# Patient Record
Sex: MALE | Race: WHITE | NOT HISPANIC OR LATINO | Employment: OTHER | URBAN - METROPOLITAN AREA
[De-identification: names, ages, dates, MRNs, and addresses within clinical notes are randomized per-mention and may not be internally consistent; named-entity substitution may affect disease eponyms.]

---

## 2017-01-09 ENCOUNTER — ALLSCRIPTS OFFICE VISIT (OUTPATIENT)
Dept: OTHER | Facility: OTHER | Age: 52
End: 2017-01-09

## 2017-02-13 ENCOUNTER — HOSPITAL ENCOUNTER (OUTPATIENT)
Dept: CT IMAGING | Facility: CLINIC | Age: 52
Discharge: HOME | End: 2017-02-13
Payer: MEDICARE

## 2017-02-13 ENCOUNTER — ALLSCRIPTS OFFICE VISIT (OUTPATIENT)
Dept: OTHER | Facility: OTHER | Age: 52
End: 2017-02-13

## 2017-02-13 DIAGNOSIS — F17.200 NICOTINE DEPENDENCE, UNCOMPLICATED: ICD-10-CM

## 2017-02-13 DIAGNOSIS — J45.909 UNCOMPLICATED ASTHMA: ICD-10-CM

## 2017-02-13 DIAGNOSIS — J44.9 CHRONIC OBSTRUCTIVE PULMONARY DISEASE (HCC): ICD-10-CM

## 2017-02-13 PROCEDURE — 71250 CT THORAX DX C-: CPT

## 2017-09-06 ENCOUNTER — GENERIC CONVERSION - ENCOUNTER (OUTPATIENT)
Dept: OTHER | Facility: OTHER | Age: 52
End: 2017-09-06

## 2017-10-11 ENCOUNTER — ALLSCRIPTS OFFICE VISIT (OUTPATIENT)
Dept: OTHER | Facility: OTHER | Age: 52
End: 2017-10-11

## 2017-10-13 NOTE — PROGRESS NOTES
Assessment  1  Acute otitis externa of left ear (380 10) (H60 502)   2  Acute asthmatic bronchitis (493 90) (J45 909)   3  History of hematemesis (V12 79) (Z87 19)   4  GERD (gastroesophageal reflux disease) (530 81) (K21 9)   5  Polysubstance dependence (304 80) (F19 20)   6  Nicotine dependence (305 1) (F17 200)    Plan  Acute asthmatic bronchitis    · Cefuroxime Axetil 500 MG Oral Tablet; Take 1 tablet twice daily for 10 days   · ProAir  (90 Base) MCG/ACT Inhalation Aerosol Solution; INHALE 2 PUFFS  EVERY 4-6 HOURS AS NEEDED  Acute otitis externa of left ear    · Neomycin-Polymyxin-HC 3 5-57409-0 Otic Suspension; 2 drops to the affected ear  QID for 7 days  GERD (gastroesophageal reflux disease), Hematemesis/vomiting blood    · Omeprazole 40 MG Oral Capsule Delayed Release; take 1 capsule daily   · 2 - Harry Gray MD, Lore[facility] Chatman  (Gastroenterology) Co-Management  *  Status: Active  Requested  for: 36SOP4688  Care Summary provided  : Yes  Polysubstance dependence    · 3 - [facility] (Psychiatry) Co-Management  *  Status: Hold For - Scheduling   Requested for: 19QHM9901  are Referring to a non- Preferred Provider : Quality  Care Summary provided  : Yes    Discussion/Summary    1  Acute otitis externa of the left ear-the patient will use the eardrops as ordered  He will try to avoid water in the ear and will keep it dry  He will call if the symptoms do not improve within a week  Acute asthmatic bronchitis-we will treat the patient with antibiotic as ordered and also gave him a albuterol inhaler to use as needed for shortness of breath  Status post episode of hematemesis and history of GERD-we will start the patient on the omeprazole as ordered  I have referred him to GI for further evaluation and probable endoscopy  He was advised to follow up in the emergency room with any recurrent symptoms of him out and assess  Polysubstance abuse-I referred him to an outpatient drug and alcohol treatment program as ordered to help with his recovery  We will see him back in the office as scheduled  Chief Complaint  Cough with mucous, and left ear pain, swollen, and clogged  Vomiting blood and blood in stool  History of Present Illness  HPI: Mireya Major is a 68-year-old male who presents today with a cough productive mucus as left ear is painful and swollen  He is having a cough and chest congestion  He is smoking 2 ppd  His left ear is painful and swollen for 5 months  It is worse recently  There is some drainage form the ear  There is no bleeding  There is pressure in the ear  There are no fevers or chills  He had an episode of a large amount of hematemesis on 10/05/2017- he recently stopped taking his opioids that he was getting off the street  He had been having withdrawal symptoms-last week and he was having diarrhea and sweats  He felt better by 10/04/2017  He was taking a lot of Advil and developed burning in his esophagus and stomach and he had 2 severe episodes of hematemesis  He has not had any further episodes since then  He was having black tarry stools-and that has resolved- he only had one episode  He is not having pain  He is drinking alcohol  He did not go to the emergency room with his symptoms  Currently has had no further episodes of hematemesis but still has some abdominal burning and   Cold Symptoms: [address] presents with complaints of cold symptoms  Associated symptoms include sneezing,-nasal congestion,-post nasal drainage,-productive cough,-plugged ear(s)-and-ear pain, but-no runny nose,-no scratchy throat,-no sore throat,-no hoarseness,-no dry cough,-no facial pressure,-no facial pain,-no headache,-no swollen lymph nodes,-no wheezing,-no shortness of breath,-no fatigue,-no weakness,-no nausea,-no vomiting,-no diarrhea,-no fever-and-no chills  Hematemesis: [address] presents with complaints of hematemesis     Associated symptoms include vomiting blood-and-epigastric pain, but-no bloating,-no cramping,-no nausea,-no prolonged emesis,-no prolonged retching,-no diarrhea,-no hematochezia,-no melena,-no anorexia,-no easy bruising,-no epistaxis-and-no hemoptysis  Review of Systems    Constitutional: as noted in HPI    ENT: as noted in HPI  Cardiovascular: as noted in HPI  Respiratory: as noted in HPI  Gastrointestinal: as noted in HPI  Genitourinary: as noted in HPI  Musculoskeletal: as noted in HPI  Integumentary: as noted in HPI  Active Problems  1  Bipolar II disorder (296 89) (F31 81)   2  Chronic asthmatic bronchitis (493 20) (J44 9)   3  Chronic depression (311) (F32 9)   4  Nicotine dependence (305 1) (F17 200)   5  Polysubstance dependence (304 80) (F19 20)   6  Suicidal ideation (V62 84) (R45 851)    Past Medical History  1  History of Acute asthmatic bronchitis (493 90) (J45 909)   2  History of Acute maxillary sinusitis (461 0) (J01 00)   3  History of Acute recurrent pansinusitis (461 8) (J01 41)   4  History of Bilateral impacted cerumen (380 4) (H61 23)   5  History of Bronchitis, asthmatic (493 90) (J45 909)   6  History of Contusion Of The Right Knee With Intact Skin Surface (924 11)   7  History of Depression (311) (F32 9)   8  History of Encounter for screening for malignant neoplasm of rectum (V76 41) (Z12 12)   9  History of acute bronchitis (V12 69) (Z87 09)   10  History of hematemesis (V12 79) (Z87 19)   11  History of pneumonia (V12 61) (Z87 01)   12  History of sebaceous cyst (V13 3) (Z87 2)   13  History of tinea corporis (V12 09) (Z86 19)   14  History of Impacted cerumen, unspecified laterality (380 4) (H61 20)   15  History of Joint pain, knee (719 46) (M25 569)   16  History of Pneumonia of left lower lobe due to Haemophilus influenzae (482 2) (J14)  Active Problems And Past Medical History Reviewed: The active problems and past medical history were reviewed and updated today  Family History  Mother    1  Family history of Colon Cancer (V16 0)   2  Family history of Diabetes Mellitus (V18 0)  Family History Reviewed: The family history was reviewed and updated today  Social History   · Alcohol Use (History)   · consumes 5-6 beers daily   · Current Every Day Smoker (305 1)   · 1 1/2 PPD   · Daily Coffee Consumption (3  Cups/Day)  The social history was reviewed and updated today  The social history was reviewed and is unchanged  Current Meds   1  Cefuroxime Axetil 500 MG Oral Tablet; Take 1 tablet twice daily; Therapy: 68OAM7034 to (Last Rx:13Nae3069)  Requested for: 12Edj5473 Ordered    The medication list was reviewed and updated today  Allergies  1  Levaquin    Vitals   Recorded: 19NDG1016 03:04PM   Temperature 98 4 F, Tympanic   Heart Rate 88   Systolic 993, RUE, Sitting   Diastolic 70, RUE, Sitting   Height 5 ft 7 in   Weight 118 lb 8 0 oz   BMI Calculated 18 56   BSA Calculated 1 62     Physical Exam    Constitutional   General appearance: No acute distress, well appearing and well nourished  Ears, Nose, Mouth, and Throat   External inspection of ears and nose: Normal     Otoscopic examination: Abnormal   The right tympanic membrane was normal  The right external canal was normal  The left external canal was tender,-was swollen-and-was erythematous  Nasal mucosa, septum, and turbinates: Normal without edema or erythema  Oropharynx: Abnormal   The posterior pharynx was erythematous, but-did not have an exudate  Pulmonary   Respiratory effort: No increased work of breathing or signs of respiratory distress  Auscultation of lungs: Abnormal   Auscultation of the lungs revealed expiratory wheezing  rales/crackles over both bases  rhonchi over both bases  no friction rub  diffuse wheezing bilaterally  diminished breath sounds over both bases  bronchial breath sounds over the bases bilaterally  Cardiovascular   Auscultation of heart: Normal rate and rhythm, normal S1 and S2, without murmurs      Examination of extremities for edema and/or varicosities: Normal     Carotid pulses: Normal     Abdomen   Abdomen: Non-tender, no masses  Liver and spleen: No hepatomegaly or splenomegaly  Lymphatic   Palpation of lymph nodes in neck: No lymphadenopathy           Signatures   Electronically signed by : Destin Ramires DO; Oct 12 2017  7:24AM EST                       (Author)

## 2017-11-08 ENCOUNTER — GENERIC CONVERSION - ENCOUNTER (OUTPATIENT)
Dept: OTHER | Facility: OTHER | Age: 52
End: 2017-11-08

## 2017-11-16 ENCOUNTER — GENERIC CONVERSION - ENCOUNTER (OUTPATIENT)
Dept: OTHER | Facility: OTHER | Age: 52
End: 2017-11-16

## 2017-11-16 DIAGNOSIS — F32.9 MAJOR DEPRESSIVE DISORDER, SINGLE EPISODE: ICD-10-CM

## 2017-11-16 DIAGNOSIS — F17.200 NICOTINE DEPENDENCE, UNCOMPLICATED: ICD-10-CM

## 2017-11-16 DIAGNOSIS — F31.81 BIPOLAR II DISORDER (HCC): ICD-10-CM

## 2017-11-30 ENCOUNTER — GENERIC CONVERSION - ENCOUNTER (OUTPATIENT)
Dept: FAMILY MEDICINE CLINIC | Facility: CLINIC | Age: 52
End: 2017-11-30

## 2018-01-03 ENCOUNTER — ALLSCRIPTS OFFICE VISIT (OUTPATIENT)
Dept: OTHER | Facility: OTHER | Age: 53
End: 2018-01-03

## 2018-01-05 NOTE — PROGRESS NOTES
Assessment   1  Acute maxillary sinusitis (461 0) (J01 00)   2  Acute bronchitis, unspecified organism (466 0) (J20 9)   3  Nicotine dependence (305 1) (F17 200)    Plan   Acute bronchitis, unspecified organism, Acute maxillary sinusitis    · Clarithromycin 500 MG Oral Tablet; TAKE 1 TABLET EVERY 12 HOURS DAILY   · PredniSONE 20 MG Oral Tablet; Take 3 tablets daily for 2 days, then take 2 tablets    daily for 3 days, then take 1 tablet daily for 3 days then stop  Acute maxillary sinusitis    · Apply warm moist compresses to the affected area 3 times a day for 5 minutes ;    Status:Complete;   Done: 19PNY1690   · Drink at least 6 glasses of water or juice a day ; Status:Complete;   Done: 95HXA8297   · How to use a nasal spray ; Status:Complete;   Done: 45UPJ9129   · Irrigate your nose twice a day ; Status:Complete;   Done: 53GBN3949   · Taking a hot steamy shower may help your condition ; Status:Complete;   Done:    59BYA2838   · Follow Up if Not Better Evaluation and Treatment  Follow-up  Status: Complete  Done:    88QBV3606   · Call [phone] if: The fever comes back after being normal for 2 days ;    Status:Complete;   Done: 00YFP6972   · Call [phone] if: The sinus pain is not better in 1 week ; Status:Complete;   Done:    22GFM8702   · Call [phone] if: The symptoms are not better in 7 days ; Status:Complete;   Done:    63RFG8083   · Call [phone] if: The symptoms come back after the medications are finished ;    Status:Complete;   Done: 49ETX1302   · Call [phone] if: You start vomiting ; Status:Complete;   Done: 66CUC3884   · Call [phone] if: Your sinus pain is worse ; Status:Complete;   Done: 50ZDE4071   · Call [phone] if:  Your temperature is higher than 101F ; Status:Complete;   Done:    54GIW3038  GERD (gastroesophageal reflux disease)    · Follow-up visit in 2 weeks Evaluation and Treatment  Follow-up  Status: Hold For -    Scheduling  Requested for: 13RXT9870    Discussion/Summary      The patient will take the antibiotic as prescribed  He can continue with his over-the-counter decongestant as needed for symptom relief  He will increase his fluids and rest  He'll call if there's no improvement within one week or if the condition worsens  Possible side effects of new medications were reviewed with the patient/guardian today  The treatment plan was reviewed with the patient/guardian  The patient/guardian understands and agrees with the treatment plan      Chief Complaint   1  Cough  Congested cough      History of Present Illness   HPI: Jimmy Luque is a 51-year-old male who presents today with a congestive cough  He had a cold that started on 12/22/2017 which seemed to get better by 12/25/2017  He then started with more bacterial symptoms a day or 2 later  There is a cough and chest congestion  He is bringing up mucous and his ears are popping  There are low grade fevers  There is nasal congestion and pressure  There is wheezing and shortness of breath  There is some chest discomfort  There is tightness in his chest     Cough: [address] presents with complaints of cough  Associated symptoms include chills,-- fever,-- runny nose,-- stuffy nose,-- sore throat,-- postnasal drainage-- and-- painful swallowing, but-- no dyspnea,-- no wheezing,-- no myalgias,-- no pleuritic chest pain,-- no chest pain,-- no vomiting,-- no heartburn,-- no mouth breathing,-- no noisy breathing,-- no rapid breathing,-- no hoarseness,-- no eye itching,-- no nose itching,-- no hemoptysis-- and-- no night sweats  Review of Systems        Constitutional: as noted in HPI       ENT: as noted in HPI  Cardiovascular: as noted in HPI  Respiratory: as noted in HPI  Gastrointestinal: as noted in HPI  Genitourinary: as noted in HPI  Musculoskeletal: as noted in HPI  Integumentary: as noted in HPI  Active Problems   1   Bipolar II disorder (296 89) (F31 81)   2  Chronic depression (311) (F32 9)   3  Chronic sinusitis (473 9) (J32 9)   4  Cough due to bronchospasm (519 11) (J98 01)   5  GERD (gastroesophageal reflux disease) (530 81) (K21 9)   6  Nicotine dependence (305 1) (F17 200)   7  Polysubstance dependence (304 80) (F19 20)    Past Medical History   1  History of Acute asthmatic bronchitis (493 90) (J45 909)   2  History of Acute maxillary sinusitis (461 0) (J01 00)   3  History of Acute recurrent pansinusitis (461 8) (J01 41)   4  History of Bilateral impacted cerumen (380 4) (H61 23)   5  History of Bronchitis, asthmatic (493 90) (J45 909)   6  History of Contusion Of The Right Knee With Intact Skin Surface (924 11)   7  History of Depression (311) (F32 9)   8  History of Encounter for screening for malignant neoplasm of rectum (V76 41) (Z12 12)   9  History of acute bronchitis (V12 69) (Z87 09)   10  History of hematemesis (V12 79) (Z87 19)   11  History of pneumonia (V12 61) (Z87 01)   12  History of sebaceous cyst (V13 3) (Z87 2)   13  History of suicidal ideation (V11 8) (Z86 59)   14  History of tinea corporis (V12 09) (Z86 19)   15  History of Impacted cerumen, unspecified laterality (380 4) (H61 20)   16  History of Joint pain, knee (719 46) (M25 569)   17  History of Pneumonia of left lower lobe due to Haemophilus influenzae (482 2) (J14)  Active Problems And Past Medical History Reviewed: The active problems and past medical history were reviewed and updated today  Family History   Mother    1  Family history of Colon Cancer (V16 0)   2  Family history of Diabetes Mellitus (V18 0)  Family History Reviewed: The family history was reviewed and updated today  Social History    · Alcohol Use (History)   · consumes 5-6 beers daily   · Current Every Day Smoker (305 1)   · 1 1/2 PPD   · Daily Coffee Consumption (3  Cups/Day)  The social history was reviewed and updated today  The social history was reviewed and is unchanged  Current Meds    1  No Reported Medications Recorded     The medication list was reviewed and updated today  Allergies   1  Levaquin    Vitals    Recorded: T6806374 12:50PM Recorded: 03XLU1240 12:18PM   Temperature  98 3 F, Tympanic   Heart Rate  78, L Radial   Pulse Quality  Regular, L Radial   Systolic  971, LUE, Sitting   Diastolic  80, LUE, Sitting   Height  5 ft 7 in   Weight  116 lb    BMI Calculated  18 17   BSA Calculated  1 6   O2 Saturation 96, RA      Physical Exam        Constitutional      General appearance: No acute distress, well appearing and well nourished  Ears, Nose, Mouth, and Throat      External inspection of ears and nose: Normal        Otoscopic examination: Abnormal   The right tympanic membrane was normal  The right external canal was normal  The left external canal was tender,-- was swollen-- and-- was erythematous  Nasal mucosa, septum, and turbinates: Normal without edema or erythema  Oropharynx: Abnormal   The posterior pharynx was erythematous, but-- did not have an exudate  Pulmonary      Respiratory effort: No increased work of breathing or signs of respiratory distress  Auscultation of lungs: Abnormal   Auscultation of the lungs revealed expiratory wheezing  rales/crackles over both bases  rhonchi over both bases  no friction rub  diffuse wheezing bilaterally  diminished breath sounds over both bases  bronchial breath sounds over the bases bilaterally  Cardiovascular      Auscultation of heart: Normal rate and rhythm, normal S1 and S2, without murmurs  Examination of extremities for edema and/or varicosities: Normal        Carotid pulses: Normal        Abdomen      Abdomen: Non-tender, no masses  Liver and spleen: No hepatomegaly or splenomegaly  Lymphatic      Palpation of lymph nodes in neck: No lymphadenopathy            Signatures    Electronically signed by : Ebony Mtz DO; Jan 4 2018  5:37AM EST (Author)

## 2018-01-09 NOTE — MISCELLANEOUS
Message   Recorded as Task   Date: 09/06/2017 08:06 AM, Created By: Rajesh Charles   Task Name: Call Back   Assigned To: Liliya,April   Regarding Patient: Huel Cranker, Status: In Progress   CommentCasandnetta Milling - 06 Sep 2017 8:06 AM     TASK CREATED  Caller: Self; Other; [phone] (Home)  PT STARTED WITHDRAW FOR TWO DAYS AND THEN LAST NIGHT TOOK A 50 MG OF NALTROXENE AND NOW HE IS GOING THROUGH WITHDRAW WORSE HAS SWEATS AND DIARHEAA VERY BAD HE WANTS TO KNOW IF THERE IS ANYTHING HE CAN DO TO GET THE NALTROXENE OUT OF HIS SYSTEM HE RESEARCHED IT AND IT SAID TO TAKE AFTER YOU WERE TOTALLY WITHDRAWN AND HE TOOK IT TO SOON HE THINKS CONTACT HIM AT [facility] - 06 Sep 2017 8:30 AM     TASK EDITED  Tell him I cannot help him here with this   he needs to go to the emergency room  Do not off from an appointment for this   Liliya,April - 06 Sep 2017 9:43 AM     TASK EDITED  phone line [place] [phone] - 06 Sep 2017 9:43 AM     TASK IN [address] [place] [address] Street - 06 Sep 2017 9:47 AM     TASK EDITED  patient was repeatedly advised to go to emergency room, Johnny stated he did not want to go to the ER and that he would just do things "his way"  patient was advised to go to the local emergency room if he changes his mind and it was advisable to go to the emergency room by Dr Drew Davis and that Dr Drew Davis was unable to help him here with this  Active Problems   1  Acute asthmatic bronchitis (493 90) (J45 909)  2  Acute maxillary sinusitis (461 0) (J01 00)  3  Bipolar II disorder (296 89) (F31 81)  4  Chronic asthmatic bronchitis (493 20) (J44 9)  5  Chronic depression (311) (F32 9)  6  Nicotine dependence (305 1) (F17 200)  7  Pneumonia (486) (J18 9)  8  Polysubstance dependence (304 80) (F19 20)  9  Suicidal ideation (V62 84) (R49 851)    Current Meds  1  Cefuroxime Axetil 500 MG Oral Tablet; Take 1 tablet twice daily;    Therapy: 16FYU9127 to (Last Rx:63Ila3353)  Requested for: 14ZMJ3574 Ordered    Allergies   1   Levaquin    Signatures   Electronically signed by : CASSIUS Kidd ; Sep  6 2017 10:00AM EST                       (Author)

## 2018-01-13 VITALS
BODY MASS INDEX: 18.21 KG/M2 | HEIGHT: 67 IN | SYSTOLIC BLOOD PRESSURE: 122 MMHG | DIASTOLIC BLOOD PRESSURE: 80 MMHG | WEIGHT: 116 LBS | HEART RATE: 70 BPM | TEMPERATURE: 98.4 F

## 2018-01-13 VITALS
BODY MASS INDEX: 18.6 KG/M2 | DIASTOLIC BLOOD PRESSURE: 70 MMHG | SYSTOLIC BLOOD PRESSURE: 120 MMHG | WEIGHT: 118.5 LBS | TEMPERATURE: 98.4 F | HEIGHT: 67 IN | HEART RATE: 88 BPM

## 2018-01-14 VITALS
DIASTOLIC BLOOD PRESSURE: 72 MMHG | HEART RATE: 70 BPM | TEMPERATURE: 98.4 F | SYSTOLIC BLOOD PRESSURE: 122 MMHG | BODY MASS INDEX: 18.83 KG/M2 | HEIGHT: 67 IN | WEIGHT: 120 LBS

## 2018-01-16 NOTE — MISCELLANEOUS
Message   Recorded as Task   Date: 11/07/2017 02:25 PM, Created By: Fredda Phoenix   Task Name: Medical Complaint Callback   Assigned To: Darlene Gonzalez   Regarding Patient: Juan Jose Hammond, Status: In Progress   Comment: Fredda Phoenix - 07 Nov 2017 2:25 PM     TASK CREATED  Caller: Self; Medical Complaint; [phone] (Home)  PT IS NOT BETTER WITH HIS EAR IT IS A LITTLE WORSE HE CANT GET THE DROPS IN HIS EAR HE WILL LIKE THE NAME OF AN ENT HE CAN GO TO PT # [phone]   Fabi Loza - 07 Nov 2017 2:37 PM     TASK REASSIGNED: Previously Assigned To Fabi Loza  We can refer him to Dr Iliana Bashir in [place]  Darlene Gonzalez - 07 Nov 2017 5:14 PM     TASK EDITED  spoke to patient, he is driving and I will call him in the morning with the number   Darlene Gonzalez - 07 Nov 2017 5:14 PM     TASK IN PROGRESS   Darlene Gonzalez - 08 Nov 2017 9:22 AM     TASK EDITED  left detailed message for amna  Active Problems    1  Acute asthmatic bronchitis (493 90) (J45 909)   2  Acute otitis externa of left ear (380 10) (H60 502)   3  Bipolar II disorder (296 89) (F31 81)   4  Chronic asthmatic bronchitis (493 20) (J44 9)   5  Chronic depression (311) (F32 9)   6  GERD (gastroesophageal reflux disease) (530 81) (K21 9)   7  Nicotine dependence (305 1) (F17 200)   8  Polysubstance dependence (304 80) (F19 20)   9  Suicidal ideation (V62 84) (R45 851)    Current Meds   1  Doxycycline Monohydrate 100 MG Oral Tablet; TAKE 1 TABLET TWICE DAILY; Therapy: 56OPW2047 to (Evaluate:10Nov2017)  Requested for: 31Oct2017; Last   Rx:31Oct2017 Ordered   2  Neomycin-Polymyxin-HC 3 5-52730-0 Otic Suspension; 2 drops to the affected ear QID   for 7 days; Therapy: 89GGP8257 to (Evaluate:18Oct2017)  Requested for: 72XTT7504; Last   Rx:11Oct2017 Ordered   3  Omeprazole 40 MG Oral Capsule Delayed Release; take 1 capsule daily; Therapy: 58JGL4808 to (341 754 221)  Requested for: 13YTU0292; Last   Rx:11Oct2017 Ordered   4   ProAir  (90 Base) MCG/ACT Inhalation Aerosol Solution; INHALE 2 PUFFS   EVERY 4-6 HOURS AS NEEDED; Therapy: 25YCZ6127 to (Last Rx:11Oct2017)  Requested for: 11Oct2017 Ordered    Allergies    1   Levaquin    Signatures   Electronically signed by : Savi Kuo, ; Nov 8 2017  9:23AM EST                       (Author)

## 2018-01-16 NOTE — MISCELLANEOUS
Message  Patient called the office this morning  He was seen at [facility] ER on 9/24/16 and was interviewed by a crisis   He was apparently referred for outpatient treatment to the [facility] for detox  He is trying to be admitted but wants us to contact them and help him facilitate therapy  Spoke to "[facility] [employer]" at [facility] and she will contact patient this morning  Requested a call back from the facility to followup but she did not return another call to our office  [facility] - phone # [phone] ELOISA      Active Problems    1  Bipolar II disorder (296 89) (F31 81)   2  Chronic asthmatic bronchitis (493 20) (J44 9,J45 909)   3  Chronic depression (311) (F32 9)   4  Nicotine dependence (305 1) (F17 200)   5  Polysubstance dependence (304 80) (F19 20)    Current Meds   1  No Reported Medications Recorded    Allergies    1   Levaquin    Signatures   Electronically signed by : Maynard Najjar, ; Sep 26 2016  4:32PM EST                       (Author)

## 2018-01-17 NOTE — PROGRESS NOTES
Assessment    1  Bronchitis, asthmatic (164 90) (J45 909)    Plan  Bronchitis, asthmatic    · DrRx Zithromax Z-Elpidio 250 MG #6 pill pack; Day one two tablet day 2 through 5   1  tab   · PredniSONE 10 MG Oral Tablet; Day 1  50 mg after breakfast  Day 2  40 mg after breakfast  Day 3  30 mg after breakfast  Day 4  20 mg after breakfast  Day 5  10 mg   · Follow Up if Not Better Evaluation and Treatment  Follow-up  Status: Complete  Done:  [id number]   · Avoid exposure to cigarette smoke ; Status:Complete;   Done: 76YUN4350   · Do not resume your normal level of activity until you have been rechecked ;  Status:Complete;   Done: 43MHO4616   · Drink at least 6 glasses of clear liquids a day ; Status:Complete;   Done: 37HIH4345   · Call [phone] if: You have difficulty breathing, or you are short of breath more  often ; Status:Complete;   Done: 37YIN7274    Discussion/Summary    47-year-old male with asthmatic bronchitis  This is undoubtedly a followup to probably a viral infection  I'm giving him an antibiotic and prednisone taper  I've encouraged him to try hard to quit smoking  He will call me if he is not getting better  Chief Complaint    1  Cold Symptoms   2  Cough  Sinus congestion, coughing which was productive @ first but not now body aches      History of Present Illness  HPI: 47-year-old male with recurring bronchitis/sinusitis  Was treated a month ago and doing well until a few days ago when he began to become congested and started to cough up discolored productive phlegm      Review of Systems    Constitutional: feeling poorly  ENT: sore throat and nasal discharge  Cardiovascular: no complaints of slow or fast heart rate, no chest pain, no palpitations, no leg claudication or lower extremity edema  Respiratory: cough  Gastrointestinal: no complaints of abdominal pain, no constipation, no nausea or vomiting, no diarrhea or bloody stools     Genitourinary: no complaints of dysuria or incontinence, no hesitancy, no nocturia, no genital lesion, no inadequacy of penile erection  Musculoskeletal: no complaints of arthralgia, no myalgia, no joint swelling or stiffness, no limb pain or swelling  Integumentary: no complaints of skin rash or lesion, no itching or dry skin, no skin wounds  Neurological: no complaints of headache, no confusion, no numbness or tingling, no dizziness or fainting  Active Problems    1  Acute recurrent pansinusitis (461 8) (J01 41)   2  Bronchitis, asthmatic (493 90) (J45 909)   3  Nicotine dependence (305 1) (F17 200)   4  Tinea corporis (110 5) (B35 4)    Past Medical History    1  History of Contusion Of The Right Knee With Intact Skin Surface (924 11)   2  History of Depression (311) (F32 9)   3  History of Encounter for screening for malignant neoplasm of rectum (V76 41) (Z12 12)   4  History of acute bronchitis (V12 69) (Z87 09)   5  History of sebaceous cyst (V13 3) (Z87 2)   6  History of Impacted cerumen, unspecified laterality (380 4) (H61 20)   7  History of Joint pain, knee (719 46) (M25 569)   8  History of Pneumonia of left lower lobe due to Haemophilus influenzae (482 2) (J14)  Active Problems And Past Medical History Reviewed: The active problems and past medical history were reviewed and updated today  Family History    1  Family history of Colon Cancer (V16 0)   2  Family history of Diabetes Mellitus (V18 0)  Family History Reviewed: The family history was reviewed and updated today  Social History    · Alcohol Use (History)   · consumes 5-6 beers daily   · Current Every Day Smoker (305 1)   · 1 1/2 PPD   · Daily Coffee Consumption (3  Cups/Day)  The social history was reviewed and updated today  The social history was reviewed and is unchanged  Surgical History  Surgical History Reviewed: The surgical history was reviewed and updated today  Current Meds   1   Ketoconazole 2 % External Cream; APPLY A THIN LAYER TO AFFECTED AREA(S)   TWICE DAILY; Therapy: 50SBQ2855 to (Complete:06Bug4812)  Requested for: 12Gcu8613; Last   Rx:49Mwn8745 Ordered    Allergies    1  Levaquin    Vitals   Recorded: 36OMR4025 02:57PM   Temperature 98 7 F, Tympanic   Heart Rate 78, L Radial   Pulse Quality Regular   Systolic 062, LUE, Sitting   Diastolic 70, LUE, Sitting   Height 5 ft 7 in   Weight 120 lb    BMI Calculated 18 79   BSA Calculated 1 63     Physical Exam    Constitutional   General appearance: Abnormal   chronically ill and appearance reflects stated age  Ears, Nose, Mouth, and Throat   External inspection of ears and nose: Normal     Otoscopic examination: Tympanic membrance translucent with normal light reflex  Canals patent without erythema  Nasal mucosa, septum, and turbinates: Normal without edema or erythema  Oropharynx: Abnormal   The posterior pharynx was erythematous  Pulmonary   Auscultation of lungs: Abnormal   Auscultation of the lungs revealed decreased breath sounds diffusely  Cardiovascular   Auscultation of heart: Normal rate and rhythm, normal S1 and S2, without murmurs      Examination of extremities for edema and/or varicosities: Normal     Carotid pulses: Normal          Signatures   Electronically signed by : CASSIUS Obrien ; Jan 25 2016  3:35PM EST                       (Author)

## 2018-01-18 NOTE — MISCELLANEOUS
Message  I spoke with Ed today at 3:30 PM  I told him the labs that were done recently in the emergency room showed positivity for opiates and alcohol  His kidney and liver functions and CBC and urinalysis were normal  I told him that I was started this point and but I was not about to give him any controlled substances and that I felt that his attempts to detoxify himself as an outpatient would probably not be altogether successful and suggested again to get back in touch with [facility] we intervened and spoke to them and asked them to please consider admitting him for detoxification  I think he is not seriously interested in doing this  I told him that I would help in any way I could but that if he was going to withdrawal from opiates he had probably best present himself to the hospital again pmc      Signatures   Electronically signed by : CASSIUS Mendez ; Sep 27 2016  3:49PM EST                       (Author)

## 2018-01-22 VITALS
DIASTOLIC BLOOD PRESSURE: 70 MMHG | TEMPERATURE: 97 F | HEART RATE: 78 BPM | SYSTOLIC BLOOD PRESSURE: 128 MMHG | RESPIRATION RATE: 16 BRPM | HEIGHT: 67 IN | BODY MASS INDEX: 18.52 KG/M2 | WEIGHT: 118 LBS

## 2018-01-23 VITALS
HEART RATE: 78 BPM | SYSTOLIC BLOOD PRESSURE: 128 MMHG | DIASTOLIC BLOOD PRESSURE: 80 MMHG | TEMPERATURE: 98.3 F | WEIGHT: 116 LBS | HEIGHT: 67 IN | BODY MASS INDEX: 18.21 KG/M2 | OXYGEN SATURATION: 96 %

## 2018-04-04 ENCOUNTER — APPOINTMENT (OUTPATIENT)
Dept: RADIOLOGY | Facility: CLINIC | Age: 53
End: 2018-04-04
Payer: MEDICARE

## 2018-04-04 ENCOUNTER — OFFICE VISIT (OUTPATIENT)
Dept: FAMILY MEDICINE CLINIC | Facility: CLINIC | Age: 53
End: 2018-04-04
Payer: MEDICARE

## 2018-04-04 VITALS
TEMPERATURE: 98.2 F | DIASTOLIC BLOOD PRESSURE: 74 MMHG | SYSTOLIC BLOOD PRESSURE: 110 MMHG | WEIGHT: 119 LBS | HEIGHT: 67 IN | HEART RATE: 74 BPM | BODY MASS INDEX: 18.68 KG/M2

## 2018-04-04 DIAGNOSIS — M54.2 NECK PAIN, ACUTE: ICD-10-CM

## 2018-04-04 DIAGNOSIS — M25.511 ACUTE PAIN OF RIGHT SHOULDER DUE TO TRAUMA: ICD-10-CM

## 2018-04-04 DIAGNOSIS — J01.01 ACUTE RECURRENT MAXILLARY SINUSITIS: Primary | ICD-10-CM

## 2018-04-04 DIAGNOSIS — J32.9 CHRONIC SINUSITIS, UNSPECIFIED LOCATION: ICD-10-CM

## 2018-04-04 DIAGNOSIS — G89.29 CHRONIC BILATERAL LOW BACK PAIN WITHOUT SCIATICA: ICD-10-CM

## 2018-04-04 DIAGNOSIS — J01.01 ACUTE RECURRENT MAXILLARY SINUSITIS: ICD-10-CM

## 2018-04-04 DIAGNOSIS — M54.50 CHRONIC BILATERAL LOW BACK PAIN WITHOUT SCIATICA: ICD-10-CM

## 2018-04-04 DIAGNOSIS — M67.40 GANGLION CYST: ICD-10-CM

## 2018-04-04 DIAGNOSIS — G89.11 ACUTE PAIN OF RIGHT SHOULDER DUE TO TRAUMA: ICD-10-CM

## 2018-04-04 DIAGNOSIS — J44.9 CHRONIC OBSTRUCTIVE PULMONARY DISEASE, UNSPECIFIED COPD TYPE (HCC): ICD-10-CM

## 2018-04-04 DIAGNOSIS — J20.0 ACUTE BRONCHITIS DUE TO MYCOPLASMA PNEUMONIAE: ICD-10-CM

## 2018-04-04 PROBLEM — K21.9 GERD (GASTROESOPHAGEAL REFLUX DISEASE): Status: ACTIVE | Noted: 2017-10-11

## 2018-04-04 PROCEDURE — 72100 X-RAY EXAM L-S SPINE 2/3 VWS: CPT

## 2018-04-04 PROCEDURE — 73030 X-RAY EXAM OF SHOULDER: CPT

## 2018-04-04 PROCEDURE — 72040 X-RAY EXAM NECK SPINE 2-3 VW: CPT

## 2018-04-04 PROCEDURE — 71046 X-RAY EXAM CHEST 2 VIEWS: CPT

## 2018-04-04 PROCEDURE — 99214 OFFICE O/P EST MOD 30 MIN: CPT | Performed by: FAMILY MEDICINE

## 2018-04-04 RX ORDER — CLONIDINE HYDROCHLORIDE 0.1 MG/1
1 TABLET ORAL
COMMUNITY
Start: 2017-11-16 | End: 2018-10-15

## 2018-04-04 RX ORDER — AZITHROMYCIN 250 MG/1
TABLET, FILM COATED ORAL
Qty: 6 TABLET | Refills: 0 | Status: SHIPPED | OUTPATIENT
Start: 2018-04-04 | End: 2018-04-10

## 2018-04-04 RX ORDER — MOMETASONE FUROATE 50 UG/1
2 SPRAY, METERED NASAL DAILY
Qty: 17 G | Refills: 0 | Status: SHIPPED | OUTPATIENT
Start: 2018-04-04 | End: 2020-01-31 | Stop reason: ALTCHOICE

## 2018-04-04 RX ORDER — BUDESONIDE AND FORMOTEROL FUMARATE DIHYDRATE 160; 4.5 UG/1; UG/1
2 AEROSOL RESPIRATORY (INHALATION) 2 TIMES DAILY
COMMUNITY
Start: 2017-11-16 | End: 2020-01-31 | Stop reason: ALTCHOICE

## 2018-04-04 RX ORDER — PREDNISONE 20 MG/1
TABLET ORAL
Qty: 15 TABLET | Refills: 0 | Status: SHIPPED | OUTPATIENT
Start: 2018-04-04 | End: 2020-01-31 | Stop reason: ALTCHOICE

## 2018-04-04 NOTE — PROGRESS NOTES
Assessment/Plan:  1  Acute maxillary sinusitis and acute bronchitis-the patient will take antibiotic as ordered  He will take the prednisone taper as ordered  He can use his rescue inhaler every 4 hours as needed for shortness of breath  2   COPD-the patient will continue his inhalers as ordered  3   Chronic sinusitis-the patient will start the nasal spray as ordered  He will go for the follow-up CT scan as ordered  4   The patient will go for the x-rays of his shoulder, neck, and lumbar spine as ordered to evaluate his chronic pain  5  Ganglion cyst left hand- I am referring the patient to a hand specialist as ordered  We will follow up with the results of the testing and will see him back as scheduled  Diagnoses and all orders for this visit:    Acute recurrent maxillary sinusitis  -     azithromycin (ZITHROMAX) 250 mg tablet; Take 2 tablets today then 1 tablet daily x 4 days  -     predniSONE 20 mg tablet; Take 3 tablets for 2 days then 2 tablets for 3 days then 1 tablet for 3 days then stop  -     XR chest pa & lateral; Future    Acute bronchitis due to Mycoplasma pneumoniae  -     azithromycin (ZITHROMAX) 250 mg tablet; Take 2 tablets today then 1 tablet daily x 4 days  -     predniSONE 20 mg tablet; Take 3 tablets for 2 days then 2 tablets for 3 days then 1 tablet for 3 days then stop  -     XR chest pa & lateral; Future    Chronic obstructive pulmonary disease, unspecified COPD type ([facility] [address] )    Acute pain of right shoulder due to trauma  -     XR shoulder 2+ vw right; Future    Neck pain, acute  -     Cancel: XR spine cervical complete 4 or 5 vw non injury; Future    Chronic bilateral low back pain without sciatica  -     Cancel: XR spine lumbar minimum 4 views non injury; Future    Ganglion cyst  Comments:  left hand  Orders:  -     Ambulatory referral to Orthopedic Surgery;  Future    Chronic sinusitis, unspecified location  -     mometasone (NASONEX) 50 mcg/act nasal spray; 2 sprays into each nostril daily  -     CT sinus wo contrast; Future    Other orders  -     cloNIDine (CATAPRES) 0 1 mg tablet; Take 1 tablet by mouth daily at bedtime  -     albuterol (PROAIR HFA) 90 mcg/act inhaler; Inhale 2 puffs every 4 (four) hours as needed for shortness of breath  -     budesonide-formoterol (SYMBICORT) 160-4 5 mcg/act inhaler; Inhale 2 puffs 2 (two) times a day       No Follow-up on file  Subjective:   Chief Complaint   Patient presents with    Cold Like Symptoms     Nasal congested with yellow mucous, left ear pressure, and chest congested comes and goes for 4 months  Lump painful in left hand  Patient ID: Tin Arellano is a 50 y o  male presents today for nasal congestion, left ear pressure, and chest congestion  Page Meter is a 61-year-old male presents today with nasal congestion, left ear pressure, and chest congestion  He has had lingering congestion since December 2017  He was seen here is January 2018 and was placed on Clarithromycin and Prednisone and he did improve but his symptoms came back  He has had worsening symptoms over the last 3 weeks  He continues to smoke heavily  There is no fever  There is thick nasal drainage   There is a productive cough  He has no energy  He is very tired  There is mild wheezing  He has a painful lump on the palmar aspect of his left hand at the base of the 3rd finger for a month  The pain is getting worse and there is pain in his hand  There is swelling  He fell about 1 1/2 months ago on the ice and he landed on the right side  There was a contusion to the right shoulder  He did not seek medical care  There is no numbness in the shoulder  There is tingling in the right hand          URI        The following portions of the patient's history were reviewed and updated as appropriate: allergies, current medications, past family history, past medical history, past social history, past surgical history and problem list   Patient Active Problem List   Diagnosis    Bipolar II disorder (HCC)    Chronic depression    Chronic sinusitis    GERD (gastroesophageal reflux disease)    Nicotine dependence    Polysubstance dependence ([facility] )     Past Medical History:   Diagnosis Date    Bronchitis, asthmatic     last assessed: 3/21/2016    Depression     Pneumonia     last assessed: 12/27/2016    Pneumonia of left lower lobe due to Haemophilus influenzae ([facility] )     last assessed: 11/27/2015    Sebaceous cyst     last assessed: 12/26/2013    Substance abuse     Suicidal ideation     last assessed: 12/27/2016     No past surgical history on file  Allergies   Allergen Reactions    Levofloxacin     Quinolones      Family History   Problem Relation Age of Onset    Colon cancer Mother     Diabetes Mother      mellitus     Social History     Social History    Marital status: Single     Spouse name: N/A    Number of children: N/A    Years of education: N/A     Occupational History    Not on file  Social History Main Topics    Smoking status: Current Every Day Smoker     Packs/day: 1 50    Smokeless tobacco: Never Used    Alcohol use Yes      Comment: consumes 5-6 beers daily    Drug use: Yes     Types: Prescription      Comment: opiates    Sexual activity: Not on file     Other Topics Concern    Not on file     Social History Narrative    Coffee consumption (3 cups/day)     No current outpatient prescriptions on file prior to visit  No current facility-administered medications on file prior to visit  Review of Systems    Objective:  Vitals:    04/04/18 1414   BP: 110/74   BP Location: Left arm   Patient Position: Sitting   Cuff Size: Standard   Pulse: 74   Temp: 98 2 °F (36 8 °C)   TempSrc: Tympanic   Weight: 54 kg (119 lb)   Height: 5' 7" (1 702 m)     Body mass index is 18 64 kg/m²  Physical Exam   Constitutional: He appears well-developed and well-nourished     HENT:   Nose: Mucosal edema, rhinorrhea and sinus tenderness present  Right sinus exhibits maxillary sinus tenderness  Left sinus exhibits maxillary sinus tenderness  Eyes: Pupils are equal, round, and reactive to light  Neck: Normal range of motion  Neck supple  Cardiovascular: Normal rate, regular rhythm and intact distal pulses  Exam reveals no gallop and no friction rub  No murmur heard  Pulmonary/Chest: Effort normal  No respiratory distress  He has wheezes in the right middle field, the right lower field, the left middle field and the left lower field  He has rhonchi in the right middle field, the right lower field, the left middle field and the left lower field  He has rales  He exhibits no tenderness  Abdominal: Soft  Bowel sounds are normal  He exhibits no distension and no mass  There is no tenderness  There is no rebound and no guarding  Musculoskeletal: He exhibits tenderness  Right shoulder: He exhibits decreased range of motion, tenderness, bony tenderness and pain  He exhibits no swelling, no effusion, no deformity, no laceration and no spasm  Cervical back: He exhibits decreased range of motion, tenderness, bony tenderness and pain  Lumbar back: He exhibits decreased range of motion, tenderness and swelling  Left hand: He exhibits tenderness  There is a 1 cm palpable subcutaneous lesion in the palmar aspect of the left hand at the base of the 3rd finger  It is tender to touch and freely moveable  Lymphadenopathy:     He has cervical adenopathy

## 2018-04-06 ENCOUNTER — TELEPHONE (OUTPATIENT)
Dept: FAMILY MEDICINE CLINIC | Facility: CLINIC | Age: 53
End: 2018-04-06

## 2018-04-06 DIAGNOSIS — M50.30 DEGENERATIVE DISC DISEASE, CERVICAL: ICD-10-CM

## 2018-04-06 DIAGNOSIS — M47.22 CERVICAL RADICULOPATHY DUE TO DEGENERATIVE JOINT DISEASE OF SPINE: Primary | ICD-10-CM

## 2018-04-06 DIAGNOSIS — M43.10 ANTEROLISTHESIS: ICD-10-CM

## 2018-04-09 PROBLEM — M47.22 CERVICAL RADICULOPATHY DUE TO DEGENERATIVE JOINT DISEASE OF SPINE: Status: ACTIVE | Noted: 2018-04-09

## 2018-04-09 PROBLEM — M43.10 ANTEROLISTHESIS: Status: ACTIVE | Noted: 2018-04-09

## 2018-04-09 PROBLEM — M50.30 DEGENERATIVE DISC DISEASE, CERVICAL: Status: ACTIVE | Noted: 2018-04-09

## 2018-04-09 NOTE — TELEPHONE ENCOUNTER
I spoke with the patient on 4/7/2018 at 1:00 p m     I reviewed the results of his x-rays  His chest x-ray was unremarkable  The x-ray of his lumbar spine demonstrated some degenerative disc disease changes  The x-ray of the cervical spine demonstrated degenerative disc disease and demonstrated anterolisthesis of C3 and C4  This is on a degenerative basis  The patient has ongoing neck pain  He has been having weakness in both arms, numbness down both arms, and pain in the neck  We will order a MRI of the cervical spine for further evaluation

## 2018-04-11 ENCOUNTER — TELEPHONE (OUTPATIENT)
Dept: FAMILY MEDICINE CLINIC | Facility: CLINIC | Age: 53
End: 2018-04-11

## 2018-04-11 DIAGNOSIS — K21.9 GASTROESOPHAGEAL REFLUX DISEASE WITHOUT ESOPHAGITIS: Primary | ICD-10-CM

## 2018-04-11 DIAGNOSIS — F19.20 POLYSUBSTANCE DEPENDENCE (HCC): ICD-10-CM

## 2018-04-11 DIAGNOSIS — Z12.5 SCREENING FOR PROSTATE CANCER: ICD-10-CM

## 2018-04-11 DIAGNOSIS — F32.A CHRONIC DEPRESSION: ICD-10-CM

## 2018-04-11 DIAGNOSIS — F31.81 BIPOLAR II DISORDER (HCC): ICD-10-CM

## 2018-04-16 ENCOUNTER — TELEPHONE (OUTPATIENT)
Dept: FAMILY MEDICINE CLINIC | Facility: CLINIC | Age: 53
End: 2018-04-16

## 2018-04-16 DIAGNOSIS — J20.9 ACUTE BRONCHITIS, UNSPECIFIED ORGANISM: Primary | ICD-10-CM

## 2018-04-16 RX ORDER — DOXYCYCLINE HYCLATE 100 MG/1
100 CAPSULE ORAL EVERY 12 HOURS SCHEDULED
Qty: 14 CAPSULE | Refills: 0 | Status: SHIPPED | OUTPATIENT
Start: 2018-04-16 | End: 2018-04-23

## 2018-04-18 ENCOUNTER — APPOINTMENT (OUTPATIENT)
Dept: LAB | Facility: CLINIC | Age: 53
End: 2018-04-18
Payer: MEDICARE

## 2018-04-18 DIAGNOSIS — K21.9 GASTROESOPHAGEAL REFLUX DISEASE WITHOUT ESOPHAGITIS: ICD-10-CM

## 2018-04-18 DIAGNOSIS — F19.20 POLYSUBSTANCE DEPENDENCE (HCC): ICD-10-CM

## 2018-04-18 DIAGNOSIS — F31.81 BIPOLAR II DISORDER (HCC): ICD-10-CM

## 2018-04-18 DIAGNOSIS — Z12.5 SCREENING FOR PROSTATE CANCER: ICD-10-CM

## 2018-04-18 DIAGNOSIS — F32.A CHRONIC DEPRESSION: ICD-10-CM

## 2018-04-18 LAB
ALBUMIN SERPL BCP-MCNC: 3.8 G/DL (ref 3.5–5)
ALP SERPL-CCNC: 45 U/L (ref 46–116)
ALT SERPL W P-5'-P-CCNC: 30 U/L (ref 12–78)
AMYLASE SERPL-CCNC: 47 IU/L (ref 25–115)
ANION GAP SERPL CALCULATED.3IONS-SCNC: 5 MMOL/L (ref 4–13)
AST SERPL W P-5'-P-CCNC: 20 U/L (ref 5–45)
BASOPHILS # BLD AUTO: 0.02 THOUSANDS/ΜL (ref 0–0.1)
BASOPHILS NFR BLD AUTO: 0 % (ref 0–1)
BILIRUB SERPL-MCNC: 0.63 MG/DL (ref 0.2–1)
BUN SERPL-MCNC: 5 MG/DL (ref 5–25)
CALCIUM SERPL-MCNC: 8.6 MG/DL (ref 8.3–10.1)
CHLORIDE SERPL-SCNC: 108 MMOL/L (ref 100–108)
CHOLEST SERPL-MCNC: 139 MG/DL (ref 50–200)
CO2 SERPL-SCNC: 28 MMOL/L (ref 21–32)
CREAT SERPL-MCNC: 0.62 MG/DL (ref 0.6–1.3)
EOSINOPHIL # BLD AUTO: 0.06 THOUSAND/ΜL (ref 0–0.61)
EOSINOPHIL NFR BLD AUTO: 1 % (ref 0–6)
ERYTHROCYTE [DISTWIDTH] IN BLOOD BY AUTOMATED COUNT: 11.8 % (ref 11.6–15.1)
GFR SERPL CREATININE-BSD FRML MDRD: 116 ML/MIN/1.73SQ M
GGT SERPL-CCNC: 85 U/L (ref 5–85)
GLUCOSE P FAST SERPL-MCNC: 79 MG/DL (ref 65–99)
HCT VFR BLD AUTO: 40.2 % (ref 36.5–49.3)
HDLC SERPL-MCNC: 81 MG/DL (ref 40–60)
HGB BLD-MCNC: 13.5 G/DL (ref 12–17)
LDLC SERPL CALC-MCNC: 40 MG/DL (ref 0–100)
LDLC SERPL DIRECT ASSAY-MCNC: 52 MG/DL (ref 0–100)
LIPASE SERPL-CCNC: 212 U/L (ref 73–393)
LYMPHOCYTES # BLD AUTO: 2.24 THOUSANDS/ΜL (ref 0.6–4.47)
LYMPHOCYTES NFR BLD AUTO: 38 % (ref 14–44)
MCH RBC QN AUTO: 34.4 PG (ref 26.8–34.3)
MCHC RBC AUTO-ENTMCNC: 33.6 G/DL (ref 31.4–37.4)
MCV RBC AUTO: 102 FL (ref 82–98)
MONOCYTES # BLD AUTO: 0.81 THOUSAND/ΜL (ref 0.17–1.22)
MONOCYTES NFR BLD AUTO: 14 % (ref 4–12)
NEUTROPHILS # BLD AUTO: 2.68 THOUSANDS/ΜL (ref 1.85–7.62)
NEUTS SEG NFR BLD AUTO: 47 % (ref 43–75)
NONHDLC SERPL-MCNC: 58 MG/DL
NRBC BLD AUTO-RTO: 0 /100 WBCS
PLATELET # BLD AUTO: 311 THOUSANDS/UL (ref 149–390)
PMV BLD AUTO: 8.8 FL (ref 8.9–12.7)
POTASSIUM SERPL-SCNC: 4.3 MMOL/L (ref 3.5–5.3)
PROT SERPL-MCNC: 7.1 G/DL (ref 6.4–8.2)
RBC # BLD AUTO: 3.93 MILLION/UL (ref 3.88–5.62)
SODIUM SERPL-SCNC: 141 MMOL/L (ref 136–145)
TRIGL SERPL-MCNC: 90 MG/DL
TSH SERPL DL<=0.05 MIU/L-ACNC: 1.62 UIU/ML (ref 0.36–3.74)
WBC # BLD AUTO: 5.83 THOUSAND/UL (ref 4.31–10.16)

## 2018-04-18 PROCEDURE — 82977 ASSAY OF GGT: CPT

## 2018-04-18 PROCEDURE — 80053 COMPREHEN METABOLIC PANEL: CPT

## 2018-04-18 PROCEDURE — 84443 ASSAY THYROID STIM HORMONE: CPT

## 2018-04-18 PROCEDURE — G0103 PSA SCREENING: HCPCS

## 2018-04-18 PROCEDURE — 83721 ASSAY OF BLOOD LIPOPROTEIN: CPT

## 2018-04-18 PROCEDURE — 36415 COLL VENOUS BLD VENIPUNCTURE: CPT

## 2018-04-18 PROCEDURE — 83690 ASSAY OF LIPASE: CPT

## 2018-04-18 PROCEDURE — 82150 ASSAY OF AMYLASE: CPT

## 2018-04-18 PROCEDURE — 85025 COMPLETE CBC W/AUTO DIFF WBC: CPT

## 2018-04-18 PROCEDURE — 80061 LIPID PANEL: CPT

## 2018-04-20 LAB — PSA SERPL DL<=0.01 NG/ML-MCNC: 0.66 NG/ML (ref 0–4)

## 2018-05-07 ENCOUNTER — OFFICE VISIT (OUTPATIENT)
Dept: OBGYN CLINIC | Facility: CLINIC | Age: 53
End: 2018-05-07
Payer: MEDICARE

## 2018-05-07 VITALS
WEIGHT: 112.6 LBS | DIASTOLIC BLOOD PRESSURE: 84 MMHG | HEART RATE: 97 BPM | SYSTOLIC BLOOD PRESSURE: 133 MMHG | HEIGHT: 67 IN | BODY MASS INDEX: 17.67 KG/M2

## 2018-05-07 DIAGNOSIS — M67.40 GANGLION CYST: ICD-10-CM

## 2018-05-07 DIAGNOSIS — R22.32 MASS OF LEFT HAND: Primary | ICD-10-CM

## 2018-05-07 PROCEDURE — 99203 OFFICE O/P NEW LOW 30 MIN: CPT | Performed by: ORTHOPAEDIC SURGERY

## 2018-05-07 NOTE — PROGRESS NOTES
ASSESSMENT/PLAN:    Diagnoses and all orders for this visit:    Mass of left hand    Ganglion cyst  Comments:  left hand  Orders:  -     Ambulatory referral to Orthopedic Surgery        Assessment:   Left hand ganglion cyst    Plan:   Resume activities as tolerated    Follow Up:  PRN    To Do Next Visit:  none    General Discussions:     Ganglion Cysts: The anatomy and physiology of the ganglion was discussed with the patient today in the office  Fluid leaking out of the joint surface typically creates a small sac, which can enlarge and cause pain or limitation of motion  Treatment options include observation, aspiration, or surgical incision were discussed with the patient today  Observation typically lead to resolution and approximately 10% of patients, aspiration least resolution approximately 50% of patients, and surgical excision lead to resolution in approximately 97% of patients  After discussion with the patient today, the patient voiced understanding of all treatment options  Operative Discussions:      _____________________________________________________  CHIEF COMPLAINT:  Chief Complaint   Patient presents with    Left Hand - Cyst         SUBJECTIVE:  Abraham Keene is a 50y o  year old male who presents with one-month history of left hand palmar mass overlying the long finger metacarpal head  3 days ago, he slammed his palm and immediately noticed a decrease in the size of the mass  Pain that radiated proximally to the volar wrist region  Denies any numbness or tingling       PAST MEDICAL HISTORY:  Past Medical History:   Diagnosis Date    Bronchitis, asthmatic     last assessed: 3/21/2016    Depression     Pneumonia     last assessed: 12/27/2016    Pneumonia of left lower lobe due to Haemophilus influenzae ([facility] [address] )     last assessed: 11/27/2015    Sebaceous cyst     last assessed: 12/26/2013    Substance abuse     Suicidal ideation     last assessed: 12/27/2016       PAST SURGICAL HISTORY:  History reviewed  No pertinent surgical history  FAMILY HISTORY:  Family History   Problem Relation Age of Onset    Colon cancer Mother     Diabetes Mother      mellitus       SOCIAL HISTORY:  Social History   Substance Use Topics    Smoking status: Current Every Day Smoker     Packs/day: 1 50    Smokeless tobacco: Never Used    Alcohol use Yes      Comment: consumes 5-6 beers daily       MEDICATIONS:    Current Outpatient Prescriptions:     albuterol (PROAIR HFA) 90 mcg/act inhaler, Inhale 2 puffs every 4 (four) hours as needed for shortness of breath, Disp: , Rfl:     budesonide-formoterol (SYMBICORT) 160-4 5 mcg/act inhaler, Inhale 2 puffs 2 (two) times a day, Disp: , Rfl:     cloNIDine (CATAPRES) 0 1 mg tablet, Take 1 tablet by mouth daily at bedtime, Disp: , Rfl:     mometasone (NASONEX) 50 mcg/act nasal spray, 2 sprays into each nostril daily, Disp: 17 g, Rfl: 0    predniSONE 20 mg tablet, Take 3 tablets for 2 days then 2 tablets for 3 days then 1 tablet for 3 days then stop, Disp: 15 tablet, Rfl: 0    ALLERGIES:  Allergies   Allergen Reactions    Levofloxacin     Quinolones        REVIEW OF SYSTEMS:  Pertinent items are noted in HPI  A comprehensive review of systems was negative      LABS:  HgA1c: No results found for: HGBA1C  BMP:   Lab Results   Component Value Date    GLUCOSE 86 09/24/2016    CALCIUM 8 6 04/18/2018     04/18/2018    K 4 3 04/18/2018    CO2 28 04/18/2018     04/18/2018    BUN 5 04/18/2018    CREATININE 0 62 04/18/2018         _____________________________________________________  PHYSICAL EXAMINATION:  General: well developed and well nourished, alert, oriented times 3 and appears comfortable  Psychiatric: Normal  HEENT: Trachea Midline, No torticollis  Cardiovascular: No discernable arrhythmia  Pulmonary: No wheezing or stridor  Skin:  Let hand 1 cm x 1 cm mobile mass over the palmar aspect of the long finger metacarpal head  Neurovascular: Sensation Intact to the Median, Ulnar, Radial Nerve, Motor Intact to the Median, Ulnar, Radial Nerve and Pulses Intact    MUSCULOSKELETAL EXAMINATION:  Left side:  Full range of motion and 5/5 strength in all distributions      _____________________________________________________  STUDIES REVIEWED:  No Studies to review    PROCEDURES PERFORMED:  Procedures  No Procedures performed today

## 2018-10-05 ENCOUNTER — OFFICE VISIT (OUTPATIENT)
Dept: URGENT CARE | Facility: CLINIC | Age: 53
End: 2018-10-05
Payer: MEDICARE

## 2018-10-05 ENCOUNTER — APPOINTMENT (OUTPATIENT)
Dept: RADIOLOGY | Facility: CLINIC | Age: 53
End: 2018-10-05
Payer: MEDICARE

## 2018-10-05 VITALS
SYSTOLIC BLOOD PRESSURE: 122 MMHG | BODY MASS INDEX: 19.15 KG/M2 | RESPIRATION RATE: 16 BRPM | HEIGHT: 67 IN | TEMPERATURE: 98.5 F | DIASTOLIC BLOOD PRESSURE: 78 MMHG | HEART RATE: 130 BPM | WEIGHT: 122 LBS | OXYGEN SATURATION: 97 %

## 2018-10-05 DIAGNOSIS — J20.9 ACUTE BRONCHITIS, UNSPECIFIED ORGANISM: Primary | ICD-10-CM

## 2018-10-05 DIAGNOSIS — J20.9 ACUTE BRONCHITIS, UNSPECIFIED ORGANISM: ICD-10-CM

## 2018-10-05 PROCEDURE — 99213 OFFICE O/P EST LOW 20 MIN: CPT | Performed by: PHYSICIAN ASSISTANT

## 2018-10-05 PROCEDURE — 71046 X-RAY EXAM CHEST 2 VIEWS: CPT

## 2018-10-05 PROCEDURE — G0463 HOSPITAL OUTPT CLINIC VISIT: HCPCS | Performed by: PHYSICIAN ASSISTANT

## 2018-10-05 RX ORDER — AZITHROMYCIN 250 MG/1
TABLET, FILM COATED ORAL
Qty: 6 TABLET | Refills: 0 | Status: SHIPPED | OUTPATIENT
Start: 2018-10-05 | End: 2018-10-09

## 2018-10-05 RX ORDER — PREDNISONE 10 MG/1
TABLET ORAL
Qty: 21 TABLET | Refills: 0 | Status: SHIPPED | OUTPATIENT
Start: 2018-10-05 | End: 2020-01-31 | Stop reason: ALTCHOICE

## 2018-10-05 NOTE — PROGRESS NOTES
[facility]        NAME: Joyce Reynolds is a 48 y o  male  : 1965    MRN: [id number]  DATE: 2018  TIME: 3:16 PM    Assessment and Plan   Acute bronchitis, unspecified organism [[id number]]  1  Acute bronchitis, unspecified organism  XR chest pa & lateral    Ambulatory referral to Allergy    azithromycin (ZITHROMAX) 250 mg tablet    predniSONE 10 mg tablet         Patient Instructions     No acute cardiopulmonary disease noted on xray, will call if radiology report differs  Take azithromycin as directed  Take prednisone taper as directed  Follow up with PCP in 3-5 days  Proceed to  ER if symptoms worsen  Chief Complaint     Chief Complaint   Patient presents with    Fatigue     For about three months the pt reports being exposed to mold  He reports worsening fatigue, confusion, b/l leg pain, cough, diarrhea and loss of appetite  History of Present Illness       This is a 48year old male PMH opioid abuse, COPD, and bipolar disorder presenting with multiple complaints  He states that he has mold in his apartment that his landlord is not doing anything about  He states that he has had cough and congestion x 6 months  Over the last 1-2 months he has had worsening sweats, chills, sleeping problems, ear pain, diarrhea, shortness of breath, bilateral leg pain, and mental fog  He also reports increased left ear pain over the last few weeks due to "clogged eustachian tube " He has a history of opioid substance abuse and states that he had to take a percocet today to be able to come here  He talks at length about the mold in his home and his landlord  He reports that he has done extensive research on mold poisoning and is requesting a urine test for mold- I suggested that he see an allergist for evaluation of these complaints and he is resistant to the idea, stating that he has done the research himself  He has not tried anything for his symptoms          Review of Systems   Review of Systems   Constitutional: Positive for chills and fatigue  HENT: Positive for congestion, ear pain, postnasal drip, rhinorrhea, sinus pain, sinus pressure and sore throat  Negative for ear discharge  Respiratory: Positive for cough, chest tightness, shortness of breath and wheezing  Gastrointestinal: Positive for diarrhea and nausea  Negative for abdominal pain and vomiting  Musculoskeletal: Positive for arthralgias, back pain and myalgias  Negative for joint swelling  Skin: Negative for rash  Neurological: Positive for dizziness and headaches  Negative for syncope  Psychiatric/Behavioral: Positive for decreased concentration and sleep disturbance           Current Medications       Current Outpatient Prescriptions:     albuterol (PROAIR HFA) 90 mcg/act inhaler, Inhale 2 puffs every 4 (four) hours as needed for shortness of breath, Disp: , Rfl:     azithromycin (ZITHROMAX) 250 mg tablet, Take 2 tablets today then 1 tablet daily x 4 days, Disp: 6 tablet, Rfl: 0    budesonide-formoterol (SYMBICORT) 160-4 5 mcg/act inhaler, Inhale 2 puffs 2 (two) times a day, Disp: , Rfl:     cloNIDine (CATAPRES) 0 1 mg tablet, Take 1 tablet by mouth daily at bedtime, Disp: , Rfl:     mometasone (NASONEX) 50 mcg/act nasal spray, 2 sprays into each nostril daily (Patient not taking: Reported on 10/5/2018 ), Disp: 17 g, Rfl: 0    predniSONE 10 mg tablet, 60 mg x 1 day, 50 mg x 1 day, 40 mg x 1 day, 30 mg x 1 day, 20 mg x 1 day, 10 mg x 1 day, Disp: 21 tablet, Rfl: 0    predniSONE 20 mg tablet, Take 3 tablets for 2 days then 2 tablets for 3 days then 1 tablet for 3 days then stop (Patient not taking: Reported on 10/5/2018 ), Disp: 15 tablet, Rfl: 0    Current Allergies     Allergies as of 10/05/2018 - Reviewed 10/05/2018   Allergen Reaction Noted    Levofloxacin  11/27/2015    Quinolones  09/24/2016            The following portions of the patient's history were reviewed and updated as appropriate: allergies, current medications, past family history, past medical history, past social history, past surgical history and problem list      Past Medical History:   Diagnosis Date    Bronchitis, asthmatic     last assessed: 3/21/2016    Depression     Pneumonia     last assessed: 12/27/2016    Pneumonia of left lower lobe due to Haemophilus influenzae ([address] )     last assessed: 11/27/2015    Sebaceous cyst     last assessed: 12/26/2013    Substance abuse     Suicidal ideation     last assessed: 12/27/2016       No past surgical history on file  Family History   Problem Relation Age of Onset    Colon cancer Mother     Diabetes Mother         mellitus         Medications have been verified  Objective   /78   Pulse (!) 130   Temp 98 5 °F (36 9 °C)   Resp 16   Ht 5' 7" (1 702 m)   Wt 55 3 kg (122 lb)   SpO2 97%   BMI 19 11 kg/m²        Physical Exam     Physical Exam   Constitutional: He is oriented to person, place, and time  He appears distressed (mild)  HENT:   Head: Normocephalic  Right Ear: Tympanic membrane, external ear and ear canal normal    Left Ear: Tympanic membrane, external ear and ear canal normal    Nose: Mucosal edema and rhinorrhea present  Mouth/Throat: Uvula is midline and mucous membranes are normal  Posterior oropharyngeal erythema present  No posterior oropharyngeal edema  Eyes: Pupils are equal, round, and reactive to light  Conjunctivae and EOM are normal    Neck: Normal range of motion  Neck supple  Cardiovascular: Normal rate, regular rhythm, normal heart sounds and intact distal pulses  Pulmonary/Chest: Effort normal and breath sounds normal  No respiratory distress  He has no decreased breath sounds  He has no wheezes  He has no rhonchi  He has no rales  Coarse lung sounds   Abdominal: Soft  Bowel sounds are normal  He exhibits no distension  There is no tenderness  Musculoskeletal: Normal range of motion     No antalgic gait   Lymphadenopathy:     He has no cervical adenopathy  Neurological: He is alert and oriented to person, place, and time  Skin: Skin is warm and dry  He is not diaphoretic  Nursing note and vitals reviewed

## 2018-10-05 NOTE — PATIENT INSTRUCTIONS
No acute cardiopulmonary disease noted on xray, will call if radiology report differs  Take azithromycin as directed  Take prednisone taper as directed  Follow up with allergist and your PCP for persistent symptoms  Go to the ER for worsening symptoms

## 2018-10-15 ENCOUNTER — HOSPITAL ENCOUNTER (EMERGENCY)
Facility: HOSPITAL | Age: 53
Discharge: HOME | End: 2018-10-15
Attending: EMERGENCY MEDICINE | Admitting: EMERGENCY MEDICINE
Payer: MEDICARE

## 2018-10-15 ENCOUNTER — APPOINTMENT (EMERGENCY)
Dept: RADIOLOGY | Facility: HOSPITAL | Age: 53
End: 2018-10-15
Payer: MEDICARE

## 2018-10-15 VITALS
BODY MASS INDEX: 17.42 KG/M2 | HEART RATE: 118 BPM | TEMPERATURE: 97.7 F | OXYGEN SATURATION: 98 % | RESPIRATION RATE: 18 BRPM | DIASTOLIC BLOOD PRESSURE: 79 MMHG | SYSTOLIC BLOOD PRESSURE: 127 MMHG | HEIGHT: 67 IN | WEIGHT: 111 LBS

## 2018-10-15 DIAGNOSIS — J44.1 COPD WITH ACUTE EXACERBATION (HCC): Primary | ICD-10-CM

## 2018-10-15 LAB
ALBUMIN SERPL BCP-MCNC: 4.4 G/DL (ref 3.5–5)
ALP SERPL-CCNC: 92 U/L (ref 46–116)
ALT SERPL W P-5'-P-CCNC: 123 U/L (ref 12–78)
AMORPH PHOS CRY URNS QL MICRO: ABNORMAL /HPF
AMPHETAMINES SERPL QL SCN: NEGATIVE
ANION GAP SERPL CALCULATED.3IONS-SCNC: 11 MMOL/L (ref 4–13)
APTT PPP: 30 SECONDS (ref 24–36)
AST SERPL W P-5'-P-CCNC: 86 U/L (ref 5–45)
BACTERIA UR QL AUTO: ABNORMAL /HPF
BARBITURATES UR QL: NEGATIVE
BASOPHILS # BLD AUTO: 0.03 THOUSANDS/ΜL (ref 0–0.1)
BASOPHILS NFR BLD AUTO: 0 % (ref 0–1)
BENZODIAZ UR QL: NEGATIVE
BILIRUB SERPL-MCNC: 1.6 MG/DL (ref 0.2–1)
BILIRUB UR QL STRIP: ABNORMAL
BUN SERPL-MCNC: 8 MG/DL (ref 5–25)
CALCIUM SERPL-MCNC: 9.8 MG/DL (ref 8.3–10.1)
CHLORIDE SERPL-SCNC: 99 MMOL/L (ref 100–108)
CLARITY UR: CLEAR
CO2 SERPL-SCNC: 29 MMOL/L (ref 21–32)
COCAINE UR QL: NEGATIVE
COLOR UR: ABNORMAL
CREAT SERPL-MCNC: 0.6 MG/DL (ref 0.6–1.3)
EOSINOPHIL # BLD AUTO: 0.01 THOUSAND/ΜL (ref 0–0.61)
EOSINOPHIL NFR BLD AUTO: 0 % (ref 0–6)
ERYTHROCYTE [DISTWIDTH] IN BLOOD BY AUTOMATED COUNT: 11.5 % (ref 11.6–15.1)
ETHANOL SERPL-MCNC: <3 MG/DL (ref 0–3)
GFR SERPL CREATININE-BSD FRML MDRD: 116 ML/MIN/1.73SQ M
GLUCOSE SERPL-MCNC: 106 MG/DL (ref 65–140)
GLUCOSE UR STRIP-MCNC: NEGATIVE MG/DL
HCT VFR BLD AUTO: 46.1 % (ref 36.5–49.3)
HGB BLD-MCNC: 16.2 G/DL (ref 12–17)
HGB UR QL STRIP.AUTO: NEGATIVE
IMM GRANULOCYTES # BLD AUTO: 0.02 THOUSAND/UL (ref 0–0.2)
IMM GRANULOCYTES NFR BLD AUTO: 0 % (ref 0–2)
INR PPP: 0.98 (ref 0.86–1.17)
KETONES UR STRIP-MCNC: ABNORMAL MG/DL
LACTATE SERPL-SCNC: 1.2 MMOL/L (ref 0.5–2)
LEUKOCYTE ESTERASE UR QL STRIP: NEGATIVE
LYMPHOCYTES # BLD AUTO: 0.9 THOUSANDS/ΜL (ref 0.6–4.47)
LYMPHOCYTES NFR BLD AUTO: 12 % (ref 14–44)
MAGNESIUM SERPL-MCNC: 2.1 MG/DL (ref 1.6–2.6)
MCH RBC QN AUTO: 34.8 PG (ref 26.8–34.3)
MCHC RBC AUTO-ENTMCNC: 35.1 G/DL (ref 31.4–37.4)
MCV RBC AUTO: 99 FL (ref 82–98)
METHADONE UR QL: NEGATIVE
MONOCYTES # BLD AUTO: 0.83 THOUSAND/ΜL (ref 0.17–1.22)
MONOCYTES NFR BLD AUTO: 11 % (ref 4–12)
NEUTROPHILS # BLD AUTO: 5.93 THOUSANDS/ΜL (ref 1.85–7.62)
NEUTS SEG NFR BLD AUTO: 77 % (ref 43–75)
NITRITE UR QL STRIP: NEGATIVE
NON-SQ EPI CELLS URNS QL MICRO: ABNORMAL /HPF
NRBC BLD AUTO-RTO: 0 /100 WBCS
OPIATES UR QL SCN: NEGATIVE
PCP UR QL: NEGATIVE
PH UR STRIP.AUTO: 8.5 [PH] (ref 4.5–8)
PLATELET # BLD AUTO: 296 THOUSANDS/UL (ref 149–390)
PMV BLD AUTO: 9 FL (ref 8.9–12.7)
POTASSIUM SERPL-SCNC: 3.9 MMOL/L (ref 3.5–5.3)
PROCALCITONIN SERPL-MCNC: <0.05 NG/ML
PROT SERPL-MCNC: 8 G/DL (ref 6.4–8.2)
PROT UR STRIP-MCNC: ABNORMAL MG/DL
PROTHROMBIN TIME: 12.4 SECONDS (ref 11.8–14.2)
RBC # BLD AUTO: 4.66 MILLION/UL (ref 3.88–5.62)
RBC #/AREA URNS AUTO: ABNORMAL /HPF
SODIUM SERPL-SCNC: 139 MMOL/L (ref 136–145)
SP GR UR STRIP.AUTO: 1.02 (ref 1–1.03)
THC UR QL: POSITIVE
TROPONIN I SERPL-MCNC: <0.02 NG/ML
UROBILINOGEN UR QL STRIP.AUTO: 0.2 E.U./DL
WBC # BLD AUTO: 7.72 THOUSAND/UL (ref 4.31–10.16)
WBC #/AREA URNS AUTO: ABNORMAL /HPF

## 2018-10-15 PROCEDURE — 80053 COMPREHEN METABOLIC PANEL: CPT | Performed by: EMERGENCY MEDICINE

## 2018-10-15 PROCEDURE — 84145 PROCALCITONIN (PCT): CPT | Performed by: EMERGENCY MEDICINE

## 2018-10-15 PROCEDURE — 36415 COLL VENOUS BLD VENIPUNCTURE: CPT | Performed by: EMERGENCY MEDICINE

## 2018-10-15 PROCEDURE — 80320 DRUG SCREEN QUANTALCOHOLS: CPT | Performed by: EMERGENCY MEDICINE

## 2018-10-15 PROCEDURE — 71046 X-RAY EXAM CHEST 2 VIEWS: CPT

## 2018-10-15 PROCEDURE — 84484 ASSAY OF TROPONIN QUANT: CPT | Performed by: EMERGENCY MEDICINE

## 2018-10-15 PROCEDURE — 96360 HYDRATION IV INFUSION INIT: CPT

## 2018-10-15 PROCEDURE — 85730 THROMBOPLASTIN TIME PARTIAL: CPT | Performed by: EMERGENCY MEDICINE

## 2018-10-15 PROCEDURE — 99285 EMERGENCY DEPT VISIT HI MDM: CPT

## 2018-10-15 PROCEDURE — 94640 AIRWAY INHALATION TREATMENT: CPT

## 2018-10-15 PROCEDURE — 83605 ASSAY OF LACTIC ACID: CPT | Performed by: EMERGENCY MEDICINE

## 2018-10-15 PROCEDURE — 81001 URINALYSIS AUTO W/SCOPE: CPT | Performed by: EMERGENCY MEDICINE

## 2018-10-15 PROCEDURE — 85610 PROTHROMBIN TIME: CPT | Performed by: EMERGENCY MEDICINE

## 2018-10-15 PROCEDURE — 87040 BLOOD CULTURE FOR BACTERIA: CPT | Performed by: EMERGENCY MEDICINE

## 2018-10-15 PROCEDURE — 93005 ELECTROCARDIOGRAM TRACING: CPT

## 2018-10-15 PROCEDURE — 83735 ASSAY OF MAGNESIUM: CPT | Performed by: EMERGENCY MEDICINE

## 2018-10-15 PROCEDURE — 80307 DRUG TEST PRSMV CHEM ANLYZR: CPT | Performed by: EMERGENCY MEDICINE

## 2018-10-15 PROCEDURE — 85025 COMPLETE CBC W/AUTO DIFF WBC: CPT | Performed by: EMERGENCY MEDICINE

## 2018-10-15 RX ORDER — PREDNISONE 20 MG/1
40 TABLET ORAL DAILY
Qty: 8 TABLET | Refills: 0 | Status: SHIPPED | OUTPATIENT
Start: 2018-10-16 | End: 2018-10-20

## 2018-10-15 RX ORDER — ALBUTEROL SULFATE 90 UG/1
2 AEROSOL, METERED RESPIRATORY (INHALATION) EVERY 4 HOURS PRN
Qty: 1 INHALER | Refills: 0 | Status: SHIPPED | OUTPATIENT
Start: 2018-10-15 | End: 2020-01-31 | Stop reason: ALTCHOICE

## 2018-10-15 RX ORDER — PREDNISONE 20 MG/1
40 TABLET ORAL DAILY
Status: DISCONTINUED | OUTPATIENT
Start: 2018-10-15 | End: 2018-10-15 | Stop reason: HOSPADM

## 2018-10-15 RX ORDER — ALBUTEROL SULFATE 2.5 MG/3ML
5 SOLUTION RESPIRATORY (INHALATION) ONCE
Status: COMPLETED | OUTPATIENT
Start: 2018-10-15 | End: 2018-10-15

## 2018-10-15 RX ORDER — AZITHROMYCIN 250 MG/1
TABLET, FILM COATED ORAL
Qty: 6 TABLET | Refills: 0 | Status: SHIPPED | OUTPATIENT
Start: 2018-10-15 | End: 2018-10-19

## 2018-10-15 RX ADMIN — IPRATROPIUM BROMIDE 0.5 MG: 0.5 SOLUTION RESPIRATORY (INHALATION) at 15:26

## 2018-10-15 RX ADMIN — IPRATROPIUM BROMIDE 0.5 MG: 0.5 SOLUTION RESPIRATORY (INHALATION) at 14:13

## 2018-10-15 RX ADMIN — ALBUTEROL SULFATE 5 MG: 2.5 SOLUTION RESPIRATORY (INHALATION) at 15:26

## 2018-10-15 RX ADMIN — PREDNISONE 40 MG: 20 TABLET ORAL at 16:10

## 2018-10-15 RX ADMIN — ALBUTEROL SULFATE 5 MG: 2.5 SOLUTION RESPIRATORY (INHALATION) at 14:12

## 2018-10-15 RX ADMIN — SODIUM CHLORIDE 1000 ML: 0.9 INJECTION, SOLUTION INTRAVENOUS at 13:59

## 2018-10-15 NOTE — DISCHARGE INSTRUCTIONS
COPD (Chronic Obstructive Pulmonary Disease)   WHAT YOU NEED TO KNOW:   Chronic obstructive pulmonary disease (COPD) is a lung disease that makes it hard for you to breathe  It is usually a result of lung damage caused by years of irritation and inflammation in your lungs  DISCHARGE INSTRUCTIONS:   Call 911 if:   · You feel lightheaded, short of breath, and have chest pain  Return to the emergency department if:   · You are confused, dizzy, or feel faint  · Your arm or leg feels warm, tender, and painful  It may look swollen and red  · You cough up blood  Contact your healthcare provider if:   · You have more shortness of breath than usual      · You need more medicine than usual to control your symptoms  · You are coughing or wheezing more than usual      · You are coughing up more mucus, or it is a different color or has a different odor  · You gain more than 3 pounds in a week  · You have a fever, a runny or stuffy nose, and a sore throat, or other cold or flu symptoms  · Your skin, lips, or nails start to turn blue  · You have swelling in your legs or ankles  · You are very tired or weak for more than a day  · You notice changes in your mood, or changes in your ability to think or concentrate  · You have questions or concerns about your condition or care  Medicines:   · Medicines  may be used to open your airways, decrease swelling and inflammation in your lungs, or treat an infection  You may need 2 or more medicines  A short-acting medicine relieves symptoms quickly  Long-acting medicines will control or prevent symptoms  Ask for more information about the medicines you are given and how to use them safely  · Take your medicine as directed  Contact your healthcare provider if you think your medicine is not helping or if you have side effects  Tell him or her if you are allergic to any medicine  Keep a list of the medicines, vitamins, and herbs you take  Include the amounts, and when and why you take them  Bring the list or the pill bottles to follow-up visits  Carry your medicine list with you in case of an emergency  Help make breathing easier:   · Use pursed-lip breathing any time you feel short of breath  Take a deep breath in through your nose  Slowly breathe out through your mouth with your lips pursed for twice as long as you inhaled  You can also practice this breathing pattern while you bend, lift, climb stairs, or exercise  It slows down your breathing and helps move more air in and out of your lungs  · Do not smoke, and avoid others who smoke  Nicotine and other substances can cause lung irritation or damage and make it harder for you to breathe  Do not use e-cigarettes or smokeless tobacco  They still contain nicotine  Ask your healthcare provider for information if you currently smoke and need help to quit  For support and more information:  ¨ [employer]  Phone: 5- 491 - 058-9256  Web Address: [place]      · Be aware of and avoid anything that makes your symptoms worse  Stay out of high altitudes and places with high humidity  Stay inside, or cover your mouth and nose with a scarf when you are outside during cold weather  Stay inside on days when air pollution or pollen counts are high  Do not use aerosol sprays such as deodorant, bug spray, and hair spray  Manage COPD and help prevent exacerbations:  COPD is a serious condition that gets worse over time  A COPD exacerbation means your symptoms suddenly get worse  It is important to prevent exacerbations  An exacerbation can cause more lung damage  COPD cannot be cured, but you can take action to feel better and prevent COPD exacerbations:  · Protect yourself from germs  Germs can get into your lungs and cause an infection  An infection in your lungs can create more mucus and make it harder to breathe   An infection can also create swelling in your airways and prevent air from getting in  You can decrease your risk for infection by doing the following:     [facility] your hands often with soap and water  Carry germ-killing gel with you  You can use the gel to clean your hands when soap and water are not available  ¨ Do not touch your eyes, nose, or mouth unless you have washed your hands first      ¨ Always cover your mouth when you cough  Cough into a tissue or your shirtsleeve so you do not spread germs from your hands  ¨ Try to avoid people who have a cold or the flu  If you are sick, stay away from others as much as possible  · Drink more liquids  This will help to keep your air passages moist and help you cough up mucus  Ask how much liquid to drink each day and which liquids are best for you  · Exercise daily  Exercise for at least 20 minutes each day to help increase your energy and decrease shortness of breath  Walking or riding a bike are good ways to exercise  Talk to your healthcare provider about the best exercise plan for you  · Ask about vaccines  Your healthcare provider may recommend that you get regular flu and pneumonia vaccines  Pneumonia can become life-threatening for a person who has COPD  Ask about other vaccines you may need  Ask your healthcare provider about the flu and pneumonia vaccines  All adults should get the flu (influenza) vaccine every year as soon as it becomes available  The pneumonia vaccine is given to adults aged 72 or older to prevent pneumococcal disease, such as pneumonia  Adults aged 23 to 59 years who are at high risk for pneumococcal disease also should get the pneumococcal vaccine  It may need to be repeated 1 or 5 years later  Pulmonary rehabilitation:  Your healthcare provider may recommend a program to help you manage your symptoms and improve your quality of life  It may include nutritional counseling and exercise to strengthen your lungs     Make decisions about your choices for future treatment:  Ask for information about advanced medical directives and living nunez  These documents help you decide and write down your choices for treatment and end-of-life care  It is best to complete them when you feel well and can think clearly about your wishes  The information can then be kept for future use if you are in the hospital or become very ill  Follow up with your healthcare provider as directed: You may need more tests  Your healthcare provider may refer you to a pulmonary (lung) specialist  Write down your questions so you remember to ask them during your visits  © 2017 [facility] [place] Information is for End User's use only and may not be sold, redistributed or otherwise used for commercial purposes  All illustrations and images included in CareNotes® are the copyrighted property of [employer]  or [employer]  The above information is an  only  It is not intended as medical advice for individual conditions or treatments  Talk to your doctor, nurse or pharmacist before following any medical regimen to see if it is safe and effective for you

## 2018-10-15 NOTE — ED PROVIDER NOTES
History  Chief Complaint   Patient presents with    Shortness of Breath     Patient reports shortness of breath x2 months  reports mold in the home, that the landlord will not fix  Admits to pain in legs, n, d, sore throat  States that he has been coughing up "nasty junk " Admits to cold sweats but never took temp      Here with multiple complaints  Notes 2 months of periodic sweats, cough, dyspnea, intermittent leg pain, intermittent nausea, poor appetite  When asked specifically why he came to the ED today, he notes worsening of his baseline dyspnea, cough produtive of 'nasty' looking sputum  Is very concerned b/c he states his roof leaks and there is mold his landlord refuses to clean up  Is concerned about the 'mycotoxins '    Denies fever/chills, no sig congestion, occas sore throat  Denies cp/pressure, no palpitations  Notes intermittent abd pain, anorexia and nausea along w/ episodic diarrhea  Denies melena/hematochezia  No changes in urination  Pt drinks alcohol daily and admits to occasionally binging  Initially denied drug use, but notes that he was previously in rehab for opiate abuse and will occasionally 'pop a perc' to see if that helps with any of his symptoms  History provided by:  Patient  History limited by:  Psychiatric disorder (poor historian)   used: No    Shortness of Breath   Severity:  Moderate  Onset quality:  Gradual  Timing:  Constant  Progression:  Worsening  Chronicity:  Chronic  Context: not pollens and not URI    Relieved by:  None tried  Worsened by:  Exertion, movement and coughing  Ineffective treatments:  None tried  Associated symptoms: cough, diaphoresis, sore throat, sputum production and wheezing    Associated symptoms: no abdominal pain, no chest pain, no fever, no hemoptysis, no PND and no vomiting        Prior to Admission Medications   Prescriptions Last Dose Informant Patient Reported? Taking?    albuterol (PROAIR HFA) 90 mcg/act inhaler Not Taking at Unknown time Self Yes No   Sig: Inhale 2 puffs every 4 (four) hours as needed for shortness of breath   budesonide-formoterol (SYMBICORT) 160-4 5 mcg/act inhaler Not Taking at Unknown time Self Yes No   Sig: Inhale 2 puffs 2 (two) times a day   mometasone (NASONEX) 50 mcg/act nasal spray Not Taking at Unknown time  No No   Si sprays into each nostril daily   Patient not taking: Reported on 10/5/2018    predniSONE 10 mg tablet Not Taking at Unknown time  No No   Si mg x 1 day, 50 mg x 1 day, 40 mg x 1 day, 30 mg x 1 day, 20 mg x 1 day, 10 mg x 1 day   Patient not taking: Reported on 10/15/2018    predniSONE 20 mg tablet Not Taking at Unknown time  No No   Sig: Take 3 tablets for 2 days then 2 tablets for 3 days then 1 tablet for 3 days then stop   Patient not taking: Reported on 10/5/2018       Facility-Administered Medications: None       Past Medical History:   Diagnosis Date    Bronchitis, asthmatic     last assessed: 3/21/2016    Depression     Pneumonia     last assessed: 2016    Pneumonia of left lower lobe due to Haemophilus influenzae ([facility] )     last assessed: 2015    Sebaceous cyst     last assessed: 2013    Substance abuse ([facility] )     Suicidal ideation     last assessed: 2016       History reviewed  No pertinent surgical history  Family History   Problem Relation Age of Onset    Colon cancer Mother     Diabetes Mother         mellitus     I have reviewed and agree with the history as documented  Social History   Substance Use Topics    Smoking status: Current Every Day Smoker     Packs/day: 1 50    Smokeless tobacco: Never Used    Alcohol use Yes      Comment: consumes 5-6 beers daily        Review of Systems   Constitutional: Positive for diaphoresis  Negative for fever  HENT: Positive for sore throat  Respiratory: Positive for cough, sputum production, shortness of breath and wheezing  Negative for hemoptysis      Cardiovascular: Negative for chest pain and PND  Gastrointestinal: Negative for abdominal pain and vomiting  All other systems reviewed and are negative  Physical Exam  Physical Exam   Constitutional: He appears well-developed and well-nourished  Appears older than stated age   HENT:   Nose: Nose normal    Mouth/Throat: Oropharynx is clear and moist    Eyes: Conjunctivae are normal    Neck: Neck supple  Cardiovascular: Regular rhythm  Tachycardia present  Pulmonary/Chest: No accessory muscle usage  Tachypnea noted  No respiratory distress  He has decreased breath sounds  He has wheezes  He has rhonchi  Abdominal: Soft  There is no tenderness  Musculoskeletal: He exhibits no deformity  Neurological: He is alert  Fine tremor noted   Skin: Skin is warm  Psychiatric: His affect is labile  His speech is rapid and/or pressured  He is hyperactive  Nursing note and vitals reviewed        Vital Signs  ED Triage Vitals [10/15/18 1347]   Temperature Pulse Respirations Blood Pressure SpO2   97 7 °F (36 5 °C) (!) 117 19 (!) 156/103 99 %      Temp Source Heart Rate Source Patient Position - Orthostatic VS BP Location FiO2 (%)   Tympanic Monitor Sitting Right arm --      Pain Score       6           Vitals:    10/15/18 1430 10/15/18 1445 10/15/18 1500 10/15/18 1615   BP: 128/70 141/78 127/76 127/79   Pulse: 85 (!) 106 (!) 111 (!) 118   Patient Position - Orthostatic VS: Sitting Sitting Sitting Sitting       Visual Acuity      ED Medications  Medications   sodium chloride 0 9 % bolus 1,000 mL (0 mL Intravenous Stopped 10/15/18 1521)   albuterol inhalation solution 5 mg (5 mg Nebulization Given 10/15/18 1412)   ipratropium (ATROVENT) 0 02 % inhalation solution 0 5 mg (0 5 mg Nebulization Given 10/15/18 1413)   albuterol inhalation solution 5 mg (5 mg Nebulization Given 10/15/18 1526)   ipratropium (ATROVENT) 0 02 % inhalation solution 0 5 mg (0 5 mg Nebulization Given 10/15/18 1526)       Diagnostic Studies  Results Reviewed     Procedure Component Value Units Date/Time    Blood culture #1 [[id number]] Collected:  10/15/18 1400    Lab Status:  Preliminary result Specimen:  Blood from Arm, Left Updated:  10/17/18 2201     Blood Culture No Growth at 48 hrs  Blood culture #2 [[id number]] Collected:  10/15/18 1350    Lab Status:  Preliminary result Specimen:  Blood from Arm, Right Updated:  10/17/18 2201     Blood Culture No Growth at 48 hrs  Procalcitonin [[id number]]  (Normal) Collected:  10/15/18 1350    Lab Status:  Final result Specimen:  Blood from Arm, Right Updated:  10/15/18 2143     Procalcitonin <0 05 ng/ml     Urine Microscopic [[id number]]  (Abnormal) Collected:  10/15/18 1525    Lab Status:  Final result Specimen:  Urine Updated:  10/15/18 1604     RBC, UA 2-4 (A) /hpf      WBC, UA 2-4 (A) /hpf      Epithelial Cells Occasional /hpf      Bacteria, UA Occasional /hpf      AMORPH PHOSPATES Occasional /hpf     Rapid drug screen, urine [[id number]]  (Abnormal) Collected:  10/15/18 1525    Lab Status:  Final result Specimen:  Urine from Urine, Other Updated:  10/15/18 1557     Amph/Meth UR Negative     Barbiturate Ur Negative     Benzodiazepine Urine Negative     Cocaine Urine Negative     Methadone Urine Negative     Opiate Urine Negative     PCP Ur Negative     THC Urine Positive (A)    Narrative:         Presumptive report  If requested, specimen will be sent to reference lab for confirmation  FOR MEDICAL PURPOSES ONLY  IF CONFIRMATION NEEDED PLEASE CONTACT THE LAB WITHIN 5 DAYS      Drug Screen Cutoff Levels:  AMPHETAMINE/METHAMPHETAMINES  1000 ng/mL  BARBITURATES     200 ng/mL  BENZODIAZEPINES     200 ng/mL  COCAINE      300 ng/mL  METHADONE      300 ng/mL  OPIATES      300 ng/mL  PHENCYCLIDINE     25 ng/mL  THC       50 ng/mL    UA w Reflex to Microscopic w Reflex to Culture [[id number]]  (Abnormal) Collected:  10/15/18 1525    Lab Status:  Final result Specimen:  Urine Updated:  10/15/18 1544     Color, UA [place] Clarity, UA Clear     Specific [place], UA 1 020     pH, UA 8 5 (H)     Leukocytes, UA Negative     Nitrite, UA Negative     Protein, UA Trace (A) mg/dl      Glucose, UA Negative mg/dl      Ketones, UA 40 (2+) (A) mg/dl      Urobilinogen, UA 0 2 E U /dl      Bilirubin, UA Interference- unable to analyze (A)     Blood, UA Negative    Lactic acid x2 [[id number]]  (Normal) Collected:  10/15/18 1350    Lab Status:  Final result Specimen:  Blood from Arm, Right Updated:  10/15/18 1441     LACTIC ACID 1 2 mmol/L     Narrative:         Result may be elevated if tourniquet was used during collection  Ethanol [[id number]]  (Normal) Collected:  10/15/18 1354    Lab Status:  Final result Specimen:  Blood from Arm, Right Updated:  10/15/18 1436     Ethanol Lvl <3 mg/dL     Comprehensive metabolic panel [[id number]]  (Abnormal) Collected:  10/15/18 1350    Lab Status:  Final result Specimen:  Blood from Arm, Right Updated:  10/15/18 1431     Sodium 139 mmol/L      Potassium 3 9 mmol/L      Chloride 99 (L) mmol/L      CO2 29 mmol/L      ANION GAP 11 mmol/L      BUN 8 mg/dL      Creatinine 0 60 mg/dL      Glucose 106 mg/dL      Calcium 9 8 mg/dL      AST 86 (H) U/L       (H) U/L      Alkaline Phosphatase 92 U/L      Total Protein 8 0 g/dL      Albumin 4 4 g/dL      Total Bilirubin 1 60 (H) mg/dL      eGFR 116 ml/min/1 73sq m     Narrative:         National Kidney Disease Education Program recommendations are as follows:  GFR calculation is accurate only with a steady state creatinine  Chronic Kidney disease less than 60 ml/min/1 73 sq  meters  Kidney failure less than 15 ml/min/1 73 sq  meters      Troponin I [[id number]]  (Normal) Collected:  10/15/18 1353    Lab Status:  Final result Specimen:  Blood from Arm, Right Updated:  10/15/18 1430     Troponin I <0 02 ng/mL     Magnesium [[id number]]  (Normal) Collected:  10/15/18 1353    Lab Status:  Final result Specimen:  Blood from Arm, Right Updated:  10/15/18 1422     Magnesium 2 1 mg/dL     Protime-INR [[id number]]  (Normal) Collected:  10/15/18 1350    Lab Status:  Final result Specimen:  Blood from Arm, Right Updated:  10/15/18 1420     Protime 12 4 seconds      INR 0 98    APTT [[id number]]  (Normal) Collected:  10/15/18 1350    Lab Status:  Final result Specimen:  Blood from Arm, Right Updated:  10/15/18 1420     PTT 30 seconds     CBC and differential [[id number]]  (Abnormal) Collected:  10/15/18 1353    Lab Status:  Final result Specimen:  Blood from Arm, Right Updated:  10/15/18 1408     WBC 7 72 Thousand/uL      RBC 4 66 Million/uL      Hemoglobin 16 2 g/dL      Hematocrit 46 1 %      MCV 99 (H) fL      MCH 34 8 (H) pg      MCHC 35 1 g/dL      RDW 11 5 (L) %      MPV 9 0 fL      Platelets 039 Thousands/uL      nRBC 0 /100 WBCs      Neutrophils Relative 77 (H) %      Immat GRANS % 0 %      Lymphocytes Relative 12 (L) %      Monocytes Relative 11 %      Eosinophils Relative 0 %      Basophils Relative 0 %      Neutrophils Absolute 5 93 Thousands/µL      Immature Grans Absolute 0 02 Thousand/uL      Lymphocytes Absolute 0 90 Thousands/µL      Monocytes Absolute 0 83 Thousand/µL      Eosinophils Absolute 0 01 Thousand/µL      Basophils Absolute 0 03 Thousands/µL                  XR chest 2 views   ED Interpretation by Em Marie DO (10/15 1418)   neg      Final Result by Cesar Maher MD (10/15 1412)      No acute cardiopulmonary disease              Workstation performed: [id number]                    Procedures  ECG 12 Lead Documentation  Date/Time: 10/15/2018 1:42 PM  Performed by: Saintclair Parson  Authorized by: Saintclair Parson     ECG reviewed by me, the ED Provider: yes    Patient location:  ED  Previous ECG:     Previous ECG:  Unavailable  Interpretation:     Interpretation: non-specific    Rate:     ECG rate:  102    ECG rate assessment: tachycardic    Rhythm:     Rhythm: sinus tachycardia    Ectopy:     Ectopy: none    Conduction:     Conduction: abnormal      Abnormal conduction: non-specific intraventricular conduction delay    ST segments:     ST segments:  Normal  T waves:     T waves: normal             Phone Contacts  ED Phone Contact    ED Course                               MDM  Number of Diagnoses or Management Options  COPD with acute exacerbation [facility]): new and requires workup     Amount and/or Complexity of Data Reviewed  Clinical lab tests: ordered and reviewed  Tests in the radiology section of CPT®: ordered and reviewed  Tests in the medicine section of CPT®: ordered and reviewed  Independent visualization of images, tracings, or specimens: yes      CritCare Time    Disposition  Final diagnoses:   COPD with acute exacerbation ([address] )     Time reflects when diagnosis was documented in both MDM as applicable and the Disposition within this note     Time User Action Codes Description Comment    10/15/2018  3:47 PM Sherita Jones Add [J44 1] COPD with acute exacerbation [facility])       ED Disposition     ED Disposition Condition Comment    Discharge  Johnny Santana discharge to home/self care  Condition at discharge: Good        Follow-up Information     Follow up With Specialties Details Why Contact Info    Viet Nicholson, DO Family Medicine In 3 days If symptoms worsen [facility]  [phone]            Discharge Medication List as of 10/15/2018  3:58 PM      START taking these medications    Details   !! albuterol (PROVENTIL HFA,VENTOLIN HFA) 90 mcg/act inhaler Inhale 2 puffs every 4 (four) hours as needed for wheezing, Starting Mon 10/15/2018, Normal      azithromycin (ZITHROMAX) 250 mg tablet Take 2 tablets today then 1 tablet daily x 4 days, Normal      !! predniSONE 20 mg tablet Take 2 tablets (40 mg total) by mouth daily for 4 days, Starting Tue 10/16/2018, Until Sat 10/20/2018, Normal       !! - Potential duplicate medications found  Please discuss with provider        CONTINUE these medications which have NOT CHANGED Details   !! albuterol (PROAIR HFA) 90 mcg/act inhaler Inhale 2 puffs every 4 (four) hours as needed for shortness of breath, Starting Wed 10/11/2017, Historical Med      budesonide-formoterol (SYMBICORT) 160-4 5 mcg/act inhaler Inhale 2 puffs 2 (two) times a day, Starting Thu 11/16/2017, Historical Med      mometasone (NASONEX) 50 mcg/act nasal spray 2 sprays into each nostril daily, Starting Wed 4/4/2018, Normal      !! predniSONE 10 mg tablet 60 mg x 1 day, 50 mg x 1 day, 40 mg x 1 day, 30 mg x 1 day, 20 mg x 1 day, 10 mg x 1 day, Normal      !! predniSONE 20 mg tablet Take 3 tablets for 2 days then 2 tablets for 3 days then 1 tablet for 3 days then stop, Normal       !! - Potential duplicate medications found  Please discuss with provider  No discharge procedures on file      ED Provider  Electronically Signed by           Gallo Murphy DO  10/18/18 9457

## 2018-10-16 LAB
ATRIAL RATE: 102 BPM
P AXIS: 72 DEGREES
PR INTERVAL: 122 MS
QRS AXIS: 0 DEGREES
QRSD INTERVAL: 90 MS
QT INTERVAL: 334 MS
QTC INTERVAL: 435 MS
T WAVE AXIS: 71 DEGREES
VENTRICULAR RATE: 102 BPM

## 2018-10-16 PROCEDURE — 93010 ELECTROCARDIOGRAM REPORT: CPT | Performed by: INTERNAL MEDICINE

## 2018-10-20 LAB
BACTERIA BLD CULT: NORMAL
BACTERIA BLD CULT: NORMAL

## 2019-03-13 ENCOUNTER — OFFICE VISIT (OUTPATIENT)
Dept: URGENT CARE | Facility: CLINIC | Age: 54
End: 2019-03-13
Payer: MEDICARE

## 2019-03-13 VITALS
WEIGHT: 124 LBS | RESPIRATION RATE: 16 BRPM | BODY MASS INDEX: 18.79 KG/M2 | TEMPERATURE: 97 F | DIASTOLIC BLOOD PRESSURE: 80 MMHG | HEIGHT: 68 IN | OXYGEN SATURATION: 97 % | SYSTOLIC BLOOD PRESSURE: 152 MMHG | HEART RATE: 110 BPM

## 2019-03-13 DIAGNOSIS — J20.8 ACUTE BRONCHITIS DUE TO OTHER SPECIFIED ORGANISMS: Primary | ICD-10-CM

## 2019-03-13 PROCEDURE — 99213 OFFICE O/P EST LOW 20 MIN: CPT | Performed by: EMERGENCY MEDICINE

## 2019-03-13 PROCEDURE — G0463 HOSPITAL OUTPT CLINIC VISIT: HCPCS | Performed by: EMERGENCY MEDICINE

## 2019-03-13 RX ORDER — AMOXICILLIN AND CLAVULANATE POTASSIUM 875; 125 MG/1; MG/1
1 TABLET, FILM COATED ORAL 2 TIMES DAILY
Qty: 20 TABLET | Refills: 0 | Status: SHIPPED | OUTPATIENT
Start: 2019-03-13 | End: 2019-03-23

## 2019-03-13 NOTE — PATIENT INSTRUCTIONS
Augmentin twice a day, lots of fluids, smoke as little as possible until better, recheck with PCP next week if symptoms persist

## 2019-03-13 NOTE — PROGRESS NOTES
Assessment/Plan:    No problem-specific Assessment & Plan notes found for this encounter  Diagnoses and all orders for this visit:    Acute bronchitis due to other specified organisms  -     amoxicillin-clavulanate (AUGMENTIN) 875-125 mg per tablet; Take 1 tablet by mouth 2 (two) times a day for 10 days          Subjective:      Patient ID: Melchor Harris is a 48 y o  male  Pt with productive cough for past week; thought he would get better but has not; no fever but smokes 1 1/2 PPD    Cough   This is a chronic problem  The current episode started in the past 7 days  The problem has been waxing and waning  The problem occurs every few minutes  The cough is productive of sputum  Associated symptoms include ear congestion  The symptoms are aggravated by exercise  He has tried oral steroids for the symptoms  The treatment provided no relief  His past medical history is significant for COPD  The following portions of the patient's history were reviewed and updated as appropriate: allergies, current medications, past family history, past medical history, past social history, past surgical history and problem list     Review of Systems   Respiratory: Positive for cough  All other systems reviewed and are negative  Objective:      /80   Pulse (!) 110   Temp (!) 97 °F (36 1 °C)   Resp 16   Ht 5' 8" (1 727 m)   Wt 56 2 kg (124 lb)   SpO2 97%   BMI 18 85 kg/m²          Physical Exam   Constitutional: He is oriented to person, place, and time  He appears well-developed and well-nourished  HENT:   Right Ear: External ear normal    Left Ear: External ear normal    Nose: Nose normal    Mouth/Throat: Oropharynx is clear and moist    Eyes: Pupils are equal, round, and reactive to light  Neck: Normal range of motion  Cardiovascular: Normal rate  Pulmonary/Chest: Effort normal and breath sounds normal    Abdominal: Soft  Neurological: He is alert and oriented to person, place, and time  Skin: Skin is warm and dry  Psychiatric: He has a normal mood and affect  His behavior is normal  Judgment and thought content normal    Nursing note and vitals reviewed

## 2019-03-26 ENCOUNTER — OFFICE VISIT (OUTPATIENT)
Dept: URGENT CARE | Facility: CLINIC | Age: 54
End: 2019-03-26
Payer: MEDICARE

## 2019-03-26 VITALS
BODY MASS INDEX: 18.91 KG/M2 | SYSTOLIC BLOOD PRESSURE: 126 MMHG | OXYGEN SATURATION: 98 % | WEIGHT: 124.4 LBS | TEMPERATURE: 97.2 F | HEART RATE: 102 BPM | DIASTOLIC BLOOD PRESSURE: 92 MMHG | RESPIRATION RATE: 20 BRPM

## 2019-03-26 DIAGNOSIS — D23.30 CYST, DERMOID, FACE: Primary | ICD-10-CM

## 2019-03-26 PROCEDURE — G0463 HOSPITAL OUTPT CLINIC VISIT: HCPCS | Performed by: PREVENTIVE MEDICINE

## 2019-03-26 PROCEDURE — 10160 PNXR ASPIR ABSC HMTMA BULLA: CPT | Performed by: PREVENTIVE MEDICINE

## 2019-03-26 PROCEDURE — 99213 OFFICE O/P EST LOW 20 MIN: CPT | Performed by: PREVENTIVE MEDICINE

## 2019-03-26 NOTE — PROGRESS NOTES
[facility]        NAME: Joana Fraga is a 48 y o  male  : 1965    MRN: [id number]  DATE: 2019  TIME: 6:14 PM    Assessment and Plan   Cyst, dermoid, face [D23 30]  1  Cyst, dermoid, face  LET gel 1 application         Patient Instructions       Follow up with PCP in 3-5 days  Proceed to  ER if symptoms worsen  Chief Complaint     Chief Complaint   Patient presents with    Facial Pain     patient reports he has had a cyst right cheek x 3 months,  no drainage noted  History of Present Illness       Cyst on the right side of the cheek x3 months  It is not painful  It is not warm  It is mildly increased over the last month  Review of Systems   Review of Systems   Skin:        Cyst right cheek         Current Medications       Current Outpatient Medications:     albuterol (PROAIR HFA) 90 mcg/act inhaler, Inhale 2 puffs every 4 (four) hours as needed for shortness of breath, Disp: , Rfl:     albuterol (PROVENTIL HFA,VENTOLIN HFA) 90 mcg/act inhaler, Inhale 2 puffs every 4 (four) hours as needed for wheezing (Patient not taking: Reported on 3/26/2019), Disp: 1 Inhaler, Rfl: 0    budesonide-formoterol (SYMBICORT) 160-4 5 mcg/act inhaler, Inhale 2 puffs 2 (two) times a day, Disp: , Rfl:     mometasone (NASONEX) 50 mcg/act nasal spray, 2 sprays into each nostril daily (Patient not taking: Reported on 10/5/2018 ), Disp: 17 g, Rfl: 0    predniSONE 10 mg tablet, 60 mg x 1 day, 50 mg x 1 day, 40 mg x 1 day, 30 mg x 1 day, 20 mg x 1 day, 10 mg x 1 day (Patient not taking: Reported on 10/15/2018 ), Disp: 21 tablet, Rfl: 0    predniSONE 20 mg tablet, Take 3 tablets for 2 days then 2 tablets for 3 days then 1 tablet for 3 days then stop (Patient not taking: Reported on 10/5/2018 ), Disp: 15 tablet, Rfl: 0  No current facility-administered medications for this visit       Current Allergies     Allergies as of 2019 - Reviewed 2019   Allergen Reaction Noted    Levofloxacin  11/27/2015    Quinolones  09/24/2016            The following portions of the patient's history were reviewed and updated as appropriate: allergies, current medications, past family history, past medical history, past social history, past surgical history and problem list      Past Medical History:   Diagnosis Date    Bronchitis, asthmatic     last assessed: 3/21/2016    Depression     Pneumonia     last assessed: 12/27/2016    Pneumonia of left lower lobe due to Haemophilus influenzae ([facility] )     last assessed: 11/27/2015    Sebaceous cyst     last assessed: 12/26/2013    Substance abuse ([facility] )     Suicidal ideation     last assessed: 12/27/2016       History reviewed  No pertinent surgical history  Family History   Problem Relation Age of Onset    Colon cancer Mother     Diabetes Mother         mellitus         Medications have been verified  Objective   /92   Pulse 102   Temp (!) 97 2 °F (36 2 °C)   Resp 20   Wt 56 4 kg (124 lb 6 4 oz)   SpO2 98%   BMI 18 91 kg/m²          Physical Exam     Physical Exam   Skin:   The right cheek there is a colorless cool cyst approximately 3 8 inch in diameter  It is firm but very mildly fluctuant at the top  Incision and drain  Date/Time: 3/26/2019 6:16 PM  Performed by: Meseret Mccracken MD  Authorized by: Meseret Mccracken MD     Location:     Type:  Cyst    Size:  3/8"    Location:  Head/neck    Head/neck location:  Face  Anesthesia (see MAR for exact dosages): Anesthesia method:  Topical application    Topical anesthetic:  LET  Procedure details:     Complexity:  Simple    Needle aspiration: yes      Needle size:  18 G    Incision types:  Single straight    Incision depth:  Subcutaneous    Drainage:  Bloody    Drainage amount:  Scant    Wound treatment:  Wound left open    Packing materials:  None  Post-procedure details:     Patient tolerance of procedure:   Tolerated well, no immediate complications

## 2019-03-26 NOTE — PATIENT INSTRUCTIONS
I was unable to get any significant drainage of the cyst   I believe you need to see a surgeon have the cyst removed

## 2019-07-23 ENCOUNTER — OFFICE VISIT (OUTPATIENT)
Dept: URGENT CARE | Facility: CLINIC | Age: 54
End: 2019-07-23
Payer: MEDICARE

## 2019-07-23 VITALS
WEIGHT: 118 LBS | RESPIRATION RATE: 20 BRPM | BODY MASS INDEX: 18.52 KG/M2 | DIASTOLIC BLOOD PRESSURE: 92 MMHG | HEIGHT: 67 IN | OXYGEN SATURATION: 100 % | TEMPERATURE: 98 F | SYSTOLIC BLOOD PRESSURE: 138 MMHG | HEART RATE: 84 BPM

## 2019-07-23 DIAGNOSIS — H61.22 IMPACTED CERUMEN OF LEFT EAR: Primary | ICD-10-CM

## 2019-07-23 PROCEDURE — 99213 OFFICE O/P EST LOW 20 MIN: CPT | Performed by: FAMILY MEDICINE

## 2019-07-23 PROCEDURE — G0463 HOSPITAL OUTPT CLINIC VISIT: HCPCS | Performed by: FAMILY MEDICINE

## 2019-07-23 NOTE — PROGRESS NOTES
Assessment/Plan:      Diagnoses and all orders for this visit:    Impacted cerumen of left ear  -     carbamide peroxide (DEBROX) 6 5 % otic solution; Administer 5 drops into the left ear 2 (two) times a day          Subjective:     Patient ID: Jose Diaz is a 48 y o  male  Patient presents with complaints of impacted cerumen in his left ear canal   Apparently he used drops for ear wax this morning for the 1st time, and the they were ineffective  Review of Systems   Constitutional: Negative  HENT:        See HPI  Eyes: Negative  Respiratory: Negative  Objective:     Physical Exam   Constitutional: He appears well-developed and well-nourished  HENT:   Head: Normocephalic and atraumatic     There is dry impacted cerumen in the left ear canal    Pulmonary/Chest: Effort normal

## 2019-08-19 ENCOUNTER — TELEPHONE (OUTPATIENT)
Dept: FAMILY MEDICINE CLINIC | Facility: CLINIC | Age: 54
End: 2019-08-19

## 2019-08-19 NOTE — LETTER
[facility]  [address] [address]  Phone#  [phone]  Fax#  [phone]    2019      Re:  Rober Davalos            1965      To Whom It May Concern:     Rober Davalos is a well-established patient in our practice  He receives treatment for multiple medical conditions including COPD and chronic sinusitis  He was seen in our office on 1/3/2018 and 2018 for treatment of acute bronchitis, exacerbation of COPD, and acute sinusitis on both of these dates  Thank you for your assistance in this matter  Please contact our office with any additional questions that you may have  Sincerely,         Haim Gutierrez

## 2019-08-19 NOTE — TELEPHONE ENCOUNTER
Patient called, he said that he needs a letter, stating he had of respiratory issues  The time frame is from 1/2018-10/2018  He has a court case over a mold issue coming up  I told him I would pass the message on and we would call him back  Thank you

## 2019-08-19 NOTE — TELEPHONE ENCOUNTER
Patient said that in 2018 he was seen in the office several times with Bronchitis or pneumonia, and he asked if we could type a letter stating the dates he was seen and for what  Thank you

## 2020-01-31 ENCOUNTER — APPOINTMENT (OUTPATIENT)
Dept: RADIOLOGY | Facility: CLINIC | Age: 55
End: 2020-01-31
Payer: MEDICARE

## 2020-01-31 ENCOUNTER — OFFICE VISIT (OUTPATIENT)
Dept: FAMILY MEDICINE CLINIC | Facility: CLINIC | Age: 55
End: 2020-01-31
Payer: MEDICARE

## 2020-01-31 VITALS
BODY MASS INDEX: 18.68 KG/M2 | SYSTOLIC BLOOD PRESSURE: 154 MMHG | WEIGHT: 119 LBS | HEART RATE: 90 BPM | DIASTOLIC BLOOD PRESSURE: 94 MMHG | TEMPERATURE: 97.6 F | HEIGHT: 67 IN | RESPIRATION RATE: 18 BRPM

## 2020-01-31 DIAGNOSIS — R05.9 COUGH: ICD-10-CM

## 2020-01-31 DIAGNOSIS — F17.219 CIGARETTE NICOTINE DEPENDENCE WITH NICOTINE-INDUCED DISORDER: ICD-10-CM

## 2020-01-31 DIAGNOSIS — J44.9 CHRONIC OBSTRUCTIVE PULMONARY DISEASE, UNSPECIFIED COPD TYPE (HCC): Primary | ICD-10-CM

## 2020-01-31 DIAGNOSIS — J44.9 CHRONIC OBSTRUCTIVE PULMONARY DISEASE, UNSPECIFIED COPD TYPE (HCC): ICD-10-CM

## 2020-01-31 PROCEDURE — 99214 OFFICE O/P EST MOD 30 MIN: CPT | Performed by: FAMILY MEDICINE

## 2020-01-31 PROCEDURE — 71046 X-RAY EXAM CHEST 2 VIEWS: CPT

## 2020-01-31 RX ORDER — DOXYCYCLINE HYCLATE 100 MG/1
100 CAPSULE ORAL EVERY 12 HOURS SCHEDULED
Qty: 14 CAPSULE | Refills: 0 | Status: SHIPPED | OUTPATIENT
Start: 2020-01-31 | End: 2020-02-07

## 2020-01-31 RX ORDER — PREDNISONE 10 MG/1
TABLET ORAL
Qty: 21 TABLET | Refills: 0 | Status: SHIPPED | OUTPATIENT
Start: 2020-01-31 | End: 2020-08-25 | Stop reason: ALTCHOICE

## 2020-01-31 NOTE — PROGRESS NOTES
Cheyenne Gresham 1965 male MRN: [id number]    Family Medicine Acute Visit    ASSESSMENT/PLAN  Problem List Items Addressed This Visit        Respiratory    COPD (chronic obstructive pulmonary disease) ([facility] ) - Primary    Relevant Medications    predniSONE 10 mg tablet    doxycycline hyclate (VIBRAMYCIN) 100 mg capsule    Other Relevant Orders    XR chest pa & lateral       Other    Nicotine dependence    Relevant Orders    XR chest pa & lateral    Cough    Relevant Medications    predniSONE 10 mg tablet    doxycycline hyclate (VIBRAMYCIN) 100 mg capsule    Other Relevant Orders    XR chest pa & lateral                No future appointments  SUBJECTIVE  CC: Sinus Problem (congestion x 1 month ) and Cough (congestion x 1 month)      HPI:  Cheyenne Gresham is a 48 y o  male who presents for sick visit,  Cough, congestion, sinus pressure x 1 5 months  Smoking 1 5 PPD x 35 years      Review of Systems   Constitutional: Negative for chills and fever  HENT: Positive for congestion, postnasal drip and sinus pain  Negative for rhinorrhea  Eyes: Negative for photophobia and visual disturbance  Respiratory: Positive for cough and wheezing  Negative for shortness of breath  Cardiovascular: Negative for chest pain, palpitations and leg swelling  Gastrointestinal: Negative for abdominal pain, constipation, diarrhea, nausea and vomiting  Genitourinary: Negative for difficulty urinating and dysuria  Musculoskeletal: Negative for arthralgias and myalgias  Skin: Negative for rash  Neurological: Negative for dizziness and syncope         Historical Information   The patient history was reviewed as follows:  Past Medical History:   Diagnosis Date    Bronchitis, asthmatic     last assessed: 3/21/2016    Depression     Pneumonia     last assessed: 12/27/2016    Pneumonia of left lower lobe due to Haemophilus influenzae ([facility] )     last assessed: 11/27/2015    Sebaceous cyst     last assessed: 12/26/2013   Jude Maldonado Substance abuse ([facility] )     Suicidal ideation     last assessed: 12/27/2016         History reviewed  No pertinent surgical history  Family History   Problem Relation Age of Onset    Colon cancer Mother     Diabetes Mother         mellitus      Social History   Social History     Substance and Sexual Activity   Alcohol Use Yes    Comment: consumes 5-6 beers daily     Social History     Substance and Sexual Activity   Drug Use Not Currently    Types: Prescription    Comment: opiates     Social History     Tobacco Use   Smoking Status Current Every Day Smoker    Packs/day: 1 50   Smokeless Tobacco Never Used       Medications:     Current Outpatient Medications:     doxycycline hyclate (VIBRAMYCIN) 100 mg capsule, Take 1 capsule (100 mg total) by mouth every 12 (twelve) hours for 7 days, Disp: 14 capsule, Rfl: 0    predniSONE 10 mg tablet, Take 60 mg x 1 day, then 50 mg x1 day, then 40 mg x1 day, then 30 mg x1 day, then 20 mg x 1 day, then  10 mg x1 day, Disp: 21 tablet, Rfl: 0    Allergies   Allergen Reactions    Levofloxacin     Quinolones        OBJECTIVE  Vitals:   Vitals:    01/31/20 1142   BP: 154/94   BP Location: Right arm   Patient Position: Sitting   Cuff Size: Standard   Pulse: 90   Resp: 18   Temp: 97 6 °F (36 4 °C)   TempSrc: Tympanic   Weight: 54 kg (119 lb)   Height: 5' 7" (1 702 m)         Physical Exam   Constitutional: He is oriented to person, place, and time  He appears well-developed and well-nourished  HENT:   Head: Normocephalic and atraumatic  Right Ear: External ear normal    Left Ear: External ear normal    Mouth/Throat: Oropharynx is clear and moist    Eyes: Pupils are equal, round, and reactive to light  Conjunctivae and EOM are normal    Neck: Normal range of motion  Neck supple  Cardiovascular: Normal rate, regular rhythm and normal heart sounds  No murmur heard  Pulmonary/Chest: Effort normal  No respiratory distress  He has no wheezes  He has rhonchi  He has rales  Abdominal: Soft  Bowel sounds are normal  He exhibits no distension  Musculoskeletal: Normal range of motion  He exhibits no edema  Neurological: He is alert and oriented to person, place, and time  Skin: Skin is warm and dry  Psychiatric: He has a normal mood and affect   His behavior is normal                   [facility], DO    1/31/2020

## 2020-02-14 ENCOUNTER — OFFICE VISIT (OUTPATIENT)
Dept: FAMILY MEDICINE CLINIC | Facility: CLINIC | Age: 55
End: 2020-02-14
Payer: MEDICARE

## 2020-02-14 VITALS
OXYGEN SATURATION: 97 % | TEMPERATURE: 96.7 F | SYSTOLIC BLOOD PRESSURE: 138 MMHG | HEART RATE: 110 BPM | DIASTOLIC BLOOD PRESSURE: 90 MMHG | WEIGHT: 133 LBS | HEIGHT: 67 IN | RESPIRATION RATE: 16 BRPM | BODY MASS INDEX: 20.88 KG/M2

## 2020-02-14 DIAGNOSIS — J44.9 CHRONIC OBSTRUCTIVE PULMONARY DISEASE, UNSPECIFIED COPD TYPE (HCC): Primary | ICD-10-CM

## 2020-02-14 DIAGNOSIS — F17.219 CIGARETTE NICOTINE DEPENDENCE WITH NICOTINE-INDUCED DISORDER: ICD-10-CM

## 2020-02-14 DIAGNOSIS — J32.9 CHRONIC SINUSITIS, UNSPECIFIED LOCATION: ICD-10-CM

## 2020-02-14 PROCEDURE — 3008F BODY MASS INDEX DOCD: CPT | Performed by: FAMILY MEDICINE

## 2020-02-14 PROCEDURE — 99213 OFFICE O/P EST LOW 20 MIN: CPT | Performed by: FAMILY MEDICINE

## 2020-02-14 RX ORDER — AZITHROMYCIN 250 MG/1
TABLET, FILM COATED ORAL
Qty: 6 TABLET | Refills: 0 | Status: SHIPPED | OUTPATIENT
Start: 2020-02-14 | End: 2020-02-18

## 2020-02-14 NOTE — PROGRESS NOTES
Sabas Murrell 1965 male MRN: [id number]    Family Medicine Acute Visit    ASSESSMENT/PLAN  Problem List Items Addressed This Visit        Respiratory    Chronic sinusitis    Relevant Medications    azithromycin (ZITHROMAX) 250 mg tablet    COPD (chronic obstructive pulmonary disease) ([facility] ) - Primary       Other    Nicotine dependence                No future appointments  SUBJECTIVE  CC: Nausea and Nasal Congestion      HPI:  Sabas Murrell is a 48 y o  male who presents for acute visit  Was seen 2 weeks ago for bronchitis, was on doxy, felt better initially but now is back again  States as soon as he stopped the medication it came back  Sinus pain, congestion  Review of Systems   Constitutional: Negative for chills and fever  HENT: Positive for congestion, postnasal drip and rhinorrhea  Negative for sinus pain  Eyes: Negative for photophobia and visual disturbance  Respiratory: Positive for cough and shortness of breath  Cardiovascular: Negative for chest pain, palpitations and leg swelling  Gastrointestinal: Negative for abdominal pain, constipation, diarrhea, nausea and vomiting  Genitourinary: Negative for difficulty urinating and dysuria  Musculoskeletal: Negative for arthralgias and myalgias  Skin: Negative for rash  Neurological: Negative for dizziness and syncope  Historical Information   The patient history was reviewed as follows:  Past Medical History:   Diagnosis Date    Bronchitis, asthmatic     last assessed: 3/21/2016    Depression     Pneumonia     last assessed: 12/27/2016    [facility]neum[facility] lower lobe [facility]ophil[facility] influenzae [facility]     last assessed: 11/27/2015    Sebaceous cyst     last assessed: 12/26/2013    Substance abuse ([facility] )     Suicidal ideation     last assessed: 12/27/2016         History reviewed  No pertinent surgical history    Family History   Problem Relation Age of Onset    Colon cancer Mother     Diabetes Mother mellitus      Social History   Social History     Substance and Sexual Activity   Alcohol Use Yes    Comment: consumes 5-6 beers daily     Social History     Substance and Sexual Activity   Drug Use Not Currently    Types: Prescription    Comment: opiates     Social History     Tobacco Use   Smoking Status Current Every Day Smoker    Packs/day: 1 50   Smokeless Tobacco Never Used       Medications:     Current Outpatient Medications:     azithromycin (ZITHROMAX) 250 mg tablet, Take 2 tablets today then 1 tablet daily x 4 days, Disp: 6 tablet, Rfl: 0    predniSONE 10 mg tablet, Take 60 mg x 1 day, then 50 mg x1 day, then 40 mg x1 day, then 30 mg x1 day, then 20 mg x 1 day, then  10 mg x1 day (Patient not taking: Reported on 2/14/2020), Disp: 21 tablet, Rfl: 0    Allergies   Allergen Reactions    Levofloxacin     Quinolones        OBJECTIVE  Vitals:   Vitals:    02/14/20 1435   BP: 138/90   BP Location: Left arm   Patient Position: Sitting   Cuff Size: Large   Pulse: (!) 110   Resp: 16   Temp: (!) 96 7 °F (35 9 °C)   TempSrc: Tympanic   SpO2: 97%   Weight: 60 3 kg (133 lb)   Height: 5' 7" (1 702 m)         Physical Exam               Anne Marie 59, DO    2/14/2020

## 2020-03-10 ENCOUNTER — OFFICE VISIT (OUTPATIENT)
Dept: FAMILY MEDICINE CLINIC | Facility: CLINIC | Age: 55
End: 2020-03-10
Payer: MEDICARE

## 2020-03-10 ENCOUNTER — APPOINTMENT (OUTPATIENT)
Dept: RADIOLOGY | Facility: CLINIC | Age: 55
End: 2020-03-10
Payer: MEDICARE

## 2020-03-10 VITALS
BODY MASS INDEX: 21.06 KG/M2 | TEMPERATURE: 97.4 F | SYSTOLIC BLOOD PRESSURE: 170 MMHG | RESPIRATION RATE: 20 BRPM | HEART RATE: 97 BPM | DIASTOLIC BLOOD PRESSURE: 90 MMHG | HEIGHT: 67 IN | OXYGEN SATURATION: 98 % | WEIGHT: 134.2 LBS

## 2020-03-10 DIAGNOSIS — L72.3 SEBACEOUS CYST: ICD-10-CM

## 2020-03-10 DIAGNOSIS — S69.91XA FINGER INJURY, RIGHT, INITIAL ENCOUNTER: Primary | ICD-10-CM

## 2020-03-10 DIAGNOSIS — S69.91XA FINGER INJURY, RIGHT, INITIAL ENCOUNTER: ICD-10-CM

## 2020-03-10 PROCEDURE — 73130 X-RAY EXAM OF HAND: CPT

## 2020-03-10 PROCEDURE — 99213 OFFICE O/P EST LOW 20 MIN: CPT | Performed by: FAMILY MEDICINE

## 2020-03-10 PROCEDURE — 3008F BODY MASS INDEX DOCD: CPT | Performed by: FAMILY MEDICINE

## 2020-03-10 NOTE — PROGRESS NOTES
Brenton Cortez 1965 male MRN: [id number]    Family Medicine Acute Visit    ASSESSMENT/PLAN  Problem List Items Addressed This Visit        Musculoskeletal and Integument    Sebaceous cyst    Relevant Orders    Ambulatory referral to Plastic Surgery       Other    Finger injury, right, initial encounter - Primary    Relevant Orders    XR hand 3+ vw right                No future appointments  SUBJECTIVE  CC: Finger Injury (right ring finger 14 days ago after getting finger trapped in door  )      HPI:  Brenton Cortez is a 48 y o  male who presents for acute visit  Finger got caught in the door, thinks he broke his finger, very swollen, bruising noted  This happened 2 weeks ago  Right hand 4th digit  Also has cyst on his face- tried to open it himself at home-states some drained out but he needs someone else to look at it    Review of Systems   Constitutional: Negative for chills and fever  HENT: Negative for congestion, postnasal drip, rhinorrhea and sinus pain  Eyes: Negative for photophobia and visual disturbance  Respiratory: Negative for cough and shortness of breath  Cardiovascular: Negative for chest pain, palpitations and leg swelling  Gastrointestinal: Negative for abdominal pain, constipation, diarrhea, nausea and vomiting  Genitourinary: Negative for difficulty urinating and dysuria  Musculoskeletal: Negative for arthralgias and myalgias  Skin: Negative for rash  Neurological: Negative for dizziness and syncope         Historical Information   The patient history was reviewed as follows:  Past Medical History:   Diagnosis Date    Bronchitis, asthmatic     last assessed: 3/21/2016    Depression     Pneumonia     last assessed: 12/27/2016    Pneumonia of left lower lobe [facility] to [facility]ophilus influenzae [facility] )     last assessed: 11/27/2015    Sebaceous cyst     last assessed: 12/26/2013    Substance abuse ([facility] )     Suicidal ideation     last assessed: 12/27/2016 History reviewed  No pertinent surgical history  Family History   Problem Relation Age of Onset    Colon cancer Mother     Diabetes Mother         mellitus      Social History   Social History     Substance and Sexual Activity   Alcohol Use Yes    Comment: consumes 5-6 beers daily     Social History     Substance and Sexual Activity   Drug Use Not Currently    Types: Prescription    Comment: opiates     Social History     Tobacco Use   Smoking Status Current Every Day Smoker    Packs/day: 1 50   Smokeless Tobacco Never Used       Medications:     Current Outpatient Medications:     predniSONE 10 mg tablet, Take 60 mg x 1 day, then 50 mg x1 day, then 40 mg x1 day, then 30 mg x1 day, then 20 mg x 1 day, then  10 mg x1 day, Disp: 21 tablet, Rfl: 0    Allergies   Allergen Reactions    Levofloxacin     Quinolones        OBJECTIVE  Vitals:   Vitals:    03/10/20 1419   BP: 170/90   BP Location: Left arm   Patient Position: Sitting   Cuff Size: Standard   Pulse: 97   Resp: 20   Temp: (!) 97 4 °F (36 3 °C)   TempSrc: Tympanic   SpO2: 98%   Weight: 60 9 kg (134 lb 3 2 oz)   Height: 5' 7" (1 702 m)         Physical Exam   Constitutional: He is oriented to person, place, and time  He appears well-developed and well-nourished  HENT:   Head: Normocephalic and atraumatic  Right Ear: External ear normal    Left Ear: External ear normal    Mouth/Throat: Oropharynx is clear and moist    Eyes: Pupils are equal, round, and reactive to light  Conjunctivae and EOM are normal    Neck: Normal range of motion  Neck supple  Cardiovascular: Normal rate, regular rhythm and normal heart sounds  No murmur heard  Pulmonary/Chest: Effort normal and breath sounds normal  No respiratory distress  He has no wheezes  Abdominal: Soft  Bowel sounds are normal  He exhibits no distension  Musculoskeletal: Normal range of motion  He exhibits no edema  Right hand: He exhibits tenderness and swelling   Decreased strength noted  Neurological: He is alert and oriented to person, place, and time  Skin: Skin is warm and dry  Psychiatric: He has a normal mood and affect   His behavior is normal                   Dante Fortune DO    3/10/2020

## 2020-08-24 ENCOUNTER — NURSE TRIAGE (OUTPATIENT)
Dept: OTHER | Facility: OTHER | Age: 55
End: 2020-08-24

## 2020-08-24 NOTE — TELEPHONE ENCOUNTER
Regarding: arm pain  ----- Message from Thierry Shelton sent at 8/24/2020  5:21 PM EDT -----  " I have so much arm pain I can not move it "

## 2020-08-24 NOTE — TELEPHONE ENCOUNTER
Reason for Disposition   Numbness (i e , loss of sensation) in hand or fingers    Answer Assessment - Initial Assessment Questions  1  ONSET: "When did the pain start?"      2 days ago  2  LOCATION: "Where is the pain located?"      Right arm   3  PAIN: "How bad is the pain?" (Scale 1-10; or mild, moderate, severe)    - MILD (1-3): doesn't interfere with normal activities    - MODERATE (4-7): interferes with normal activities (e g , work or school) or awakens from sleep    - SEVERE (8-10): excruciating pain, unable to do any normal activities, unable to hold a cup of water     10  4  WORK OR EXERCISE: "Has there been any recent work or exercise that involved this part of the body?"      Playing the piano  5  CAUSE: "What do you think is causing the arm pain?"    Unsure  Cannot think of any cause other than playing the piano ( intensely )        6  OTHER SYMPTOMS: "Do you have any other symptoms?" (e g , neck pain, swelling, rash, fever, numbness, weakness)      Numbness in the arm      Protocols used: ARM PAIN-ADULT-

## 2020-08-24 NOTE — TELEPHONE ENCOUNTER
Extremity is warm to touch  States he has "normal blood flow to the arm" Tried ice and heat without relief  Suggested that patient go to a [facility] but refuses

## 2020-08-25 ENCOUNTER — TELEMEDICINE (OUTPATIENT)
Dept: FAMILY MEDICINE CLINIC | Facility: CLINIC | Age: 55
End: 2020-08-25
Payer: MEDICARE

## 2020-08-25 VITALS — HEIGHT: 67 IN | WEIGHT: 115 LBS | BODY MASS INDEX: 18.05 KG/M2

## 2020-08-25 DIAGNOSIS — J01.01 ACUTE RECURRENT MAXILLARY SINUSITIS: ICD-10-CM

## 2020-08-25 DIAGNOSIS — M25.511 ACUTE PAIN OF RIGHT SHOULDER: Primary | ICD-10-CM

## 2020-08-25 PROCEDURE — 99214 OFFICE O/P EST MOD 30 MIN: CPT | Performed by: NURSE PRACTITIONER

## 2020-08-25 PROCEDURE — 3008F BODY MASS INDEX DOCD: CPT | Performed by: NURSE PRACTITIONER

## 2020-08-25 RX ORDER — PREDNISONE 10 MG/1
10 TABLET ORAL DAILY
Qty: 21 TABLET | Refills: 0 | Status: SHIPPED | OUTPATIENT
Start: 2020-08-25 | End: 2021-01-29 | Stop reason: ALTCHOICE

## 2020-08-25 RX ORDER — CEFUROXIME AXETIL 500 MG/1
500 TABLET ORAL EVERY 12 HOURS SCHEDULED
Qty: 14 TABLET | Refills: 0 | Status: SHIPPED | OUTPATIENT
Start: 2020-08-25 | End: 2020-09-01

## 2020-08-25 NOTE — PROGRESS NOTES
Virtual Regular Visit      Assessment/Plan:    Problem List Items Addressed This Visit     None               Reason for visit is   Chief Complaint   Patient presents with    Shoulder Pain     right -- tingling in hand -- no known injury    Sinus Problem    Virtual Regular Visit        Encounter provider MACHO Gomes    Provider located at [address] [place] [id number] [phone]      Recent Visits  No visits were found meeting these conditions  Showing recent visits within past 7 days and meeting all other requirements     Today's Visits  Date Type Provider Dept   08/25/20 Telemedicine MACHO Gomes [facility]   Showing today's visits and meeting all other requirements     Future Appointments  No visits were found meeting these conditions  Showing future appointments within next 150 days and meeting all other requirements        The patient was identified by name and date of birth  Alexis Rice was informed that this is a telemedicine visit and that the visit is being conducted through [facility] and patient was informed that this is a secure, HIPAA-compliant platform  He agrees to proceed     My office door was closed  No one else was in the room  He acknowledged consent and understanding of privacy and security of the video platform  The patient has agreed to participate and understands they can discontinue the visit at any time  Patient is aware this is a billable service  Subjective  Alexis Rice is a 48 y o  male       Shoulder Pain    The pain is present in the right shoulder  This is a new problem  The current episode started 1 to 4 weeks ago  There has been no history of extremity trauma  The problem occurs constantly  The problem has been unchanged  The quality of the pain is described as sharp and aching  The pain is at a severity of 9/10  The pain is severe   Associated symptoms include a limited range of motion  Pertinent negatives include no fever, inability to bear weight, itching, joint locking, joint swelling, numbness, stiffness or tingling  The symptoms are aggravated by activity  He has tried nothing for the symptoms  The treatment provided no relief  Family history does not include gout or rheumatoid arthritis  There is no history of diabetes, gout, osteoarthritis or rheumatoid arthritis  Sinus Problem   This is a chronic (patient reports that he has a mold issue, shows viesw of areasin his apartment, reports about to be evicted due to lack of paying rent due to mold) problem  The current episode started 1 to 4 weeks ago  The problem has been waxing and waning since onset  There has been no fever  His pain is at a severity of 5/10  The pain is moderate  Associated symptoms include congestion and sinus pressure  Pertinent negatives include no chills, coughing, diaphoresis, ear pain, headaches, hoarse voice, neck pain, shortness of breath, sneezing, sore throat or swollen glands  Past treatments include nothing  The treatment provided no relief  Past Medical History:   Diagnosis Date    Bronchitis, asthmatic     last assessed: 3/21/2016    Depression     Pneumonia     last assessed: 12/27/2016    Pneumonia of left lower lobe due to Haemophilus influenzae ([facility] )     last assessed: 11/27/2015    Sebaceous cyst     last assessed: 12/26/2013    Substance abuse ([facility] )     Suicidal ideation     last assessed: 12/27/2016       No past surgical history on file  No current outpatient medications on file  No current facility-administered medications for this visit  Allergies   Allergen Reactions    Levofloxacin     Quinolones        Review of Systems   Constitutional: Negative  Negative for chills, diaphoresis and fever  HENT: Positive for congestion and sinus pressure  Negative for ear pain, hoarse voice, sneezing and sore throat  Eyes: Negative      Respiratory: Negative  Negative for cough and shortness of breath  Cardiovascular: Negative  Gastrointestinal: Negative  Endocrine: Negative  Genitourinary: Negative  Musculoskeletal: Negative for gout, neck pain and stiffness  Skin: Negative  Negative for itching  Allergic/Immunologic: Negative  Neurological: Negative  Negative for tingling, numbness and headaches  Hematological: Negative  Psychiatric/Behavioral: Negative  Video Exam    Vitals:    08/25/20 1437   Weight: 52 2 kg (115 lb)   Height: 5' 7" (1 702 m)       Physical Exam  Constitutional:       General: He is not in acute distress  Appearance: He is not ill-appearing  Comments: Appears disarranged   HENT:      Head: Normocephalic and atraumatic  Eyes:      General:         Right eye: No discharge  Left eye: No discharge  Extraocular Movements: Extraocular movements intact  Conjunctiva/sclera: Conjunctivae normal    Neck:      Musculoskeletal: Normal range of motion  Pulmonary:      Effort: Pulmonary effort is normal       Breath sounds: Normal breath sounds  Musculoskeletal:         General: Tenderness present  No deformity  Right lower leg: No edema  Left lower leg: No edema  Comments: Limited ROM of right shoulder   Skin:     General: Skin is dry  Capillary Refill: Capillary refill takes less than 2 seconds  Neurological:      Mental Status: He is alert and oriented to person, place, and time  Cranial Nerves: No cranial nerve deficit  Sensory: No sensory deficit  Motor: No weakness  Coordination: Coordination normal       Gait: Gait normal       Deep Tendon Reflexes: Reflexes normal    Psychiatric:         Mood and Affect: Mood normal          Behavior: Behavior normal          Thought Content:  Thought content normal          Judgment: Judgment normal           I spent 20 minutes directly with the patient during this visit      VIRTUAL VISIT DISCLAIMER    Johnny Santana acknowledges that he has consented to an online visit or consultation  He understands that the online visit is based solely on information provided by him, and that, in the absence of a face-to-face physical evaluation by the physician, the diagnosis he receives is both limited and provisional in terms of accuracy and completeness  This is not intended to replace a full medical face-to-face evaluation by the physician  Johnny Santana understands and accepts these terms

## 2020-08-25 NOTE — PATIENT INSTRUCTIONS
Shoulder Pain   WHAT YOU NEED TO KNOW:   Shoulder pain is a common problem and can affect your daily activities  Pain can be caused by a problem within your shoulder  Shoulder pain may also be caused by pain that spreads to your shoulder from another part of your body  DISCHARGE INSTRUCTIONS:   Return to the emergency department if:   · You have severe pain  · You cannot move your arm or shoulder  · You have numbness or tingling in your shoulder or arm  Contact your healthcare provider if:   · Your pain gets worse or does not go away with treatment  · You have trouble moving your arm or shoulder  · You have questions or concerns about your condition or care  Medicines: You may need any of the following:  · Acetaminophen  decreases pain and fever  It is available without a doctor's order  Ask how much to take and how often to take it  Follow directions  Acetaminophen can cause liver damage if not taken correctly  · NSAIDs , such as ibuprofen, help decrease swelling, pain, and fever  This medicine is available with or without a doctor's order  NSAIDs can cause stomach bleeding or kidney problems in certain people  If you take blood thinner medicine, always ask your healthcare provider if NSAIDs are safe for you  Always read the medicine label and follow directions  · Take your medicine as directed  Contact your healthcare provider if you think your medicine is not helping or if you have side effects  Tell him of her if you are allergic to any medicine  Keep a list of the medicines, vitamins, and herbs you take  Include the amounts, and when and why you take them  Bring the list or the pill bottles to follow-up visits  Carry your medicine list with you in case of an emergency  Follow up with your healthcare provider or orthopedist as directed:  Write down your questions so you remember to ask them during your visits     Manage your symptoms:   · Apply ice  on your shoulder for 20 to 30 minutes every 2 hours or as directed  Use an ice pack, or put crushed ice in a plastic bag  Cover it with a towel  Ice helps prevent tissue damage and decreases swelling and pain  · Apply heat if ice does not help your symptoms  Apply heat on your shoulder for 20 to 30 minutes every 2 hours for as many days as directed  Heat helps decrease pain and muscle spasms  · Go to physical or occupational therapy as directed  A physical therapist teaches you exercises to help improve movement and strength, and to decrease pain  An occupational therapist teaches you skills to help with your daily activities  Prevent shoulder pain:   · Stretch and strengthen your shoulder  Use proper technique during exercises and sports  · Limit activities as directed  Try to avoid repeated overhead movements  © 2017 [facility] Information is for End User's use only and may not be sold, redistributed or otherwise used for commercial purposes  All illustrations and images included in CareNotes® are the copyrighted property of A D [employer] M [employer] Inc  or [employer]  The above information is an  only  It is not intended as medical advice for individual conditions or treatments  Talk to your doctor, nurse or pharmacist before following any medical regimen to see if it is safe and effective for you

## 2020-12-08 ENCOUNTER — TELEMEDICINE (OUTPATIENT)
Dept: FAMILY MEDICINE CLINIC | Facility: CLINIC | Age: 55
End: 2020-12-08
Payer: MEDICARE

## 2020-12-08 DIAGNOSIS — R05.9 COUGH: ICD-10-CM

## 2020-12-08 DIAGNOSIS — Z03.818 ENCOUNTER FOR OBSERVATION FOR SUSPECTED EXPOSURE TO OTHER BIOLOGICAL AGENTS RULED OUT: ICD-10-CM

## 2020-12-08 DIAGNOSIS — J44.9 CHRONIC OBSTRUCTIVE PULMONARY DISEASE, UNSPECIFIED COPD TYPE (HCC): ICD-10-CM

## 2020-12-08 DIAGNOSIS — J32.9 CHRONIC SINUSITIS, UNSPECIFIED LOCATION: ICD-10-CM

## 2020-12-08 DIAGNOSIS — Z03.818 ENCOUNTER FOR OBSERVATION FOR SUSPECTED EXPOSURE TO OTHER BIOLOGICAL AGENTS RULED OUT: Primary | ICD-10-CM

## 2020-12-08 PROCEDURE — 99442 PR PHYS/QHP TELEPHONE EVALUATION 11-20 MIN: CPT | Performed by: FAMILY MEDICINE

## 2020-12-08 PROCEDURE — U0003 INFECTIOUS AGENT DETECTION BY NUCLEIC ACID (DNA OR RNA); SEVERE ACUTE RESPIRATORY SYNDROME CORONAVIRUS 2 (SARS-COV-2) (CORONAVIRUS DISEASE [COVID-19]), AMPLIFIED PROBE TECHNIQUE, MAKING USE OF HIGH THROUGHPUT TECHNOLOGIES AS DESCRIBED BY CMS-2020-01-R: HCPCS | Performed by: FAMILY MEDICINE

## 2020-12-08 RX ORDER — AZITHROMYCIN 250 MG/1
TABLET, FILM COATED ORAL
Qty: 6 TABLET | Refills: 0 | Status: SHIPPED | OUTPATIENT
Start: 2020-12-08 | End: 2020-12-12

## 2020-12-08 RX ORDER — PREDNISONE 10 MG/1
TABLET ORAL
Qty: 21 TABLET | Refills: 0 | Status: SHIPPED | OUTPATIENT
Start: 2020-12-08 | End: 2021-01-29 | Stop reason: ALTCHOICE

## 2020-12-09 LAB — SARS-COV-2 RNA SPEC QL NAA+PROBE: NOT DETECTED

## 2021-01-29 ENCOUNTER — TELEMEDICINE (OUTPATIENT)
Dept: FAMILY MEDICINE CLINIC | Facility: CLINIC | Age: 56
End: 2021-01-29
Payer: MEDICARE

## 2021-01-29 VITALS — WEIGHT: 115 LBS | BODY MASS INDEX: 18.05 KG/M2 | HEIGHT: 67 IN

## 2021-01-29 DIAGNOSIS — Z20.822 EXPOSURE TO COVID-19 VIRUS: Primary | ICD-10-CM

## 2021-01-29 PROCEDURE — 99441 PR PHYS/QHP TELEPHONE EVALUATION 5-10 MIN: CPT | Performed by: FAMILY MEDICINE

## 2021-01-29 NOTE — PROGRESS NOTES
Virtual Brief Visit    Assessment/Plan:    Problem List Items Addressed This Visit     None                Reason for visit is   Chief Complaint   Patient presents with    Chills    Headache     for 3 days    Fatigue    Virtual Brief Visit        Encounter provider Triny Crhistensen DO    Provider located at [address] [address]  [phone]    Recent Visits  No visits were found meeting these conditions  Showing recent visits within past 7 days and meeting all other requirements     Today's Visits  Date Type Provider Dept   01/29/21 Telemedicine Eddie Alas DO [facility]   Showing today's visits and meeting all other requirements     Future Appointments  No visits were found meeting these conditions  Showing future appointments within next 150 days and meeting all other requirements        After connecting through {AMB VIRTUAL VISIT URSXNO:14070}, the patient was identified by name and date of birth  Aakash Godoy was informed that this is a telemedicine visit and that the visit is being conducted through {AMB CORONAVIRUS VISIT VFRUTA:43421}  {Telemedicine confidentiality :25992} {Telemedicine participants:62058}  He acknowledged consent and understanding of privacy and security of the platform  The patient has agreed to participate and understands he can discontinue the visit at any time  Patient is aware this is a billable service  Subjective    Aakash Godoy is a 50 y o  male ***  HPI     Past Medical History:   Diagnosis Date    Bronchitis, asthmatic     last assessed: 3/21/2016    Depression     Pneumonia     last assessed: 12/27/2016    Pneumonia of left lower lobe due to Haemophilus influenzae ([facility] )     last assessed: 11/27/2015    Sebaceous cyst     last assessed: 12/26/2013    Substance abuse ([facility] )     Suicidal ideation     last assessed: 12/27/2016       No past surgical history on file      No current outpatient medications on file  No current facility-administered medications for this visit  Allergies   Allergen Reactions    Levofloxacin     Quinolones        Review of Systems    Vitals:    01/29/21 1248   Weight: 52 2 kg (115 lb)   Height: 5' 7" (1 702 m)         {covid time spent:11344}    VIRTUAL VISIT DISCLAIMER    Johnny Santana acknowledges that he has consented to an online visit or consultation  He understands that the online visit is based solely on information provided by him, and that, in the absence of a face-to-face physical evaluation by the physician, the diagnosis he receives is both limited and provisional in terms of accuracy and completeness  This is not intended to replace a full medical face-to-face evaluation by the physician  Johnny Santana understands and accepts these terms

## 2021-01-29 NOTE — PROGRESS NOTES
COVID-19 Virtual Visit     Assessment/Plan:    Problem List Items Addressed This Visit     None      Visit Diagnoses     Exposure to COVID-19 virus    -  Primary    Relevant Orders    Novel Coronavirus (Covid-19),PCR [facility] - Collected at Mobile Vans or [facility]          will test patient for COVID   Follow-up if positive        Disposition:     I have spent 10 minutes directly with the patient  Greater than 50% of this time was spent in counseling/coordination of care regarding: instructions for management  Encounter provider Chester Melo DO    Provider located at [address] [place] [id number] [phone]    Recent Visits  No visits were found meeting these conditions  Showing recent visits within past 7 days and meeting all other requirements     Today's Visits  Date Type Provider Dept   01/29/21 Telemedicine DO Arvin Montero   Showing today's visits and meeting all other requirements     Future Appointments  No visits were found meeting these conditions  Showing future appointments within next 150 days and meeting all other requirements        Patient agrees to participate in a virtual check in via telephone or video visit instead of presenting to the office to address urgent/immediate medical needs  Patient is aware this is a billable service  After connecting through Telephone, the patient was identified by name and date of birth  Inez Jorge was informed that this was a telemedicine visit and that the exam was being conducted confidentially over secure lines  Johnny Santana acknowledged consent and understanding of privacy and security of the telemedicine visit  I informed the patient that I have reviewed his record in Epic and presented the opportunity for him to ask any questions regarding the visit today  The patient agreed to participate  Subjective:    Inez Jorge is a 50 y o  male who is concerned about COVID-19  Patient is currently asymptomatic  Patient denies fever, chills, fatigue, malaise, congestion, rhinorrhea, sore throat, anosmia, loss of taste, cough, shortness of breath, chest tightness, abdominal pain, nausea, vomiting, diarrhea, myalgias and headaches  Patient's symptoms are unclear  Patient is a poor historian has significant flight of ideas    Lab Results   Component Value Date    SARSCOV2 Not Detected 12/08/2020     Past Medical History:   Diagnosis Date    Bronchitis, asthmatic     last assessed: 3/21/2016    Depression     Pneumonia     last assessed: 12/27/2016    Pneumonia of left lower lobe due to Haemophilus influenzae ([facility] )     last assessed: 11/27/2015    Sebaceous cyst     last assessed: 12/26/2013    Substance abuse ([facility] )     Suicidal ideation     last assessed: 12/27/2016     No past surgical history on file  No current outpatient medications on file  No current facility-administered medications for this visit  Allergies   Allergen Reactions    Levofloxacin     Quinolones        Review of Systems   Constitutional: Negative  Negative for chills, fatigue and fever  HENT: Negative  Negative for congestion, rhinorrhea and sore throat  Eyes: Negative  Respiratory: Negative  Negative for cough, chest tightness and shortness of breath  Cardiovascular: Negative  Gastrointestinal: Negative  Negative for abdominal pain, diarrhea, nausea and vomiting  Endocrine: Negative  Genitourinary: Negative  Musculoskeletal: Negative  Negative for myalgias  Skin: Negative  Allergic/Immunologic: Negative  Neurological: Negative  Negative for headaches  Hematological: Negative  Psychiatric/Behavioral: Negative  All other systems reviewed and are negative      Objective:    Vitals:    01/29/21 1248   Weight: 52 2 kg (115 lb)   Height: 5' 7" (1 702 m)       VIRTUAL VISIT DISCLAIMER    Johnny Santana acknowledges that he has consented to an online visit or consultation  He understands that the online visit is based solely on information provided by him, and that, in the absence of a face-to-face physical evaluation by the physician, the diagnosis he receives is both limited and provisional in terms of accuracy and completeness  This is not intended to replace a full medical face-to-face evaluation by the physician  Johnny Santana understands and accepts these terms

## 2022-01-03 ENCOUNTER — TELEMEDICINE (OUTPATIENT)
Dept: FAMILY MEDICINE CLINIC | Facility: CLINIC | Age: 57
End: 2022-01-03
Payer: MEDICARE

## 2022-01-03 VITALS — BODY MASS INDEX: 18.01 KG/M2 | HEIGHT: 67 IN

## 2022-01-03 DIAGNOSIS — J20.9 ACUTE BRONCHITIS, UNSPECIFIED ORGANISM: Primary | ICD-10-CM

## 2022-01-03 PROCEDURE — 99213 OFFICE O/P EST LOW 20 MIN: CPT | Performed by: FAMILY MEDICINE

## 2022-01-03 RX ORDER — DOXYCYCLINE HYCLATE 100 MG/1
100 CAPSULE ORAL EVERY 12 HOURS SCHEDULED
Qty: 20 CAPSULE | Refills: 0 | Status: SHIPPED | OUTPATIENT
Start: 2022-01-03 | End: 2022-01-13

## 2022-01-03 NOTE — PROGRESS NOTES
Virtual Regular Visit    Verification of patient location:    Patient is located in the following state in which I hold an active license [place]      Assessment/Plan:    Problem List Items Addressed This Visit     None      Visit Diagnoses     Acute bronchitis, unspecified organism    -  Primary    Relevant Medications    doxycycline hyclate (VIBRAMYCIN) 100 mg capsule      The patient has acute bronchitis  The patient will take the doxycycline as indicated  He will call if there is no improvement or if the condition worsens  I strongly advised him to schedule follow-up in the office to address some of his preventative concerns  We will see him back as scheduled  Reason for visit is   Chief Complaint   Patient presents with    Dental Pain     Tooth pain    Eye Problem     Left eye numbness    Cough     Cough with green mucous    Virtual Regular Visit        Encounter provider Ollie Aleman DO    Provider located at [address] [place] [id number] [phone]      Recent Visits  Date Type Provider Dept   01/03/22 Telemedicine Ollie Aleman DO [facility]   Showing recent visits within past 7 days and meeting all other requirements  Future Appointments  No visits were found meeting these conditions  Showing future appointments within next 150 days and meeting all other requirements       The patient was identified by name and date of birth  Neto Wilcox was informed that this is a telemedicine visit and that the visit is being conducted through [address] [place] [address] Now and patient was informed that this is a secure, HIPAA-compliant platform  He agrees to proceed     My office door was closed  No one else was in the room  He acknowledged consent and understanding of privacy and security of the video platform  The patient has agreed to participate and understands they can discontinue the visit at any time      Patient is aware this is a billable service  Subjective  Dashawn Bustamante is a 49 y o  male presents with cold an chest congestion for several days  Derrick Jose is a 49 y o  male who presents with a cough and chest congestion for 2-3 weeks that is getting worse  He does have a thick productive cough of mucus  He continues to smoke  He is not vaccinated against COVID-19  He also complains of a swollen gum and bad teeth the refuses to go to the dentist   He does have fevers on and off  He complains of some mild shortness of breath  He denies any chest pain  There is no nausea, vomiting, or diarrhea  He also complains of discoloration in his stool but refuses to follow-up with GI as well  Cough  This is a chronic problem  The current episode started 1 to 4 weeks ago  The problem has been rapidly worsening  The problem occurs constantly  The cough is productive of purulent sputum  Associated symptoms include nasal congestion, postnasal drip and shortness of breath  Pertinent negatives include no chest pain, chills, ear congestion, ear pain, fever, headaches, heartburn, hemoptysis, myalgias, rash, rhinorrhea, sore throat, sweats, weight loss or wheezing  Past Medical History:   Diagnosis Date    Bronchitis, asthmatic     last assessed: 3/21/2016    Depression     Pneumonia     last assessed: 12/27/2016    Pneumonia of left lower lobe due to Haemophilus influenzae ([facility] )     last assessed: 11/27/2015    Sebaceous cyst     last assessed: 12/26/2013    Substance abuse ([facility] )     Suicidal ideation     last assessed: 12/27/2016       No past surgical history on file  Current Outpatient Medications   Medication Sig Dispense Refill    doxycycline hyclate (VIBRAMYCIN) 100 mg capsule Take 1 capsule (100 mg total) by mouth every 12 (twelve) hours for 10 days 20 capsule 0     No current facility-administered medications for this visit          Allergies   Allergen Reactions    Levofloxacin     Quinolones        Review of Systems   Constitutional: Negative  Negative for chills, fever and weight loss  HENT: Positive for postnasal drip  Negative for ear pain, rhinorrhea and sore throat  Eyes: Negative  Respiratory: Positive for cough and shortness of breath  Negative for hemoptysis and wheezing  Cardiovascular: Negative  Negative for chest pain  Gastrointestinal: Negative  Negative for heartburn  Endocrine: Negative  Genitourinary: Negative  Musculoskeletal: Negative  Negative for myalgias  Skin: Negative  Negative for rash  Allergic/Immunologic: Negative  Neurological: Negative  Negative for headaches  Hematological: Negative  Psychiatric/Behavioral: Negative  Video Exam    Vitals:    01/03/22 1342   Height: 5' 7" (1 702 m)   The above vitals were obtained by the patient at home and reported to our office since this is a virtual visit  Physical Exam  Constitutional:       General: He is not in acute distress  Appearance: He is well-developed  He is not diaphoretic  HENT:      Head: Normocephalic and atraumatic  Eyes:      Pupils: Pupils are equal, round, and reactive to light  Pulmonary:      Effort: Pulmonary effort is normal       Breath sounds: Normal breath sounds  Musculoskeletal:         General: Normal range of motion  Cervical back: Normal range of motion and neck supple  Neurological:      Mental Status: He is alert  Psychiatric:         Behavior: Behavior normal          Thought Content: Thought content normal          Judgment: Judgment normal           I spent 15 minutes directly with the patient during this visit    VIRTUAL VISIT DISCLAIMER      [address] verbally agrees to participate in [employer]   Pt is aware that [employer] could be limited without vital signs or the ability to perform a full hands-on physical exam  Johnny Santana understands he or the provider may request at any time to terminate the video visit and request the patient to seek care or treatment in person

## 2022-01-17 ENCOUNTER — OFFICE VISIT (OUTPATIENT)
Dept: FAMILY MEDICINE CLINIC | Facility: CLINIC | Age: 57
End: 2022-01-17
Payer: MEDICARE

## 2022-01-17 VITALS
SYSTOLIC BLOOD PRESSURE: 144 MMHG | DIASTOLIC BLOOD PRESSURE: 82 MMHG | HEIGHT: 67 IN | OXYGEN SATURATION: 97 % | WEIGHT: 141.2 LBS | RESPIRATION RATE: 20 BRPM | BODY MASS INDEX: 22.16 KG/M2 | TEMPERATURE: 97.7 F | HEART RATE: 118 BPM

## 2022-01-17 DIAGNOSIS — Z12.11 SCREENING FOR COLON CANCER: ICD-10-CM

## 2022-01-17 DIAGNOSIS — K12.0 APHTHOUS ULCER OF TONGUE: Primary | ICD-10-CM

## 2022-01-17 DIAGNOSIS — R03.0 ELEVATED BLOOD-PRESSURE READING WITHOUT DIAGNOSIS OF HYPERTENSION: ICD-10-CM

## 2022-01-17 DIAGNOSIS — D17.24 LIPOMA OF LEFT LOWER EXTREMITY: ICD-10-CM

## 2022-01-17 DIAGNOSIS — Z11.59 ENCOUNTER FOR HEPATITIS C SCREENING TEST FOR LOW RISK PATIENT: ICD-10-CM

## 2022-01-17 DIAGNOSIS — E78.2 MIXED HYPERLIPIDEMIA: ICD-10-CM

## 2022-01-17 DIAGNOSIS — R05.3 CHRONIC COUGH: ICD-10-CM

## 2022-01-17 DIAGNOSIS — F31.81 BIPOLAR II DISORDER (HCC): ICD-10-CM

## 2022-01-17 DIAGNOSIS — Z11.4 SCREENING FOR HIV (HUMAN IMMUNODEFICIENCY VIRUS): ICD-10-CM

## 2022-01-17 PROCEDURE — 99214 OFFICE O/P EST MOD 30 MIN: CPT | Performed by: FAMILY MEDICINE

## 2022-01-17 NOTE — PROGRESS NOTES
Assessment/Plan:  Problem List Items Addressed This Visit        Digestive    Aphthous ulcer of tongue - Primary     The patient has resolving aphthous ulcers of his tongue  We have given them the magic mouthwash to use as needed  He will avoid spicy and acidic foods  Will monitor closely  They are starting to resolve  He will follow-up if there is no improvement within a week or if the condition worsens  Relevant Medications    al mag oxide-diphenhydramine-lidocaine viscous (MAGIC MOUTHWASH) 1:1:1 suspension       Other    Bipolar II disorder (HCC)    Relevant Orders    CBC and differential    Comprehensive metabolic panel    LDL cholesterol, direct    Lipid panel    TSH, 3rd generation with Free T4 reflex    UA (URINE) with reflex to Scope    Lipoma of left lower extremity     The palpable lesion on the patient's left lower leg is consistent with a lipoma  It is very small  We will monitor it for now  There is no intervention necessary  Other Visit Diagnoses     Elevated blood-pressure reading without diagnosis of hypertension        Relevant Orders    CBC and differential    Comprehensive metabolic panel    LDL cholesterol, direct    Lipid panel    TSH, 3rd generation with Free T4 reflex    UA (URINE) with reflex to Scope    Screening for colon cancer        Relevant Orders    Ambulatory referral for colonoscopy    Encounter for hepatitis C screening test for low risk patient        Relevant Orders    Hepatitis C antibody    Screening for HIV (human immunodeficiency virus)        Relevant Orders    HIV 1/2 Antigen/Antibody (4th Generation) w Reflex [facility]    Chronic cough        Relevant Orders    XR chest pa & lateral    Mixed hyperlipidemia        Relevant Orders    LDL cholesterol, direct    Lipid panel      The patient will go for the testing as indicated  We will see him back as scheduled  Return for Annual physical- schedule the patient for a medicare wellness visit        I spent 20 minutes during the visit reviewing the history from the patient, performing the examination, discussing the findings with the patient, providing counseling and education, and making a plan  I spent 15 minutes ordering referrals and testing and documenting  Subjective:   Chief Complaint   Patient presents with    Lesion(s) on tongue     Patient reports that they showed up 5-6 days ago, he thinks there's about 4 of them   Bump on left shin     Patient reports that he first noticed it about a year ago  No pain from it, but it alarms hism        Patient ID: Gui Barksdale is a 49 y o  male presents today for evaluation of lesions on his tongue  Gui Barksdale is a 49 y o  male who presents today for evaluation of lesions on his tongue that have been present for 5-6 days  He also complains of a bump on his left shin that he has had for about a year  The lesion has not changed in size and there is no pain with this  He has lesions on his tongue for 5-6 days and they are painful- they have gotten a little better with medicinal tea  There is increased heartburn- that subsided  He had a stomach virus prior to this and his discoloration in his tool  There is a sore throat  There are are slight fevers  There is no cough  He took the doxycycline and did well  He is still smoking          The following portions of the patient's history were reviewed and updated as appropriate: allergies, current medications, past family history, past medical history, past social history, past surgical history and problem list   Patient Active Problem List   Diagnosis    B[facility] II disorder ([facility]    Chronic depression    Chronic sinusitis    GERD (gastroesophageal reflux disease)    Nicotine dependence    Polysubstance dependence ([facility]    COPD (chronic obstructive pulmonary disease) ([facility]    Cervical radiculopathy due to degenerative joint disease of spine    Degenerative disc disease, cervical    Anterolisthesis    Mass of left hand    Cough    Finger injury, right, initial encounter    Sebaceous cyst    Lipoma of left lower extremity    Aphthous ulcer of tongue     Past Medical History:   Diagnosis Date    Bronchitis, asthmatic     last assessed: 3/21/2016    Depression     Pneumonia     last assessed: 12/27/2016    Pneumonia of left lower lobe due to Haemophilus influenzae ([facility] )     last assessed: 11/27/2015    Sebaceous cyst     last assessed: 12/26/2013    Substance abuse ([facility] )     Suicidal ideation     last assessed: 12/27/2016     History reviewed  No pertinent surgical history    Allergies   Allergen Reactions    Levofloxacin     Quinolones      Family History   Problem Relation Age of Onset    Colon cancer Mother     Diabetes Mother         mellitus     Social History     Socioeconomic History    Marital status: Single     Spouse name: Not on file    Number of children: Not on file    Years of education: Not on file    Highest education level: Not on file   Occupational History    Not on file   Tobacco Use    Smoking status: Current Every Day Smoker     Packs/day: 1 50    Smokeless tobacco: Never Used   Vaping Use    Vaping Use: Never used   Substance and Sexual Activity    Alcohol use: Yes     Comment: consumes 5-6 beers daily    Drug use: Not Currently     Types: Prescription, Methamphetamines     Comment: opiates    Sexual activity: Not on file   Other Topics Concern    Not on file   Social History Narrative    Coffee consumption (3 cups/day)     Social Determinants of Health     Financial Resource Strain: Not on file   Food Insecurity: Not on file   Transportation Needs: Not on file   Physical Activity: Not on file   Stress: Not on file   Social Connections: Not on file   Intimate Partner Violence: Not At Risk    Fear of Current or Ex-Partner: No    Emotionally Abused: No    Physically Abused: No    Sexually Abused: No   Housing Stability: Not on file     No current outpatient medications on file prior to visit  No current facility-administered medications on file prior to visit  Review of Systems   Constitutional: Negative  HENT: Positive for mouth sores  Eyes: Negative  Respiratory: Negative  Cardiovascular: Negative  Gastrointestinal: Negative  Endocrine: Negative  Genitourinary: Negative  Musculoskeletal: Negative  Skin: Negative  Allergic/Immunologic: Negative  Neurological: Negative  Hematological: Negative  Psychiatric/Behavioral: Negative  Objective:  Vitals:    01/17/22 1107   BP: 144/82   BP Location: Left arm   Patient Position: Sitting   Cuff Size: Standard   Pulse: (!) 118   Resp: 20   Temp: 97 7 °F (36 5 °C)   TempSrc: Tympanic   SpO2: 97%   Weight: 64 kg (141 lb 3 2 oz)   Height: 5' 7" (1 702 m)     Body mass index is 22 12 kg/m²  Physical Exam  Vitals and nursing note reviewed  Constitutional:       General: He is not in acute distress  Appearance: He is well-developed  He is not diaphoretic  HENT:      Mouth/Throat:      Mouth: Mucous membranes are moist       Pharynx: Oropharynx is clear  No oropharyngeal exudate or posterior oropharyngeal erythema  Comments: The patient has small superficial ulcerated areas and either side of his tongue that her white in color  There is no increased redness or swelling  Eyes:      Pupils: Pupils are equal, round, and reactive to light  Neck:      Thyroid: No thyromegaly  Vascular: No JVD  Trachea: No tracheal deviation  Cardiovascular:      Rate and Rhythm: Normal rate and regular rhythm  Heart sounds: Normal heart sounds  No murmur heard  No friction rub  No gallop  Pulmonary:      Effort: Pulmonary effort is normal  No respiratory distress  Breath sounds: Normal breath sounds  No stridor  No wheezing or rales  Chest:      Chest wall: No tenderness     Abdominal:      General: Bowel sounds are normal  There is no distension  Palpations: Abdomen is soft  There is no mass  Tenderness: There is no abdominal tenderness  There is no guarding or rebound  Musculoskeletal:         General: Normal range of motion  Cervical back: Normal range of motion and neck supple  Lymphadenopathy:      Cervical: No cervical adenopathy  Skin:     General: Skin is warm and dry  Coloration: Skin is not pale  Findings: No erythema or rash  Neurological:      Mental Status: He is alert and oriented to person, place, and time  Cranial Nerves: No cranial nerve deficit  Motor: No abnormal muscle tone  Coordination: Coordination normal       Deep Tendon Reflexes: Reflexes are normal and symmetric   Reflexes normal

## 2022-01-19 PROBLEM — D17.24 LIPOMA OF LEFT LOWER EXTREMITY: Status: ACTIVE | Noted: 2022-01-19

## 2022-01-19 PROBLEM — K12.0 APHTHOUS ULCER OF TONGUE: Status: ACTIVE | Noted: 2022-01-19

## 2022-01-19 NOTE — ASSESSMENT & PLAN NOTE
The palpable lesion on the patient's left lower leg is consistent with a lipoma  It is very small  We will monitor it for now  There is no intervention necessary

## 2022-01-19 NOTE — ASSESSMENT & PLAN NOTE
The patient has resolving aphthous ulcers of his tongue  We have given them the magic mouthwash to use as needed  He will avoid spicy and acidic foods  Will monitor closely  They are starting to resolve  He will follow-up if there is no improvement within a week or if the condition worsens

## 2022-02-03 ENCOUNTER — APPOINTMENT (OUTPATIENT)
Dept: LAB | Facility: CLINIC | Age: 57
End: 2022-02-03
Payer: MEDICARE

## 2022-02-03 ENCOUNTER — APPOINTMENT (OUTPATIENT)
Dept: RADIOLOGY | Facility: CLINIC | Age: 57
End: 2022-02-03
Payer: MEDICARE

## 2022-02-03 DIAGNOSIS — R05.3 CHRONIC COUGH: ICD-10-CM

## 2022-02-03 DIAGNOSIS — R03.0 ELEVATED BLOOD-PRESSURE READING WITHOUT DIAGNOSIS OF HYPERTENSION: ICD-10-CM

## 2022-02-03 DIAGNOSIS — E78.2 MIXED HYPERLIPIDEMIA: ICD-10-CM

## 2022-02-03 DIAGNOSIS — Z11.4 SCREENING FOR HIV (HUMAN IMMUNODEFICIENCY VIRUS): ICD-10-CM

## 2022-02-03 DIAGNOSIS — F31.81 BIPOLAR II DISORDER (HCC): ICD-10-CM

## 2022-02-03 DIAGNOSIS — Z11.59 ENCOUNTER FOR HEPATITIS C SCREENING TEST FOR LOW RISK PATIENT: ICD-10-CM

## 2022-02-03 LAB
ALBUMIN SERPL BCP-MCNC: 4.1 G/DL (ref 3.5–5)
ALP SERPL-CCNC: 104 U/L (ref 46–116)
ALT SERPL W P-5'-P-CCNC: 114 U/L (ref 12–78)
ANION GAP SERPL CALCULATED.3IONS-SCNC: 5 MMOL/L (ref 4–13)
AST SERPL W P-5'-P-CCNC: 153 U/L (ref 5–45)
BACTERIA UR QL AUTO: ABNORMAL /HPF
BASOPHILS # BLD AUTO: 0.02 THOUSANDS/ΜL (ref 0–0.1)
BASOPHILS NFR BLD AUTO: 0 % (ref 0–1)
BILIRUB SERPL-MCNC: 0.87 MG/DL (ref 0.2–1)
BILIRUB UR QL STRIP: NEGATIVE
BUN SERPL-MCNC: 9 MG/DL (ref 5–25)
CALCIUM SERPL-MCNC: 9.3 MG/DL (ref 8.3–10.1)
CAOX CRY URNS QL MICRO: ABNORMAL /HPF
CHLORIDE SERPL-SCNC: 101 MMOL/L (ref 100–108)
CHOLEST SERPL-MCNC: 177 MG/DL
CLARITY UR: CLEAR
CO2 SERPL-SCNC: 30 MMOL/L (ref 21–32)
COLOR UR: ABNORMAL
CREAT SERPL-MCNC: 0.75 MG/DL (ref 0.6–1.3)
EOSINOPHIL # BLD AUTO: 0.06 THOUSAND/ΜL (ref 0–0.61)
EOSINOPHIL NFR BLD AUTO: 1 % (ref 0–6)
ERYTHROCYTE [DISTWIDTH] IN BLOOD BY AUTOMATED COUNT: 12.9 % (ref 11.6–15.1)
GFR SERPL CREATININE-BSD FRML MDRD: 103 ML/MIN/1.73SQ M
GLUCOSE P FAST SERPL-MCNC: 94 MG/DL (ref 65–99)
GLUCOSE UR STRIP-MCNC: NEGATIVE MG/DL
HCT VFR BLD AUTO: 45.6 % (ref 36.5–49.3)
HDLC SERPL-MCNC: 85 MG/DL
HGB BLD-MCNC: 15.7 G/DL (ref 12–17)
HGB UR QL STRIP.AUTO: NEGATIVE
HYALINE CASTS #/AREA URNS LPF: ABNORMAL /LPF
IMM GRANULOCYTES # BLD AUTO: 0.03 THOUSAND/UL (ref 0–0.2)
IMM GRANULOCYTES NFR BLD AUTO: 1 % (ref 0–2)
KETONES UR STRIP-MCNC: ABNORMAL MG/DL
LDLC SERPL CALC-MCNC: 79 MG/DL (ref 0–100)
LDLC SERPL DIRECT ASSAY-MCNC: 66 MG/DL (ref 0–100)
LEUKOCYTE ESTERASE UR QL STRIP: NEGATIVE
LYMPHOCYTES # BLD AUTO: 2.03 THOUSANDS/ΜL (ref 0.6–4.47)
LYMPHOCYTES NFR BLD AUTO: 35 % (ref 14–44)
MCH RBC QN AUTO: 34.3 PG (ref 26.8–34.3)
MCHC RBC AUTO-ENTMCNC: 34.4 G/DL (ref 31.4–37.4)
MCV RBC AUTO: 100 FL (ref 82–98)
MONOCYTES # BLD AUTO: 0.64 THOUSAND/ΜL (ref 0.17–1.22)
MONOCYTES NFR BLD AUTO: 11 % (ref 4–12)
MUCOUS THREADS UR QL AUTO: ABNORMAL
NEUTROPHILS # BLD AUTO: 2.95 THOUSANDS/ΜL (ref 1.85–7.62)
NEUTS SEG NFR BLD AUTO: 52 % (ref 43–75)
NITRITE UR QL STRIP: NEGATIVE
NON-SQ EPI CELLS URNS QL MICRO: ABNORMAL /HPF
NONHDLC SERPL-MCNC: 92 MG/DL
NRBC BLD AUTO-RTO: 0 /100 WBCS
PH UR STRIP.AUTO: 6 [PH]
PLATELET # BLD AUTO: 193 THOUSANDS/UL (ref 149–390)
PMV BLD AUTO: 9.9 FL (ref 8.9–12.7)
POTASSIUM SERPL-SCNC: 3.3 MMOL/L (ref 3.5–5.3)
PROT SERPL-MCNC: 8.3 G/DL (ref 6.4–8.2)
PROT UR STRIP-MCNC: ABNORMAL MG/DL
RBC # BLD AUTO: 4.58 MILLION/UL (ref 3.88–5.62)
RBC #/AREA URNS AUTO: ABNORMAL /HPF
SODIUM SERPL-SCNC: 136 MMOL/L (ref 136–145)
SP GR UR STRIP.AUTO: 1.02 (ref 1–1.03)
TRIGL SERPL-MCNC: 63 MG/DL
TSH SERPL DL<=0.05 MIU/L-ACNC: 2.46 UIU/ML (ref 0.36–3.74)
UROBILINOGEN UR QL STRIP.AUTO: 1 E.U./DL
WBC # BLD AUTO: 5.73 THOUSAND/UL (ref 4.31–10.16)
WBC #/AREA URNS AUTO: ABNORMAL /HPF

## 2022-02-03 PROCEDURE — 80053 COMPREHEN METABOLIC PANEL: CPT

## 2022-02-03 PROCEDURE — 71046 X-RAY EXAM CHEST 2 VIEWS: CPT

## 2022-02-03 PROCEDURE — 87389 HIV-1 AG W/HIV-1&-2 AB AG IA: CPT

## 2022-02-03 PROCEDURE — 85025 COMPLETE CBC W/AUTO DIFF WBC: CPT

## 2022-02-03 PROCEDURE — 86803 HEPATITIS C AB TEST: CPT

## 2022-02-03 PROCEDURE — 83721 ASSAY OF BLOOD LIPOPROTEIN: CPT

## 2022-02-03 PROCEDURE — 84443 ASSAY THYROID STIM HORMONE: CPT

## 2022-02-03 PROCEDURE — 81001 URINALYSIS AUTO W/SCOPE: CPT

## 2022-02-03 PROCEDURE — 36415 COLL VENOUS BLD VENIPUNCTURE: CPT

## 2022-02-03 PROCEDURE — 80061 LIPID PANEL: CPT

## 2022-02-04 LAB
HCV AB SER QL: NORMAL
HIV 1+2 AB+HIV1 P24 AG SERPL QL IA: NORMAL

## 2022-02-08 ENCOUNTER — TELEPHONE (OUTPATIENT)
Dept: FAMILY MEDICINE CLINIC | Facility: CLINIC | Age: 57
End: 2022-02-08

## 2022-02-08 NOTE — TELEPHONE ENCOUNTER
Patient called  He was wondering what the blood work results were and the x-ray  I told him that the results were not back on the x-ray, but the blood work was  He asked if someone would be able to call him with the results of the blood work  [phone]  Thank you

## 2022-02-10 ENCOUNTER — TELEPHONE (OUTPATIENT)
Dept: FAMILY MEDICINE CLINIC | Facility: CLINIC | Age: 57
End: 2022-02-10

## 2022-02-10 DIAGNOSIS — F10.10 ALCOHOL ABUSE: ICD-10-CM

## 2022-02-10 DIAGNOSIS — R94.5 ABNORMAL RESULTS OF LIVER FUNCTION STUDIES: ICD-10-CM

## 2022-02-10 DIAGNOSIS — R19.5 STOOL COLOR ABNORMAL: ICD-10-CM

## 2022-02-10 DIAGNOSIS — R74.8 ELEVATED LIVER ENZYMES: Primary | ICD-10-CM

## 2022-02-10 DIAGNOSIS — R19.5 CHANGE IN STOOL: ICD-10-CM

## 2022-02-10 NOTE — TELEPHONE ENCOUNTER
I spoke with the patient on 2/09/2022 and reviewed his testing  The patient's chest x-ray came back normal   His labs were mostly unremarkable except his liver enzymes were elevated  He does admit to heavy alcohol use  He also has been experiencing very dark colored stool  His bowel movements are formed but extremely dark  He has never had a colonoscopy  I advised the patient we need to refer him to GI for further evaluation and in addition we will send her for additional testing to follow-up on the elevated liver enzymes  He needs to cut back on his alcohol intake and I discussed with him the possibility of placing him in a detox center to help him stop alcohol use  He is going to consider this  We will see him back as scheduled

## 2022-09-28 ENCOUNTER — OFFICE VISIT (OUTPATIENT)
Dept: FAMILY MEDICINE CLINIC | Facility: CLINIC | Age: 57
End: 2022-09-28
Payer: MEDICARE

## 2022-09-28 ENCOUNTER — LAB (OUTPATIENT)
Dept: LAB | Facility: CLINIC | Age: 57
End: 2022-09-28
Payer: MEDICARE

## 2022-09-28 ENCOUNTER — APPOINTMENT (OUTPATIENT)
Dept: LAB | Facility: CLINIC | Age: 57
End: 2022-09-28
Payer: MEDICARE

## 2022-09-28 VITALS
HEART RATE: 100 BPM | WEIGHT: 134.6 LBS | HEIGHT: 67 IN | BODY MASS INDEX: 21.12 KG/M2 | OXYGEN SATURATION: 99 % | TEMPERATURE: 98.1 F | SYSTOLIC BLOOD PRESSURE: 120 MMHG | RESPIRATION RATE: 16 BRPM | DIASTOLIC BLOOD PRESSURE: 88 MMHG

## 2022-09-28 DIAGNOSIS — E46 PROTEIN-CALORIE MALNUTRITION, UNSPECIFIED SEVERITY (HCC): ICD-10-CM

## 2022-09-28 DIAGNOSIS — G62.9 NEUROPATHY: ICD-10-CM

## 2022-09-28 DIAGNOSIS — R74.8 ABNORMAL LEVELS OF OTHER SERUM ENZYMES: ICD-10-CM

## 2022-09-28 DIAGNOSIS — R00.0 TACHYCARDIA: ICD-10-CM

## 2022-09-28 DIAGNOSIS — R14.0 ABDOMINAL DISTENSION: ICD-10-CM

## 2022-09-28 DIAGNOSIS — R79.9 ABNORMAL FINDING OF BLOOD CHEMISTRY, UNSPECIFIED: ICD-10-CM

## 2022-09-28 DIAGNOSIS — Z12.5 SCREENING FOR PROSTATE CANCER: ICD-10-CM

## 2022-09-28 DIAGNOSIS — F19.20 POLYSUBSTANCE DEPENDENCE (HCC): ICD-10-CM

## 2022-09-28 DIAGNOSIS — R53.83 FATIGUE, UNSPECIFIED TYPE: ICD-10-CM

## 2022-09-28 DIAGNOSIS — R16.0 HEPATOMEGALY: ICD-10-CM

## 2022-09-28 DIAGNOSIS — R19.5 DARK STOOLS: Primary | ICD-10-CM

## 2022-09-28 DIAGNOSIS — R10.84 GENERALIZED ABDOMINAL PAIN: ICD-10-CM

## 2022-09-28 DIAGNOSIS — J44.9 CHRONIC OBSTRUCTIVE PULMONARY DISEASE, UNSPECIFIED COPD TYPE (HCC): ICD-10-CM

## 2022-09-28 DIAGNOSIS — R63.4 WEIGHT LOSS: ICD-10-CM

## 2022-09-28 DIAGNOSIS — K21.9 GASTROESOPHAGEAL REFLUX DISEASE WITHOUT ESOPHAGITIS: ICD-10-CM

## 2022-09-28 DIAGNOSIS — R19.5 DARK STOOLS: ICD-10-CM

## 2022-09-28 DIAGNOSIS — F17.219 CIGARETTE NICOTINE DEPENDENCE WITH NICOTINE-INDUCED DISORDER: ICD-10-CM

## 2022-09-28 PROBLEM — K12.0 APHTHOUS ULCER OF TONGUE: Status: RESOLVED | Noted: 2022-01-19 | Resolved: 2022-09-28

## 2022-09-28 LAB
ALBUMIN SERPL BCP-MCNC: 3.1 G/DL (ref 3.5–5)
ALP SERPL-CCNC: 310 U/L (ref 46–116)
ALT SERPL W P-5'-P-CCNC: 78 U/L (ref 12–78)
AMYLASE SERPL-CCNC: 21 IU/L (ref 25–115)
ANION GAP SERPL CALCULATED.3IONS-SCNC: 9 MMOL/L (ref 4–13)
AST SERPL W P-5'-P-CCNC: 174 U/L (ref 5–45)
BASOPHILS # BLD AUTO: 0.11 THOUSANDS/ΜL (ref 0–0.1)
BASOPHILS NFR BLD AUTO: 1 % (ref 0–1)
BILIRUB SERPL-MCNC: 4.23 MG/DL (ref 0.2–1)
BUN SERPL-MCNC: 12 MG/DL (ref 5–25)
CALCIUM ALBUM COR SERPL-MCNC: 10.3 MG/DL (ref 8.3–10.1)
CALCIUM SERPL-MCNC: 9.6 MG/DL (ref 8.3–10.1)
CHLORIDE SERPL-SCNC: 92 MMOL/L (ref 96–108)
CO2 SERPL-SCNC: 32 MMOL/L (ref 21–32)
CREAT SERPL-MCNC: 0.59 MG/DL (ref 0.6–1.3)
EOSINOPHIL # BLD AUTO: 0.23 THOUSAND/ΜL (ref 0–0.61)
EOSINOPHIL NFR BLD AUTO: 1 % (ref 0–6)
ERYTHROCYTE [DISTWIDTH] IN BLOOD BY AUTOMATED COUNT: 15.9 % (ref 11.6–15.1)
EST. AVERAGE GLUCOSE BLD GHB EST-MCNC: 100 MG/DL
FOLATE SERPL-MCNC: 2 NG/ML (ref 3.1–17.5)
GFR SERPL CREATININE-BSD FRML MDRD: 113 ML/MIN/1.73SQ M
GGT SERPL-CCNC: 1144 U/L (ref 5–85)
GLUCOSE P FAST SERPL-MCNC: 119 MG/DL (ref 65–99)
HBA1C MFR BLD: 5.1 %
HCT VFR BLD AUTO: 37.3 % (ref 36.5–49.3)
HGB BLD-MCNC: 13.1 G/DL (ref 12–17)
IMM GRANULOCYTES # BLD AUTO: 0.18 THOUSAND/UL (ref 0–0.2)
IMM GRANULOCYTES NFR BLD AUTO: 1 % (ref 0–2)
LIPASE SERPL-CCNC: 204 U/L (ref 73–393)
LYMPHOCYTES # BLD AUTO: 1.19 THOUSANDS/ΜL (ref 0.6–4.47)
LYMPHOCYTES NFR BLD AUTO: 6 % (ref 14–44)
MCH RBC QN AUTO: 37.5 PG (ref 26.8–34.3)
MCHC RBC AUTO-ENTMCNC: 35.1 G/DL (ref 31.4–37.4)
MCV RBC AUTO: 107 FL (ref 82–98)
MONOCYTES # BLD AUTO: 1.44 THOUSAND/ΜL (ref 0.17–1.22)
MONOCYTES NFR BLD AUTO: 7 % (ref 4–12)
NEUTROPHILS # BLD AUTO: 17.41 THOUSANDS/ΜL (ref 1.85–7.62)
NEUTS SEG NFR BLD AUTO: 84 % (ref 43–75)
NRBC BLD AUTO-RTO: 0 /100 WBCS
PLATELET # BLD AUTO: 323 THOUSANDS/UL (ref 149–390)
PMV BLD AUTO: 9.8 FL (ref 8.9–12.7)
POTASSIUM SERPL-SCNC: 2.9 MMOL/L (ref 3.5–5.3)
PROT SERPL-MCNC: 7.6 G/DL (ref 6.4–8.4)
PSA SERPL-MCNC: 1.2 NG/ML (ref 0–4)
RBC # BLD AUTO: 3.49 MILLION/UL (ref 3.88–5.62)
SODIUM SERPL-SCNC: 133 MMOL/L (ref 135–147)
TSH SERPL DL<=0.05 MIU/L-ACNC: 4.13 UIU/ML (ref 0.45–4.5)
VIT B12 SERPL-MCNC: 2340 PG/ML (ref 100–900)
WBC # BLD AUTO: 20.56 THOUSAND/UL (ref 4.31–10.16)

## 2022-09-28 PROCEDURE — 82150 ASSAY OF AMYLASE: CPT

## 2022-09-28 PROCEDURE — 99214 OFFICE O/P EST MOD 30 MIN: CPT | Performed by: FAMILY MEDICINE

## 2022-09-28 PROCEDURE — 83690 ASSAY OF LIPASE: CPT

## 2022-09-28 PROCEDURE — 85025 COMPLETE CBC W/AUTO DIFF WBC: CPT

## 2022-09-28 PROCEDURE — 36415 COLL VENOUS BLD VENIPUNCTURE: CPT

## 2022-09-28 PROCEDURE — 83036 HEMOGLOBIN GLYCOSYLATED A1C: CPT

## 2022-09-28 PROCEDURE — 84443 ASSAY THYROID STIM HORMONE: CPT

## 2022-09-28 PROCEDURE — 82746 ASSAY OF FOLIC ACID SERUM: CPT

## 2022-09-28 PROCEDURE — G0103 PSA SCREENING: HCPCS

## 2022-09-28 PROCEDURE — 82977 ASSAY OF GGT: CPT

## 2022-09-28 PROCEDURE — 80053 COMPREHEN METABOLIC PANEL: CPT

## 2022-09-28 PROCEDURE — 82607 VITAMIN B-12: CPT

## 2022-09-28 RX ORDER — OMEPRAZOLE 20 MG/1
20 CAPSULE, DELAYED RELEASE ORAL DAILY
Qty: 30 CAPSULE | Refills: 2 | Status: SHIPPED | OUTPATIENT
Start: 2022-09-28

## 2022-09-28 NOTE — ASSESSMENT & PLAN NOTE
I strongly advised the patient that he needs to call and schedule a follow-up with GI as previously advised  He ultimately needs an upper and lower endoscopy to rule out a bleeding source  I advised he should follow up in the ER with any worsening symptoms  He will call to schedule appoint with GI as soon as possible  He will go for the testing as indicated

## 2022-09-28 NOTE — ASSESSMENT & PLAN NOTE
The patient is having worsening GERD symptoms so we will start him on the omeprazole as ordered and monitor him closely  He will go for the testing as indicated

## 2022-09-28 NOTE — ASSESSMENT & PLAN NOTE
The patient has a strong history of substance abuse and alcohol use disorder  He does have an enlarged liver on exam   He will go for the testing as indicated and will follow up very closely with the results  I did strongly advise him to follow-up with GI as previously recommended  He was strongly encouraged to quit alcohol intake and I did recommend to consider inpatient detox but the patient refuses at this time

## 2022-09-28 NOTE — RESULT ENCOUNTER NOTE
Left vm for him to callback and confirm he received vm and let us know if he had any other questions

## 2022-09-28 NOTE — PROGRESS NOTES
Assessment/Plan:  Problem List Items Addressed This Visit        Digestive    GERD (gastroesophageal reflux disease)     The patient is having worsening GERD symptoms so we will start him on the omeprazole as ordered and monitor him closely  He will go for the testing as indicated  Relevant Medications    omeprazole (PriLOSEC) 20 mg delayed release capsule    Other Relevant Orders    CBC and differential (Completed)    Comprehensive metabolic panel    Amylase    Lipase    Ambulatory Referral to Gastroenterology    Hepatomegaly     The patient has a strong history of substance abuse and alcohol use disorder  He does have an enlarged liver on exam   He will go for the testing as indicated and will follow up very closely with the results  I did strongly advise him to follow-up with GI as previously recommended  He was strongly encouraged to quit alcohol intake and I did recommend to consider inpatient detox but the patient refuses at this time  Relevant Orders    CBC and differential (Completed)    Comprehensive metabolic panel    Amylase    Lipase    US abdomen complete    Ambulatory Referral to Gastroenterology       Respiratory    COPD (chronic obstructive pulmonary disease) ([facility] [address] )     The patient is strongly encouraged to quit smoking and we will monitor him closely  Other    Dark stools - Primary     I strongly advised the patient that he needs to call and schedule a follow-up with GI as previously advised  He ultimately needs an upper and lower endoscopy to rule out a bleeding source  I advised he should follow up in the ER with any worsening symptoms  He will call to schedule appoint with GI as soon as possible  He will go for the testing as indicated           Relevant Medications    omeprazole (PriLOSEC) 20 mg delayed release capsule    Other Relevant Orders    CBC and differential (Completed)    Comprehensive metabolic panel    Amylase    Lipase    Gamma GT    US abdomen complete    Ambulatory Referral to Gastroenterology    Nicotine dependence     Tobacco Cessation Counseling: Tobacco cessation counseling and education was provided  The patient is sincerely urged to quit consumption of tobacco  He is not ready to quit tobacco  The numerous health risks of tobacco consumption were discussed  If he decides to quit, there are a number of helpful adjunctive aids, and he can see me to discuss nicotine replacement therapy, chantix, or bupropion anytime in the future             Polysubstance dependence ([facility] [address] )    Relevant Orders    CBC and differential (Completed)    Comprehensive metabolic panel    Vitamin B12    Folate      Other Visit Diagnoses     Generalized abdominal pain        Relevant Orders    CBC and differential (Completed)    Comprehensive metabolic panel    Amylase    Lipase    Gamma GT    TSH, 3rd generation    PSA, Total Screen    Vitamin B12    Folate    US abdomen complete    Ambulatory Referral to Gastroenterology    ECG 12 lead    Hemoglobin A1C    Neuropathy        Relevant Orders    CBC and differential (Completed)    Comprehensive metabolic panel    Amylase    Lipase    Gamma GT    TSH, 3rd generation    PSA, Total Screen    Vitamin B12    Folate    Hemoglobin A1C    Screening for prostate cancer        Relevant Orders    PSA, Total Screen    Tachycardia        Relevant Orders    ECG 12 lead    Abdominal distension        Relevant Orders    CBC and differential (Completed)    Comprehensive metabolic panel    Amylase    Lipase    Gamma GT    TSH, 3rd generation    PSA, Total Screen    Vitamin B12    Folate    US abdomen complete    Ambulatory Referral to Gastroenterology    Fatigue, unspecified type        Relevant Orders    CBC and differential (Completed)    Comprehensive metabolic panel    Amylase    Lipase    Gamma GT    TSH, 3rd generation    PSA, Total Screen    Vitamin B12    Folate    Weight loss        Relevant Orders    CBC and differential (Completed) Comprehensive metabolic panel    Amylase    Lipase    Gamma GT    TSH, 3rd generation    PSA, Total Screen    Vitamin B12    Folate    Abnormal levels of other serum enzymes         Relevant Orders    Gamma GT    Protein-calorie malnutrition, unspecified severity (HCC)         Relevant Orders    Vitamin B12    Folate    Hemoglobin A1C    Abnormal finding of blood chemistry, unspecified         Relevant Orders    Hemoglobin A1C      The patient will go for the testing as ordered and was strongly advised to follow-up with GI for further evaluation of his dark stools an enlarged liver  He will schedule this in the near future  We will follow up closely with his results  The patient did refuse the COVID-19 vaccination and the flu shot today  Return in about 1 month (around 10/28/2022) for Recheck  I spent 20 minutes during the visit reviewing the history from the patient, performing the examination, discussing the findings with the patient, providing counseling and education, and making a plan  I spent 10 minutes ordering referrals and testing and documenting  Subjective:   Chief Complaint   Patient presents with    Follow-up        Patient ID: Idania Tatum is a 56 y o  male presents today for a routine checkup  Idania Tatum is a 56 y o  male with a strong history of polysubstance abuse and bipolar disorder who presents today for routine checkup  The patient has multiple problems  He is under a lot of stress and is not sleeping  He has been having dark colored stools and black tarry stools for months  He never went to the GI specialists  He has never had a colonoscopy  He will have episodes of nausea and vomitting every 10 days for the past 8 months  He is having some increasing GERD symptoms as well  There is no dysphagia  There is no early satiety your who is overdue for lab work  He does feels heart racing at times  He is still smoking    The patient denies any chest pain, shortness of breath, or palpitations  There is no edema  There are no headaches or visual changes  There is no lightheadedness, dizziness, or fainting spells  The patient has been having dark stools as well for a months  There are no bloody stools      The following portions of the patient's history were reviewed and updated as appropriate: allergies, current medications, past family history, past medical history, past social history, past surgical history and problem list   Patient Active Problem List   Diagnosis    Bipolar II disorder ([place] [address] )    Chronic depression    Chronic sinusitis    GERD (gastroesophageal reflux disease)    Nicotine dependence    Polysubstance dependence ([place] [address] )    COPD (chronic obstructive pulmonary disease) ([place] [address] )    Cervical radiculopathy due to degenerative joint disease of spine    Degenerative disc disease, cervical    Anterolisthesis    Mass of left hand    Cough    Finger injury, right, initial encounter    Sebaceous cyst    Lipoma of left lower extremity    Dark stools    Hepatomegaly     Past Medical History:   Diagnosis Date    Bronchitis, asthmatic     last assessed: 3/21/2016    Depression     Pneumonia     last assessed: 12/27/2016    Pneumonia of left lower lobe due to Haemophilus influenzae ([place] [address] )     last assessed: 11/27/2015    Sebaceous cyst     last assessed: 12/26/2013    Substance abuse ([place] [address] )     Suicidal ideation     last assessed: 12/27/2016     History reviewed  No pertinent surgical history    Allergies   Allergen Reactions    Levofloxacin     Quinolones      Family History   Problem Relation Age of Onset    Colon cancer Mother     Diabetes Mother         mellitus     Social History     Socioeconomic History    Marital status: Single     Spouse name: Not on file    Number of children: Not on file    Years of education: Not on file    Highest education level: Not on file   Occupational History    Not on file   Tobacco Use    Smoking status: Current Every Day Smoker     Packs/day: 1 00    Smokeless tobacco: Never Used   Vaping Use    Vaping Use: Never used   Substance and Sexual Activity    Alcohol use: Yes     Comment: 10 shots a day of vodka mixed with juice of some sort    Drug use: Not Currently     Types: Prescription, Methamphetamines     Comment: opiates    Sexual activity: Not on file   Other Topics Concern    Not on file   Social History Narrative    Coffee consumption (3 cups/day)     Social Determinants of Health     Financial Resource Strain: Not on file   Food Insecurity: Not on file   Transportation Needs: Not on file   Physical Activity: Not on file   Stress: Not on file   Social Connections: Not on file   Intimate Partner Violence: Not At Risk    Fear of Current or Ex-Partner: No    Emotionally Abused: No    Physically Abused: No    Sexually Abused: No   Housing Stability: Not on file     Current Outpatient Medications on File Prior to Visit   Medication Sig Dispense Refill    [DISCONTINUED] al mag oxide-diphenhydramine-lidocaine viscous (MAGIC MOUTHWASH) 1:1:1 suspension Swish and spit 10 mL every 4 (four) hours as needed for mouth pain or discomfort 90 mL 0     No current facility-administered medications on file prior to visit  Review of Systems   Constitutional: Positive for fatigue  HENT: Negative  Eyes: Negative  Respiratory: Negative  Cardiovascular: Negative  Gastrointestinal: Positive for abdominal distention, abdominal pain, nausea and vomiting  Endocrine: Negative  Genitourinary: Negative  Musculoskeletal: Positive for arthralgias  Skin: Negative  Allergic/Immunologic: Negative  Neurological: Negative  Hematological: Negative  Psychiatric/Behavioral: Negative          Objective:  Vitals:    09/28/22 0830   BP: 120/88   BP Location: Left arm   Patient Position: Sitting   Cuff Size: Standard   Pulse: 100   Resp: 16   Temp: 98 1 °F (36 7 °C)   SpO2: 99%   Weight: 61 1 kg (134 lb 9 6 oz) Height: 5' 7" (1 702 m)     Body mass index is 21 08 kg/m²  Physical Exam  Vitals and nursing note reviewed  Constitutional:       General: He is not in acute distress  Appearance: He is well-developed  He is not diaphoretic  Eyes:      Pupils: Pupils are equal, round, and reactive to light  Neck:      Thyroid: No thyromegaly  Vascular: No JVD  Trachea: No tracheal deviation  Cardiovascular:      Rate and Rhythm: Normal rate and regular rhythm  Heart sounds: Normal heart sounds  No murmur heard  No friction rub  No gallop  Pulmonary:      Effort: Pulmonary effort is normal  No respiratory distress  Breath sounds: Normal breath sounds  No stridor  No wheezing or rales  Chest:      Chest wall: No tenderness  Abdominal:      General: Abdomen is flat  Bowel sounds are normal  There is distension  Palpations: Abdomen is soft  There is hepatomegaly and splenomegaly  There is no mass  Tenderness: There is abdominal tenderness in the epigastric area and left upper quadrant  There is no guarding or rebound  Hernia: No hernia is present  Musculoskeletal:         General: Normal range of motion  Cervical back: Normal range of motion and neck supple  Lymphadenopathy:      Cervical: No cervical adenopathy  Skin:     General: Skin is warm and dry  Coloration: Skin is not pale  Findings: No erythema or rash  Neurological:      Mental Status: He is alert and oriented to person, place, and time  Cranial Nerves: No cranial nerve deficit  Motor: No abnormal muscle tone  Coordination: Coordination normal       Deep Tendon Reflexes: Reflexes are normal and symmetric   Reflexes normal

## 2022-09-30 ENCOUNTER — TELEPHONE (OUTPATIENT)
Dept: FAMILY MEDICINE CLINIC | Facility: CLINIC | Age: 57
End: 2022-09-30

## 2022-09-30 DIAGNOSIS — R19.5 DARK STOOLS: ICD-10-CM

## 2022-09-30 DIAGNOSIS — R14.0 ABDOMINAL DISTENSION: ICD-10-CM

## 2022-09-30 DIAGNOSIS — E87.6 HYPOKALEMIA: Primary | ICD-10-CM

## 2022-09-30 DIAGNOSIS — R74.8 ELEVATED LIVER ENZYMES: ICD-10-CM

## 2022-09-30 DIAGNOSIS — R16.0 HEPATOMEGALY: ICD-10-CM

## 2022-09-30 DIAGNOSIS — K04.7 DENTAL INFECTION: ICD-10-CM

## 2022-09-30 RX ORDER — AMOXICILLIN AND CLAVULANATE POTASSIUM 875; 125 MG/1; MG/1
1 TABLET, FILM COATED ORAL EVERY 12 HOURS SCHEDULED
Qty: 14 TABLET | Refills: 0 | Status: SHIPPED | OUTPATIENT
Start: 2022-09-30 | End: 2022-10-07

## 2022-09-30 RX ORDER — POTASSIUM CHLORIDE 20 MEQ/1
TABLET, EXTENDED RELEASE ORAL
Qty: 60 TABLET | Refills: 0 | Status: SHIPPED | OUTPATIENT
Start: 2022-09-30 | End: 2022-10-28

## 2022-10-03 ENCOUNTER — APPOINTMENT (OUTPATIENT)
Dept: LAB | Facility: HOSPITAL | Age: 57
End: 2022-10-03
Payer: MEDICARE

## 2022-10-03 DIAGNOSIS — R19.5 CHANGE IN STOOL: ICD-10-CM

## 2022-10-03 DIAGNOSIS — R94.5 ABNORMAL RESULTS OF LIVER FUNCTION STUDIES: ICD-10-CM

## 2022-10-03 DIAGNOSIS — R74.8 ELEVATED LIVER ENZYMES: ICD-10-CM

## 2022-10-03 DIAGNOSIS — F10.10 ALCOHOL ABUSE: ICD-10-CM

## 2022-10-03 DIAGNOSIS — R19.5 STOOL COLOR ABNORMAL: ICD-10-CM

## 2022-10-03 LAB
ALBUMIN SERPL BCP-MCNC: 2.8 G/DL (ref 3.5–5)
ALP SERPL-CCNC: 321 U/L (ref 46–116)
ALT SERPL W P-5'-P-CCNC: 80 U/L (ref 12–78)
AST SERPL W P-5'-P-CCNC: 149 U/L (ref 5–45)
BILIRUB DIRECT SERPL-MCNC: 2.25 MG/DL (ref 0–0.2)
BILIRUB SERPL-MCNC: 2.9 MG/DL (ref 0.2–1)
FERRITIN SERPL-MCNC: 428 NG/ML (ref 8–388)
HAV IGM SER QL: NORMAL
HBV CORE IGM SER QL: NORMAL
HBV SURFACE AG SER QL: NORMAL
HCV AB SER QL: NORMAL
IRON SATN MFR SERPL: 43 % (ref 20–50)
IRON SERPL-MCNC: 65 UG/DL (ref 65–175)
PROT SERPL-MCNC: 7.3 G/DL (ref 6.4–8.4)
TIBC SERPL-MCNC: 152 UG/DL (ref 250–450)

## 2022-10-03 PROCEDURE — 86381 MITOCHONDRIAL ANTIBODY EACH: CPT

## 2022-10-03 PROCEDURE — 83550 IRON BINDING TEST: CPT

## 2022-10-03 PROCEDURE — 80074 ACUTE HEPATITIS PANEL: CPT

## 2022-10-03 PROCEDURE — 82784 ASSAY IGA/IGD/IGG/IGM EACH: CPT

## 2022-10-03 PROCEDURE — 86364 TISS TRNSGLTMNASE EA IG CLAS: CPT

## 2022-10-03 PROCEDURE — 80076 HEPATIC FUNCTION PANEL: CPT

## 2022-10-03 PROCEDURE — 82728 ASSAY OF FERRITIN: CPT

## 2022-10-03 PROCEDURE — 86258 DGP ANTIBODY EACH IG CLASS: CPT

## 2022-10-03 PROCEDURE — 86038 ANTINUCLEAR ANTIBODIES: CPT

## 2022-10-03 PROCEDURE — 86231 EMA EACH IG CLASS: CPT

## 2022-10-03 PROCEDURE — 36415 COLL VENOUS BLD VENIPUNCTURE: CPT

## 2022-10-03 PROCEDURE — 86015 ACTIN ANTIBODY EACH: CPT

## 2022-10-03 PROCEDURE — 83540 ASSAY OF IRON: CPT

## 2022-10-04 LAB
ACTIN IGG SERPL-ACNC: 19 UNITS (ref 0–19)
MITOCHONDRIA M2 IGG SER-ACNC: <20 UNITS (ref 0–20)
RYE IGE QN: NEGATIVE

## 2022-10-05 LAB
ENDOMYSIUM IGA SER QL: NEGATIVE
GLIADIN PEPTIDE IGA SER-ACNC: 5 UNITS (ref 0–19)
GLIADIN PEPTIDE IGG SER-ACNC: 3 UNITS (ref 0–19)
IGA SERPL-MCNC: 381 MG/DL (ref 90–386)
TTG IGA SER-ACNC: <2 U/ML (ref 0–3)
TTG IGG SER-ACNC: <2 U/ML (ref 0–5)

## 2022-10-14 ENCOUNTER — TELEPHONE (OUTPATIENT)
Dept: FAMILY MEDICINE CLINIC | Facility: CLINIC | Age: 57
End: 2022-10-14

## 2022-10-14 ENCOUNTER — APPOINTMENT (EMERGENCY)
Dept: CT IMAGING | Facility: HOSPITAL | Age: 57
DRG: 871 | End: 2022-10-14
Payer: MEDICARE

## 2022-10-14 ENCOUNTER — HOSPITAL ENCOUNTER (INPATIENT)
Facility: HOSPITAL | Age: 57
LOS: 14 days | Discharge: HOME | DRG: 871 | End: 2022-10-28
Attending: EMERGENCY MEDICINE | Admitting: INTERNAL MEDICINE
Payer: MEDICARE

## 2022-10-14 DIAGNOSIS — K74.60 CIRRHOSIS (HCC): ICD-10-CM

## 2022-10-14 DIAGNOSIS — Z78.9 ALCOHOL USE: ICD-10-CM

## 2022-10-14 DIAGNOSIS — A41.9 SEPSIS WITH ACUTE ORGAN DYSFUNCTION WITHOUT SEPTIC SHOCK, DUE TO UNSPECIFIED ORGANISM, UNSPECIFIED TYPE (HCC): ICD-10-CM

## 2022-10-14 DIAGNOSIS — D72.829 LEUKOCYTOSIS, UNSPECIFIED TYPE: ICD-10-CM

## 2022-10-14 DIAGNOSIS — R18.8 ASCITES: Primary | ICD-10-CM

## 2022-10-14 DIAGNOSIS — R10.9 ABDOMINAL PAIN: ICD-10-CM

## 2022-10-14 DIAGNOSIS — R65.20 SEPSIS WITH ACUTE ORGAN DYSFUNCTION WITHOUT SEPTIC SHOCK, DUE TO UNSPECIFIED ORGANISM, UNSPECIFIED TYPE (HCC): ICD-10-CM

## 2022-10-14 PROBLEM — R65.10 SIRS (SYSTEMIC INFLAMMATORY RESPONSE SYNDROME) (HCC): Status: ACTIVE | Noted: 2022-10-14

## 2022-10-14 PROBLEM — F10.90 ALCOHOL USE: Status: ACTIVE | Noted: 2022-10-14

## 2022-10-14 PROBLEM — G93.40 ENCEPHALOPATHY: Status: ACTIVE | Noted: 2022-10-14

## 2022-10-14 LAB
ALBUMIN SERPL BCP-MCNC: 2.4 G/DL (ref 3.5–5)
ALP SERPL-CCNC: 411 U/L (ref 46–116)
ALT SERPL W P-5'-P-CCNC: 72 U/L (ref 12–78)
AMMONIA PLAS-SCNC: 38 UMOL/L (ref 11–35)
ANION GAP SERPL CALCULATED.3IONS-SCNC: 15 MMOL/L (ref 4–13)
APTT PPP: 35 SECONDS (ref 23–37)
AST SERPL W P-5'-P-CCNC: 123 U/L (ref 5–45)
ATRIAL RATE: 129 BPM
BACTERIA UR QL AUTO: ABNORMAL /HPF
BASOPHILS # BLD MANUAL: 0 THOUSAND/UL (ref 0–0.1)
BASOPHILS NFR MAR MANUAL: 0 % (ref 0–1)
BILIRUB DIRECT SERPL-MCNC: 1.41 MG/DL (ref 0–0.2)
BILIRUB SERPL-MCNC: 1.9 MG/DL (ref 0.2–1)
BILIRUB UR QL STRIP: NEGATIVE
BUN SERPL-MCNC: 6 MG/DL (ref 5–25)
CALCIUM ALBUM COR SERPL-MCNC: 9.9 MG/DL (ref 8.3–10.1)
CALCIUM SERPL-MCNC: 8.6 MG/DL (ref 8.3–10.1)
CHLORIDE SERPL-SCNC: 97 MMOL/L (ref 96–108)
CLARITY UR: CLEAR
CO2 SERPL-SCNC: 21 MMOL/L (ref 21–32)
COLOR UR: YELLOW
CREAT SERPL-MCNC: 0.67 MG/DL (ref 0.6–1.3)
EOSINOPHIL # BLD MANUAL: 0 THOUSAND/UL (ref 0–0.4)
EOSINOPHIL NFR BLD MANUAL: 0 % (ref 0–6)
ERYTHROCYTE [DISTWIDTH] IN BLOOD BY AUTOMATED COUNT: 16.7 % (ref 11.6–15.1)
ETHANOL SERPL-MCNC: 19 MG/DL (ref 0–3)
GFR SERPL CREATININE-BSD FRML MDRD: 108 ML/MIN/1.73SQ M
GLUCOSE SERPL-MCNC: 182 MG/DL (ref 65–140)
GLUCOSE UR STRIP-MCNC: NEGATIVE MG/DL
HCT VFR BLD AUTO: 34 % (ref 36.5–49.3)
HGB BLD-MCNC: 12.4 G/DL (ref 12–17)
HGB UR QL STRIP.AUTO: NEGATIVE
INR PPP: 1.07 (ref 0.84–1.19)
KETONES UR STRIP-MCNC: NEGATIVE MG/DL
LACTATE SERPL-SCNC: 1.9 MMOL/L (ref 0.5–2)
LEUKOCYTE ESTERASE UR QL STRIP: NEGATIVE
LIPASE SERPL-CCNC: 308 U/L (ref 73–393)
LYMPHOCYTES # BLD AUTO: 1.29 THOUSAND/UL (ref 0.6–4.47)
LYMPHOCYTES # BLD AUTO: 4 % (ref 14–44)
MCH RBC QN AUTO: 36.6 PG (ref 26.8–34.3)
MCHC RBC AUTO-ENTMCNC: 36.5 G/DL (ref 31.4–37.4)
MCV RBC AUTO: 100 FL (ref 82–98)
METAMYELOCYTES NFR BLD MANUAL: 1 % (ref 0–1)
MONOCYTES # BLD AUTO: 0.65 THOUSAND/UL (ref 0–1.22)
MONOCYTES NFR BLD: 2 % (ref 4–12)
MUCOUS THREADS UR QL AUTO: ABNORMAL
NEUTROPHILS # BLD MANUAL: 30.09 THOUSAND/UL (ref 1.85–7.62)
NEUTS BAND NFR BLD MANUAL: 4 % (ref 0–8)
NEUTS SEG NFR BLD AUTO: 89 % (ref 43–75)
NITRITE UR QL STRIP: NEGATIVE
NON-SQ EPI CELLS URNS QL MICRO: ABNORMAL /HPF
P AXIS: 49 DEGREES
PH UR STRIP.AUTO: 7.5 [PH]
PLATELET # BLD AUTO: 405 THOUSANDS/UL (ref 149–390)
PLATELET BLD QL SMEAR: ABNORMAL
PMV BLD AUTO: 9.6 FL (ref 8.9–12.7)
POTASSIUM SERPL-SCNC: 4 MMOL/L (ref 3.5–5.3)
PR INTERVAL: 138 MS
PROT SERPL-MCNC: 7.1 G/DL (ref 6.4–8.4)
PROT UR STRIP-MCNC: ABNORMAL MG/DL
PROTHROMBIN TIME: 14.7 SECONDS (ref 11.6–14.5)
QRS AXIS: -45 DEGREES
QRSD INTERVAL: 88 MS
QT INTERVAL: 298 MS
QTC INTERVAL: 436 MS
RBC # BLD AUTO: 3.39 MILLION/UL (ref 3.88–5.62)
RBC #/AREA URNS AUTO: ABNORMAL /HPF
RBC MORPH BLD: NORMAL
SODIUM SERPL-SCNC: 133 MMOL/L (ref 135–147)
SP GR UR STRIP.AUTO: <1.005 (ref 1–1.03)
T WAVE AXIS: 43 DEGREES
UROBILINOGEN UR STRIP-ACNC: <2 MG/DL
VENTRICULAR RATE: 129 BPM
WBC # BLD AUTO: 32.35 THOUSAND/UL (ref 4.31–10.16)
WBC #/AREA URNS AUTO: ABNORMAL /HPF

## 2022-10-14 PROCEDURE — 85610 PROTHROMBIN TIME: CPT | Performed by: EMERGENCY MEDICINE

## 2022-10-14 PROCEDURE — 85730 THROMBOPLASTIN TIME PARTIAL: CPT | Performed by: EMERGENCY MEDICINE

## 2022-10-14 PROCEDURE — 82248 BILIRUBIN DIRECT: CPT | Performed by: EMERGENCY MEDICINE

## 2022-10-14 PROCEDURE — 99285 EMERGENCY DEPT VISIT HI MDM: CPT

## 2022-10-14 PROCEDURE — 85007 BL SMEAR W/DIFF WBC COUNT: CPT | Performed by: EMERGENCY MEDICINE

## 2022-10-14 PROCEDURE — 96365 THER/PROPH/DIAG IV INF INIT: CPT

## 2022-10-14 PROCEDURE — 0W9G3ZZ DRAINAGE OF PERITONEAL CAVITY, PERCUTANEOUS APPROACH: ICD-10-PCS | Performed by: INTERNAL MEDICINE

## 2022-10-14 PROCEDURE — 99285 EMERGENCY DEPT VISIT HI MDM: CPT | Performed by: EMERGENCY MEDICINE

## 2022-10-14 PROCEDURE — 93005 ELECTROCARDIOGRAM TRACING: CPT

## 2022-10-14 PROCEDURE — 74177 CT ABD & PELVIS W/CONTRAST: CPT

## 2022-10-14 PROCEDURE — 85027 COMPLETE CBC AUTOMATED: CPT | Performed by: EMERGENCY MEDICINE

## 2022-10-14 PROCEDURE — 99223 1ST HOSP IP/OBS HIGH 75: CPT | Performed by: PHYSICIAN ASSISTANT

## 2022-10-14 PROCEDURE — 80053 COMPREHEN METABOLIC PANEL: CPT | Performed by: EMERGENCY MEDICINE

## 2022-10-14 PROCEDURE — 36415 COLL VENOUS BLD VENIPUNCTURE: CPT

## 2022-10-14 PROCEDURE — 83690 ASSAY OF LIPASE: CPT | Performed by: EMERGENCY MEDICINE

## 2022-10-14 PROCEDURE — 93010 ELECTROCARDIOGRAM REPORT: CPT | Performed by: INTERNAL MEDICINE

## 2022-10-14 PROCEDURE — 82140 ASSAY OF AMMONIA: CPT | Performed by: PHYSICIAN ASSISTANT

## 2022-10-14 PROCEDURE — 87040 BLOOD CULTURE FOR BACTERIA: CPT | Performed by: EMERGENCY MEDICINE

## 2022-10-14 PROCEDURE — 96366 THER/PROPH/DIAG IV INF ADDON: CPT

## 2022-10-14 PROCEDURE — G1004 CDSM NDSC: HCPCS

## 2022-10-14 PROCEDURE — 83605 ASSAY OF LACTIC ACID: CPT | Performed by: EMERGENCY MEDICINE

## 2022-10-14 PROCEDURE — 82077 ASSAY SPEC XCP UR&BREATH IA: CPT | Performed by: EMERGENCY MEDICINE

## 2022-10-14 PROCEDURE — 81001 URINALYSIS AUTO W/SCOPE: CPT | Performed by: EMERGENCY MEDICINE

## 2022-10-14 RX ORDER — LORAZEPAM 2 MG/ML
2 INJECTION INTRAMUSCULAR ONCE
Status: DISCONTINUED | OUTPATIENT
Start: 2022-10-14 | End: 2022-10-27

## 2022-10-14 RX ORDER — ALBUTEROL SULFATE 90 UG/1
2 AEROSOL, METERED RESPIRATORY (INHALATION) EVERY 4 HOURS PRN
Status: DISCONTINUED | OUTPATIENT
Start: 2022-10-14 | End: 2022-10-28 | Stop reason: HOSPADM

## 2022-10-14 RX ORDER — LANOLIN ALCOHOL/MO/W.PET/CERES
100 CREAM (GRAM) TOPICAL DAILY
Status: DISCONTINUED | OUTPATIENT
Start: 2022-10-15 | End: 2022-10-28 | Stop reason: HOSPADM

## 2022-10-14 RX ORDER — FOLIC ACID 1 MG/1
1 TABLET ORAL DAILY
Status: DISCONTINUED | OUTPATIENT
Start: 2022-10-15 | End: 2022-10-28 | Stop reason: HOSPADM

## 2022-10-14 RX ORDER — OXYCODONE HYDROCHLORIDE 5 MG/1
2.5 TABLET ORAL EVERY 6 HOURS PRN
Status: DISCONTINUED | OUTPATIENT
Start: 2022-10-14 | End: 2022-10-15

## 2022-10-14 RX ORDER — DIAZEPAM 5 MG/ML
10 INJECTION, SOLUTION INTRAMUSCULAR; INTRAVENOUS ONCE
Status: COMPLETED | OUTPATIENT
Start: 2022-10-14 | End: 2022-10-14

## 2022-10-14 RX ORDER — SODIUM CHLORIDE 9 MG/ML
75 INJECTION, SOLUTION INTRAVENOUS CONTINUOUS
Status: DISCONTINUED | OUTPATIENT
Start: 2022-10-14 | End: 2022-10-15

## 2022-10-14 RX ORDER — NICOTINE 21 MG/24HR
21 PATCH, TRANSDERMAL 24 HOURS TRANSDERMAL ONCE
Status: COMPLETED | OUTPATIENT
Start: 2022-10-14 | End: 2022-10-15

## 2022-10-14 RX ORDER — ONDANSETRON 2 MG/ML
4 INJECTION INTRAMUSCULAR; INTRAVENOUS EVERY 4 HOURS PRN
Status: DISCONTINUED | OUTPATIENT
Start: 2022-10-14 | End: 2022-10-28 | Stop reason: HOSPADM

## 2022-10-14 RX ADMIN — Medication 3.38 G: at 22:46

## 2022-10-14 RX ADMIN — DIAZEPAM 10 MG: 10 INJECTION, SOLUTION INTRAMUSCULAR; INTRAVENOUS at 22:46

## 2022-10-14 RX ADMIN — NICOTINE 21 MG: 21 PATCH, EXTENDED RELEASE TRANSDERMAL at 20:46

## 2022-10-14 RX ADMIN — IOHEXOL 100 ML: 350 INJECTION, SOLUTION INTRAVENOUS at 16:50

## 2022-10-14 RX ADMIN — SODIUM CHLORIDE 75 ML/HR: 0.9 INJECTION, SOLUTION INTRAVENOUS at 20:47

## 2022-10-14 RX ADMIN — OXYCODONE HYDROCHLORIDE 2.5 MG: 5 TABLET ORAL at 21:18

## 2022-10-14 RX ADMIN — PIPERACILLIN AND TAZOBACTAM 3.38 G: 3; .375 INJECTION, POWDER, LYOPHILIZED, FOR SOLUTION INTRAVENOUS at 16:30

## 2022-10-14 RX ADMIN — ONDANSETRON 4 MG: 2 INJECTION INTRAMUSCULAR; INTRAVENOUS at 21:18

## 2022-10-14 NOTE — TELEPHONE ENCOUNTER
Patient had called and wanted to inform you that he is having Bloating, not passing stool, having sharp pain and having lower colon stabbing pain and no appetite  Please advise I told him to go to the hospital but he had wanted you to be aware of his condition  Patient can be reached at [phone]

## 2022-10-14 NOTE — ED PROVIDER NOTES
History  Chief Complaint   Patient presents with   • Abdominal Pain     Pt c/o of rioght sided abdomianla pain x 1 days  Patient in the emergency department with complaint of worsening abdominal pain, distension and jaundice  He denies fevers or chills  He admits to daily alcohol use, most recently this morning  He states that he was evaluated by his primary care physician recently, due to his complaint of dark, tarry stools  Primary care physician recommended outpatient abdominal ultrasound and CT scan  Patient states that abdominal pain has since worsened  He denies nausea, vomiting, diarrhea  History provided by:  Patient   used: No    Abdominal Pain  Pain location:  Suprapubic and RUQ  Pain quality: aching    Pain radiates to:  Does not radiate  Pain severity:  Moderate  Onset quality:  Gradual  Timing:  Constant  Progression:  Worsening  Chronicity:  New  Relieved by:  Nothing  Worsened by:  Nothing  Ineffective treatments:  None tried  Associated symptoms: no chest pain, no chills, no constipation, no cough, no diarrhea, no dysuria, no fever, no hematuria, no nausea, no shortness of breath and no vomiting        Prior to Admission Medications   Prescriptions Last Dose Informant Patient Reported? Taking?   omeprazole (PriLOSEC) 20 mg delayed release capsule 10/14/2022 at Unknown time  No Yes   Sig: Take 1 capsule (20 mg total) by mouth daily   potassium chloride (K-DUR,KLOR-CON) 20 mEq tablet 10/14/2022 at Unknown time  No Yes   Sig: Take 2 tablets (40 mEq total) by mouth 2 (two) times a day for 2 days, THEN 1 tablet (20 mEq total) 2 (two) times a day for 26 days        Facility-Administered Medications: None       Past Medical History:   Diagnosis Date   • Bronchitis, asthmatic     last assessed: 3/21/2016   • Depression    • Pneumonia     last assessed: 12/27/2016   • Pneumonia of left lower lobe due to Haemophilus influenzae ([facility] [address] )     last assessed: 11/27/2015   • Sebaceous cyst     last assessed: 12/26/2013   • Substance abuse ([facility] [address] )    • Suicidal ideation     last assessed: 12/27/2016       History reviewed  No pertinent surgical history  Family History   Problem Relation Age of Onset   • Colon cancer Mother    • Diabetes Mother         mellitus     I have reviewed and agree with the history as documented  E-Cigarette/Vaping   • E-Cigarette Use Never User      E-Cigarette/Vaping Substances   • Nicotine No    • THC No    • CBD No    • Flavoring No    • Other No    • Unknown No      Social History     Tobacco Use   • Smoking status: Current Every Day Smoker     Packs/day: 1 00   • Smokeless tobacco: Never Used   Vaping Use   • Vaping Use: Never used   Substance Use Topics   • Alcohol use: Yes     Comment: 10 shots a day of vodka mixed with juice of some sort   • Drug use: Not Currently     Types: Prescription, Methamphetamines     Comment: opiates       Review of Systems   Constitutional: Negative for chills and fever  Respiratory: Negative for cough, shortness of breath and wheezing  Cardiovascular: Negative for chest pain and palpitations  Gastrointestinal: Positive for abdominal pain  Negative for constipation, diarrhea, nausea and vomiting  Genitourinary: Negative for dysuria, flank pain, hematuria and urgency  Musculoskeletal: Negative for back pain  Skin: Positive for color change  Negative for rash  All other systems reviewed and are negative  Physical Exam  Physical Exam  Vitals and nursing note reviewed  Constitutional:       Appearance: He is well-developed  HENT:      Head: Normocephalic and atraumatic  Eyes:      Pupils: Pupils are equal, round, and reactive to light  Cardiovascular:      Rate and Rhythm: Normal rate and regular rhythm  Heart sounds: Normal heart sounds  Pulmonary:      Effort: Pulmonary effort is normal       Breath sounds: Normal breath sounds  Abdominal:      General: Abdomen is protuberant   Bowel sounds are normal  There is distension  Palpations: Abdomen is soft  There is fluid wave  There is no mass  Tenderness: There is generalized abdominal tenderness  There is no guarding or rebound  Hernia: A hernia is present  Skin:     General: Skin is warm and dry  Capillary Refill: Capillary refill takes less than 2 seconds  Coloration: Skin is jaundiced  Neurological:      General: No focal deficit present  Mental Status: He is alert and oriented to person, place, and time  Psychiatric:         Mood and Affect: Mood is anxious  Behavior: Behavior normal          Thought Content:  Thought content normal          Judgment: Judgment normal          Vital Signs  ED Triage Vitals   Temperature Pulse Respirations Blood Pressure SpO2   10/14/22 1346 10/14/22 1346 10/14/22 1346 10/14/22 1346 10/14/22 1346   97 8 °F (36 6 °C) (!) 137 18 146/91 97 %      Temp Source Heart Rate Source Patient Position - Orthostatic VS BP Location FiO2 (%)   10/14/22 1346 10/14/22 1346 10/14/22 1346 10/14/22 1346 --   Temporal Monitor Sitting Right arm       Pain Score       10/14/22 1926       8           Vitals:    10/14/22 1346 10/14/22 1400 10/14/22 1700 10/14/22 2001   BP: 146/91 134/85 137/88 136/89   Pulse: (!) 137 (!) 128 (!) 121 (!) 122   Patient Position - Orthostatic VS: Sitting  Lying Lying         Visual Acuity      ED Medications  Medications   nicotine (NICODERM CQ) 21 mg/24 hr TD 24 hr patch 21 mg (has no administration in time range)   LORazepam (ATIVAN) injection 2 mg (2 mg Intravenous Not Given 10/14/22 1912)   piperacillin-tazobactam (ZOSYN) IVPB 3 375 g (has no administration in time range)   thiamine tablet 100 mg (has no administration in time range)   folic acid (FOLVITE) tablet 1 mg (has no administration in time range)   multivitamin-minerals (CENTRUM) tablet 1 tablet (has no administration in time range)   diazepam (VALIUM) injection 10 mg (has no administration in time range) albuterol (PROVENTIL HFA,VENTOLIN HFA) inhaler 2 puff (has no administration in time range)   sodium chloride 0 9 % infusion (has no administration in time range)   piperacillin-tazobactam (ZOSYN) IVPB 3 375 g (3 375 g Intravenous New Bag 10/14/22 1630)   iohexol (OMNIPAQUE) 350 MG/ML injection (SINGLE-DOSE) 100 mL (100 mL Intravenous Given 10/14/22 1650)       Diagnostic Studies  Results Reviewed     Procedure Component Value Units Date/Time    Urine Microscopic [[id number]]  (Abnormal) Collected: 10/14/22 1842    Lab Status: Final result Specimen: Urine, Clean Catch Updated: 10/14/22 1954     RBC, UA None Seen /hpf      WBC, UA 2-4 /hpf      Epithelial Cells Occasional /hpf      Bacteria, UA None Seen /hpf      MUCUS THREADS Occasional    UA w Reflex to Microscopic w Reflex to Culture [[id number]]  (Abnormal) Collected: 10/14/22 1842    Lab Status: Final result Specimen: Urine, Clean Catch Updated: 10/14/22 1954     Color, UA Yellow     Clarity, UA Clear     Specific Gravity, UA <1 005     pH, UA 7 5     Leukocytes, UA Negative     Nitrite, UA Negative     Protein, UA Trace mg/dl      Glucose, UA Negative mg/dl      Ketones, UA Negative mg/dl      Urobilinogen, UA <2 0 mg/dl      Bilirubin, UA Negative     Occult Blood, UA Negative    Lactic acid [[id number]]  (Normal) Collected: 10/14/22 1534    Lab Status: Final result Specimen: Blood from Arm, Right Updated: 10/14/22 1617     LACTIC ACID 1 9 mmol/L     Narrative:      Result may be elevated if tourniquet was used during collection  Blood culture #2 [[id number]] Collected: 10/14/22 1534    Lab Status: In process Specimen: Blood from Arm, Right Updated: 10/14/22 1543    Blood culture #1 [[id number]] Collected: 10/14/22 1534    Lab Status:  In process Specimen: Blood from Arm, Right Updated: 10/14/22 1543    CBC and differential [[id number]]  (Abnormal) Collected: 10/14/22 1350    Lab Status: Final result Specimen: Blood from Arm, Right Updated: 10/14/22 1527 WBC 32 35 Thousand/uL      RBC 3 39 Million/uL      Hemoglobin 12 4 g/dL      Hematocrit 34 0 %       fL      MCH 36 6 pg      MCHC 36 5 g/dL      RDW 16 7 %      MPV 9 6 fL      Platelets 755 Thousands/uL     Manual Differential(PHLEBS Do Not Order) [[id number]]  (Abnormal) Collected: 10/14/22 1350    Lab Status: Final result Specimen: Blood from Arm, Right Updated: 10/14/22 1514     Segmented % 89 %      Bands % 4 %      Lymphocytes % 4 %      Monocytes % 2 %      Eosinophils, % 0 %      Basophils % 0 %      Metamyelocytes% 1 %      Absolute Neutrophils 30 09 Thousand/uL      Lymphocytes Absolute 1 29 Thousand/uL      Monocytes Absolute 0 65 Thousand/uL      Eosinophils Absolute 0 00 Thousand/uL      Basophils Absolute 0 00 Thousand/uL      Total Counted --     RBC Morphology Normal     Platelet Estimate Increased    Lipase [[id number]]  (Normal) Collected: 10/14/22 1350    Lab Status: Final result Specimen: Blood from Arm, Right Updated: 10/14/22 1507     Lipase 308 u/L     Comprehensive metabolic panel [[id number]]  (Abnormal) Collected: 10/14/22 1350    Lab Status: Final result Specimen: Blood from Arm, Right Updated: 10/14/22 1507     Sodium 133 mmol/L      Potassium 4 0 mmol/L      Chloride 97 mmol/L      CO2 21 mmol/L      ANION GAP 15 mmol/L      BUN 6 mg/dL      Creatinine 0 67 mg/dL      Glucose 182 mg/dL      Calcium 8 6 mg/dL      Corrected Calcium 9 9 mg/dL       U/L      ALT 72 U/L      Alkaline Phosphatase 411 U/L      Total Protein 7 1 g/dL      Albumin 2 4 g/dL      Total Bilirubin 1 90 mg/dL      eGFR 108 ml/min/1 73sq m     Narrative:      [place] guidelines for Chronic Kidney Disease (CKD):   •  Stage 1 with normal or high GFR (GFR > 90 mL/min/1 73 square meters)  •  Stage 2 Mild CKD (GFR = 60-89 mL/min/1 73 square meters)  •  Stage 3A Moderate CKD (GFR = 45-59 mL/min/1 73 square meters)  •  Stage 3B Moderate CKD (GFR = 30-44 mL/min/1 73 square meters)  • Stage 4 Severe CKD (GFR = 15-29 mL/min/1 73 square meters)  •  Stage 5 End Stage CKD (GFR <15 mL/min/1 73 square meters)  Note: GFR calculation is accurate only with a steady state creatinine    Bilirubin, direct [[id number]]  (Abnormal) Collected: 10/14/22 1350    Lab Status: Final result Specimen: Blood from Arm, Right Updated: 10/14/22 1507     Bilirubin, Direct 1 41 mg/dL     Protime-INR [[id number]]  (Abnormal) Collected: 10/14/22 1433    Lab Status: Final result Specimen: Blood from Arm, Right Updated: 10/14/22 1504     Protime 14 7 seconds      INR 1 07    APTT [[id number]]  (Normal) Collected: 10/14/22 1433    Lab Status: Final result Specimen: Blood from Arm, Right Updated: 10/14/22 1504     PTT 35 seconds     Ethanol [[id number]]  (Abnormal) Collected: 10/14/22 1433    Lab Status: Final result Specimen: Blood from Arm, Right Updated: 10/14/22 1456     Ethanol Lvl 19 mg/dL                  CT abdomen pelvis with contrast   Final Result by Addison Black MD (10/14 1741)   1  Slightly microlobulated contour of the liver suggestive of cirrhosis  Hepatomegaly  2  New moderate abdominal/pelvic ascites           Workstation performed: [id number]         IR INPATIENT Paracentesis    (Results Pending)              Procedures  ECG 12 Lead Documentation Only    Date/Time: 10/14/2022 3:57 PM  Performed by: Reuben Wadsworth DO  Authorized by: Reuben Wadsworth DO     Indications / Diagnosis:  Abd pain  ECG reviewed by me, the ED Provider: yes    Patient location:  ED  Previous ECG:     Previous ECG:  Unavailable    Comparison to cardiac monitor: Yes    Interpretation:     Interpretation: normal    Rate:     ECG rate:  129bpm    ECG rate assessment: tachycardic    Rhythm:     Rhythm: sinus tachycardia    Ectopy:     Ectopy: none    QRS:     QRS axis:  Normal  Conduction:     Conduction: normal               ED Course                               SBIRT 20yo+    Flowsheet Row Most Recent Value   SBIRT (23 yo +) In order to provide better care to our patients, we are screening all of our patients for alcohol and drug use  Would it be okay to ask you these screening questions? Yes Filed at: 10/14/2022 1528   Initial Alcohol Screen: US AUDIT-C     1  How often do you have a drink containing alcohol? 6 Filed at: 10/14/2022 1528   2  How many drinks containing alcohol do you have on a typical day you are drinking? 6 Filed at: 10/14/2022 1528   3a  Male UNDER 65: How often do you have five or more drinks on one occasion? 6 Filed at: 10/14/2022 1528   3b  FEMALE Any Age, or MALE 65+: How often do you have 4 or more drinks on one occassion? 0 Filed at: 10/14/2022 1528   Audit-C Score 18 Filed at: 10/14/2022 1528   Full Alcohol Screen: US AUDIT    4  How often during the last year have you found that you were not able to stop drinking once you had started? 0 Filed at: 10/14/2022 1528   5  How often during past year have you failed to do what was normally expected of you because of drinking? 0 Filed at: 10/14/2022 1528   6  How often in past year have you needed a first drink in the morning to get yourself going after a heavy drinking session? 0 Filed at: 10/14/2022 1528   7  How often in past year have you had feeling of guilt or remorse after drinking? 0 Filed at: 10/14/2022 1528   8  How often in past year have you been unable to remember what happened night before because you had been drinking? 0 Filed at: 10/14/2022 1528   9  Have you or someone else been injured as a result of your drinking? 0 Filed at: 10/14/2022 1528   10  Has a relative, friend, doctor or other health worker been concerned about your drinking and suggested you cut down?  0 Filed at: 10/14/2022 1528   AUDIT Total Score 18 Filed at: 10/14/2022 1528   JUAN: How many times in the past year have you    Used an illegal drug or used a prescription medication for non-medical reasons?  Never Filed at: 10/14/2022 1528                    MDM  Number of Diagnoses or Management Options  Abdominal pain: new and requires workup  Ascites: new and requires workup  Cirrhosis [facility]): new and requires workup     Amount and/or Complexity of Data Reviewed  Clinical lab tests: ordered and reviewed  Tests in the radiology section of CPT®: ordered and reviewed    Risk of Complications, Morbidity, and/or Mortality  Presenting problems: high  Diagnostic procedures: high  Management options: high    Patient Progress  Patient progress: stable      Disposition  Final diagnoses:   Ascites   Cirrhosis ([facility] )   Abdominal pain     Time reflects when diagnosis was documented in both MDM as applicable and the Disposition within this note     Time User Action Codes Description Comment    10/14/2022  6:09 PM Horacio Spare Add [R18 8] Ascites     10/14/2022  6:10 PM Horacio Spare O Add [K74 60] [facility]     10/14/2022  6:11 PM Horacio Spare Add [R10 9] Abdominal pain       ED Disposition     ED Disposition   Admit    Condition   Stable    Date/Time   Fri Oct 14, 2022  6:09 PM    Comment   Case was discussed with Jone PALMER and the patient's admission status was agreed to be Admission Status: inpatient status to the service of Dr Tien Mohr   Follow-up Information    None         Current Discharge Medication List      CONTINUE these medications which have NOT CHANGED    Details   omeprazole (PriLOSEC) 20 mg delayed release capsule Take 1 capsule (20 mg total) by mouth daily  Qty: 30 capsule, Refills: 2    Comments: For delivery  Associated Diagnoses: Dark stools; Gastroesophageal reflux disease without esophagitis      potassium chloride (K-DUR,KLOR-CON) 20 mEq tablet Take 2 tablets (40 mEq total) by mouth 2 (two) times a day for 2 days, THEN 1 tablet (20 mEq total) 2 (two) times a day for 26 days  Qty: 60 tablet, Refills: 0    Comments: For delivery  Associated Diagnoses: Hypokalemia             No discharge procedures on file      PDMP Review     None ED Provider  Electronically Signed by           Marcia Cole DO  10/14/22 2031

## 2022-10-15 ENCOUNTER — APPOINTMENT (INPATIENT)
Dept: RADIOLOGY | Facility: HOSPITAL | Age: 57
DRG: 871 | End: 2022-10-15
Payer: MEDICARE

## 2022-10-15 LAB
AFP-TM SERPL-MCNC: 2.2 NG/ML (ref 0.5–8)
ALBUMIN SERPL BCP-MCNC: 2 G/DL (ref 3.5–5)
ALP SERPL-CCNC: 335 U/L (ref 46–116)
ALT SERPL W P-5'-P-CCNC: 56 U/L (ref 12–78)
ANION GAP SERPL CALCULATED.3IONS-SCNC: 8 MMOL/L (ref 4–13)
AST SERPL W P-5'-P-CCNC: 98 U/L (ref 5–45)
BILIRUB SERPL-MCNC: 2.7 MG/DL (ref 0.2–1)
BUN SERPL-MCNC: 6 MG/DL (ref 5–25)
CALCIUM ALBUM COR SERPL-MCNC: 10 MG/DL (ref 8.3–10.1)
CALCIUM SERPL-MCNC: 8.4 MG/DL (ref 8.3–10.1)
CHLORIDE SERPL-SCNC: 101 MMOL/L (ref 96–108)
CO2 SERPL-SCNC: 27 MMOL/L (ref 21–32)
CREAT SERPL-MCNC: 0.59 MG/DL (ref 0.6–1.3)
ERYTHROCYTE [DISTWIDTH] IN BLOOD BY AUTOMATED COUNT: 16.6 % (ref 11.6–15.1)
FERRITIN SERPL-MCNC: 423 NG/ML (ref 8–388)
GFR SERPL CREATININE-BSD FRML MDRD: 113 ML/MIN/1.73SQ M
GLUCOSE SERPL-MCNC: 111 MG/DL (ref 65–140)
HAV IGM SER QL: NORMAL
HBV CORE IGM SER QL: NORMAL
HBV SURFACE AG SER QL: NORMAL
HCT VFR BLD AUTO: 31.7 % (ref 36.5–49.3)
HCV AB SER QL: NORMAL
HGB BLD-MCNC: 11.1 G/DL (ref 12–17)
INR PPP: 1.14 (ref 0.84–1.19)
IRON SATN MFR SERPL: 61 % (ref 20–50)
IRON SERPL-MCNC: 96 UG/DL (ref 65–175)
MCH RBC QN AUTO: 36.2 PG (ref 26.8–34.3)
MCHC RBC AUTO-ENTMCNC: 35 G/DL (ref 31.4–37.4)
MCV RBC AUTO: 103 FL (ref 82–98)
PLATELET # BLD AUTO: 332 THOUSANDS/UL (ref 149–390)
PMV BLD AUTO: 9.6 FL (ref 8.9–12.7)
POTASSIUM SERPL-SCNC: 3.9 MMOL/L (ref 3.5–5.3)
PROT SERPL-MCNC: 6.1 G/DL (ref 6.4–8.4)
PROTHROMBIN TIME: 15.4 SECONDS (ref 11.6–14.5)
RBC # BLD AUTO: 3.07 MILLION/UL (ref 3.88–5.62)
SODIUM SERPL-SCNC: 136 MMOL/L (ref 135–147)
TIBC SERPL-MCNC: 158 UG/DL (ref 250–450)
WBC # BLD AUTO: 25.04 THOUSAND/UL (ref 4.31–10.16)

## 2022-10-15 PROCEDURE — 99223 1ST HOSP IP/OBS HIGH 75: CPT | Performed by: INTERNAL MEDICINE

## 2022-10-15 PROCEDURE — 80053 COMPREHEN METABOLIC PANEL: CPT | Performed by: INTERNAL MEDICINE

## 2022-10-15 PROCEDURE — 83550 IRON BINDING TEST: CPT | Performed by: INTERNAL MEDICINE

## 2022-10-15 PROCEDURE — 85027 COMPLETE CBC AUTOMATED: CPT | Performed by: INTERNAL MEDICINE

## 2022-10-15 PROCEDURE — 71045 X-RAY EXAM CHEST 1 VIEW: CPT

## 2022-10-15 PROCEDURE — 85610 PROTHROMBIN TIME: CPT | Performed by: INTERNAL MEDICINE

## 2022-10-15 PROCEDURE — 83540 ASSAY OF IRON: CPT | Performed by: INTERNAL MEDICINE

## 2022-10-15 PROCEDURE — 82728 ASSAY OF FERRITIN: CPT | Performed by: INTERNAL MEDICINE

## 2022-10-15 PROCEDURE — 80074 ACUTE HEPATITIS PANEL: CPT | Performed by: INTERNAL MEDICINE

## 2022-10-15 PROCEDURE — 99232 SBSQ HOSP IP/OBS MODERATE 35: CPT | Performed by: INTERNAL MEDICINE

## 2022-10-15 PROCEDURE — 82105 ALPHA-FETOPROTEIN SERUM: CPT | Performed by: INTERNAL MEDICINE

## 2022-10-15 RX ORDER — NICOTINE 21 MG/24HR
21 PATCH, TRANSDERMAL 24 HOURS TRANSDERMAL DAILY
Status: DISCONTINUED | OUTPATIENT
Start: 2022-10-15 | End: 2022-10-28 | Stop reason: HOSPADM

## 2022-10-15 RX ORDER — CHLORDIAZEPOXIDE HYDROCHLORIDE 5 MG/1
10 CAPSULE, GELATIN COATED ORAL EVERY 8 HOURS SCHEDULED
Status: DISCONTINUED | OUTPATIENT
Start: 2022-10-15 | End: 2022-10-24

## 2022-10-15 RX ORDER — OXYCODONE HYDROCHLORIDE 5 MG/1
5 TABLET ORAL EVERY 6 HOURS PRN
Status: DISCONTINUED | OUTPATIENT
Start: 2022-10-15 | End: 2022-10-23

## 2022-10-15 RX ORDER — LACTULOSE 20 G/30ML
20 SOLUTION ORAL ONCE
Status: COMPLETED | OUTPATIENT
Start: 2022-10-15 | End: 2022-10-15

## 2022-10-15 RX ADMIN — OXYCODONE HYDROCHLORIDE 5 MG: 5 TABLET ORAL at 21:48

## 2022-10-15 RX ADMIN — CHLORDIAZEPOXIDE HYDROCHLORIDE 10 MG: 5 CAPSULE ORAL at 21:43

## 2022-10-15 RX ADMIN — CHLORDIAZEPOXIDE HYDROCHLORIDE 10 MG: 5 CAPSULE ORAL at 16:57

## 2022-10-15 RX ADMIN — Medication 3.38 G: at 22:01

## 2022-10-15 RX ADMIN — Medication 100 MG: at 09:03

## 2022-10-15 RX ADMIN — Medication 3.38 G: at 16:57

## 2022-10-15 RX ADMIN — OXYCODONE HYDROCHLORIDE 5 MG: 5 TABLET ORAL at 11:35

## 2022-10-15 RX ADMIN — LACTULOSE 20 G: 20 SOLUTION ORAL at 09:43

## 2022-10-15 RX ADMIN — NICOTINE 21 MG: 21 PATCH, EXTENDED RELEASE TRANSDERMAL at 21:44

## 2022-10-15 RX ADMIN — Medication 3.38 G: at 11:31

## 2022-10-15 RX ADMIN — CHLORDIAZEPOXIDE HYDROCHLORIDE 10 MG: 5 CAPSULE ORAL at 09:42

## 2022-10-15 RX ADMIN — OXYCODONE HYDROCHLORIDE 2.5 MG: 5 TABLET ORAL at 09:42

## 2022-10-15 RX ADMIN — Medication 3.38 G: at 04:24

## 2022-10-15 RX ADMIN — MULTIPLE VITAMINS W/ MINERALS TAB 1 TABLET: TAB ORAL at 09:03

## 2022-10-15 RX ADMIN — FOLIC ACID 1 MG: 1 TABLET ORAL at 09:03

## 2022-10-15 NOTE — H&P
[address] 1965, 56 y o  male MRN: [id number]  Unit/Bed#: -01 Encounter: [id number]  Primary Care Provider: Sonia Chase DO   Date and time admitted to hospital: 10/14/2022  1:52 PM    * Cirrhosis [facility])  Assessment & Plan  New onset cirrhosis  Was noted to have abdominal distension, hepatomegaly, with dark stools by PCP on 09/28/2022  · Presented today due to worsening diffuse abdominal pain  · Reports drinking vodka and juice daily, last drink today  · On exam, distended abdomen which is firm and mildly tender  Scleral icterus  · Mild transaminitis with , ALT 72  Alk-phos 400  T bili 1 9, direct bili 1 4  · CT abdomen/pelvis shows new onset cirrhosis with moderate ascites  · Appears mildly encephalopathic as well  · Suspect alcoholic liver disease  · Check ammonia level  · Start B12/folate/ thiamine supplementation  · Check AFP  · GI consult  Appreciate recs  · Concern for possible SBP as patient reported fevers recently as well as abdominal pain with ascites, and recent bloody stools per PCP  Continue Zosyn  · IR consult for paracentesis with fluid analysis    Encephalopathy  Assessment & Plan  Mild encephalopathy, AAOx3 however answers some questions inappropriately, at times not making any sense  Suspect hepatic encephalopathy vs Wernicke's  Check ammonia level  Continue thiamine, folate, B12    Ascites  Assessment & Plan  Presented with diffuse abdominal pain and abdominal distension  Likely secondary to alcoholic cirrhosis  Plan for IR paracentesis w/ full labs  Per PCP note, had bloody stools  Today reported fevers at home   Concern for possible SBP  WBC 33  Continue Zosyn  GI consult    SIRS (systemic inflammatory response syndrome) (HCC)  Assessment & Plan  Meets SIRS criteria w tachycardia (HR 120s) and leukocytosis (WBC 33)  Reports fevers/chills at home, however no fevers here  No definitive source of infection, however concern for possible SBP  IR paracentesis tomorrow with labs  Continue Zosyn for time being    Alcohol use  Assessment & Plan  Drinks vodka and juice daily  Never attempted to quit due to experiencing withdrawals w/ tremors  Denies seizures/hallucinations  CIWA protocol  Thiamine/folate/B12  At risk for DT    COPD (chronic obstructive pulmonary disease) (HCC)  Assessment & Plan  No shortness of breath/wheezing  Not acute exacerbation  Albuterol inhaler PRN    Nicotine dependence  Assessment & Plan  Smokes approximately a pack a day  Nicotine patch      VTE Pharmacologic Prophylaxis:   Moderate Risk (Score 3-4) - Pharmacological DVT Prophylaxis Contraindicated  Sequential Compression Devices Ordered  Code Status: No Order level 1 full code  Discussion with family: Phone number in chart disconnected - unable to talk to family members  Anticipated Length of Stay: Patient will be admitted on an inpatient basis with an anticipated length of stay of greater than 2 midnights secondary to new onset cirrhosis  Total Time for Visit, including Counseling / Coordination of Care: 45 minutes Greater than 50% of this total time spent on direct patient counseling and coordination of care  Chief Complaint:  Abdominal pain    History of Present Illness:  Columbia Cayden is a 56 y o  male with a PMH of alcohol use, tobacco use, COPD, GERD who presents with diffuse abdominal pain x2 weeks  Patient is a poor historian, appears encephalopathic but reports 2 weeks of diffuse abdominal pain which has worsened  Per PCP note on 09/28/2022, he had distended abdomen, hepatomegaly, dark stools for which he was given a liver workup as well as GI referral which he never followed up with  On arrival to the ED, he is tachycardic, rest of his vital signs are stable  He has a distended abdomen on exam which is firm and mildly tender, he is AAO x3 to orientation questions however answers other questions inappropriately    Lab work significant for transaminitis with elevated T bili (primarily direct bili), as well as mild hyponatremia  He does report fevers at home, although none here, which is concerning for SBP  He had a CT abdomen/pelvis which demonstrated new onset cirrhosis as well as new onset moderate ascites  He does admit to heavy alcohol use with vodka daily, has never attempted to quit due to withdrawals  He will be admitted for workup of his new onset cirrhosis as well as encephalopathy  Review of Systems:  Review of Systems   Constitutional: Positive for chills and fever  Negative for appetite change and fatigue  HENT: Negative for ear pain, sore throat and trouble swallowing  Eyes: Negative for visual disturbance  Respiratory: Negative for cough, chest tightness, shortness of breath and wheezing  Cardiovascular: Negative for chest pain, palpitations and leg swelling  Gastrointestinal: Positive for abdominal distention, abdominal pain and nausea  Negative for diarrhea and vomiting  Endocrine: Negative  Genitourinary: Negative for dysuria, flank pain and hematuria  Musculoskeletal: Negative for arthralgias, gait problem and myalgias  Skin: Negative for pallor  Allergic/Immunologic: Negative for immunocompromised state  Neurological: Negative for dizziness, syncope, light-headedness, numbness and headaches  Past Medical and Surgical History:   Past Medical History:   Diagnosis Date   • Bronchitis, asthmatic     last assessed: 3/21/2016   • Depression    • Pneumonia     last assessed: 12/27/2016   • Pneumonia of left lower lobe due to Haemophilus influenzae ([facility] )     last assessed: 11/27/2015   • Sebaceous cyst     last assessed: 12/26/2013   • Substance abuse ([facility] )    • Suicidal ideation     last assessed: 12/27/2016       History reviewed  No pertinent surgical history  Meds/Allergies:  Prior to Admission medications    Medication Sig Start Date End Date Taking?  Authorizing Provider omeprazole (PriLOSEC) 20 mg delayed release capsule Take 1 capsule (20 mg total) by mouth daily 9/28/22  Yes Isaura Tan DO   potassium chloride (K-DUR,KLOR-CON) 20 mEq tablet Take 2 tablets (40 mEq total) by mouth 2 (two) times a day for 2 days, THEN 1 tablet (20 mEq total) 2 (two) times a day for 26 days  9/30/22 10/28/22 Yes Isaura Tan DO     I have reviewed home medications with patient personally  Allergies: Allergies   Allergen Reactions   • Levofloxacin    • Quinolones        Social History:  Marital Status: Single   Occupation:  Noncontributory  Patient Pre-hospital Living Situation:  Unclear  Patient Pre-hospital Level of Mobility: walks  Patient Pre-hospital Diet Restrictions:  None  Substance Use History:   Social History     Substance and Sexual Activity   Alcohol Use Yes    Comment: 10 shots a day of vodka mixed with juice of some sort     Social History     Tobacco Use   Smoking Status Current Every Day Smoker   • Packs/day: 1 00   Smokeless Tobacco Never Used     Social History     Substance and Sexual Activity   Drug Use Not Currently   • Types: Prescription, Methamphetamines    Comment: opiates       Family History:  Family History   Problem Relation Age of Onset   • Colon cancer Mother    • Diabetes Mother         mellitus       Physical Exam:     Vitals:   Blood Pressure: 136/89 (10/14/22 2001)  Pulse: (!) 122 (10/14/22 2001)  Temperature: 97 9 °F (36 6 °C) (10/14/22 2001)  Temp Source: Oral (10/14/22 2001)  Respirations: 20 (10/14/22 2001)  SpO2: 96 % (10/14/22 1700)    Physical Exam  Vitals and nursing note reviewed  HENT:      Head: Normocephalic and atraumatic  Mouth/Throat:      Mouth: Mucous membranes are moist       Pharynx: Oropharynx is clear  No oropharyngeal exudate  Eyes:      General: Scleral icterus present  Extraocular Movements: Extraocular movements intact  Cardiovascular:      Rate and Rhythm: Regular rhythm  Tachycardia present        Pulses: Normal pulses  Heart sounds: Normal heart sounds  No murmur heard  No friction rub  No gallop  Pulmonary:      Effort: Pulmonary effort is normal  No respiratory distress  Breath sounds: Normal breath sounds  No stridor  No wheezing or rales  Abdominal:      General: Bowel sounds are normal  There is distension  Tenderness: There is abdominal tenderness  Musculoskeletal:      Right lower leg: No edema  Left lower leg: No edema  Skin:     General: Skin is warm and dry  Neurological:      General: No focal deficit present  Mental Status: He is alert  He is disoriented  Comments: Answers orientation questions appropriately, but other questions inappropriately         Additional Data:     Lab Results:  Results from last 7 days   Lab Units 10/14/22  1350   WBC Thousand/uL 32 35*   HEMOGLOBIN g/dL 12 4   HEMATOCRIT % 34 0*   PLATELETS Thousands/uL 405*   BANDS PCT % 4   LYMPHO PCT % 4*   MONO PCT % 2*   EOS PCT % 0     Results from last 7 days   Lab Units 10/14/22  1350   SODIUM mmol/L 133*   POTASSIUM mmol/L 4 0   CHLORIDE mmol/L 97   CO2 mmol/L 21   BUN mg/dL 6   CREATININE mg/dL 0 67   ANION GAP mmol/L 15*   CALCIUM mg/dL 8 6   ALBUMIN g/dL 2 4*   TOTAL BILIRUBIN mg/dL 1 90*   ALK PHOS U/L 411*   ALT U/L 72   AST U/L 123*   GLUCOSE RANDOM mg/dL 182*     Results from last 7 days   Lab Units 10/14/22  1433   INR  1 07             Results from last 7 days   Lab Units 10/14/22  1534   LACTIC ACID mmol/L 1 9       Imaging: Reviewed radiology reports from this admission including: abdominal/pelvic CT  CT abdomen pelvis with contrast   Final Result by Freddie Hunt MD (10/14 1741)   1  Slightly microlobulated contour of the liver suggestive of cirrhosis  Hepatomegaly  2  New moderate abdominal/pelvic ascites           Workstation performed: [id number]         IR INPATIENT Paracentesis    (Results Pending)       EKG and Other Studies Reviewed on Admission:   · EKG: Sinus Tachycardia    ** Please Note: This note has been constructed using a voice recognition system   **

## 2022-10-15 NOTE — CASE MANAGEMENT
Case Management Assessment & Discharge Planning Note    Patient name Sandra Clay  Location /-53 MRN [id number]  : 1965 Date 10/15/2022       Current Admission Date: 10/14/2022  Current Admission Diagnosis:Cirrhosis [facility])   Patient Active Problem List    Diagnosis Date Noted   • Cirrhosis ([place] [address] ) 10/14/2022   • Encephalopathy 10/14/2022   • Ascites 10/14/2022   • SIRS (systemic inflammatory response syndrome) ([place] [address] ) 10/14/2022   • Alcohol use 10/14/2022   • Dark stools 2022   • Hepatomegaly 2022   • Lipoma of left lower extremity 2022   • Finger injury, right, initial encounter 03/10/2020   • Sebaceous cyst 03/10/2020   • Cough 2020   • Mass of left hand 2018   • Cervical radiculopathy due to degenerative joint disease of spine 2018   • Degenerative disc disease, cervical 2018   • Anterolisthesis 2018   • COPD (chronic obstructive pulmonary disease) ([place] [address] ) 2018   • Chronic sinusitis 2017   • GERD (gastroesophageal reflux disease) 10/11/2017   • Bipolar II disorder ([place] [address] ) 2016   • Chronic depression 2016   • Polysubstance dependence ([place] [address] ) 2016   • Nicotine dependence 2014      LOS (days): 1  Geometric Mean LOS (GMLOS) (days):   Days to GMLOS:     OBJECTIVE:    Risk of Unplanned Readmission Score: 11 46         Current admission status: Inpatient       Preferred Pharmacy:   [facility], [address]  [address]  [facility] [address]  Phone: [phone] Fax: [phone] [facility] [address], [address]   [address]   [address] [facility] [address]  Phone: [phone] Fax: [phone]    Primary Care Provider: Elysia Rosenbaum DO    Primary Insurance: MEDICARE  Secondary Insurance: [facility]    ASSESSMENT:  Da 26 Proxies    There are no active Health Care Proxies on file         Advance Directives  Does patient have a [address]?: No  Was patient offered paperwork?: Yes (given)  Does patient currently have a Health Care decision maker?: No  Does patient have Advance Directives?: No  Was patient offered paperwork?: Yes (refused)  Primary Contact: Nicole Ring - father         Readmission [employer]  30 Day Readmission: No    Patient Information  Admitted from[de-identified] Home  Mental Status: Alert  During Assessment patient was accompanied by: Friend  Assessment information provided by[de-identified] Patient  Primary Caregiver: Self  Support Systems: Family members, [address] [place] [address] of Residence: [address] [place] [address] do you live in?: [employer] entry access options   Select all that apply : Stairs  Number of steps to enter home : One Flight  Do the steps have railings?: Yes  Type of Current Residence: Apartment  Floor Level: 2  Upon entering residence, is there a bedroom on the main floor (no further steps)?: Yes  Upon entering residence, is there a bathroom on the main floor (no further steps)?: Yes  In the last 12 months, was there a time when you were not able to pay the mortgage or rent on time?: No  In the last 12 months, how many places have you lived?: 1  In the last 12 months, was there a time when you did not have a steady place to sleep or slept in a shelter (including now)?: No  Homeless/housing insecurity resource given?: N/A  Living Arrangements: Other (Comment) (Enma - roommate)  Is patient a ?: No    Activities of Daily Living Prior to Admission  Functional Status: Independent  Completes ADLs independently?: Yes  Ambulates independently?: Yes  Does patient use assisted devices?: No  Does patient currently own DME?: Yes  What DME does the patient currently own?: [employer]  Does patient have a history of Outpatient Therapy (PT/OT)?: Yes  Does the patient have a history of Short-Term Rehab?: No  Does patient have a history of HHC?: No  Does patient currently have [facility]?: No         Patient Information Continued  Income Source: SSI/SSD  Does patient have prescription coverage?: Yes  Within the past 12 months, you worried that your food would run out before you got the money to buy more : Never true  Within the past 12 months, the food you bought just didn't last and you didn't have money to get more : Never true  Food insecurity resource given?: N/A  Does patient receive dialysis treatments?: No  Does patient have a history of substance abuse?: Yes  Historical substance use preference: Prescription, "Crack" cocaine (opioids)  History of Withdrawal Symptoms: Other withdrawal symptoms (specify in comment), Elevated BP  Is patient currently in treatment for substance abuse?: No  Patient declined treatment information  Does patient have a history of Mental Health Diagnosis?: Yes (Manic depressive)  Is patient receiving treatment for mental health?: No  Patient declined treatment information  Has patient received inpatient treatment related to mental health in the last 2 years?: Yes (Isrrael Morales)         [employer] of Transport to [facility][de-identified] Friends  In the past 12 months, has lack of transportation kept you from medical appointments or from getting medications?: No  In the past 12 months, has lack of transportation kept you from meetings, work, or from getting things needed for daily living?: No  Was application for public transport provided?: N/A        DISCHARGE DETAILS:    Discharge planning discussed with[de-identified] patient  Freedom of Choice: Yes  Comments - Freedom of Choice: Patient plans on returning home at discharge and is not interested in any information on alcohol rehab  He states he will quit on his own    CM contacted family/caregiver?: No- see comments (declined)  Were Treatment Team discharge recommendations reviewed with patient/caregiver?: Yes  Did patient/caregiver verbalize understanding of patient care needs?: Yes  Were patient/caregiver advised of the risks associated with not following Treatment Team discharge recommendations?: Yes         [address]         Is the patient interested in [place] at discharge?: No    DME Referral Provided  Referral made for DME?: No              Treatment Team Recommendation: Home  Discharge Destination Plan[de-identified] Home  Transport at Discharge : Automobile (friend)        IMM Given (Date):: 10/15/22  IMM Given to[de-identified] Patient     Additional Comments: Patient lives in a second floor apartment with roommate Enma  He is independent adl's and ambulation, doesn't drive, is on SS disability  He denies need for alcohol rehab or home services  Roommate Enma will transport home

## 2022-10-15 NOTE — ASSESSMENT & PLAN NOTE
Presented with diffuse abdominal pain and abdominal distension  Likely secondary to alcoholic cirrhosis  Plan for IR paracentesis w/ full labs  Per PCP note, had bloody stools  Today reported fevers at home   Concern for possible SBP  Trend WBC and fever curve  Continue Zosyn  GI consult

## 2022-10-15 NOTE — PROGRESS NOTES
[place]  Progress Note - Paralee Dais 1965, 56 y o  male MRN: [id number]  Unit/Bed#: -01 Encounter: [id number]  Primary Care Provider: Joselyn Jarquin DO   Date and time admitted to hospital: 10/14/2022  1:52 PM    Alcohol use  Assessment & Plan  Drinks vodka and juice daily  Never attempted to quit due to experiencing withdrawals w/ tremors  Denies seizures/hallucinations  CIWA protocol  Thiamine/folate/B12  At risk for DT    SIRS (systemic inflammatory response syndrome) (HCC)  Assessment & Plan  Meets SIRS criteria w tachycardia (HR 120s) and leukocytosis (WBC 33)  Reports fevers/chills at home, however afebrile while inpatient  No definitive source of infection, however concern for possible SBP  IR consult for paracentesis  Continue Zosyn  Follow-up culture results    Ascites  Assessment & Plan  Presented with diffuse abdominal pain and abdominal distension  Likely secondary to alcoholic cirrhosis  Plan for IR paracentesis w/ full labs  Per PCP note, had bloody stools  Today reported fevers at home  Concern for possible SBP  Trend WBC and fever curve  Continue Zosyn  GI consult    Encephalopathy  Assessment & Plan  Mild encephalopathy, AAOx3 however answers some questions inappropriately, at times not making any sense  Suspect hepatic encephalopathy vs SBP  Ammonia level is 38  Will give a dose of lactulose  Repeat ammonia levels  Continue thiamine, folate, B12    COPD (chronic obstructive pulmonary disease) (HCC)  Assessment & Plan  No shortness of breath/wheezing  Not acute exacerbation  Albuterol inhaler PRN    Nicotine dependence  Assessment & Plan  Smokes approximately a pack a day  Nicotine patch    * Cirrhosis ([facility] )  Assessment & Plan  New onset cirrhosis  Was noted to have abdominal distension, hepatomegaly, with dark stools by PCP on 09/28/2022    · Presented today due to worsening diffuse abdominal pain  · Reports drinking vodka and juice daily, last drink today  · On exam, distended abdomen which is firm and mildly tender  Scleral icterus  · Mild transaminitis with , ALT 72  Alk-phos 400  T bili 1 9, direct bili 1 4  · CT abdomen/pelvis shows new onset cirrhosis with moderate ascites  · Appears mildly encephalopathic as well  · Suspect alcoholic liver disease  · Trend ammonia levels  · On B12/folate/ thiamine supplementation  · GI consult  Appreciate recs  · Concern for possible SBP as patient reported fevers recently as well as abdominal pain with ascites, and recent bloody stools per PCP  Continue Zosyn  · IR consult for paracentesis with fluid analysis        Labs & Imaging: I have personally reviewed pertinent reports  VTE Prophylaxis: in place  Code Status:   No Order    Patient Centered Rounds: I have performed bedside rounds with nursing staff today  Discussions with Specialists or Other Care Team Provider:  GI    Education and Discussions with Family / Patient:  Patient states he will update his family  I offered to call    Current Length of Stay: 1 day(s)    Current Patient Status: Inpatient   Certification Statement: The patient will continue to require additional inpatient hospital stay due to see my assessment and plan  Subjective:   Patient is seen and examined at bedside  Has abdominal distension and minimal pain  Denies any other complaints  Afebrile  All other ROS are negative  Objective:    Vitals: Blood pressure 128/78, pulse 104, temperature 97 5 °F (36 4 °C), temperature source Oral, resp  rate 14, height 5' 7" (1 702 m), weight 66 7 kg (147 lb 1 6 oz), SpO2 98 %  ,Body mass index is 23 04 kg/m²    SPO2 RA Rest    Flowsheet Row ED to Hosp-Admission (Current) from 10/14/2022 in [address] Med Surg Unit   SpO2 98 %   SpO2 Activity At Rest   O2 Device None (Room air)   O2 Flow Rate --        I&O: No intake or output data in the 24 hours ending 10/15/22 0936    Physical Exam:    General- Alert, lying comfortably in bed  Not in any acute distress  Neck- Supple, No JVD  CVS- regular, S1 and S2 normal  Chest- Bilateral Air entry, No rhochi, crackles or wheezing present  Abdomen- soft, minimal tenderness on palpation, distended, no guarding or rigidity, BS+  Extremities-  No pedal edema, No calf tenderness  Able to move all 4 extremities  CNS-   Alert, awake and orientedx3  No focal deficits present      Invasive Devices  Report    Peripheral Intravenous Line  Duration           Peripheral IV 10/14/22 Right Antecubital <1 day                      Social History  reviewed  Family History   Problem Relation Age of Onset   • Colon cancer Mother    • Diabetes Mother         mellitus    reviewed    Meds:  Current Facility-Administered Medications   Medication Dose Route Frequency Provider Last Rate Last Admin   • albuterol (PROVENTIL HFA,VENTOLIN HFA) inhaler 2 puff  2 puff Inhalation Q4H PRN Humberto Brain, PA-C       • chlordiazePOXIDE (LIBRIUM) capsule 10 mg  10 mg Oral Q8H Gayle Fernandez MD       • folic acid (FOLVITE) tablet 1 mg  1 mg Oral Daily Humberto Brain, PA-C   1 mg at 10/15/22 3284   • lactulose oral solution 20 g  20 g Oral Once Reshma Ku MD       • LORazepam (ATIVAN) injection 2 mg  2 mg Intravenous Once Olubusola O Oyesanmi, DO       • multivitamin-minerals (CENTRUM) tablet 1 tablet  1 tablet Oral Daily Humberto Brain, PA-C   1 tablet at 10/15/22 8704   • nicotine (NICODERM CQ) 21 mg/24 hr TD 24 hr patch 21 mg  21 mg Transdermal Once Olubusola O Oyesanmi, DO   21 mg at 10/14/22 2046   • ondansetron (ZOFRAN) injection 4 mg  4 mg Intravenous Q4H PRN Humberto Brain, PA-C   4 mg at 10/14/22 2118   • oxyCODONE (ROXICODONE) IR tablet 2 5 mg  2 5 mg Oral Q6H PRN Humberto Brain, PA-C   2 5 mg at 10/14/22 2118   • piperacillin-tazobactam (ZOSYN) IVPB 3 375 g  3 375 g Intravenous Q6H Humberto Brain, PA-C   3 375 g at 10/15/22 0424   • thiamine tablet 100 mg  100 mg Oral Daily Lizzette Brannon PA-C   100 mg at 10/15/22 8911      Medications Prior to Admission   Medication   • omeprazole (PriLOSEC) 20 mg delayed release capsule   • potassium chloride (K-DUR,KLOR-CON) 20 mEq tablet       Labs:  Results from last 7 days   Lab Units 10/15/22  0434 10/14/22  1350   WBC Thousand/uL 25 04* 32 35*   HEMOGLOBIN g/dL 11 1* 12 4   HEMATOCRIT % 31 7* 34 0*   PLATELETS Thousands/uL 332 405*   LYMPHO PCT %  --  4*   MONO PCT %  --  2*   EOS PCT %  --  0     Results from last 7 days   Lab Units 10/15/22  0434 10/14/22  1350   POTASSIUM mmol/L 3 9 4 0   CHLORIDE mmol/L 101 97   CO2 mmol/L 27 21   BUN mg/dL 6 6   CREATININE mg/dL 0 59* 0 67   CALCIUM mg/dL 8 4 8 6   ALK PHOS U/L 335* 411*   ALT U/L 56 72   AST U/L 98* 123*     Lab Results   Component Value Date    TROPONINI <0 02 10/15/2018     Results from last 7 days   Lab Units 10/15/22  0434 10/14/22  1433   INR  1 14 1 07     Lab Results   Component Value Date    BLOODCX Received in Microbiology Lab  Culture in Progress  10/14/2022    BLOODCX Received in Microbiology Lab  Culture in Progress  10/14/2022    BLOODCX No Growth After 5 Days  10/15/2018         Imaging:  No results found for this or any previous visit  Results for orders placed in visit on 02/03/22    XR chest pa & lateral    Narrative  CHEST    INDICATION:   R05 3: Chronic cough  COMPARISON:  1/31/2020    EXAM PERFORMED/VIEWS:  XR CHEST PA & LATERAL      FINDINGS:    Cardiomediastinal silhouette appears unremarkable  The lungs are clear  No pneumothorax or pleural effusion  Osseous structures appear within normal limits for patient age  Impression  No acute cardiopulmonary disease              Workstation performed: [id number]      Last 24 Hours Medication List:   Current Facility-Administered Medications   Medication Dose Route Frequency Provider Last Rate   • albuterol  2 puff Inhalation Q4H PRN Lizzette Brannon PA-C     • chlordiazePOXIDE  10 mg Oral [facility] Lionel Vizcarra MD     • folic acid  1 mg Oral Daily Joy Mackey PA-C     • lactulose  20 g Oral Once Lionel Vizcarra MD     • LORazepam  2 mg Intravenous Once Christina Barnett DO     • multivitamin-minerals  1 tablet Oral Daily Joy Mackey PA-C     • nicotine  21 mg Transdermal Once Christina Barnett DO     • ondansetron  4 mg Intravenous Q4H PRN Joy Mackey PA-C     • oxyCODONE  2 5 mg Oral Q6H PRN Joy Mcakey PA-C     • piperacillin-tazobactam  3 375 g Intravenous Q6H Joy Mackey PA-C     • thiamine  100 mg Oral Daily Joy Mackey PA-C          Today, Patient Was Seen By: Lionel Vizcarra    ** Please Note: Dictation voice to text software may have been used in the creation of this document   **

## 2022-10-15 NOTE — ASSESSMENT & PLAN NOTE
Meets SIRS criteria w tachycardia (HR 120s) and leukocytosis (WBC 33)  Reports fevers/chills at home, however no fevers here  No definitive source of infection, however concern for possible SBP  IR paracentesis tomorrow with labs  Continue Zosyn for time being

## 2022-10-15 NOTE — ASSESSMENT & PLAN NOTE
New onset cirrhosis  Was noted to have abdominal distension, hepatomegaly, with dark stools by PCP on 09/28/2022  · Presented today due to worsening diffuse abdominal pain  · Reports drinking vodka and juice daily, last drink today  · On exam, distended abdomen which is firm and mildly tender  Scleral icterus  · Mild transaminitis with , ALT 72  Alk-phos 400  T bili 1 9, direct bili 1 4  · CT abdomen/pelvis shows new onset cirrhosis with moderate ascites  · Appears mildly encephalopathic as well  · Suspect alcoholic liver disease  · Check ammonia level  · Start B12/folate/ thiamine supplementation  · Check AFP  · GI consult  Appreciate recs  · Concern for possible SBP as patient reported fevers recently as well as abdominal pain with ascites, and recent bloody stools per PCP    Continue Zosyn  · IR consult for paracentesis with fluid analysis

## 2022-10-15 NOTE — ASSESSMENT & PLAN NOTE
New onset cirrhosis  Was noted to have abdominal distension, hepatomegaly, with dark stools by PCP on 09/28/2022  · Presented today due to worsening diffuse abdominal pain  · Reports drinking vodka and juice daily, last drink today  · On exam, distended abdomen which is firm and mildly tender  Scleral icterus  · Mild transaminitis with , ALT 72  Alk-phos 400  T bili 1 9, direct bili 1 4  · CT abdomen/pelvis shows new onset cirrhosis with moderate ascites  · Appears mildly encephalopathic as well  · Suspect alcoholic liver disease  · Trend ammonia levels  · On B12/folate/ thiamine supplementation  · GI consult  Appreciate recs  · Concern for possible SBP as patient reported fevers recently as well as abdominal pain with ascites, and recent bloody stools per PCP    Continue Zosyn  · IR consult for paracentesis with fluid analysis

## 2022-10-15 NOTE — ASSESSMENT & PLAN NOTE
Meets SIRS criteria w tachycardia (HR 120s) and leukocytosis (WBC 33)  Reports fevers/chills at home, however afebrile while inpatient  No definitive source of infection, however concern for possible SBP  IR consult for paracentesis  Continue Zosyn  Follow-up culture results

## 2022-10-15 NOTE — CONSULTS
Consultation - [id number]     Eileen Hammond 56 y o  male MRN: [id number]  Unit/Bed#: -01 Encounter: [id number]    Inpatient consult to gastroenterology  Consult performed by: Hao Luna MD  Consult ordered by: Frank Duggan MD          ASSESSMENT and PLAN    1  New onset ascites with abdominal pain    Awaiting paracentesis  On empiric Zosyn for SBP  2  Alcohol abuse    Daily vodka - monitor for withdrawal   No h/o withdrawal seizures but pt admits he usually does not stop for prolonged periods of time  Declines any kind of assistance for ETOH rehab  Strict ETOH cessation emphasized  3  Imaging concerning for cirrhosis, with poss decompensation of ascites and mild hep encephalopathy  MELD 13  Likely ETOH cirrhosis with components of ETOH hepatitis  Low MELD and DF  No steroids given this as well as concerns for sepsis  Previous work up reviewed - CT 10/14/2022 neg for [address]  and PVT  Neg for viral, autoimmune etiologies on labs  Will add on iron panel for completeness  Will need an EGD as outpatient for varices screening  4  SIRS - tachycardic with WBC 33    Continue Zosyn for empiric treatment of SBP  Diagnostic and therapeutic paracentesis r/o SBP  Pan culture and CXR pending  5  COPD, smoker [facility])      Chief Complaint   Patient presents with   • Abdominal Pain     Pt c/o of rioght sided abdomianla pain x 1 days  Physician Requesting Consult: Frank Duggan MD    HPI  Eileen Hammond is a 56y o  year old male past medical history significant for polysubstance abuse, alcohol abuse, bipolar disorder, COPD who presented to the ER with abdominal pain  Patient states that he has noticed increasing abdominal distention over the past several months  He has been told in the past that he has liver disease because of his alcohol but has never been able to make it to the GI doctor  He continues to drink on a regular basis    Patient states that the past week, his abdominal distention became significantly worse with pain, she sharp, radiating diffusely  Subjective fevers and chills  No nausea vomiting  No diarrhea  Does have history of bleeding 8 years ago and 2 years ago, but never followed up with the doctor for this  Upon presentation, patient was found to have tachycardia to 137 with white count of 32  CT showed possible cirrhosis with new onset ascites  Historical Information   Past Medical History:   Diagnosis Date   • Bronchitis, asthmatic     last assessed: 3/21/2016   • Depression    • Pneumonia     last assessed: 12/27/2016   • Pneumonia of left lower lobe due to [facility]emophil[facility] influenzae ([facility] )     last assessed: 11/27/2015   • Sebaceous cyst     last assessed: 12/26/2013   • Substance abuse ([facility] )    • Suicidal ideation     last assessed: 12/27/2016     History reviewed  No pertinent surgical history    Social History   Social History     Substance and Sexual Activity   Alcohol Use Yes    Comment: 10 shots a day of vodka mixed with juice of some sort     Social History     Substance and Sexual Activity   Drug Use Not Currently   • Types: Prescription, Methamphetamines    Comment: opiates     Social History     Tobacco Use   Smoking Status Current Every Day Smoker   • Packs/day: 1 00   Smokeless Tobacco Never Used     Family History   Problem Relation Age of Onset   • Colon cancer Mother    • Diabetes Mother         mellitus       Meds/Allergies     Current Facility-Administered Medications   Medication Dose Route Frequency   • albuterol (PROVENTIL HFA,VENTOLIN HFA) inhaler 2 puff  2 puff Inhalation Q4H PRN   • chlordiazePOXIDE (LIBRIUM) capsule 10 mg  10 mg Oral I[address]   • folic acid (FOLVITE) tablet 1 mg  1 mg Oral Daily   • LORazepam (ATIVAN) injection 2 mg  2 mg Intravenous Once   • multivitamin-minerals (CENTRUM) tablet 1 tablet  1 tablet Oral Daily   • nicotine (NICODERM CQ) 21 mg/24 hr TD 24 hr patch 21 mg  21 mg Transdermal Once   • ondansetron (ZOFRAN) injection 4 mg  4 mg Intravenous Q4H PRN   • oxyCODONE (ROXICODONE) IR tablet 2 5 mg  2 5 mg Oral Q6H PRN   • piperacillin-tazobactam (ZOSYN) IVPB 3 375 g  3 375 g Intravenous Q6H   • thiamine tablet 100 mg  100 mg Oral Daily     Medications Prior to Admission   Medication   • omeprazole (PriLOSEC) 20 mg delayed release capsule   • potassium chloride (K-DUR,KLOR-CON) 20 mEq tablet       Allergies   Allergen Reactions   • Levofloxacin    • Quinolones        PHYSICAL EXAM    Blood pressure 128/78, pulse 104, temperature 97 5 °F (36 4 °C), temperature source Oral, resp  rate 14, height 5' 7" (1 702 m), weight 66 7 kg (147 lb 1 6 oz), SpO2 98 %  Body mass index is 23 04 kg/m²  General Appearance: NAD, cooperative, alert  Eyes: Anicteric, PERRLA, EOMI  ENT:  Normocephalic, atraumatic, normal mucosa  Neck:  Supple, symmetrical, trachea midline  Resp:  Clear to auscultation bilaterally; no rales, rhonchi or wheezing; respirations unlabored   CV:  S1 S2, Regular rate and rhythm; no murmur, rub, or gallop  GI:  Soft, diffusely tender, distended; normal bowel sounds; no masses, no organomegaly , positive ascites  Rectal: Deferred  Musculoskeletal: No cyanosis, clubbing or edema  Normal ROM    Skin:  No jaundice, rashes, or lesions   Heme/Lymph: No palpable cervical lymphadenopathy  Psych: Normal affect, good eye contact  Neuro: No gross deficits, AAOx3, no asterixis    Lab Results   Component Value Date    CALCIUM 8 4 10/15/2022    K 3 9 10/15/2022    CO2 27 10/15/2022     10/15/2022    BUN 6 10/15/2022    CREATININE 0 59 (L) 10/15/2022     Lab Results   Component Value Date    WBC 25 04 (H) 10/15/2022    HGB 11 1 (L) 10/15/2022    HCT 31 7 (L) 10/15/2022     (H) 10/15/2022     10/15/2022     Lab Results   Component Value Date    ALT 56 10/15/2022    AST 98 (H) 10/15/2022    GGT 1,144 (H) 09/28/2022    ALKPHOS 335 (H) 10/15/2022     Lab Results   Component Value Date    AMYLASE 21 (L) 09/28/2022     Lab Results   Component Value Date    LIPASE 308 10/14/2022     Lab Results   Component Value Date    IRON 65 10/03/2022    TIBC 152 (L) 10/03/2022    FERRITIN 428 (H) 10/03/2022     Lab Results   Component Value Date    INR 1 14 10/15/2022       Imaging Studies: I have personally reviewed pertinent reports  and I have personally reviewed pertinent films in PACS    EKG, Pathology, and Other Studies: I have personally reviewed pertinent reports  and I have personally reviewed pertinent films in PACS    REVIEW OF SYSTEMS:    CONSTITUTIONAL:  Positive fevers and chills, with subjective weight loss of about 5 lb  HEENT: No earache or tinnitus  Denies hearing loss or visual disturbances  CARDIOVASCULAR: No chest pain or palpitations  RESPIRATORY: Denies any cough, hemoptysis, shortness of breath or dyspnea on exertion  GASTROINTESTINAL: As noted in the History of Present Illness  GENITOURINARY: No problems with urination  Denies any hematuria or dysuria  NEUROLOGIC: No dizziness or vertigo, denies headaches  MUSCULOSKELETAL: Denies any muscle or joint pain  SKIN: Denies skin rashes or itching  ENDOCRINE: Denies excessive thirst  Denies intolerance to heat or cold  PSYCHOSOCIAL: Denies depression or anxiety  Denies any recent memory loss

## 2022-10-15 NOTE — ASSESSMENT & PLAN NOTE
Drinks vodka and juice daily  Never attempted to quit due to experiencing withdrawals w/ tremors   Denies seizures/hallucinations  WA protocol  Thiamine/folate/B12  At risk for DT

## 2022-10-15 NOTE — ASSESSMENT & PLAN NOTE
Presented with diffuse abdominal pain and abdominal distension  Likely secondary to alcoholic cirrhosis  Plan for IR paracentesis w/ full labs  Per PCP note, had bloody stools  Today reported fevers at home   Concern for possible SBP  WBC 33  Continue Zosyn  GI consult

## 2022-10-15 NOTE — PLAN OF CARE
Problem: Potential for Falls  Goal: Patient will remain free of falls  Description: INTERVENTIONS:  - Educate patient/family on patient safety including physical limitations  - Instruct patient to call for assistance with activity   - Consult OT/PT to assist with strengthening/mobility   - Keep Call bell within reach  - Keep bed low and locked with side rails adjusted as appropriate  - Keep care items and personal belongings within reach  - Initiate and maintain comfort rounds  - Make Fall Risk Sign visible to staff  - Offer Toileting every 2 Hours, in advance of need  - Initiate/Maintain alarm  - Obtain necessary fall risk management equipment:   - Apply yellow socks and bracelet for high fall risk patients  - Consider moving patient to room near nurses station  Outcome: Progressing     Problem: PAIN - ADULT  Goal: Verbalizes/displays adequate comfort level or baseline comfort level  Description: Interventions:  - Encourage patient to monitor pain and request assistance  - Assess pain using appropriate pain scale  - Administer analgesics based on type and severity of pain and evaluate response  - Implement non-pharmacological measures as appropriate and evaluate response  - Consider cultural and social influences on pain and pain management  - Notify physician/advanced practitioner if interventions unsuccessful or patient reports new pain  Outcome: Progressing     Problem: INFECTION - ADULT  Goal: Absence or prevention of progression during hospitalization  Description: INTERVENTIONS:  - Assess and monitor for signs and symptoms of infection  - Monitor lab/diagnostic results  - Monitor all insertion sites, i e  indwelling lines, tubes, and drains  - Monitor endotracheal if appropriate and nasal secretions for changes in amount and color  - [place] appropriate cooling/warming therapies per order  - Administer medications as ordered  - Instruct and encourage patient and family to use good hand hygiene technique  - Identify and instruct in appropriate isolation precautions for identified infection/condition  Outcome: Progressing  Goal: Absence of fever/infection during neutropenic period  Description: INTERVENTIONS:  - Monitor WBC    Outcome: Progressing     Problem: SAFETY ADULT  Goal: Patient will remain free of falls  Description: INTERVENTIONS:  - Educate patient/family on patient safety including physical limitations  - Instruct patient to call for assistance with activity   - Consult OT/PT to assist with strengthening/mobility   - Keep Call bell within reach  - Keep bed low and locked with side rails adjusted as appropriate  - Keep care items and personal belongings within reach  - Initiate and maintain comfort rounds  - Make Fall Risk Sign visible to staff  - Offer Toileting every 2 Hours, in advance of need  - Initiate/Maintain alarm  - Obtain necessary fall risk management equipment:   - Apply yellow socks and bracelet for high fall risk patients  - Consider moving patient to room near nurses station  Outcome: Progressing  Goal: Maintain or return to baseline ADL function  Description: INTERVENTIONS:  -  Assess patient's ability to carry out ADLs; assess patient's baseline for ADL function and identify physical deficits which impact ability to perform ADLs (bathing, care of mouth/teeth, toileting, grooming, dressing, etc )  - Assess/evaluate cause of self-care deficits   - Assess range of motion  - Assess patient's mobility; develop plan if impaired  - Assess patient's need for assistive devices and provide as appropriate  - Encourage maximum independence but intervene and supervise when necessary  - Involve family in performance of ADLs  - Assess for home care needs following discharge   - Consider OT consult to assist with ADL evaluation and planning for discharge  - Provide patient education as appropriate  Outcome: Progressing  Goal: Maintains/Returns to pre admission functional level  Description: INTERVENTIONS:  - Perform BMAT or MOVE assessment daily    - Set and communicate daily mobility goal to care team and patient/family/caregiver  - Collaborate with rehabilitation services on mobility goals if consulted  - Out of bed for toileting  - Record patient progress and toleration of activity level   Outcome: Progressing     Problem: DISCHARGE PLANNING  Goal: Discharge to home or other facility with appropriate resources  Description: INTERVENTIONS:  - Identify barriers to discharge w/patient and caregiver  - Arrange for needed discharge resources and transportation as appropriate  - Identify discharge learning needs (meds, wound care, etc )  - Arrange for interpretive services to assist at discharge as needed  - Refer to Case Management Department for coordinating discharge planning if the patient needs post-hospital services based on physician/advanced practitioner order or complex needs related to functional status, cognitive ability, or social support system  Outcome: Progressing     Problem: Knowledge Deficit  Goal: Patient/family/caregiver demonstrates understanding of disease process, treatment plan, medications, and discharge instructions  Description: Complete learning assessment and assess knowledge base    Interventions:  - Provide teaching at level of understanding  - Provide teaching via preferred learning methods  Outcome: Progressing

## 2022-10-15 NOTE — ASSESSMENT & PLAN NOTE
Mild encephalopathy, AAOx3 however answers some questions inappropriately, at times not making any sense    Suspect hepatic encephalopathy vs Wernicke's  Check ammonia level  Continue thiamine, folate, B12

## 2022-10-15 NOTE — PLAN OF CARE
Problem: Potential for Falls  Goal: Patient will remain free of falls  Description: INTERVENTIONS:  - Educate patient/family on patient safety including physical limitations  - Instruct patient to call for assistance with activity   - Consult OT/PT to assist with strengthening/mobility   - Keep Call bell within reach  - Keep bed low and locked with side rails adjusted as appropriate  - Keep care items and personal belongings within reach  - Initiate and maintain comfort rounds  - Make Fall Risk Sign visible to staff  - Offer Toileting every xx Hours, in advance of need  - Initiate/Maintain xalarm  - Obtain necessary fall risk management equipment: x  - Apply yellow socks and bracelet for high fall risk patients  - Consider moving patient to room near nurses station  Outcome: Progressing     Problem: PAIN - ADULT  Goal: Verbalizes/displays adequate comfort level or baseline comfort level  Description: Interventions:  - Encourage patient to monitor pain and request assistance  - Assess pain using appropriate pain scale  - Administer analgesics based on type and severity of pain and evaluate response  - Implement non-pharmacological measures as appropriate and evaluate response  - Consider cultural and social influences on pain and pain management  - Notify physician/advanced practitioner if interventions unsuccessful or patient reports new pain  Outcome: Progressing     Problem: INFECTION - ADULT  Goal: Absence or prevention of progression during hospitalization  Description: INTERVENTIONS:  - Assess and monitor for signs and symptoms of infection  - Monitor lab/diagnostic results  - Monitor all insertion sites, i e  indwelling lines, tubes, and drains  - Monitor endotracheal if appropriate and nasal secretions for changes in amount and color  - [place] appropriate cooling/warming therapies per order  - Administer medications as ordered  - Instruct and encourage patient and family to use good hand hygiene technique  - Identify and instruct in appropriate isolation precautions for identified infection/condition  Outcome: Progressing  Goal: Absence of fever/infection during neutropenic period  Description: INTERVENTIONS:  - Monitor WBC    Outcome: Progressing     Problem: SAFETY ADULT  Goal: Patient will remain free of falls  Description: INTERVENTIONS:  - Educate patient/family on patient safety including physical limitations  - Instruct patient to call for assistance with activity   - Consult OT/PT to assist with strengthening/mobility   - Keep Call bell within reach  - Keep bed low and locked with side rails adjusted as appropriate  - Keep care items and personal belongings within reach  - Initiate and maintain comfort rounds  - Make Fall Risk Sign visible to staff  - Offer Toileting every x Hours, in advance of need  - Initiate/Maintain xalarm  - Obtain necessary fall risk management equipment: x  - Apply yellow socks and bracelet for high fall risk patients  - Consider moving patient to room near nurses station  Outcome: Progressing  Goal: Maintain or return to baseline ADL function  Description: INTERVENTIONS:  -  Assess patient's ability to carry out ADLs; assess patient's baseline for ADL function and identify physical deficits which impact ability to perform ADLs (bathing, care of mouth/teeth, toileting, grooming, dressing, etc )  - Assess/evaluate cause of self-care deficits   - Assess range of motion  - Assess patient's mobility; develop plan if impaired  - Assess patient's need for assistive devices and provide as appropriate  - Encourage maximum independence but intervene and supervise when necessary  - Involve family in performance of ADLs  - Assess for home care needs following discharge   - Consider OT consult to assist with ADL evaluation and planning for discharge  - Provide patient education as appropriate  Outcome: Progressing  Goal: Maintains/Returns to pre admission functional level  Description: INTERVENTIONS:  - Perform BMAT or MOVE assessment daily    - Set and communicate daily mobility goal to care team and patient/family/caregiver  - Collaborate with rehabilitation services on mobility goals if consulted  - Perform Range of Motion xx times a day  - Reposition patient every xxxx hours  - Dangle patient x times a day  - Stand patient x times a day  - Ambulate patient x times a day  - Out of bed to chair x times a day   - Out of bed for meals x times a day  - Out of bed for toileting  - Record patient progress and toleration of activity level   Outcome: Progressing     Problem: DISCHARGE PLANNING  Goal: Discharge to home or other facility with appropriate resources  Description: INTERVENTIONS:  - Identify barriers to discharge w/patient and caregiver  - Arrange for needed discharge resources and transportation as appropriate  - Identify discharge learning needs (meds, wound care, etc )  - Arrange for interpretive services to assist at discharge as needed  - Refer to Case Management Department for coordinating discharge planning if the patient needs post-hospital services based on physician/advanced practitioner order or complex needs related to functional status, cognitive ability, or social support system  Outcome: Progressing     Problem: Knowledge Deficit  Goal: Patient/family/caregiver demonstrates understanding of disease process, treatment plan, medications, and discharge instructions  Description: Complete learning assessment and assess knowledge base    Interventions:  - Provide teaching at level of understanding  - Provide teaching via preferred learning methods  Outcome: Progressing

## 2022-10-15 NOTE — ASSESSMENT & PLAN NOTE
Mild encephalopathy, AAOx3 however answers some questions inappropriately, at times not making any sense    Suspect hepatic encephalopathy vs SBP  Ammonia level is 38  Will give a dose of lactulose  Repeat ammonia levels  Continue thiamine, folate, B12

## 2022-10-16 LAB
ALBUMIN SERPL BCP-MCNC: 2 G/DL (ref 3.5–5)
ALP SERPL-CCNC: 325 U/L (ref 46–116)
ALT SERPL W P-5'-P-CCNC: 51 U/L (ref 12–78)
AMMONIA PLAS-SCNC: 15 UMOL/L (ref 11–35)
ANION GAP SERPL CALCULATED.3IONS-SCNC: 7 MMOL/L (ref 4–13)
AST SERPL W P-5'-P-CCNC: 96 U/L (ref 5–45)
BASOPHILS # BLD AUTO: 0.2 THOUSANDS/ÂΜL (ref 0–0.1)
BASOPHILS NFR BLD AUTO: 1 % (ref 0–1)
BILIRUB SERPL-MCNC: 1.9 MG/DL (ref 0.2–1)
BUN SERPL-MCNC: 7 MG/DL (ref 5–25)
CALCIUM ALBUM COR SERPL-MCNC: 9.8 MG/DL (ref 8.3–10.1)
CALCIUM SERPL-MCNC: 8.2 MG/DL (ref 8.3–10.1)
CHLORIDE SERPL-SCNC: 101 MMOL/L (ref 96–108)
CO2 SERPL-SCNC: 28 MMOL/L (ref 21–32)
CREAT SERPL-MCNC: 0.69 MG/DL (ref 0.6–1.3)
EOSINOPHIL # BLD AUTO: 0.57 THOUSAND/ÂΜL (ref 0–0.61)
EOSINOPHIL NFR BLD AUTO: 2 % (ref 0–6)
ERYTHROCYTE [DISTWIDTH] IN BLOOD BY AUTOMATED COUNT: 17.2 % (ref 11.6–15.1)
GFR SERPL CREATININE-BSD FRML MDRD: 106 ML/MIN/1.73SQ M
GLUCOSE SERPL-MCNC: 135 MG/DL (ref 65–140)
HCT VFR BLD AUTO: 32.6 % (ref 36.5–49.3)
HGB BLD-MCNC: 11.1 G/DL (ref 12–17)
IMM GRANULOCYTES # BLD AUTO: 0.36 THOUSAND/UL (ref 0–0.2)
IMM GRANULOCYTES NFR BLD AUTO: 1 % (ref 0–2)
LYMPHOCYTES # BLD AUTO: 1.17 THOUSANDS/ÂΜL (ref 0.6–4.47)
LYMPHOCYTES NFR BLD AUTO: 5 % (ref 14–44)
MAGNESIUM SERPL-MCNC: 1.5 MG/DL (ref 1.6–2.6)
MCH RBC QN AUTO: 35.9 PG (ref 26.8–34.3)
MCHC RBC AUTO-ENTMCNC: 34 G/DL (ref 31.4–37.4)
MCV RBC AUTO: 106 FL (ref 82–98)
MONOCYTES # BLD AUTO: 1.33 THOUSAND/ÂΜL (ref 0.17–1.22)
MONOCYTES NFR BLD AUTO: 5 % (ref 4–12)
NEUTROPHILS # BLD AUTO: 21.21 THOUSANDS/ÂΜL (ref 1.85–7.62)
NEUTS SEG NFR BLD AUTO: 86 % (ref 43–75)
NRBC BLD AUTO-RTO: 0 /100 WBCS
PLATELET # BLD AUTO: 338 THOUSANDS/UL (ref 149–390)
PMV BLD AUTO: 9.4 FL (ref 8.9–12.7)
POTASSIUM SERPL-SCNC: 3.7 MMOL/L (ref 3.5–5.3)
PROCALCITONIN SERPL-MCNC: 0.64 NG/ML
PROT SERPL-MCNC: 6.1 G/DL (ref 6.4–8.4)
RBC # BLD AUTO: 3.09 MILLION/UL (ref 3.88–5.62)
SODIUM SERPL-SCNC: 136 MMOL/L (ref 135–147)
WBC # BLD AUTO: 24.84 THOUSAND/UL (ref 4.31–10.16)

## 2022-10-16 PROCEDURE — 84145 PROCALCITONIN (PCT): CPT | Performed by: INTERNAL MEDICINE

## 2022-10-16 PROCEDURE — 80053 COMPREHEN METABOLIC PANEL: CPT | Performed by: INTERNAL MEDICINE

## 2022-10-16 PROCEDURE — 82140 ASSAY OF AMMONIA: CPT | Performed by: INTERNAL MEDICINE

## 2022-10-16 PROCEDURE — 83735 ASSAY OF MAGNESIUM: CPT | Performed by: INTERNAL MEDICINE

## 2022-10-16 PROCEDURE — 85025 COMPLETE CBC W/AUTO DIFF WBC: CPT | Performed by: INTERNAL MEDICINE

## 2022-10-16 PROCEDURE — 99232 SBSQ HOSP IP/OBS MODERATE 35: CPT | Performed by: INTERNAL MEDICINE

## 2022-10-16 RX ORDER — HYDROMORPHONE HCL/PF 1 MG/ML
0.5 SYRINGE (ML) INJECTION EVERY 4 HOURS PRN
Status: DISCONTINUED | OUTPATIENT
Start: 2022-10-16 | End: 2022-10-20

## 2022-10-16 RX ORDER — MAGNESIUM SULFATE HEPTAHYDRATE 40 MG/ML
2 INJECTION, SOLUTION INTRAVENOUS ONCE
Status: COMPLETED | OUTPATIENT
Start: 2022-10-16 | End: 2022-10-16

## 2022-10-16 RX ADMIN — OXYCODONE HYDROCHLORIDE 5 MG: 5 TABLET ORAL at 03:46

## 2022-10-16 RX ADMIN — MORPHINE SULFATE 2 MG: 2 INJECTION, SOLUTION INTRAMUSCULAR; INTRAVENOUS at 17:28

## 2022-10-16 RX ADMIN — CHLORDIAZEPOXIDE HYDROCHLORIDE 10 MG: 5 CAPSULE ORAL at 21:53

## 2022-10-16 RX ADMIN — MULTIPLE VITAMINS W/ MINERALS TAB 1 TABLET: TAB ORAL at 08:37

## 2022-10-16 RX ADMIN — MAGNESIUM SULFATE HEPTAHYDRATE 2 G: 40 INJECTION, SOLUTION INTRAVENOUS at 10:02

## 2022-10-16 RX ADMIN — FOLIC ACID 1 MG: 1 TABLET ORAL at 08:37

## 2022-10-16 RX ADMIN — MORPHINE SULFATE 2 MG: 2 INJECTION, SOLUTION INTRAMUSCULAR; INTRAVENOUS at 15:00

## 2022-10-16 RX ADMIN — Medication 3.38 G: at 19:40

## 2022-10-16 RX ADMIN — OXYCODONE HYDROCHLORIDE 5 MG: 5 TABLET ORAL at 17:31

## 2022-10-16 RX ADMIN — Medication 100 MG: at 08:37

## 2022-10-16 RX ADMIN — NICOTINE 21 MG: 21 PATCH, EXTENDED RELEASE TRANSDERMAL at 08:38

## 2022-10-16 RX ADMIN — MORPHINE SULFATE 2 MG: 2 INJECTION, SOLUTION INTRAMUSCULAR; INTRAVENOUS at 23:30

## 2022-10-16 RX ADMIN — Medication 3.38 G: at 12:25

## 2022-10-16 RX ADMIN — Medication 3.38 G: at 05:35

## 2022-10-16 RX ADMIN — OXYCODONE HYDROCHLORIDE 5 MG: 5 TABLET ORAL at 11:00

## 2022-10-16 RX ADMIN — CHLORDIAZEPOXIDE HYDROCHLORIDE 10 MG: 5 CAPSULE ORAL at 14:59

## 2022-10-16 RX ADMIN — MORPHINE SULFATE 2 MG: 2 INJECTION, SOLUTION INTRAMUSCULAR; INTRAVENOUS at 08:37

## 2022-10-16 RX ADMIN — HYDROMORPHONE HYDROCHLORIDE 0.5 MG: 1 INJECTION, SOLUTION INTRAMUSCULAR; INTRAVENOUS; SUBCUTANEOUS at 21:53

## 2022-10-16 RX ADMIN — CHLORDIAZEPOXIDE HYDROCHLORIDE 10 MG: 5 CAPSULE ORAL at 05:35

## 2022-10-16 NOTE — PLAN OF CARE
Problem: Potential for Falls  Goal: Patient will remain free of falls  Description: INTERVENTIONS:  - Educate patient/family on patient safety including physical limitations  - Instruct patient to call for assistance with activity   - Consult OT/PT to assist with strengthening/mobility   - Keep Call bell within reach  - Keep bed low and locked with side rails adjusted as appropriate  - Keep care items and personal belongings within reach  - Initiate and maintain comfort rounds  - Make Fall Risk Sign visible to staff  - Offer Toileting every x Hours, in advance of need  - Initiate/Maintain xalarm  - Obtain necessary fall risk management equipment: x  - Apply yellow socks and bracelet for high fall risk patients  - Consider moving patient to room near nurses station  Outcome: Progressing     Problem: PAIN - ADULT  Goal: Verbalizes/displays adequate comfort level or baseline comfort level  Description: Interventions:  - Encourage patient to monitor pain and request assistance  - Assess pain using appropriate pain scale  - Administer analgesics based on type and severity of pain and evaluate response  - Implement non-pharmacological measures as appropriate and evaluate response  - Consider cultural and social influences on pain and pain management  - Notify physician/advanced practitioner if interventions unsuccessful or patient reports new pain  Outcome: Progressing     Problem: INFECTION - ADULT  Goal: Absence or prevention of progression during hospitalization  Description: INTERVENTIONS:  - Assess and monitor for signs and symptoms of infection  - Monitor lab/diagnostic results  - Monitor all insertion sites, i e  indwelling lines, tubes, and drains  - Monitor endotracheal if appropriate and nasal secretions for changes in amount and color  - [place] appropriate cooling/warming therapies per order  - Administer medications as ordered  - Instruct and encourage patient and family to use good hand hygiene technique  - Identify and instruct in appropriate isolation precautions for identified infection/condition  Outcome: Progressing  Goal: Absence of fever/infection during neutropenic period  Description: INTERVENTIONS:  - Monitor WBC    Outcome: Progressing     Problem: SAFETY ADULT  Goal: Patient will remain free of falls  Description: INTERVENTIONS:  - Educate patient/family on patient safety including physical limitations  - Instruct patient to call for assistance with activity   - Consult OT/PT to assist with strengthening/mobility   - Keep Call bell within reach  - Keep bed low and locked with side rails adjusted as appropriate  - Keep care items and personal belongings within reach  - Initiate and maintain comfort rounds  - Make Fall Risk Sign visible to staff  - Offer Toileting every x Hours, in advance of need  - Initiate/Maintain xalarm  - Obtain necessary fall risk management equipment: x  - Apply yellow socks and bracelet for high fall risk patients  - Consider moving patient to room near nurses station  Outcome: Progressing  Goal: Maintain or return to baseline ADL function  Description: INTERVENTIONS:  -  Assess patient's ability to carry out ADLs; assess patient's baseline for ADL function and identify physical deficits which impact ability to perform ADLs (bathing, care of mouth/teeth, toileting, grooming, dressing, etc )  - Assess/evaluate cause of self-care deficits   - Assess range of motion  - Assess patient's mobility; develop plan if impaired  - Assess patient's need for assistive devices and provide as appropriate  - Encourage maximum independence but intervene and supervise when necessary  - Involve family in performance of ADLs  - Assess for home care needs following discharge   - Consider OT consult to assist with ADL evaluation and planning for discharge  - Provide patient education as appropriate  Outcome: Progressing  Goal: Maintains/Returns to pre admission functional level  Description: INTERVENTIONS:  - Perform BMAT or MOVE assessment daily    - Set and communicate daily mobility goal to care team and patient/family/caregiver  - Collaborate with rehabilitation services on mobility goals if consulted  - Perform Range of Motion x times a day  - Reposition patient every x hours  - Dangle patient x times a day  - Stand patient x times a day  - Ambulate patient x times a day  - Out of bed to chair xxxx times a day   - Out of bed for meals x times a day  - Out of bed for toileting  - Record patient progress and toleration of activity level   Outcome: Progressing     Problem: DISCHARGE PLANNING  Goal: Discharge to home or other facility with appropriate resources  Description: INTERVENTIONS:  - Identify barriers to discharge w/patient and caregiver  - Arrange for needed discharge resources and transportation as appropriate  - Identify discharge learning needs (meds, wound care, etc )  - Arrange for interpretive services to assist at discharge as needed  - Refer to Case Management Department for coordinating discharge planning if the patient needs post-hospital services based on physician/advanced practitioner order or complex needs related to functional status, cognitive ability, or social support system  Outcome: Progressing     Problem: Knowledge Deficit  Goal: Patient/family/caregiver demonstrates understanding of disease process, treatment plan, medications, and discharge instructions  Description: Complete learning assessment and assess knowledge base    Interventions:  - Provide teaching at level of understanding  - Provide teaching via preferred learning methods  Outcome: Progressing   x

## 2022-10-16 NOTE — PROGRESS NOTES
Progress note - Gastroenterology   Bao Escobar 56 y o  male MRN: [id number]  Unit/Bed#: -01 Encounter: [id number]    ASSESSMENT and PLAN       1  New onset ascites with abdominal pain     Awaiting paracentesis  On empiric Zosyn for SBP      2  Alcohol abuse     Daily vodka - monitor for withdrawal   No h/o withdrawal seizures but pt admits he usually does not stop for prolonged periods of time  Declines any kind of assistance for ETOH rehab  Strict ETOH cessation emphasized      3  Imaging concerning for cirrhosis, with poss decompensation of ascites and mild hep encephalopathy  MELD 13      Likely ETOH cirrhosis with components of ETOH hepatitis  Low MELD and DF  No steroids given this as well as concerns for sepsis  Previous work up reviewed - CT 10/14/2022 neg for [address]  and PVT  Neg for viral, autoimmune, metabolic etiologies on labs  - Will need an EGD as outpatient for varices screening      4  SIRS - tachycardic with WBC 33     Continue Zosyn for empiric treatment of SBP  Diagnostic and therapeutic paracentesis r/o SBP by IR pending  Pan culture and CXR pending      5  COPD, smoker [facility])       Chief Complaint   Patient presents with   • Abdominal Pain     Pt c/o of rioght sided abdomianla pain x 1 days  SUBJECTIVE/HPI   abd pain  /81 (BP Location: Left arm)   Pulse 99   Temp (!) 97 1 °F (36 2 °C) (Oral)   Resp 18   Ht 5' 7" (1 702 m)   Wt 66 7 kg (147 lb 1 6 oz)   SpO2 99%   BMI 23 04 kg/m²     PHYSICALEXAM  General Appearance: NAD, cooperative, alert  Eyes: Anicteric, PERRLA, EOMI  ENT:  Normocephalic, atraumatic, normal mucosa     Neck:    Supple, symmetrical, trachea midline  Resp:  Clear to auscultation bilaterally; no rales, rhonchi or wheezing; respirations unlabored   CV:  S1 S2, Regular rate and rhythm; no murmur, rub, or gallop    GI:  Soft, diffusely tender, distended; normal bowel sounds; no masses, no organomegaly , positive ascites  Rectal: Deferred  Musculoskeletal: No cyanosis, clubbing or edema  Normal ROM    Skin:     No jaundice, rashes, or lesions   Heme/Lymph: No palpable cervical lymphadenopathy  Psych: Normal affect, good eye contact  Neuro: No gross deficits, AAOx3, no asterixis    Lab Results   Component Value Date    CALCIUM 8 2 (L) 10/16/2022    K 3 7 10/16/2022    CO2 28 10/16/2022     10/16/2022    BUN 7 10/16/2022    CREATININE 0 69 10/16/2022     Lab Results   Component Value Date    WBC 24 84 (H) 10/16/2022    HGB 11 1 (L) 10/16/2022    HCT 32 6 (L) 10/16/2022     (H) 10/16/2022     10/16/2022     Lab Results   Component Value Date    ALT 51 10/16/2022    AST 96 (H) 10/16/2022    GGT 1,144 (H) 09/28/2022    ALKPHOS 325 (H) 10/16/2022     Lab Results   Component Value Date    AMYLASE 21 (L) 09/28/2022     Lab Results   Component Value Date    LIPASE 308 10/14/2022     Lab Results   Component Value Date    IRON 96 10/15/2022    TIBC 158 (L) 10/15/2022    FERRITIN 423 (H) 10/15/2022     Lab Results   Component Value Date    INR 1 14 10/15/2022

## 2022-10-16 NOTE — PLAN OF CARE
Problem: Potential for Falls  Goal: Patient will remain free of falls  Description: INTERVENTIONS:  - Educate patient/family on patient safety including physical limitations  - Instruct patient to call for assistance with activity   - Consult OT/PT to assist with strengthening/mobility   - Keep Call bell within reach  - Keep bed low and locked with side rails adjusted as appropriate  - Keep care items and personal belongings within reach  - Initiate and maintain comfort rounds  - Make Fall Risk Sign visible to staff  - Offer Toileting every 2 Hours, in advance of need  - Initiate/Maintain alarm  - Obtain necessary fall risk management equipment:   - Apply yellow socks and bracelet for high fall risk patients  - Consider moving patient to room near nurses station  10/16/2022 1538 by Jennifer Hutchison RN  Outcome: Progressing  10/16/2022 1537 by Jennifer Hutchison RN  Outcome: Progressing     Problem: PAIN - ADULT  Goal: Verbalizes/displays adequate comfort level or baseline comfort level  Description: Interventions:  - Encourage patient to monitor pain and request assistance  - Assess pain using appropriate pain scale  - Administer analgesics based on type and severity of pain and evaluate response  - Implement non-pharmacological measures as appropriate and evaluate response  - Consider cultural and social influences on pain and pain management  - Notify physician/advanced practitioner if interventions unsuccessful or patient reports new pain  10/16/2022 1538 by Jennifer Hutchison RN  Outcome: Progressing  10/16/2022 1537 by Jennifer Hutchison RN  Outcome: Progressing     Problem: INFECTION - ADULT  Goal: Absence or prevention of progression during hospitalization  Description: INTERVENTIONS:  - Assess and monitor for signs and symptoms of infection  - Monitor lab/diagnostic results  - Monitor all insertion sites, i e  indwelling lines, tubes, and drains  - Monitor endotracheal if appropriate and nasal secretions for changes in amount and color  - [place] appropriate cooling/warming therapies per order  - Administer medications as ordered  - Instruct and encourage patient and family to use good hand hygiene technique  - Identify and instruct in appropriate isolation precautions for identified infection/condition  10/16/2022 1538 by Ainsley Zavala RN  Outcome: Progressing  10/16/2022 1537 by Ainsley Zavala RN  Outcome: Progressing  Goal: Absence of fever/infection during neutropenic period  Description: INTERVENTIONS:  - Monitor WBC    10/16/2022 1538 by Ainsley Zavala RN  Outcome: Progressing  10/16/2022 1537 by Ainsley Zavala RN  Outcome: Progressing     Problem: SAFETY ADULT  Goal: Patient will remain free of falls  Description: INTERVENTIONS:  - Educate patient/family on patient safety including physical limitations  - Instruct patient to call for assistance with activity   - Consult OT/PT to assist with strengthening/mobility   - Keep Call bell within reach  - Keep bed low and locked with side rails adjusted as appropriate  - Keep care items and personal belongings within reach  - Initiate and maintain comfort rounds  - Make Fall Risk Sign visible to staff  - Offer Toileting every 2 Hours, in advance of need  - Initiate/Maintain alarm  - Obtain necessary fall risk management equipment:   - Apply yellow socks and bracelet for high fall risk patients  - Consider moving patient to room near nurses station  10/16/2022 1538 by Ainsley Zavala RN  Outcome: Progressing  10/16/2022 1537 by Ainsley Zavala RN  Outcome: Progressing  Goal: Maintain or return to baseline ADL function  Description: INTERVENTIONS:  - Educate patient/family on patient safety including physical limitations  - Instruct patient to call for assistance with activity   - Consult OT/PT to assist with strengthening/mobility   - Keep Call bell within reach  - Keep bed low and locked with side rails adjusted as appropriate  - Keep care items and personal belongings within reach  - Initiate and maintain comfort rounds  - Make Fall Risk Sign visible to staff  - Offer Toileting every 2 Hours, in advance of need  - Initiate/Maintain alarm  - Obtain necessary fall risk management equipment:   - Apply yellow socks and bracelet for high fall risk patients  - Consider moving patient to room near nurses station  10/16/2022 1538 by Jeff Turk RN  Outcome: Progressing  10/16/2022 1537 by Jeff Turk RN  Outcome: Progressing  Goal: Maintains/Returns to pre admission functional level  Description: INTERVENTIONS:  - Perform BMAT or MOVE assessment daily    - Set and communicate daily mobility goal to care team and patient/family/caregiver     - Collaborate with rehabilitation services on mobility goals if consulted  - Out of bed for toileting  - Record patient progress and toleration of activity level   10/16/2022 1538 by Jeff Turk RN  Outcome: Progressing  10/16/2022 1537 by Jeff Turk RN  Outcome: Progressing     Problem: DISCHARGE PLANNING  Goal: Discharge to home or other facility with appropriate resources  Description: INTERVENTIONS:  - Identify barriers to discharge w/patient and caregiver  - Arrange for needed discharge resources and transportation as appropriate  - Identify discharge learning needs (meds, wound care, etc )  - Arrange for interpretive services to assist at discharge as needed  - Refer to Case Management Department for coordinating discharge planning if the patient needs post-hospital services based on physician/advanced practitioner order or complex needs related to functional status, cognitive ability, or social support system  10/16/2022 1538 by Jeff Turk RN  Outcome: Progressing  10/16/2022 1537 by Jeff Turk RN  Outcome: Progressing     Problem: Knowledge Deficit  Goal: Patient/family/caregiver demonstrates understanding of disease process, treatment plan, medications, and discharge instructions  Description: Complete learning assessment and assess knowledge base    Interventions:  - Provide teaching at level of understanding  - Provide teaching via preferred learning methods  10/16/2022 1538 by Hellen Mcgarry RN  Outcome: Progressing  10/16/2022 1537 by Hellen Mcgarry RN  Outcome: Progressing

## 2022-10-16 NOTE — PROGRESS NOTES
[facility]  Progress Note - Kip Honor 1965, 56 y o  male MRN: [id number]  Unit/Bed#: -01 Encounter: [id number]  Primary Care Provider: Karlos Gasca DO   Date and time admitted to hospital: 10/14/2022  1:52 PM    Alcohol use  Assessment & Plan  Drinks vodka and juice daily  Never attempted to quit due to experiencing withdrawals w/ tremors  Denies seizures/hallucinations  [facility] protocol  Thiamine/folate  On Librium  At risk for DT    SIRS (systemic inflammatory response syndrome) (HCC)  Assessment & Plan  Meets SIRS criteria w tachycardia (HR 120s) and leukocytosis (WBC 33)  Reports fevers/chills at home, however afebrile while inpatient  No definitive source of infection, however concern for possible SBP  IR consult for paracentesis  Continue on empiric Zosyn  Follow-up culture results    Ascites  Assessment & Plan  Presented with diffuse abdominal pain and abdominal distension  Likely secondary to alcoholic cirrhosis  Plan for IR paracentesis w/ full labs  Per PCP note, had bloody stools  Today reported fevers at home  Concern for possible SBP  Trend WBC and fever curve  Continue Zosyn  GI consult appreciated    Encephalopathy  Assessment & Plan  Presented with metabolic encephalopathy in ED POA  Suspect hepatic encephalopathy vs SBP  Ammonia level is 38  Received a dose of lactulose and ammonia levels are normal this morning  Continue thiamine and folate  Trend WBC and fever curve    COPD (chronic obstructive pulmonary disease) (HCC)  Assessment & Plan  No shortness of breath/wheezing  Not acute exacerbation  Albuterol inhaler PRN    Nicotine dependence  Assessment & Plan  Smokes approximately a pack a day  Nicotine patch    * Cirrhosis ([facility] [address] )  Assessment & Plan  New onset cirrhosis  Was noted to have abdominal distension, hepatomegaly, with dark stools by PCP on 09/28/2022    · Presented today due to worsening diffuse abdominal pain  · Reports drinking vodka and juice daily, last drink today  · On exam, distended abdomen which is firm and mildly tender  Scleral icterus  · Mild transaminitis with , ALT 72  Alk-phos 400  T bili 1 9, direct bili 1 4  · CT abdomen/pelvis shows new onset cirrhosis with moderate ascites  · Appears mildly encephalopathic as well  · Suspect alcoholic liver disease  · On folate/ thiamine supplementation  · GI consult  Appreciate recs  · Concern for possible SBP as patient reported fevers recently as well as abdominal pain with ascites, and recent bloody stools per PCP  Continue Zosyn  · IR consult for paracentesis with fluid analysis      Labs & Imaging: I have personally reviewed pertinent reports  VTE Prophylaxis: in place  Code Status:   No Order    Patient Centered Rounds: I have performed bedside rounds with nursing staff today  Discussions with Specialists or Other Care Team Provider: GI    Education and Discussions with Family / Patient:  Patient states he will update his family    Current Length of Stay: 2 day(s)    Current Patient Status: Inpatient   Certification Statement: The patient will continue to require additional inpatient hospital stay due to see my assessment and plan  Subjective:   Patient is seen and examined at bedside  Complains of generalized abdominal pain  Denies any other complaints  Afebrile  All other ROS are negative  Objective:    Vitals: Blood pressure 121/81, pulse 99, temperature (!) 97 1 °F (36 2 °C), temperature source Oral, resp  rate 18, height 5' 7" (1 702 m), weight 66 7 kg (147 lb 1 6 oz), SpO2 99 %  ,Body mass index is 23 04 kg/m²    SPO2 RA Rest    Flowsheet Row ED to Hosp-Admission (Current) from 10/14/2022 in [address] Med Surg Unit   SpO2 99 %   SpO2 Activity At Rest   O2 Device None (Room air)   O2 Flow Rate --        I&O:     Intake/Output Summary (Last 24 hours) at 10/16/2022 0740  Last data filed at 10/16/2022 0628  Gross per 24 hour   Intake 1018 ml   Output --   Net 1018 ml       Physical Exam:    General- Alert, lying comfortably in bed  Not in any acute distress  Neck- Supple, No JVD  CVS- regular, S1 and S2 normal  Chest- Bilateral Air entry, No rhochi, crackles or wheezing present  Abdomen- soft, generalized tenderness, not distended, no guarding or rigidity, BS+  Extremities-  No pedal edema, No calf tenderness  CNS-   Alert, awake and orientedx3  No focal deficits present      Invasive Devices  Report    Peripheral Intravenous Line  Duration           Peripheral IV 10/14/22 Right Antecubital 1 day                      Social History  reviewed  Family History   Problem Relation Age of Onset   • Colon cancer Mother    • Diabetes Mother         mellitus    reviewed    Meds:  Current Facility-Administered Medications   Medication Dose Route Frequency Provider Last Rate Last Admin   • albuterol (PROVENTIL HFA,VENTOLIN HFA) inhaler 2 puff  2 puff Inhalation Q4H PRN Deshawn Rollins PA-C       • chlordiazePOXIDE (LIBRIUM) capsule 10 mg  10 mg Oral Q8H Radha Ram MD   10 mg at 04/69/77 6836   • folic acid (FOLVITE) tablet 1 mg  1 mg Oral Daily Deshawn Rollins PA-C   1 mg at 10/15/22 6812   • LORazepam (ATIVAN) injection 2 mg  2 mg Intravenous Once Olubusola O Heavenlysayecenia, DO       • multivitamin-minerals (CENTRUM) tablet 1 tablet  1 tablet Oral Daily Deshawn Rollins PA-C   1 tablet at 10/15/22 1748   • nicotine (NICODERM CQ) 21 mg/24 hr TD 24 hr patch 21 mg  21 mg Transdermal Daily Teena Ayers PA-C   21 mg at 10/15/22 2144   • ondansetron (ZOFRAN) injection 4 mg  4 mg Intravenous Q4H PRN Deshawn Rollins PA-C   4 mg at 10/14/22 2118   • oxyCODONE (ROXICODONE) IR tablet 5 mg  5 mg Oral Q6H PRN Caio Pichardo MD   5 mg at 10/16/22 0346   • piperacillin-tazobactam (ZOSYN) IVPB 3 375 g  3 375 g Intravenous Q6H [facility], PA-C   3 375 g at 10/16/22 0535   • thiamine tablet 100 mg  100 mg Oral Daily Deshawn Rollins PA-C   100 mg at 10/15/22 0903      Medications Prior to Admission   Medication   • omeprazole (PriLOSEC) 20 mg delayed release capsule   • potassium chloride (K-DUR,KLOR-CON) 20 mEq tablet       Labs:  Results from last 7 days   Lab Units 10/16/22  0552 10/15/22  0434 10/14/22  1350   WBC Thousand/uL 24 84* 25 04* 32 35*   HEMOGLOBIN g/dL 11 1* 11 1* 12 4   HEMATOCRIT % 32 6* 31 7* 34 0*   PLATELETS Thousands/uL 338 332 405*   NEUTROS PCT % 86*  --   --    LYMPHS PCT % 5*  --   --    LYMPHO PCT %  --   --  4*   MONOS PCT % 5  --   --    MONO PCT %  --   --  2*   EOS PCT % 2  --  0     Results from last 7 days   Lab Units 10/16/22  0552 10/15/22  0434 10/14/22  1350   POTASSIUM mmol/L 3 7 3 9 4 0   CHLORIDE mmol/L 101 101 97   CO2 mmol/L 28 27 21   BUN mg/dL 7 6 6   CREATININE mg/dL 0 69 0 59* 0 67   CALCIUM mg/dL 8 2* 8 4 8 6   ALK PHOS U/L 325* 335* 411*   ALT U/L 51 56 72   AST U/L 96* 98* 123*     Lab Results   Component Value Date    TROPONINI <0 02 10/15/2018     Results from last 7 days   Lab Units 10/15/22  0434 10/14/22  1433   INR  1 14 1 07     Lab Results   Component Value Date    BLOODCX No Growth at 24 hrs  10/14/2022    BLOODCX No Growth at 24 hrs  10/14/2022    BLOODCX No Growth After 5 Days  10/15/2018         Imaging:  No results found for this or any previous visit  Results for orders placed in visit on 02/03/22    XR chest pa & lateral    Narrative  CHEST    INDICATION:   R05 3: Chronic cough  COMPARISON:  1/31/2020    EXAM PERFORMED/VIEWS:  XR CHEST PA & LATERAL      FINDINGS:    Cardiomediastinal silhouette appears unremarkable  The lungs are clear  No pneumothorax or pleural effusion  Osseous structures appear within normal limits for patient age  Impression  No acute cardiopulmonary disease              Workstation performed: [id number]      Last 24 Hours Medication List:   Current Facility-Administered Medications   Medication Dose Route Frequency Provider Last Rate   • albuterol  2 puff Inhalation Q4H PRN Apolonia Abbott PA-C     • chlordiazePOXIDE  10 mg Oral Q8H Shyam William MD     • folic acid  1 mg Oral Daily Apolonia Abbott PA-C     • LORazepam  2 mg Intravenous Once Carol Pedro DO     • multivitamin-minerals  1 tablet Oral Daily Apolonia Abbott PA-C     • nicotine  21 mg Transdermal Daily Leo Laura PA-C     • ondansetron  4 mg Intravenous Q4H PRN Apolonia Abbott PA-C     • oxyCODONE  5 mg Oral Q6H PRN Matthew Erazo MD     • piperacillin-tazobactam  3 375 g Intravenous Q6H Apolonia Abbott PA-C     • thiamine  100 mg Oral Daily Apolonia Abbott PA-C          Today, Patient Was Seen By: Matthew Erazo    ** Please Note: Dictation voice to text software may have been used in the creation of this document   **

## 2022-10-16 NOTE — ASSESSMENT & PLAN NOTE
Drinks vodka and juice daily  Never attempted to quit due to experiencing withdrawals w/ tremors   Denies seizures/hallucinations  [facility] protocol  Thiamine/folate  On Librium  At risk for DT

## 2022-10-16 NOTE — ASSESSMENT & PLAN NOTE
New onset cirrhosis  Was noted to have abdominal distension, hepatomegaly, with dark stools by PCP on 09/28/2022  · Presented today due to worsening diffuse abdominal pain  · Reports drinking vodka and juice daily, last drink today  · On exam, distended abdomen which is firm and mildly tender  Scleral icterus  · Mild transaminitis with , ALT 72  Alk-phos 400  T bili 1 9, direct bili 1 4  · CT abdomen/pelvis shows new onset cirrhosis with moderate ascites  · Appears mildly encephalopathic as well  · Suspect alcoholic liver disease  · On folate/ thiamine supplementation  · GI consult  Appreciate recs  · Concern for possible SBP as patient reported fevers recently as well as abdominal pain with ascites, and recent bloody stools per PCP    Continue Zosyn  · IR consult for paracentesis with fluid analysis

## 2022-10-16 NOTE — ASSESSMENT & PLAN NOTE
Meets SIRS criteria w tachycardia (HR 120s) and leukocytosis (WBC 33)  Reports fevers/chills at home, however afebrile while inpatient  No definitive source of infection, however concern for possible SBP  IR consult for paracentesis  Continue on empiric Zosyn  Follow-up culture results

## 2022-10-16 NOTE — ASSESSMENT & PLAN NOTE
Presented with diffuse abdominal pain and abdominal distension  Likely secondary to alcoholic cirrhosis  Plan for IR paracentesis w/ full labs  Per PCP note, had bloody stools  Today reported fevers at home   Concern for possible SBP  Trend WBC and fever curve  Continue Zosyn  GI consult appreciated

## 2022-10-16 NOTE — ASSESSMENT & PLAN NOTE
Presented with metabolic encephalopathy in ED POA  Suspect hepatic encephalopathy vs SBP  Ammonia level is 38  Received a dose of lactulose and ammonia levels are normal this morning  Continue thiamine and folate  Trend WBC and fever curve

## 2022-10-17 ENCOUNTER — APPOINTMENT (INPATIENT)
Dept: INTERVENTIONAL RADIOLOGY/VASCULAR | Facility: HOSPITAL | Age: 57
DRG: 871 | End: 2022-10-17
Payer: MEDICARE

## 2022-10-17 ENCOUNTER — TELEPHONE (OUTPATIENT)
Dept: INTERVENTIONAL RADIOLOGY/VASCULAR | Facility: HOSPITAL | Age: 57
End: 2022-10-17

## 2022-10-17 LAB
ALBUMIN SERPL BCP-MCNC: 2 G/DL (ref 3.5–5)
ALP SERPL-CCNC: 317 U/L (ref 46–116)
ALT SERPL W P-5'-P-CCNC: 56 U/L (ref 12–78)
ANION GAP SERPL CALCULATED.3IONS-SCNC: 7 MMOL/L (ref 4–13)
AST SERPL W P-5'-P-CCNC: 96 U/L (ref 5–45)
BILIRUB SERPL-MCNC: 2.4 MG/DL (ref 0.2–1)
BUN SERPL-MCNC: 6 MG/DL (ref 5–25)
CALCIUM ALBUM COR SERPL-MCNC: 9.9 MG/DL (ref 8.3–10.1)
CALCIUM SERPL-MCNC: 8.3 MG/DL (ref 8.3–10.1)
CHLORIDE SERPL-SCNC: 99 MMOL/L (ref 96–108)
CO2 SERPL-SCNC: 28 MMOL/L (ref 21–32)
CREAT SERPL-MCNC: 0.61 MG/DL (ref 0.6–1.3)
ERYTHROCYTE [DISTWIDTH] IN BLOOD BY AUTOMATED COUNT: 17.1 % (ref 11.6–15.1)
GFR SERPL CREATININE-BSD FRML MDRD: 112 ML/MIN/1.73SQ M
GLUCOSE FLD-MCNC: 150 MG/DL
GLUCOSE SERPL-MCNC: 114 MG/DL (ref 65–140)
HCT VFR BLD AUTO: 34.1 % (ref 36.5–49.3)
HGB BLD-MCNC: 11.8 G/DL (ref 12–17)
LYMPHOCYTES NFR BLD AUTO: 19 %
MAGNESIUM SERPL-MCNC: 1.9 MG/DL (ref 1.6–2.6)
MCH RBC QN AUTO: 36.1 PG (ref 26.8–34.3)
MCHC RBC AUTO-ENTMCNC: 34.6 G/DL (ref 31.4–37.4)
MCV RBC AUTO: 104 FL (ref 82–98)
MONOCYTES NFR BLD AUTO: 18 %
NEUTS SEG NFR BLD AUTO: 63 %
NRBC BLD AUTO-RTO: 0 /100 WBCS
PLATELET # BLD AUTO: 370 THOUSANDS/UL (ref 149–390)
PMV BLD AUTO: 9.4 FL (ref 8.9–12.7)
POTASSIUM SERPL-SCNC: 4 MMOL/L (ref 3.5–5.3)
PROT SERPL-MCNC: 6.2 G/DL (ref 6.4–8.4)
RBC # BLD AUTO: 3.27 MILLION/UL (ref 3.88–5.62)
SODIUM SERPL-SCNC: 134 MMOL/L (ref 135–147)
TOTAL CELLS COUNTED SPEC: 100
WBC # BLD AUTO: 30.58 THOUSAND/UL (ref 4.31–10.16)
WBC # FLD MANUAL: 352 /UL

## 2022-10-17 PROCEDURE — 85027 COMPLETE CBC AUTOMATED: CPT | Performed by: INTERNAL MEDICINE

## 2022-10-17 PROCEDURE — 82042 OTHER SOURCE ALBUMIN QUAN EA: CPT | Performed by: PHYSICIAN ASSISTANT

## 2022-10-17 PROCEDURE — 83735 ASSAY OF MAGNESIUM: CPT | Performed by: INTERNAL MEDICINE

## 2022-10-17 PROCEDURE — 49083 ABD PARACENTESIS W/IMAGING: CPT

## 2022-10-17 PROCEDURE — 84157 ASSAY OF PROTEIN OTHER: CPT | Performed by: PHYSICIAN ASSISTANT

## 2022-10-17 PROCEDURE — 80053 COMPREHEN METABOLIC PANEL: CPT | Performed by: INTERNAL MEDICINE

## 2022-10-17 PROCEDURE — 99233 SBSQ HOSP IP/OBS HIGH 50: CPT | Performed by: STUDENT IN AN ORGANIZED HEALTH CARE EDUCATION/TRAINING PROGRAM

## 2022-10-17 PROCEDURE — 82945 GLUCOSE OTHER FLUID: CPT | Performed by: PHYSICIAN ASSISTANT

## 2022-10-17 PROCEDURE — 89051 BODY FLUID CELL COUNT: CPT | Performed by: PHYSICIAN ASSISTANT

## 2022-10-17 PROCEDURE — 0W9G3ZZ DRAINAGE OF PERITONEAL CAVITY, PERCUTANEOUS APPROACH: ICD-10-PCS | Performed by: RADIOLOGY

## 2022-10-17 PROCEDURE — 49083 ABD PARACENTESIS W/IMAGING: CPT | Performed by: RADIOLOGY

## 2022-10-17 PROCEDURE — 99232 SBSQ HOSP IP/OBS MODERATE 35: CPT | Performed by: INTERNAL MEDICINE

## 2022-10-17 PROCEDURE — 88112 CYTOPATH CELL ENHANCE TECH: CPT | Performed by: PATHOLOGY

## 2022-10-17 PROCEDURE — 87070 CULTURE OTHR SPECIMN AEROBIC: CPT | Performed by: PHYSICIAN ASSISTANT

## 2022-10-17 PROCEDURE — 87205 SMEAR GRAM STAIN: CPT | Performed by: PHYSICIAN ASSISTANT

## 2022-10-17 PROCEDURE — C1729 CATH, DRAINAGE: HCPCS

## 2022-10-17 PROCEDURE — 83615 LACTATE (LD) (LDH) ENZYME: CPT | Performed by: PHYSICIAN ASSISTANT

## 2022-10-17 PROCEDURE — 88305 TISSUE EXAM BY PATHOLOGIST: CPT | Performed by: PATHOLOGY

## 2022-10-17 RX ORDER — LIDOCAINE HYDROCHLORIDE 10 MG/ML
INJECTION, SOLUTION EPIDURAL; INFILTRATION; INTRACAUDAL; PERINEURAL CODE/TRAUMA/SEDATION MEDICATION
Status: COMPLETED | OUTPATIENT
Start: 2022-10-17 | End: 2022-10-17

## 2022-10-17 RX ADMIN — MULTIPLE VITAMINS W/ MINERALS TAB 1 TABLET: TAB ORAL at 08:08

## 2022-10-17 RX ADMIN — Medication 3.38 G: at 00:19

## 2022-10-17 RX ADMIN — Medication 3.38 G: at 06:11

## 2022-10-17 RX ADMIN — Medication 3.38 G: at 23:42

## 2022-10-17 RX ADMIN — Medication 3.38 G: at 11:31

## 2022-10-17 RX ADMIN — MORPHINE SULFATE 2 MG: 2 INJECTION, SOLUTION INTRAMUSCULAR; INTRAVENOUS at 23:39

## 2022-10-17 RX ADMIN — FOLIC ACID 1 MG: 1 TABLET ORAL at 08:07

## 2022-10-17 RX ADMIN — Medication 3.38 G: at 18:10

## 2022-10-17 RX ADMIN — NICOTINE 21 MG: 21 PATCH, EXTENDED RELEASE TRANSDERMAL at 08:21

## 2022-10-17 RX ADMIN — CHLORDIAZEPOXIDE HYDROCHLORIDE 10 MG: 5 CAPSULE ORAL at 06:11

## 2022-10-17 RX ADMIN — OXYCODONE HYDROCHLORIDE 5 MG: 5 TABLET ORAL at 20:08

## 2022-10-17 RX ADMIN — MORPHINE SULFATE 2 MG: 2 INJECTION, SOLUTION INTRAMUSCULAR; INTRAVENOUS at 16:56

## 2022-10-17 RX ADMIN — HYDROMORPHONE HYDROCHLORIDE 0.5 MG: 1 INJECTION, SOLUTION INTRAMUSCULAR; INTRAVENOUS; SUBCUTANEOUS at 06:11

## 2022-10-17 RX ADMIN — Medication 100 MG: at 08:08

## 2022-10-17 RX ADMIN — CHLORDIAZEPOXIDE HYDROCHLORIDE 10 MG: 5 CAPSULE ORAL at 13:11

## 2022-10-17 RX ADMIN — OXYCODONE HYDROCHLORIDE 5 MG: 5 TABLET ORAL at 10:05

## 2022-10-17 RX ADMIN — CHLORDIAZEPOXIDE HYDROCHLORIDE 10 MG: 5 CAPSULE ORAL at 22:45

## 2022-10-17 RX ADMIN — LIDOCAINE HYDROCHLORIDE 9 ML: 10 INJECTION, SOLUTION EPIDURAL; INFILTRATION; INTRACAUDAL; PERINEURAL at 13:32

## 2022-10-17 RX ADMIN — MORPHINE SULFATE 2 MG: 2 INJECTION, SOLUTION INTRAMUSCULAR; INTRAVENOUS at 08:08

## 2022-10-17 RX ADMIN — OXYCODONE HYDROCHLORIDE 5 MG: 5 TABLET ORAL at 00:19

## 2022-10-17 NOTE — SEDATION DOCUMENTATION
Diagnostic paracentesis with removal of 2500ml clear yellow fluid removed  Specimen sent to lab  Band aid to site  Transferred back to floor via w/c

## 2022-10-17 NOTE — DISCHARGE INSTRUCTIONS
Abdominal Paracentesis     WHAT YOU NEED TO KNOW:   Abdominal paracentesis is a procedure to remove abnormal fluid buildup in your abdomen  Fluid builds up because of liver problems, such as swelling and scarring  Heart failure, kidney disease, a mass, or problems with your pancreas may also cause fluid buildup  DISCHARGE INSTRUCTIONS:     Follow up with your healthcare provider as directed: Write down your questions so you remember to ask them during your visits  Wound care: Remove dressing after 24 hours  Leave glue in place  Return to your normal activities    Contact Interventional Radiology at [phone] Steph PATIENTS: Contact Interventional Radiology at [phone]) Lucia Schulte PATIENTS: Contact Interventional Radiology at [phone]) if:  You have a fever and your wound is red and swollen  You have yellow, green, or bad-smelling discharge coming from your wound  You have pain or swelling in your abdomen  You have an upset stomach or you vomit  You have sudden, sharp pain in your abdomen  You urinate very little or not at all  You feel confused and more tired than usual    Your arm or leg feels warm, tender, and painful  It may look swollen and red  You suddenly feel lightheaded and have trouble breathing

## 2022-10-17 NOTE — ASSESSMENT & PLAN NOTE
Drinks vodka and juice daily  Never attempted to quit due to experiencing withdrawals w/ tremors   Denies seizures/hallucinations  [facility] protocol  Thiamine/folate/MTV  On Librium  At risk for DT

## 2022-10-17 NOTE — PLAN OF CARE
Problem: Potential for Falls  Goal: Patient will remain free of falls  Description: INTERVENTIONS:  - Educate patient/family on patient safety including physical limitations  - Instruct patient to call for assistance with activity   - Consult OT/PT to assist with strengthening/mobility   - Keep Call bell within reach  - Keep bed low and locked with side rails adjusted as appropriate  - Keep care items and personal belongings within reach  - Initiate and maintain comfort rounds  - Make Fall Risk Sign visible to staff  - Offer Toileting every 2 Hours, in advance of need  - Initiate/Maintain alarm  - Obtain necessary fall risk management equipment:   - Apply yellow socks and bracelet for high fall risk patients  - Consider moving patient to room near nurses station  Outcome: Progressing     Problem: PAIN - ADULT  Goal: Verbalizes/displays adequate comfort level or baseline comfort level  Description: Interventions:  - Encourage patient to monitor pain and request assistance  - Assess pain using appropriate pain scale  - Administer analgesics based on type and severity of pain and evaluate response  - Implement non-pharmacological measures as appropriate and evaluate response  - Consider cultural and social influences on pain and pain management  - Notify physician/advanced practitioner if interventions unsuccessful or patient reports new pain  Outcome: Progressing     Problem: INFECTION - ADULT  Goal: Absence or prevention of progression during hospitalization  Description: INTERVENTIONS:  - Assess and monitor for signs and symptoms of infection  - Monitor lab/diagnostic results  - Monitor all insertion sites, i e  indwelling lines, tubes, and drains  - Monitor endotracheal if appropriate and nasal secretions for changes in amount and color  - Sh[place]ola appropriate cooling/warming therapies per order  - Administer medications as ordered  - Instruct and encourage patient and family to use good hand hygiene technique  - Identify and instruct in appropriate isolation precautions for identified infection/condition  Outcome: Progressing  Goal: Absence of fever/infection during neutropenic period  Description: INTERVENTIONS:  - Monitor WBC    Outcome: Progressing     Problem: SAFETY ADULT  Goal: Patient will remain free of falls  Description: INTERVENTIONS:  - Educate patient/family on patient safety including physical limitations  - Instruct patient to call for assistance with activity   - Consult OT/PT to assist with strengthening/mobility   - Keep Call bell within reach  - Keep bed low and locked with side rails adjusted as appropriate  - Keep care items and personal belongings within reach  - Initiate and maintain comfort rounds  - Make Fall Risk Sign visible to staff  - Offer Toileting every 2 Hours, in advance of need  - Initiate/Maintain alarm  - Obtain necessary fall risk management equipment:   - Apply yellow socks and bracelet for high fall risk patients  - Consider moving patient to room near nurses station  Outcome: Progressing  Goal: Maintain or return to baseline ADL function  Description: INTERVENTIONS:  - Educate patient/family on patient safety including physical limitations  - Instruct patient to call for assistance with activity   - Consult OT/PT to assist with strengthening/mobility   - Keep Call bell within reach  - Keep bed low and locked with side rails adjusted as appropriate  - Keep care items and personal belongings within reach  - Initiate and maintain comfort rounds  - Make Fall Risk Sign visible to staff  - Offer Toileting every 2 Hours, in advance of need  - Initiate/Maintain alarm  - Obtain necessary fall risk management equipment:   - Apply yellow socks and bracelet for high fall risk patients  - Consider moving patient to room near nurses station  Outcome: Progressing  Goal: Maintains/Returns to pre admission functional level  Description: INTERVENTIONS:  - Perform BMAT or MOVE assessment daily    - Set and communicate daily mobility goal to care team and patient/family/caregiver  - Collaborate with rehabilitation services on mobility goals if consulted  - Perform Range of Motion 3 times a day  - Reposition patient every 3 hours  - Dangle patient 3 times a day  - Stand patient 3 times a day  - Ambulate patient 3 times a day  - Out of bed to chair 3 times a day   - Out of bed for meals 3 times a day  - Out of bed for toileting  - Record patient progress and toleration of activity level   Outcome: Progressing     Problem: DISCHARGE PLANNING  Goal: Discharge to home or other facility with appropriate resources  Description: INTERVENTIONS:  - Identify barriers to discharge w/patient and caregiver  - Arrange for needed discharge resources and transportation as appropriate  - Identify discharge learning needs (meds, wound care, etc )  - Arrange for interpretive services to assist at discharge as needed  - Refer to Case Management Department for coordinating discharge planning if the patient needs post-hospital services based on physician/advanced practitioner order or complex needs related to functional status, cognitive ability, or social support system  Outcome: Progressing     Problem: Knowledge Deficit  Goal: Patient/family/caregiver demonstrates understanding of disease process, treatment plan, medications, and discharge instructions  Description: Complete learning assessment and assess knowledge base    Interventions:  - Provide teaching at level of understanding  - Provide teaching via preferred learning methods  Outcome: Progressing

## 2022-10-17 NOTE — ASSESSMENT & PLAN NOTE
New onset cirrhosis  Was noted to have abdominal distension, hepatomegaly, with dark stools by PCP on 09/28/2022  · Mild transaminitis with , ALT 72  Alk-phos 400  T bili 1 9, direct bili 1 4    · CT abdomen/pelvis shows new onset cirrhosis with moderate ascites  · Appears mildly encephalopathic as well  · Suspect alcoholic liver disease  · On folate/ thiamine/ MTV supplementation  · GI following  · Concern for possible SBP->Continue Zosyn  · IR consult for paracentesis with fluid analysis

## 2022-10-17 NOTE — PROGRESS NOTES
[place]  Progress Note - Mauro Coronado 1965, 56 y o  male MRN: [id number]  Unit/Bed#: MS [facility] Encounter: [id number]  Primary Care Provider: Erin Nagel DO   Date and time admitted to hospital: 10/14/2022  1:52 PM    * Cirrhosis [facility])  Assessment & Plan  New onset cirrhosis  Was noted to have abdominal distension, hepatomegaly, with dark stools by PCP on 09/28/2022  · Mild transaminitis with , ALT 72  Alk-phos 400  T bili 1 9, direct bili 1 4  · CT abdomen/pelvis shows new onset cirrhosis with moderate ascites  · Appears mildly encephalopathic as well  · Suspect alcoholic liver disease  · On folate/ thiamine/ MTV supplementation  · GI following  · Concern for possible SBP->Continue Zosyn  · IR consult for paracentesis with fluid analysis    Alcohol use  Assessment & Plan  Drinks vodka and juice daily  Never attempted to quit due to experiencing withdrawals w/ tremors  Denies seizures/hallucinations  CIWA protocol  Thiamine/folate/MTV  On Librium  At risk for DT    Ascites  Assessment & Plan  Presented with diffuse abdominal pain and abdominal distension  Likely secondary to alcoholic cirrhosis  Plan for IR paracentesis w/ full labs  Per PCP note, had bloody stools and reported fevers at home   Concern for possible SBP  Trend WBC and fever curve  Continue Zosyn  GI consult appreciated  bld cx NGTD  F/u with GI outpt and will need EGD outpt    Encephalopathy  Assessment & Plan  Presented with metabolic encephalopathy in ED POA  Suspect hepatic encephalopathy vs SBP  Ammonia level is 38  Received a dose of lactulose and ammonia levels are normal this morning  Continue thiamine and folate  Trend WBC and fever curve    SIRS (systemic inflammatory response syndrome) (HCC)  Assessment & Plan  Meets SIRS criteria w tachycardia (HR 120s) and leukocytosis (WBC 33)  Reports fevers/chills at home, however afebrile while inpatient  No definitive source of infection, however concern for possible SBP  IR consult for paracentesis  Continue on empiric Zosyn  Follow-up culture results    COPD (chronic obstructive pulmonary disease) ([facility] [address] )  Assessment & Plan  No shortness of breath/wheezing  Not acute exacerbation  Albuterol inhaler PRN    Nicotine dependence  Assessment & Plan  Smokes approximately a pack a day  Nicotine patch      VTE Pharmacologic Prophylaxis:   Moderate Risk (Score 3-4) - Pharmacological DVT Prophylaxis Contraindicated  Sequential Compression Devices Ordered  Patient Centered Rounds: I performed bedside rounds with nursing staff today  Discussions with Specialists or Other Care Team Provider: GI, CM, PT, OT    Education and Discussions with Family / Patient: Updated  (father) via phone  Time Spent for Care: 30 minutes  More than 50% of total time spent on counseling and coordination of care as described above  Current Length of Stay: 3 day(s)  Current Patient Status: Inpatient   Certification Statement: The patient will continue to require additional inpatient hospital stay due to cirrhosis  Discharge Plan: Anticipate discharge in >72 hrs to discharge location to be determined pending rehab evaluations  Code Status: No Order    Subjective: Johnny was seen and examined at bedside  No acute events overnight  Discussed plan of care  All questions and concerns were answered and addressed  Objective:     Vitals:   Temp (24hrs), Av 3 °F (36 8 °C), Min:97 9 °F (36 6 °C), Max:98 7 °F (37 1 °C)    Temp:  [97 9 °F (36 6 °C)-98 7 °F (37 1 °C)] 98 °F (36 7 °C)  HR:  [104-117] 104  Resp:  [18-19] 18  BP: (124-147)/(82-91) 135/91  SpO2:  [99 %] 99 %  Body mass index is 23 04 kg/m²  Input and Output Summary (last 24 hours):      Intake/Output Summary (Last 24 hours) at 10/17/2022 0951  Last data filed at 10/16/2022 2000  Gross per 24 hour   Intake 720 ml   Output --   Net 720 ml       Physical Exam:   Physical Exam  Vitals and nursing note reviewed  Constitutional:       Appearance: Normal appearance  He is normal weight  HENT:      Head: Normocephalic and atraumatic  Cardiovascular:      Rate and Rhythm: Normal rate and regular rhythm  Pulses: Normal pulses  Heart sounds: Normal heart sounds  Pulmonary:      Effort: Pulmonary effort is normal       Breath sounds: Normal breath sounds  Abdominal:      General: There is distension  Tenderness: There is no abdominal tenderness  Musculoskeletal:      Right lower leg: No edema  Left lower leg: No edema  Skin:     General: Skin is warm  Neurological:      General: No focal deficit present  Mental Status: He is alert and oriented to person, place, and time  Additional Data:     Labs:  Results from last 7 days   Lab Units 10/17/22  0632 10/16/22  0552 10/15/22  0434 10/14/22  1350   WBC Thousand/uL 30 58* 24 84*   < > 32 35*   HEMOGLOBIN g/dL 11 8* 11 1*   < > 12 4   HEMATOCRIT % 34 1* 32 6*   < > 34 0*   PLATELETS Thousands/uL 370 338   < > 405*   BANDS PCT %  --   --   --  4   NEUTROS PCT %  --  86*  --   --    LYMPHS PCT %  --  5*  --   --    LYMPHO PCT %  --   --   --  4*   MONOS PCT %  --  5  --   --    MONO PCT %  --   --   --  2*   EOS PCT %  --  2  --  0    < > = values in this interval not displayed       Results from last 7 days   Lab Units 10/17/22  0632   SODIUM mmol/L 134*   POTASSIUM mmol/L 4 0   CHLORIDE mmol/L 99   CO2 mmol/L 28   BUN mg/dL 6   CREATININE mg/dL 0 61   ANION GAP mmol/L 7   CALCIUM mg/dL 8 3   ALBUMIN g/dL 2 0*   TOTAL BILIRUBIN mg/dL 2 40*   ALK PHOS U/L 317*   ALT U/L 56   AST U/L 96*   GLUCOSE RANDOM mg/dL 114     Results from last 7 days   Lab Units 10/15/22  0434   INR  1 14             Results from last 7 days   Lab Units 10/16/22  0552 10/14/22  1534   LACTIC ACID mmol/L  --  1 9   PROCALCITONIN ng/ml 0 64*  --        Lines/Drains:  Invasive Devices  Report    Peripheral Intravenous Line  Duration           Peripheral IV 10/16/22 Left;Proximal;Ventral (anterior) Forearm <1 day                      Imaging: No pertinent imaging reviewed  Recent Cultures (last 7 days):   Results from last 7 days   Lab Units 10/14/22  1534   BLOOD CULTURE  No Growth at 48 hrs  No Growth at 48 hrs  Last 24 Hours Medication List:   Current Facility-Administered Medications   Medication Dose Route Frequency Provider Last Rate   • albuterol  2 puff Inhalation Q4H PRN Oneta Givens, MARINO     • chlordiazePOXIDE  10 mg Oral Q8H Lui Blanco MD     • folic acid  1 mg Oral Daily Oneta Givens, MARINO     • HYDROmorphone  0 5 mg Intravenous Q4H PRN Alphonsus ChoMARINO     • LORazepam  2 mg Intravenous Once Olubustasha O Angeline, DO     • morphine injection  2 mg Intravenous Q8H Parker Schlatter, MD     • multivitamin-minerals  1 tablet Oral Daily Oneta Givens, MARINO     • nicotine  21 mg Transdermal Daily Marcianoonsus MARINO Tiwari     • ondansetron  4 mg Intravenous Q4H PRN Oneta Givens, MARINO     • oxyCODONE  5 mg Oral Q6H PRN Parker Schlatter, MD     • piperacillin-tazobactam  3 375 g Intravenous Q6H Oneta Givens, MARINO     • thiamine  100 mg Oral Daily Oneta GivensMARINO          Today, Patient Was Seen By: Anne-Marie Sampson    **Please Note: This note may have been constructed using a voice recognition system  **

## 2022-10-17 NOTE — ASSESSMENT & PLAN NOTE
Presented with diffuse abdominal pain and abdominal distension  Likely secondary to alcoholic cirrhosis  Plan for IR paracentesis w/ full labs  Per PCP note, had bloody stools and reported fevers at home   Concern for possible SBP  Trend WBC and fever curve  Continue Zosyn  GI consult appreciated  bld cx NGTD  F/u with GI outpt and will need EGD outpt

## 2022-10-18 LAB
ALBUMIN FLD-MCNC: 0.6 G/DL
ALBUMIN SERPL BCP-MCNC: 1.8 G/DL (ref 3.5–5)
ALP SERPL-CCNC: 287 U/L (ref 46–116)
ALT SERPL W P-5'-P-CCNC: 46 U/L (ref 12–78)
ANION GAP SERPL CALCULATED.3IONS-SCNC: 7 MMOL/L (ref 4–13)
ANISOCYTOSIS BLD QL SMEAR: PRESENT
AST SERPL W P-5'-P-CCNC: 80 U/L (ref 5–45)
BASOPHILS # BLD MANUAL: 0 THOUSAND/UL (ref 0–0.1)
BASOPHILS NFR MAR MANUAL: 0 % (ref 0–1)
BILIRUB SERPL-MCNC: 1.7 MG/DL (ref 0.2–1)
BUN SERPL-MCNC: 7 MG/DL (ref 5–25)
CALCIUM ALBUM COR SERPL-MCNC: 9.9 MG/DL (ref 8.3–10.1)
CALCIUM SERPL-MCNC: 8.1 MG/DL (ref 8.3–10.1)
CHLORIDE SERPL-SCNC: 98 MMOL/L (ref 96–108)
CO2 SERPL-SCNC: 26 MMOL/L (ref 21–32)
CREAT SERPL-MCNC: 0.56 MG/DL (ref 0.6–1.3)
EOSINOPHIL # BLD MANUAL: 0.77 THOUSAND/UL (ref 0–0.4)
EOSINOPHIL NFR BLD MANUAL: 3 % (ref 0–6)
ERYTHROCYTE [DISTWIDTH] IN BLOOD BY AUTOMATED COUNT: 17.1 % (ref 11.6–15.1)
GFR SERPL CREATININE-BSD FRML MDRD: 116 ML/MIN/1.73SQ M
GLUCOSE SERPL-MCNC: 112 MG/DL (ref 65–140)
HCT VFR BLD AUTO: 31.1 % (ref 36.5–49.3)
HGB BLD-MCNC: 10.7 G/DL (ref 12–17)
LDH FLD L TO P-CCNC: 72 U/L
LYMPHOCYTES # BLD AUTO: 1.54 THOUSAND/UL (ref 0.6–4.47)
LYMPHOCYTES # BLD AUTO: 6 % (ref 14–44)
MAGNESIUM SERPL-MCNC: 1.8 MG/DL (ref 1.6–2.6)
MCH RBC QN AUTO: 36.1 PG (ref 26.8–34.3)
MCHC RBC AUTO-ENTMCNC: 34.4 G/DL (ref 31.4–37.4)
MCV RBC AUTO: 105 FL (ref 82–98)
MONOCYTES # BLD AUTO: 1.54 THOUSAND/UL (ref 0–1.22)
MONOCYTES NFR BLD: 6 % (ref 4–12)
NEUTROPHILS # BLD MANUAL: 21.83 THOUSAND/UL (ref 1.85–7.62)
NEUTS BAND NFR BLD MANUAL: 16 % (ref 0–8)
NEUTS SEG NFR BLD AUTO: 69 % (ref 43–75)
PLATELET # BLD AUTO: 313 THOUSANDS/UL (ref 149–390)
PLATELET BLD QL SMEAR: ADEQUATE
PMV BLD AUTO: 9.4 FL (ref 8.9–12.7)
POLYCHROMASIA BLD QL SMEAR: PRESENT
POTASSIUM SERPL-SCNC: 3.9 MMOL/L (ref 3.5–5.3)
PROT FLD-MCNC: <2 G/DL
PROT SERPL-MCNC: 5.7 G/DL (ref 6.4–8.4)
RBC # BLD AUTO: 2.96 MILLION/UL (ref 3.88–5.62)
RBC MORPH BLD: PRESENT
SODIUM SERPL-SCNC: 131 MMOL/L (ref 135–147)
WBC # BLD AUTO: 25.68 THOUSAND/UL (ref 4.31–10.16)

## 2022-10-18 PROCEDURE — 83735 ASSAY OF MAGNESIUM: CPT | Performed by: STUDENT IN AN ORGANIZED HEALTH CARE EDUCATION/TRAINING PROGRAM

## 2022-10-18 PROCEDURE — 85007 BL SMEAR W/DIFF WBC COUNT: CPT | Performed by: STUDENT IN AN ORGANIZED HEALTH CARE EDUCATION/TRAINING PROGRAM

## 2022-10-18 PROCEDURE — 99233 SBSQ HOSP IP/OBS HIGH 50: CPT | Performed by: STUDENT IN AN ORGANIZED HEALTH CARE EDUCATION/TRAINING PROGRAM

## 2022-10-18 PROCEDURE — 85027 COMPLETE CBC AUTOMATED: CPT | Performed by: STUDENT IN AN ORGANIZED HEALTH CARE EDUCATION/TRAINING PROGRAM

## 2022-10-18 PROCEDURE — 80053 COMPREHEN METABOLIC PANEL: CPT | Performed by: STUDENT IN AN ORGANIZED HEALTH CARE EDUCATION/TRAINING PROGRAM

## 2022-10-18 PROCEDURE — 99232 SBSQ HOSP IP/OBS MODERATE 35: CPT | Performed by: INTERNAL MEDICINE

## 2022-10-18 RX ORDER — SPIRONOLACTONE 25 MG/1
25 TABLET ORAL DAILY
Status: DISCONTINUED | OUTPATIENT
Start: 2022-10-18 | End: 2022-10-20

## 2022-10-18 RX ORDER — LACTULOSE 20 G/30ML
20 SOLUTION ORAL DAILY
Status: DISCONTINUED | OUTPATIENT
Start: 2022-10-18 | End: 2022-10-28 | Stop reason: HOSPADM

## 2022-10-18 RX ADMIN — MORPHINE SULFATE 2 MG: 2 INJECTION, SOLUTION INTRAMUSCULAR; INTRAVENOUS at 08:09

## 2022-10-18 RX ADMIN — Medication 100 MG: at 08:08

## 2022-10-18 RX ADMIN — FOLIC ACID 1 MG: 1 TABLET ORAL at 08:08

## 2022-10-18 RX ADMIN — CHLORDIAZEPOXIDE HYDROCHLORIDE 10 MG: 5 CAPSULE ORAL at 22:54

## 2022-10-18 RX ADMIN — Medication 3.38 G: at 23:42

## 2022-10-18 RX ADMIN — CHLORDIAZEPOXIDE HYDROCHLORIDE 10 MG: 5 CAPSULE ORAL at 06:06

## 2022-10-18 RX ADMIN — MORPHINE SULFATE 2 MG: 2 INJECTION, SOLUTION INTRAMUSCULAR; INTRAVENOUS at 23:01

## 2022-10-18 RX ADMIN — OXYCODONE HYDROCHLORIDE 5 MG: 5 TABLET ORAL at 09:58

## 2022-10-18 RX ADMIN — Medication 3.38 G: at 11:58

## 2022-10-18 RX ADMIN — LACTULOSE 20 G: 20 SOLUTION ORAL at 11:58

## 2022-10-18 RX ADMIN — CHLORDIAZEPOXIDE HYDROCHLORIDE 10 MG: 5 CAPSULE ORAL at 13:26

## 2022-10-18 RX ADMIN — MULTIPLE VITAMINS W/ MINERALS TAB 1 TABLET: TAB ORAL at 08:08

## 2022-10-18 RX ADMIN — Medication 3.38 G: at 06:07

## 2022-10-18 RX ADMIN — MORPHINE SULFATE 2 MG: 2 INJECTION, SOLUTION INTRAMUSCULAR; INTRAVENOUS at 16:17

## 2022-10-18 RX ADMIN — HYDROMORPHONE HYDROCHLORIDE 0.5 MG: 1 INJECTION, SOLUTION INTRAMUSCULAR; INTRAVENOUS; SUBCUTANEOUS at 06:06

## 2022-10-18 RX ADMIN — NICOTINE 21 MG: 21 PATCH, EXTENDED RELEASE TRANSDERMAL at 08:09

## 2022-10-18 RX ADMIN — Medication 3.38 G: at 18:33

## 2022-10-18 RX ADMIN — OXYCODONE HYDROCHLORIDE 5 MG: 5 TABLET ORAL at 18:33

## 2022-10-18 NOTE — ASSESSMENT & PLAN NOTE
Drinks vodka and juice daily  Never attempted to quit due to experiencing withdrawals w/ tremors   Denies seizures/hallucinations  [facility] protocol  Thiamine/folate/MTV  On Librium  At risk for DT

## 2022-10-18 NOTE — PROGRESS NOTES
[place]  Progress Note - Diana Hu 1965, 56 y o  male MRN: [id number]  Unit/Bed#: -Annabel Encounter: [id number]  Primary Care Provider: Mishel Maurer DO   Date and time admitted to hospital: 10/14/2022  1:52 PM    * Cirrhosis [facility])  Assessment & Plan  New onset cirrhosis  Was noted to have abdominal distension, hepatomegaly, with dark stools by PCP on 09/28/2022  · Mild transaminitis with , ALT 72  Alk-phos 400  T bili 1 9, direct bili 1 4  · CT abdomen/pelvis shows new onset cirrhosis with moderate ascites  · Appears mildly encephalopathic as well  · Suspect alcoholic liver disease  · On folate/ thiamine/ MTV supplementation  · GI following  · Concern for possible SBP->Continue Zosyn    Alcohol use  Assessment & Plan  Drinks vodka and juice daily  Never attempted to quit due to experiencing withdrawals w/ tremors  Denies seizures/hallucinations  CIWA protocol  Thiamine/folate/MTV  On Librium  At risk for DT    Ascites  Assessment & Plan  Presented with diffuse abdominal pain and abdominal distension  Likely secondary to alcoholic cirrhosis  s/p IR paracentesis on 10/17 labs pending  Per PCP note, had bloody stools and reported fevers at home   Concern for possible SBP  Trend WBC and fever curve  Continue Zosyn day 4  GI consult appreciated  bld cx NGTD  F/u with GI outpt and will need EGD outpt    Encephalopathy  Assessment & Plan  Presented with metabolic encephalopathy in ED POA  Suspect hepatic encephalopathy vs SBP  Ammonia level is 38  Received a dose of lactulose and ammonia levels are normal this morning  Continue thiamine and folate  Trend WBC and fever curve    SIRS (systemic inflammatory response syndrome) (HCC)  Assessment & Plan  Meets SIRS criteria w tachycardia (HR 120s) and leukocytosis (WBC 33)  Reports fevers/chills at home, however afebrile while inpatient  No definitive source of infection, however concern for possible SBP  bld cx NGTD    COPD (chronic obstructive pulmonary disease) ([facility])  Assessment & Plan  No shortness of breath/wheezing  Not acute exacerbation  Albuterol inhaler PRN    Nicotine dependence  Assessment & Plan  Smokes approximately a pack a day  Nicotine patch      VTE Pharmacologic Prophylaxis:   Moderate Risk (Score 3-4) - Pharmacological DVT Prophylaxis Contraindicated  Sequential Compression Devices Ordered  Patient Centered Rounds: I performed bedside rounds with nursing staff today  Discussions with Specialists or Other Care Team Provider: GI, CM    Education and Discussions with Family / Patient: Updated  (father) via phone  Time Spent for Care: 30 minutes  More than 50% of total time spent on counseling and coordination of care as described above  Current Length of Stay: 4 day(s)  Current Patient Status: Inpatient   Certification Statement: The patient will continue to require additional inpatient hospital stay due to cirrhosis  Discharge Plan: Anticipate discharge in 24-48 hrs to home  Code Status: Level 1 - Full Code    Subjective: Johnny was seen and examined at bedside  No acute events overnight  Discussed plan of care  All questions and concerns were answered and addressed  Objective:     Vitals:   Temp (24hrs), Av 7 °F (37 1 °C), Min:97 6 °F (36 4 °C), Max:100 3 °F (37 9 °C)    Temp:  [97 6 °F (36 4 °C)-100 3 °F (37 9 °C)] 97 6 °F (36 4 °C)  HR:  [] 104  Resp:  [13-21] 16  BP: ()/(59-84) 104/65  SpO2:  [96 %-97 %] 96 %  Body mass index is 23 04 kg/m²  Input and Output Summary (last 24 hours): Intake/Output Summary (Last 24 hours) at 10/18/2022 0914  Last data filed at 10/18/2022 0101  Gross per 24 hour   Intake 480 ml   Output 2500 ml   Net -2020 ml       Physical Exam:   Physical Exam  Vitals and nursing note reviewed  Constitutional:       Appearance: He is normal weight  He is ill-appearing  HENT:      Head: Normocephalic and atraumatic     Eyes: General: Scleral icterus present  Cardiovascular:      Rate and Rhythm: Normal rate and regular rhythm  Pulses: Normal pulses  Heart sounds: Normal heart sounds  Pulmonary:      Effort: Pulmonary effort is normal       Breath sounds: Normal breath sounds  Abdominal:      General: Bowel sounds are normal  There is distension  Palpations: Abdomen is soft  Tenderness: There is no abdominal tenderness  Musculoskeletal:      Right lower leg: No edema  Left lower leg: No edema  Skin:     Coloration: Skin is jaundiced  Neurological:      General: No focal deficit present  Mental Status: He is alert and oriented to person, place, and time  Additional Data:     Labs:  Results from last 7 days   Lab Units 10/18/22  0620 10/17/22  0632 10/16/22  0552   WBC Thousand/uL 25 68*   < > 24 84*   HEMOGLOBIN g/dL 10 7*   < > 11 1*   HEMATOCRIT % 31 1*   < > 32 6*   PLATELETS Thousands/uL 313   < > 338   BANDS PCT % 16*  --   --    NEUTROS PCT %  --   --  86*   LYMPHS PCT %  --   --  5*   LYMPHO PCT % 6*  --   --    MONOS PCT %  --   --  5   MONO PCT % 6  --   --    EOS PCT % 3  --  2    < > = values in this interval not displayed       Results from last 7 days   Lab Units 10/18/22  0620   SODIUM mmol/L 131*   POTASSIUM mmol/L 3 9   CHLORIDE mmol/L 98   CO2 mmol/L 26   BUN mg/dL 7   CREATININE mg/dL 0 56*   ANION GAP mmol/L 7   CALCIUM mg/dL 8 1*   ALBUMIN g/dL 1 8*   TOTAL BILIRUBIN mg/dL 1 70*   ALK PHOS U/L 287*   ALT U/L 46   AST U/L 80*   GLUCOSE RANDOM mg/dL 112     Results from last 7 days   Lab Units 10/15/22  0434   INR  1 14             Results from last 7 days   Lab Units 10/16/22  0552 10/14/22  1534   LACTIC ACID mmol/L  --  1 9   PROCALCITONIN ng/ml 0 64*  --        Lines/Drains:  Invasive Devices  Report    Peripheral Intravenous Line  Duration           Peripheral IV 10/16/22 Left;Proximal;Ventral (anterior) Forearm 1 day                      Imaging: No pertinent imaging reviewed  Recent Cultures (last 7 days):   Results from last 7 days   Lab Units 10/14/22  1534   BLOOD CULTURE  No Growth at 72 hrs  No Growth at 72 hrs  Last 24 Hours Medication List:   Current Facility-Administered Medications   Medication Dose Route Frequency Provider Last Rate   • albuterol  2 puff Inhalation Q4H PRN Mary Cole PA-C     • chlordiazePOXIDE  10 mg Oral Q8H Chapincito Goddard MD     • folic acid  1 mg Oral Daily Mary Cole PA-C     • HYDROmorphone  0 5 mg Intravenous Q4H PRN Enzo Hester PA-C     • LORazepam  2 mg Intravenous Once Olubusola O Angeline, DO     • morphine injection  2 mg Intravenous Q8H Vanessa Martin MD     • multivitamin-minerals  1 tablet Oral Daily Mary Cole PA-C     • nicotine  21 mg Transdermal Daily Enzo Hester PA-C     • ondansetron  4 mg Intravenous Q4H PRN Mary Cole PA-C     • oxyCODONE  5 mg Oral Q6H PRN Vanessa Martin MD     • piperacillin-tazobactam  3 375 g Intravenous Q6H Mary Cole PA-C 3 375 g (10/17/22 1810)   • thiamine  100 mg Oral Daily Mary Cole PA-C          Today, Patient Was Seen By: Rosy Nava    **Please Note: This note may have been constructed using a voice recognition system  **

## 2022-10-18 NOTE — ASSESSMENT & PLAN NOTE
Presented with diffuse abdominal pain and abdominal distension  Likely secondary to alcoholic cirrhosis  s/p IR paracentesis on 10/17 labs pending  Per PCP note, had bloody stools and reported fevers at home   Concern for possible SBP  Trend WBC and fever curve  Continue Zosyn day 4  GI consult appreciated  bld cx NGTD  F/u with GI outpt and will need EGD outpt

## 2022-10-18 NOTE — PLAN OF CARE
Problem: Potential for Falls  Goal: Patient will remain free of falls  Description: INTERVENTIONS:  - Educate patient/family on patient safety including physical limitations  - Instruct patient to call for assistance with activity   - Consult OT/PT to assist with strengthening/mobility   - Keep Call bell within reach  - Keep bed low and locked with side rails adjusted as appropriate  - Keep care items and personal belongings within reach  - Initiate and maintain comfort rounds  - Make Fall Risk Sign visible to staff  - Offer Toileting every 2 Hours, in advance of need  - Initiate/Maintain alarm  - Obtain necessary fall risk management equipment:   - Apply yellow socks and bracelet for high fall risk patients  - Consider moving patient to room near nurses station  Outcome: Progressing     Problem: PAIN - ADULT  Goal: Verbalizes/displays adequate comfort level or baseline comfort level  Description: Interventions:  - Encourage patient to monitor pain and request assistance  - Assess pain using appropriate pain scale  - Administer analgesics based on type and severity of pain and evaluate response  - Implement non-pharmacological measures as appropriate and evaluate response  - Consider cultural and social influences on pain and pain management  - Notify physician/advanced practitioner if interventions unsuccessful or patient reports new pain  Outcome: Progressing     Problem: INFECTION - ADULT  Goal: Absence or prevention of progression during hospitalization  Description: INTERVENTIONS:  - Assess and monitor for signs and symptoms of infection  - Monitor lab/diagnostic results  - Monitor all insertion sites, i e  indwelling lines, tubes, and drains  - Monitor endotracheal if appropriate and nasal secretions for changes in amount and color  - Reserve appropriate cooling/warming therapies per order  - Administer medications as ordered  - Instruct and encourage patient and family to use good hand hygiene technique  - Identify and instruct in appropriate isolation precautions for identified infection/condition  Outcome: Progressing  Goal: Absence of fever/infection during neutropenic period  Description: INTERVENTIONS:  - Monitor WBC    Outcome: Progressing     Problem: SAFETY ADULT  Goal: Patient will remain free of falls  Description: INTERVENTIONS:  - Educate patient/family on patient safety including physical limitations  - Instruct patient to call for assistance with activity   - Consult OT/PT to assist with strengthening/mobility   - Keep Call bell within reach  - Keep bed low and locked with side rails adjusted as appropriate  - Keep care items and personal belongings within reach  - Initiate and maintain comfort rounds  - Make Fall Risk Sign visible to staff  - Offer Toileting every 2 Hours, in advance of need  - Initiate/Maintain alarm  - Obtain necessary fall risk management equipment:   - Apply yellow socks and bracelet for high fall risk patients  - Consider moving patient to room near nurses station  Outcome: Progressing  Goal: Maintain or return to baseline ADL function  Description: INTERVENTIONS:  - Educate patient/family on patient safety including physical limitations  - Instruct patient to call for assistance with activity   - Consult OT/PT to assist with strengthening/mobility   - Keep Call bell within reach  - Keep bed low and locked with side rails adjusted as appropriate  - Keep care items and personal belongings within reach  - Initiate and maintain comfort rounds  - Make Fall Risk Sign visible to staff  - Offer Toileting every 2 Hours, in advance of need  - Initiate/Maintain alarm  - Obtain necessary fall risk management equipment:   - Apply yellow socks and bracelet for high fall risk patients  - Consider moving patient to room near nurses station  Outcome: Progressing  Goal: Maintains/Returns to pre admission functional level  Description: INTERVENTIONS:  - Perform BMAT or MOVE assessment daily    - Set and communicate daily mobility goal to care team and patient/family/caregiver  - Collaborate with rehabilitation services on mobility goals if consulted  - Perform Range of Motion 3 times a day  - Reposition patient every 2 hours  - Dangle patient 3 times a day  - Stand patient 3 times a day  - Ambulate patient 3 times a day  - Out of bed to chair 3 times a day   - Out of bed for meals 3 times a day  - Out of bed for toileting  - Record patient progress and toleration of activity level   Outcome: Progressing     Problem: DISCHARGE PLANNING  Goal: Discharge to home or other facility with appropriate resources  Description: INTERVENTIONS:  - Identify barriers to discharge w/patient and caregiver  - Arrange for needed discharge resources and transportation as appropriate  - Identify discharge learning needs (meds, wound care, etc )  - Arrange for interpretive services to assist at discharge as needed  - Refer to Case Management Department for coordinating discharge planning if the patient needs post-hospital services based on physician/advanced practitioner order or complex needs related to functional status, cognitive ability, or social support system  Outcome: Progressing     Problem: Knowledge Deficit  Goal: Patient/family/caregiver demonstrates understanding of disease process, treatment plan, medications, and discharge instructions  Description: Complete learning assessment and assess knowledge base    Interventions:  - Provide teaching at level of understanding  - Provide teaching via preferred learning methods  Outcome: Progressing

## 2022-10-18 NOTE — PLAN OF CARE
Problem: Potential for Falls  Goal: Patient will remain free of falls  Description: INTERVENTIONS:  - Educate patient/family on patient safety including physical limitations  - Instruct patient to call for assistance with activity   - Consult OT/PT to assist with strengthening/mobility   - Keep Call bell within reach  - Keep bed low and locked with side rails adjusted as appropriate  - Keep care items and personal belongings within reach  - Initiate and maintain comfort rounds  - Make Fall Risk Sign visible to staff  - Offer Toileting every 2 Hours, in advance of need  - Initiate/Maintain alarm  - Obtain necessary fall risk management equipment:   - Apply yellow socks and bracelet for high fall risk patients  - Consider moving patient to room near nurses station  Outcome: Progressing     Problem: PAIN - ADULT  Goal: Verbalizes/displays adequate comfort level or baseline comfort level  Description: Interventions:  - Encourage patient to monitor pain and request assistance  - Assess pain using appropriate pain scale  - Administer analgesics based on type and severity of pain and evaluate response  - Implement non-pharmacological measures as appropriate and evaluate response  - Consider cultural and social influences on pain and pain management  - Notify physician/advanced practitioner if interventions unsuccessful or patient reports new pain  Outcome: Progressing     Problem: SAFETY ADULT  Goal: Patient will remain free of falls  Description: INTERVENTIONS:  - Educate patient/family on patient safety including physical limitations  - Instruct patient to call for assistance with activity   - Consult OT/PT to assist with strengthening/mobility   - Keep Call bell within reach  - Keep bed low and locked with side rails adjusted as appropriate  - Keep care items and personal belongings within reach  - Initiate and maintain comfort rounds  - Make Fall Risk Sign visible to staff  - Offer Toileting every 2 Hours, in advance of need  - Initiate/Maintain alarm  - Obtain necessary fall risk management equipment:   - Apply yellow socks and bracelet for high fall risk patients  - Consider moving patient to room near nurses station  Outcome: Progressing

## 2022-10-18 NOTE — ASSESSMENT & PLAN NOTE
New onset cirrhosis  Was noted to have abdominal distension, hepatomegaly, with dark stools by PCP on 09/28/2022  · Mild transaminitis with , ALT 72  Alk-phos 400  T bili 1 9, direct bili 1 4    · CT abdomen/pelvis shows new onset cirrhosis with moderate ascites  · Appears mildly encephalopathic as well  · Suspect alcoholic liver disease  · On folate/ thiamine/ MTV supplementation  · GI following  · Concern for possible SBP->Continue Zosyn

## 2022-10-18 NOTE — PROGRESS NOTES
Progress note - Gastroenterology   Jelena Simpson 56 y o  male MRN: [id number]  Unit/Bed#: -01 Encounter: [id number]    ASSESSMENT and PLAN    65-year-old male with tobacco and alcohol abuse and COPD admitted with SIRS, possible cirrhosis, alcoholic hepatitis, new ascites with concern for SBP     1) New onset ascites and generalized abdominal pain in setting of SIRS: Patient met SIRS criteria on admission, concern for SBP given new ascites  No other definitive source of infection  UA, blood cultures, chest x-ray negative      On exam, patient states he feels like he needs to be tapped again today  Abdomen firm, large, increased discomfort with palpation  Discussed with him we they removed 2500 mL of fluid yesterday  We will be monitoring as needed  - SBP negative from fluids withdrawn with paracentesis 10/17, however, patient's SAAG > 1 1 (1 4) for detecting portal hypertension   -continue empiric treatment with Zosyn  -recommend 2 g sodium diet   - Aldactone 25 mg po qd    Discussed patient with Dr Iva Celis on rounds  2) Possible cirrhosis with alcoholic hepatitis: MELD 13 and DF 13 (10/17)  Previous lab work-up negative for viral, autoimmune, metabolic etiologies  CT from 10/14 shows slightly lobulated contour of the liver suggestive of cirrhosis, hepatomegaly, new ascites  Liver enzymes trending down since admission  T bili stable at 1 70, AST 80, ALT 46, alkaline phosphatase 287, Creatinine 0 56, hemoglobin 10 7, platelets 267       - monitor CBC, CMP, INR (MELD score)  -in discussion, reinforced need for complete alcohol cessation  -no indication for steroids based on low DF and concern for infection  -recommend EGD as outpatient for varices screening  -patient should follow-up in outpatient GI office for continued monitoring     3) Alcohol abuse:     -patient understands he must stop drinking  -continue folic acid and thiamine  -monitor for withdrawal symptoms    4   Constipation   In patient's current setting and new diagnosis of ascites and rule out cirrhosis, will initiate lactulose 20 mg daily  Chief Complaint   Patient presents with   • Abdominal Pain     Pt c/o of rioght sided abdomianla pain x 1 days  SUBJECTIVE/HPI   Patient initially in the shower  Upon return, patient is fully dressed in street clothes  Patient denies nausea or vomiting however continues to have distended, firm abdomen  States he is eating but not a very big appetite  Patient states he feels like he needs to be tapped again today  Discussed he was tapped for 2500 mL yesterday  Will discuss results of fluid testing with Dr Luis Bone       /65 (BP Location: Right arm)   Pulse 104   Temp 97 6 °F (36 4 °C) (Oral)   Resp 16   Ht 5' 7" (1 702 m)   Wt 66 7 kg (147 lb 1 6 oz)   SpO2 96%   BMI 23 04 kg/m²     PHYSICALEXAM  General appearance: alert, appears stated age and cooperative  Eyes: PERLLA, EOMI, no icterus   Head: Normocephalic, without obvious abnormality, atraumatic  Lungs: clear to auscultation bilaterally  Heart: regular rate and rhythm, S1, S2 normal, no murmur, click, rub or gallop  Abdomen:  Firm, large, mildly generalized-tenderness; bowel sounds normal; no masses,  no organomegaly  Extremities: extremities normal, atraumatic, no cyanosis or edema  Neurologic:  No asterixis    Lab Results   Component Value Date    CALCIUM 8 1 (L) 10/18/2022    K 3 9 10/18/2022    CO2 26 10/18/2022    CL 98 10/18/2022    BUN 7 10/18/2022    CREATININE 0 56 (L) 10/18/2022     Lab Results   Component Value Date    WBC 25 68 (H) 10/18/2022    HGB 10 7 (L) 10/18/2022    HCT 31 1 (L) 10/18/2022     (H) 10/18/2022     10/18/2022     Lab Results   Component Value Date    ALT 46 10/18/2022    AST 80 (H) 10/18/2022    GGT 1,144 (H) 09/28/2022    ALKPHOS 287 (H) 10/18/2022     Lab Results   Component Value Date    AMYLASE 21 (L) 09/28/2022     Lab Results   Component Value Date    LIPASE 308 10/14/2022     Lab Results   Component Value Date    IRON 96 10/15/2022    TIBC 158 (L) 10/15/2022    FERRITIN 423 (H) 10/15/2022     Lab Results   Component Value Date    INR 1 14 10/15/2022

## 2022-10-18 NOTE — ASSESSMENT & PLAN NOTE
Meets SIRS criteria w tachycardia (HR 120s) and leukocytosis (WBC 33)  Reports fevers/chills at home, however afebrile while inpatient  No definitive source of infection, however concern for possible SBP  bld cx NGTD

## 2022-10-19 ENCOUNTER — APPOINTMENT (INPATIENT)
Dept: ULTRASOUND IMAGING | Facility: HOSPITAL | Age: 57
DRG: 871 | End: 2022-10-19
Payer: MEDICARE

## 2022-10-19 ENCOUNTER — APPOINTMENT (INPATIENT)
Dept: INTERVENTIONAL RADIOLOGY/VASCULAR | Facility: HOSPITAL | Age: 57
DRG: 871 | End: 2022-10-19
Payer: MEDICARE

## 2022-10-19 PROBLEM — K70.31 ALCOHOLIC CIRRHOSIS OF LIVER WITH ASCITES (HCC): Status: ACTIVE | Noted: 2022-10-14

## 2022-10-19 PROBLEM — G93.40 ENCEPHALOPATHY: Status: RESOLVED | Noted: 2022-10-14 | Resolved: 2022-10-19

## 2022-10-19 LAB
ALBUMIN SERPL BCP-MCNC: 1.9 G/DL (ref 3.5–5)
ALP SERPL-CCNC: 303 U/L (ref 46–116)
ALT SERPL W P-5'-P-CCNC: 47 U/L (ref 12–78)
ANION GAP SERPL CALCULATED.3IONS-SCNC: 6 MMOL/L (ref 4–13)
APPEARANCE FLD: CLEAR
AST SERPL W P-5'-P-CCNC: 78 U/L (ref 5–45)
BACTERIA BLD CULT: NORMAL
BACTERIA BLD CULT: NORMAL
BASOPHILS # BLD AUTO: 0.12 THOUSANDS/ÂΜL (ref 0–0.1)
BASOPHILS NFR BLD AUTO: 1 % (ref 0–1)
BILIRUB SERPL-MCNC: 2.2 MG/DL (ref 0.2–1)
BUN SERPL-MCNC: 6 MG/DL (ref 5–25)
CALCIUM ALBUM COR SERPL-MCNC: 10 MG/DL (ref 8.3–10.1)
CALCIUM SERPL-MCNC: 8.3 MG/DL (ref 8.3–10.1)
CHLORIDE SERPL-SCNC: 97 MMOL/L (ref 96–108)
CO2 SERPL-SCNC: 27 MMOL/L (ref 21–32)
COLOR FLD: YELLOW
CREAT SERPL-MCNC: 0.58 MG/DL (ref 0.6–1.3)
EOSINOPHIL # BLD AUTO: 0.55 THOUSAND/ÂΜL (ref 0–0.61)
EOSINOPHIL NFR BLD AUTO: 2 % (ref 0–6)
ERYTHROCYTE [DISTWIDTH] IN BLOOD BY AUTOMATED COUNT: 16.8 % (ref 11.6–15.1)
GFR SERPL CREATININE-BSD FRML MDRD: 114 ML/MIN/1.73SQ M
GLUCOSE SERPL-MCNC: 123 MG/DL (ref 65–140)
HCT VFR BLD AUTO: 32.5 % (ref 36.5–49.3)
HGB BLD-MCNC: 11.4 G/DL (ref 12–17)
IMM GRANULOCYTES # BLD AUTO: 0.42 THOUSAND/UL (ref 0–0.2)
IMM GRANULOCYTES NFR BLD AUTO: 2 % (ref 0–2)
INR PPP: 1.18 (ref 0.84–1.19)
LYMPHOCYTES # BLD AUTO: 1.52 THOUSANDS/ÂΜL (ref 0.6–4.47)
LYMPHOCYTES NFR BLD AUTO: 6 % (ref 14–44)
LYMPHOCYTES NFR BLD AUTO: 8 %
MCH RBC QN AUTO: 35.8 PG (ref 26.8–34.3)
MCHC RBC AUTO-ENTMCNC: 35.1 G/DL (ref 31.4–37.4)
MCV RBC AUTO: 102 FL (ref 82–98)
MONO+MESO NFR FLD MANUAL: 1 %
MONOCYTES # BLD AUTO: 1.49 THOUSAND/ÂΜL (ref 0.17–1.22)
MONOCYTES NFR BLD AUTO: 47 %
MONOCYTES NFR BLD AUTO: 6 % (ref 4–12)
NEUTROPHILS # BLD AUTO: 21.9 THOUSANDS/ÂΜL (ref 1.85–7.62)
NEUTS SEG NFR BLD AUTO: 44 %
NEUTS SEG NFR BLD AUTO: 83 % (ref 43–75)
NRBC BLD AUTO-RTO: 0 /100 WBCS
PLATELET # BLD AUTO: 348 THOUSANDS/UL (ref 149–390)
PMV BLD AUTO: 9.2 FL (ref 8.9–12.7)
POTASSIUM SERPL-SCNC: 3.8 MMOL/L (ref 3.5–5.3)
PROT SERPL-MCNC: 6.1 G/DL (ref 6.4–8.4)
PROTHROMBIN TIME: 15.8 SECONDS (ref 11.6–14.5)
RBC # BLD AUTO: 3.18 MILLION/UL (ref 3.88–5.62)
SITE: NORMAL
SODIUM SERPL-SCNC: 130 MMOL/L (ref 135–147)
TOTAL CELLS COUNTED SPEC: 100
WBC # BLD AUTO: 26 THOUSAND/UL (ref 4.31–10.16)
WBC # FLD MANUAL: 378 /UL

## 2022-10-19 PROCEDURE — 49083 ABD PARACENTESIS W/IMAGING: CPT

## 2022-10-19 PROCEDURE — 88112 CYTOPATH CELL ENHANCE TECH: CPT | Performed by: PATHOLOGY

## 2022-10-19 PROCEDURE — 99232 SBSQ HOSP IP/OBS MODERATE 35: CPT | Performed by: INTERNAL MEDICINE

## 2022-10-19 PROCEDURE — 87205 SMEAR GRAM STAIN: CPT | Performed by: NURSE PRACTITIONER

## 2022-10-19 PROCEDURE — 85025 COMPLETE CBC W/AUTO DIFF WBC: CPT | Performed by: STUDENT IN AN ORGANIZED HEALTH CARE EDUCATION/TRAINING PROGRAM

## 2022-10-19 PROCEDURE — C1729 CATH, DRAINAGE: HCPCS

## 2022-10-19 PROCEDURE — 0W9G3ZZ DRAINAGE OF PERITONEAL CAVITY, PERCUTANEOUS APPROACH: ICD-10-PCS | Performed by: INTERNAL MEDICINE

## 2022-10-19 PROCEDURE — 99233 SBSQ HOSP IP/OBS HIGH 50: CPT | Performed by: STUDENT IN AN ORGANIZED HEALTH CARE EDUCATION/TRAINING PROGRAM

## 2022-10-19 PROCEDURE — 89051 BODY FLUID CELL COUNT: CPT | Performed by: NURSE PRACTITIONER

## 2022-10-19 PROCEDURE — 87070 CULTURE OTHR SPECIMN AEROBIC: CPT | Performed by: NURSE PRACTITIONER

## 2022-10-19 PROCEDURE — 85610 PROTHROMBIN TIME: CPT | Performed by: STUDENT IN AN ORGANIZED HEALTH CARE EDUCATION/TRAINING PROGRAM

## 2022-10-19 PROCEDURE — 76705 ECHO EXAM OF ABDOMEN: CPT

## 2022-10-19 PROCEDURE — 80053 COMPREHEN METABOLIC PANEL: CPT | Performed by: STUDENT IN AN ORGANIZED HEALTH CARE EDUCATION/TRAINING PROGRAM

## 2022-10-19 PROCEDURE — 49083 ABD PARACENTESIS W/IMAGING: CPT | Performed by: RADIOLOGY

## 2022-10-19 PROCEDURE — 88305 TISSUE EXAM BY PATHOLOGIST: CPT | Performed by: PATHOLOGY

## 2022-10-19 RX ORDER — POLYETHYLENE GLYCOL 3350 17 G/17G
17 POWDER, FOR SOLUTION ORAL DAILY
Status: DISCONTINUED | OUTPATIENT
Start: 2022-10-19 | End: 2022-10-28 | Stop reason: HOSPADM

## 2022-10-19 RX ORDER — SPIRONOLACTONE 25 MG/1
25 TABLET ORAL DAILY
Qty: 30 TABLET | Refills: 0 | Status: CANCELLED | OUTPATIENT
Start: 2022-10-19 | End: 2022-11-18

## 2022-10-19 RX ORDER — LIDOCAINE WITH 8.4% SOD BICARB 0.9%(10ML)
SYRINGE (ML) INJECTION CODE/TRAUMA/SEDATION MEDICATION
Status: COMPLETED | OUTPATIENT
Start: 2022-10-19 | End: 2022-10-19

## 2022-10-19 RX ADMIN — OXYCODONE HYDROCHLORIDE 5 MG: 5 TABLET ORAL at 11:50

## 2022-10-19 RX ADMIN — Medication 3.38 G: at 11:47

## 2022-10-19 RX ADMIN — CHLORDIAZEPOXIDE HYDROCHLORIDE 10 MG: 5 CAPSULE ORAL at 23:40

## 2022-10-19 RX ADMIN — FOLIC ACID 1 MG: 1 TABLET ORAL at 09:44

## 2022-10-19 RX ADMIN — MORPHINE SULFATE 2 MG: 2 INJECTION, SOLUTION INTRAMUSCULAR; INTRAVENOUS at 23:40

## 2022-10-19 RX ADMIN — MORPHINE SULFATE 2 MG: 2 INJECTION, SOLUTION INTRAMUSCULAR; INTRAVENOUS at 16:13

## 2022-10-19 RX ADMIN — OXYCODONE HYDROCHLORIDE 5 MG: 5 TABLET ORAL at 01:43

## 2022-10-19 RX ADMIN — MORPHINE SULFATE 2 MG: 2 INJECTION, SOLUTION INTRAMUSCULAR; INTRAVENOUS at 09:44

## 2022-10-19 RX ADMIN — POLYETHYLENE GLYCOL 3350 17 G: 17 POWDER, FOR SOLUTION ORAL at 11:47

## 2022-10-19 RX ADMIN — Medication 3.38 G: at 05:34

## 2022-10-19 RX ADMIN — NICOTINE 21 MG: 21 PATCH, EXTENDED RELEASE TRANSDERMAL at 09:44

## 2022-10-19 RX ADMIN — LACTULOSE 20 G: 20 SOLUTION ORAL at 09:43

## 2022-10-19 RX ADMIN — Medication 3.38 G: at 23:39

## 2022-10-19 RX ADMIN — Medication 3.38 G: at 18:27

## 2022-10-19 RX ADMIN — HYDROMORPHONE HYDROCHLORIDE 0.5 MG: 1 INJECTION, SOLUTION INTRAMUSCULAR; INTRAVENOUS; SUBCUTANEOUS at 05:33

## 2022-10-19 RX ADMIN — SPIRONOLACTONE 25 MG: 25 TABLET ORAL at 09:44

## 2022-10-19 RX ADMIN — Medication 100 MG: at 09:43

## 2022-10-19 RX ADMIN — MULTIPLE VITAMINS W/ MINERALS TAB 1 TABLET: TAB ORAL at 09:44

## 2022-10-19 RX ADMIN — CHLORDIAZEPOXIDE HYDROCHLORIDE 10 MG: 5 CAPSULE ORAL at 05:33

## 2022-10-19 RX ADMIN — CHLORDIAZEPOXIDE HYDROCHLORIDE 10 MG: 5 CAPSULE ORAL at 14:42

## 2022-10-19 RX ADMIN — Medication 9 ML: at 14:50

## 2022-10-19 NOTE — ASSESSMENT & PLAN NOTE
Drinks vodka and juice daily  Never attempted to quit due to experiencing withdrawals w/ tremors   Denies seizures/hallucinations  [facility] protocol  Thiamine/folate/MTV  On Librium  At risk for DT

## 2022-10-19 NOTE — ASSESSMENT & PLAN NOTE
Drinks vodka and juice daily  Never attempted to quit due to experiencing withdrawals w/ tremors   Denies seizures/hallucinations  [facility] protocol  Thiamine/folate/MTV  On Librium  At risk for DT

## 2022-10-19 NOTE — PLAN OF CARE
Problem: Potential for Falls  Goal: Patient will remain free of falls  Description: INTERVENTIONS:  - Educate patient/family on patient safety including physical limitations  - Instruct patient to call for assistance with activity   - Consult OT/PT to assist with strengthening/mobility   - Keep Call bell within reach  - Keep bed low and locked with side rails adjusted as appropriate  - Keep care items and personal belongings within reach  - Initiate and maintain comfort rounds  - Make Fall Risk Sign visible to staff  - Offer Toileting every 2 Hours, in advance of need  - Initiate/Maintain alarm  - Obtain necessary fall risk management equipment:   - Apply yellow socks and bracelet for high fall risk patients  - Consider moving patient to room near nurses station  Outcome: Progressing     Problem: PAIN - ADULT  Goal: Verbalizes/displays adequate comfort level or baseline comfort level  Description: Interventions:  - Encourage patient to monitor pain and request assistance  - Assess pain using appropriate pain scale  - Administer analgesics based on type and severity of pain and evaluate response  - Implement non-pharmacological measures as appropriate and evaluate response  - Consider cultural and social influences on pain and pain management  - Notify physician/advanced practitioner if interventions unsuccessful or patient reports new pain  Outcome: Progressing     Problem: INFECTION - ADULT  Goal: Absence or prevention of progression during hospitalization  Description: INTERVENTIONS:  - Assess and monitor for signs and symptoms of infection  - Monitor lab/diagnostic results  - Monitor all insertion sites, i e  indwelling lines, tubes, and drains  - Monitor endotracheal if appropriate and nasal secretions for changes in amount and color  - [place] appropriate cooling/warming therapies per order  - Administer medications as ordered  - Instruct and encourage patient and family to use good hand hygiene technique  - Identify and instruct in appropriate isolation precautions for identified infection/condition  Outcome: Progressing  Goal: Absence of fever/infection during neutropenic period  Description: INTERVENTIONS:  - Monitor WBC    Outcome: Progressing     Problem: SAFETY ADULT  Goal: Patient will remain free of falls  Description: INTERVENTIONS:  - Educate patient/family on patient safety including physical limitations  - Instruct patient to call for assistance with activity   - Consult OT/PT to assist with strengthening/mobility   - Keep Call bell within reach  - Keep bed low and locked with side rails adjusted as appropriate  - Keep care items and personal belongings within reach  - Initiate and maintain comfort rounds  - Make Fall Risk Sign visible to staff  - Offer Toileting every 2 Hours, in advance of need  - Initiate/Maintain alarm  - Obtain necessary fall risk management equipment:   - Apply yellow socks and bracelet for high fall risk patients  - Consider moving patient to room near nurses station  Outcome: Progressing  Goal: Maintain or return to baseline ADL function  Description: INTERVENTIONS:  - Educate patient/family on patient safety including physical limitations  - Instruct patient to call for assistance with activity   - Consult OT/PT to assist with strengthening/mobility   - Keep Call bell within reach  - Keep bed low and locked with side rails adjusted as appropriate  - Keep care items and personal belongings within reach  - Initiate and maintain comfort rounds  - Make Fall Risk Sign visible to staff  - Offer Toileting every 2 Hours, in advance of need  - Initiate/Maintain alarm  - Obtain necessary fall risk management equipment:   - Apply yellow socks and bracelet for high fall risk patients  - Consider moving patient to room near nurses station  Outcome: Progressing  Goal: Maintains/Returns to pre admission functional level  Description: INTERVENTIONS:  - Perform BMAT or MOVE assessment daily    - Set and communicate daily mobility goal to care team and patient/family/caregiver  - Collaborate with rehabilitation services on mobility goals if consulted  - Perform Range of Motion 3 times a day  - Reposition patient every 2 hours  - Dangle patient 3 times a day  - Stand patient 3 times a day  - Ambulate patient 3 times a day  - Out of bed to chair 3 times a day   - Out of bed for meals 3 times a day  - Out of bed for toileting  - Record patient progress and toleration of activity level   Outcome: Progressing     Problem: DISCHARGE PLANNING  Goal: Discharge to home or other facility with appropriate resources  Description: INTERVENTIONS:  - Identify barriers to discharge w/patient and caregiver  - Arrange for needed discharge resources and transportation as appropriate  - Identify discharge learning needs (meds, wound care, etc )  - Arrange for interpretive services to assist at discharge as needed  - Refer to Case Management Department for coordinating discharge planning if the patient needs post-hospital services based on physician/advanced practitioner order or complex needs related to functional status, cognitive ability, or social support system  Outcome: Progressing     Problem: Knowledge Deficit  Goal: Patient/family/caregiver demonstrates understanding of disease process, treatment plan, medications, and discharge instructions  Description: Complete learning assessment and assess knowledge base    Interventions:  - Provide teaching at level of understanding  - Provide teaching via preferred learning methods  Outcome: Progressing

## 2022-10-19 NOTE — PLAN OF CARE
Problem: Potential for Falls  Goal: Patient will remain free of falls  Description: INTERVENTIONS:  - Educate patient/family on patient safety including physical limitations  - Instruct patient to call for assistance with activity   - Consult OT/PT to assist with strengthening/mobility   - Keep Call bell within reach  - Keep bed low and locked with side rails adjusted as appropriate  - Keep care items and personal belongings within reach  - Initiate and maintain comfort rounds  - Make Fall Risk Sign visible to staff  - Offer Toileting every 2 Hours, in advance of need  - Initiate/Maintain alarm  - Obtain necessary fall risk management equipment:   - Apply yellow socks and bracelet for high fall risk patients  - Consider moving patient to room near nurses station  Outcome: Progressing     Problem: PAIN - ADULT  Goal: Verbalizes/displays adequate comfort level or baseline comfort level  Description: Interventions:  - Encourage patient to monitor pain and request assistance  - Assess pain using appropriate pain scale  - Administer analgesics based on type and severity of pain and evaluate response  - Implement non-pharmacological measures as appropriate and evaluate response  - Consider cultural and social influences on pain and pain management  - Notify physician/advanced practitioner if interventions unsuccessful or patient reports new pain  Outcome: Progressing     Problem: INFECTION - ADULT  Goal: Absence or prevention of progression during hospitalization  Description: INTERVENTIONS:  - Assess and monitor for signs and symptoms of infection  - Monitor lab/diagnostic results  - Monitor all insertion sites, i e  indwelling lines, tubes, and drains  - Monitor endotracheal if appropriate and nasal secretions for changes in amount and color  - [place] appropriate cooling/warming therapies per order  - Administer medications as ordered  - Instruct and encourage patient and family to use good hand hygiene technique  - Identify and instruct in appropriate isolation precautions for identified infection/condition  Outcome: Progressing  Goal: Absence of fever/infection during neutropenic period  Description: INTERVENTIONS:  - Monitor WBC    Outcome: Progressing     Problem: SAFETY ADULT  Goal: Patient will remain free of falls  Description: INTERVENTIONS:  - Educate patient/family on patient safety including physical limitations  - Instruct patient to call for assistance with activity   - Consult OT/PT to assist with strengthening/mobility   - Keep Call bell within reach  - Keep bed low and locked with side rails adjusted as appropriate  - Keep care items and personal belongings within reach  - Initiate and maintain comfort rounds  - Make Fall Risk Sign visible to staff  - Offer Toileting every 2 Hours, in advance of need  - Initiate/Maintain alarm  - Obtain necessary fall risk management equipment:   - Apply yellow socks and bracelet for high fall risk patients  - Consider moving patient to room near nurses station  Outcome: Progressing  Goal: Maintain or return to baseline ADL function  Description: INTERVENTIONS:  - Educate patient/family on patient safety including physical limitations  - Instruct patient to call for assistance with activity   - Consult OT/PT to assist with strengthening/mobility   - Keep Call bell within reach  - Keep bed low and locked with side rails adjusted as appropriate  - Keep care items and personal belongings within reach  - Initiate and maintain comfort rounds  - Make Fall Risk Sign visible to staff  - Offer Toileting every 2 Hours, in advance of need  - Initiate/Maintain alarm  - Obtain necessary fall risk management equipment:   - Apply yellow socks and bracelet for high fall risk patients  - Consider moving patient to room near nurses station  Outcome: Progressing  Goal: Maintains/Returns to pre admission functional level  Description: INTERVENTIONS:  - Perform BMAT or MOVE assessment daily    - Set and communicate daily mobility goal to care team and patient/family/caregiver  - Collaborate with rehabilitation services on mobility goals if consulted  - Perform Range of Motion 3 times a day  - Reposition patient every 3 hours  - Dangle patient 3 times a day  - Stand patient 3 times a day  - Ambulate patient 3 times a day  - Out of bed to chair 3 times a day   - Out of bed for meals 3 times a day  - Out of bed for toileting  - Record patient progress and toleration of activity level   Outcome: Progressing     Problem: DISCHARGE PLANNING  Goal: Discharge to home or other facility with appropriate resources  Description: INTERVENTIONS:  - Identify barriers to discharge w/patient and caregiver  - Arrange for needed discharge resources and transportation as appropriate  - Identify discharge learning needs (meds, wound care, etc )  - Arrange for interpretive services to assist at discharge as needed  - Refer to Case Management Department for coordinating discharge planning if the patient needs post-hospital services based on physician/advanced practitioner order or complex needs related to functional status, cognitive ability, or social support system  Outcome: Progressing     Problem: Knowledge Deficit  Goal: Patient/family/caregiver demonstrates understanding of disease process, treatment plan, medications, and discharge instructions  Description: Complete learning assessment and assess knowledge base    Interventions:  - Provide teaching at level of understanding  - Provide teaching via preferred learning methods  Outcome: Progressing

## 2022-10-19 NOTE — ASSESSMENT & PLAN NOTE
Presented with metabolic encephalopathy in ED POA  Suspect hepatic encephalopathy vs SBP  Ammonia level is 38  Received a dose of lactulose and ammonia levels are normal this morning  Continue thiamine and folate  Trend WBC and fever curve  resolved

## 2022-10-19 NOTE — SEDATION DOCUMENTATION
Diagnostic paracentesis with removal 1950ml clear yellow fluid removed  Specimen sent to lab  Band aid to site  Transferred back to room via w/c

## 2022-10-19 NOTE — PROGRESS NOTES
[place]  Progress Note - Roney Tompkins 1965, 56 y o  male MRN: [id number]  Unit/Bed#: -01 Encounter: [id number]  Primary Care Provider: Ladi Haney DO   Date and time admitted to hospital: 10/14/2022  1:52 PM    * Cirrhosis [facility])  Assessment & Plan  New onset cirrhosis  Was noted to have abdominal distension, hepatomegaly, with dark stools by PCP on 09/28/2022  · Mild transaminitis with , ALT 72  Alk-phos 400  T bili 1 9, direct bili 1 4  · CT abdomen/pelvis shows new onset cirrhosis with moderate ascites  · Appeared mildly encephalopathic on admission  · Suspect alcoholic liver disease  · On folate/ thiamine/ MTV supplementation    Alcohol use  Assessment & Plan  Drinks vodka and juice daily  Never attempted to quit due to experiencing withdrawals w/ tremors  Denies seizures/hallucinations  [facility] protocol  Thiamine/folate/MTV  On Librium  At risk for DT    Ascites  Assessment & Plan  Presented with diffuse abdominal pain and abdominal distension  Likely secondary to alcoholic cirrhosis  s/p IR paracentesis on 10/17 labs showing possible SBP and portal HTN  Per PCP note, had bloody stools and reported fevers at home   Concern for possible SBP  Trend WBC and fever curve  Continue Zosyn day 5  Initiated aldactone  GI consult appreciated  bld cx NGTD  F/u with GI outpt and will need EGD outpt,     Encephalopathy-resolved as of 10/19/2022  Assessment & Plan  Presented with metabolic encephalopathy in ED POA  Suspect hepatic encephalopathy vs SBP  Ammonia level is 38  Received a dose of lactulose and ammonia levels are normal this morning  Continue thiamine and folate  Trend WBC and fever curve  resolved    SIRS (systemic inflammatory response syndrome) (HCC)  Assessment & Plan  Meets SIRS criteria w tachycardia (HR 120s) and leukocytosis (WBC 33)  Reports fevers/chills at home, however afebrile while inpatient  No definitive source of infection, however concern for possible SBP  bld cx NGTD    COPD (chronic obstructive pulmonary disease) ([facility])  Assessment & Plan  No shortness of breath/wheezing  Not acute exacerbation  Albuterol inhaler PRN    Nicotine dependence  Assessment & Plan  Smokes approximately a pack a day  Nicotine patch      VTE Pharmacologic Prophylaxis:   Moderate Risk (Score 3-4) - Pharmacological DVT Prophylaxis Contraindicated  Sequential Compression Devices Ordered  Patient Centered Rounds: I performed bedside rounds with nursing staff today  Discussions with Specialists or Other Care Team Provider: GI, CM    Education and Discussions with Family / Patient: Updated  (father) via phone  Time Spent for Care: 30 minutes  More than 50% of total time spent on counseling and coordination of care as described above  Current Length of Stay: 5 day(s)  Current Patient Status: Inpatient   Certification Statement: The patient will continue to require additional inpatient hospital stay due to cirrhosis  Discharge Plan: Anticipate discharge tomorrow to home  Code Status: Level 1 - Full Code    Subjective: Johnny was seen and examined at bedside  No acute events overnight  Discussed plan of care  All questions and concerns were answered and addressed  Objective:     Vitals:   Temp (24hrs), Av °F (36 7 °C), Min:97 6 °F (36 4 °C), Max:98 3 °F (36 8 °C)    Temp:  [97 6 °F (36 4 °C)-98 3 °F (36 8 °C)] 98 3 °F (36 8 °C)  HR:  [] 99  Resp:  [16-22] 19  BP: (104-151)/(65-81) 123/81  SpO2:  [96 %-98 %] 98 %  Body mass index is 23 04 kg/m²  Input and Output Summary (last 24 hours): Intake/Output Summary (Last 24 hours) at 10/19/2022 0759  Last data filed at 10/18/2022 2339  Gross per 24 hour   Intake 581 ml   Output --   Net 581 ml       Physical Exam:   Physical Exam  Vitals and nursing note reviewed  Constitutional:       Appearance: He is normal weight  He is ill-appearing (chronic)     HENT:      Head: Normocephalic and atraumatic  Cardiovascular:      Rate and Rhythm: Normal rate and regular rhythm  Pulses: Normal pulses  Heart sounds: Normal heart sounds  Pulmonary:      Effort: Pulmonary effort is normal       Breath sounds: Normal breath sounds  Abdominal:      General: Bowel sounds are normal  There is distension  Palpations: Abdomen is soft  Musculoskeletal:      Right lower leg: No edema  Left lower leg: No edema  Skin:     General: Skin is warm  Coloration: Skin is jaundiced  Neurological:      General: No focal deficit present  Mental Status: He is alert and oriented to person, place, and time  Additional Data:     Labs:  Results from last 7 days   Lab Units 10/19/22  0553 10/18/22  0620   WBC Thousand/uL 26 00* 25 68*   HEMOGLOBIN g/dL 11 4* 10 7*   HEMATOCRIT % 32 5* 31 1*   PLATELETS Thousands/uL 348 313   BANDS PCT %  --  16*   NEUTROS PCT % 83*  --    LYMPHS PCT % 6*  --    LYMPHO PCT %  --  6*   MONOS PCT % 6  --    MONO PCT %  --  6   EOS PCT % 2 3     Results from last 7 days   Lab Units 10/19/22  0553   SODIUM mmol/L 130*   POTASSIUM mmol/L 3 8   CHLORIDE mmol/L 97   CO2 mmol/L 27   BUN mg/dL 6   CREATININE mg/dL 0 58*   ANION GAP mmol/L 6   CALCIUM mg/dL 8 3   ALBUMIN g/dL 1 9*   TOTAL BILIRUBIN mg/dL 2 20*   ALK PHOS U/L 303*   ALT U/L 47   AST U/L 78*   GLUCOSE RANDOM mg/dL 123     Results from last 7 days   Lab Units 10/19/22  0553   INR  1 18             Results from last 7 days   Lab Units 10/16/22  0552 10/14/22  1534   LACTIC ACID mmol/L  --  1 9   PROCALCITONIN ng/ml 0 64*  --        Lines/Drains:  Invasive Devices  Report    Peripheral Intravenous Line  Duration           Peripheral IV 10/16/22 Left;Proximal;Ventral (anterior) Forearm 2 days                      Imaging: No pertinent imaging reviewed  Recent Cultures (last 7 days):   Results from last 7 days   Lab Units 10/17/22  1342 10/14/22  1534   BLOOD CULTURE   --  No Growth After 4 Days    No Growth After 4 Days  GRAM STAIN RESULT  No Polys or Bacteria seen  --    BODY FLUID CULTURE, STERILE  No growth  --        Last 24 Hours Medication List:   Current Facility-Administered Medications   Medication Dose Route Frequency Provider Last Rate   • albuterol  2 puff Inhalation Q4H PRN Norma Castro PA-C     • chlordiazePOXIDE  10 mg Oral Q8H Og Milton MD     • folic acid  1 mg Oral Daily Norma Castro PA-C     • HYDROmorphone  0 5 mg Intravenous Q4H PRN Jason Aburto PA-C     • lactulose  20 g Oral Daily MACHO Harrington     • LORazepam  2 mg Intravenous Once Olubuna O Angeline DO     • morphine injection  2 mg Intravenous Q8H Rajesh Butler MD     • multivitamin-minerals  1 tablet Oral Daily Norma Castro PA-C     • nicotine  21 mg Transdermal Daily Jason Aburto PA-C     • ondansetron  4 mg Intravenous Q4H PRN Norma Castro PA-C     • oxyCODONE  5 mg Oral Q6H PRN Rajesh Butler MD     • piperacillin-tazobactam  3 375 g Intravenous Q6H [facility], MARINO 3 375 g (10/17/22 1810)   • spironolactone  25 mg Oral Daily MACHO Harrington     • thiamine  100 mg Oral Daily Norma Castro PA-C          Today, Patient Was Seen By: Prabha Rodriguez    **Please Note: This note may have been constructed using a voice recognition system  **

## 2022-10-19 NOTE — ASSESSMENT & PLAN NOTE
Presented with diffuse abdominal pain and abdominal distension  Likely secondary to alcoholic cirrhosis  s/p IR paracentesis on 10/17 labs showing possible SBP and portal HTN  S/p IR paracentesis on 10/19 labs pending  Per PCP note, had bloody stools and reported fevers at home   Concern for possible SBP  Trend WBC and fever curve  Completed 5 days of Abx will dc abx  Increased aldactone to 50 mg qd  GI consult appreciated  bld cx NGTD  F/u with GI outpt and will need EGD outpt,

## 2022-10-19 NOTE — ASSESSMENT & PLAN NOTE
Presented with diffuse abdominal pain and abdominal distension  Likely secondary to alcoholic cirrhosis  s/p IR paracentesis on 10/17 labs showing possible SBP and portal HTN  Per PCP note, had bloody stools and reported fevers at home   Concern for possible SBP  Trend WBC and fever curve  Continue Zosyn day 5  Initiated aldactone  GI consult appreciated  bld cx NGTD  F/u with GI outpt and will need EGD outpt,

## 2022-10-19 NOTE — ASSESSMENT & PLAN NOTE
New onset cirrhosis  Was noted to have abdominal distension, hepatomegaly, with dark stools by PCP on 09/28/2022  · Mild transaminitis with , ALT 72  Alk-phos 400  T bili 1 9, direct bili 1 4    · CT abdomen/pelvis shows new onset cirrhosis with moderate ascites  · Appeared mildly encephalopathic on admission  · Suspect alcoholic liver disease  · On folate/ thiamine/ MTV supplementation

## 2022-10-19 NOTE — PROGRESS NOTES
Progress note - Gastroenterology   Oswaldo South 56 y o  male MRN: [id number]  Unit/Bed#: -01 Encounter: [id number]    ASSESSMENT and PLAN    32-year-old male with tobacco and alcohol abuse and COPD admitted with SIRS, possible cirrhosis, alcoholic hepatitis, new ascites with concern for SBP     1) New onset ascites and generalized abdominal pain in setting of SIRS: Patient met SIRS criteria on admission, concern for SBP given new ascites  No other definitive source of infection  UA, blood cultures, chest x-ray negative      On exam, patient states he was awoken at 4:30 a m  This morning with severe abdominal pain  Abdomen is firm and distended  Can evaluate need for follow-up paracentesis with abdominal ultrasound  Patient's current appt human is 1 9  Will request albumin replacement if procedure can be completed      - abdominal ultrasound, evaluation for possible paracentesis  Increased pain and distension noted to abdomen   - SBP negative from fluids withdrawn with paracentesis 10/17, however, patient's SAAG > 1 1 (1 4) for detecting portal hypertension   -continue empiric treatment with Zosyn  - recommend 2 g sodium diet   - Aldactone 25 mg po qd, await results of ultrasound for changes in medication administration     2) Possible cirrhosis with alcoholic hepatitis:   MELD 11 andDF 15 1  Previous lab work-up negative for viral, autoimmune, metabolic etiologies  CT from 10/14 shows slightly lobulated contour of the liver suggestive of cirrhosis, hepatomegaly, new ascites  Liver enzymes trending down until today , maintaining same elevation   T bili stable at 2 20, AST 78, ALT 47, alkaline phosphatase 303, Creatinine 0 58, hemoglobin 11 4, platelets 751       - monitor CBC, CMP, INR (MELD score)  -in discussion with roommate present, reinforced need for complete alcohol cessation  -no indication for steroids based on low DF and concern for infection  -recommend EGD as outpatient for varices screening  -patient should follow-up in outpatient GI office for continued monitoring     3) Alcohol abuse:     -patient understands he must stop drinking  -continue folic acid and thiamine  -monitor for withdrawal symptoms     4  Constipation   In patient's current setting and new diagnosis of ascites and rule out cirrhosis, will initiate lactulose 20 mg daily     - MiraLax 17 g daily, hold with diarrhea      Chief Complaint   Patient presents with   • Abdominal Pain     Pt c/o of rioght sided abdomianla pain x 1 days  SUBJECTIVE/HPI   Patient states he is having increased abdominal pain  States he was awoken at 4:30 a m  This morning with sharp right-sided abdominal pain  Tolerating regular diet with 1800 mL fluid restriction  Patient states he has not had a bowel movement yet however having increased flatus  Will add MiraLax to the bowel regiment  /89 (BP Location: Left arm)   Pulse (!) 110   Temp 98 1 °F (36 7 °C) (Oral)   Resp 18   Ht 5' 7" (1 702 m)   Wt 66 7 kg (147 lb 1 6 oz)   SpO2 95%   BMI 23 04 kg/m²     PHYSICALEXAM  General appearance: alert, appears stated age and cooperative  Eyes: PERLLA, EOMI, no icterus   Head: Normocephalic, without obvious abnormality, atraumatic  Lungs: clear to auscultation bilaterally  Heart: regular rate and rhythm, S1, S2 normal, no murmur, click, rub or gallop  Abdomen:  Large, firm, right side tenderness to abdomen increased palpation, bowel sounds normal; no masses,  no organomegaly    Extremities: extremities normal, atraumatic, no cyanosis or edema  Neurologic: Grossly normal    Lab Results   Component Value Date    CALCIUM 8 3 10/19/2022    K 3 8 10/19/2022    CO2 27 10/19/2022    CL 97 10/19/2022    BUN 6 10/19/2022    CREATININE 0 58 (L) 10/19/2022     Lab Results   Component Value Date    WBC 26 00 (H) 10/19/2022    HGB 11 4 (L) 10/19/2022    HCT 32 5 (L) 10/19/2022     (H) 10/19/2022     10/19/2022     Lab Results   Component Value Date    ALT 47 10/19/2022    AST 78 (H) 10/19/2022    GGT 1,144 (H) 09/28/2022    ALKPHOS 303 (H) 10/19/2022     Lab Results   Component Value Date    AMYLASE 21 (L) 09/28/2022     Lab Results   Component Value Date    LIPASE 308 10/14/2022     Lab Results   Component Value Date    IRON 96 10/15/2022    TIBC 158 (L) 10/15/2022    FERRITIN 423 (H) 10/15/2022     Lab Results   Component Value Date    INR 1 18 10/19/2022

## 2022-10-19 NOTE — PLAN OF CARE
Problem: Potential for Falls  Goal: Patient will remain free of falls  Description: INTERVENTIONS:  - Educate patient/family on patient safety including physical limitations  - Instruct patient to call for assistance with activity   - Consult OT/PT to assist with strengthening/mobility   - Keep Call bell within reach  - Keep bed low and locked with side rails adjusted as appropriate  - Keep care items and personal belongings within reach  - Initiate and maintain comfort rounds  - Make Fall Risk Sign visible to staff  - Offer Toileting every 2 Hours, in advance of need  - Initiate/Maintain alarm  - Obtain necessary fall risk management equipment:   - Apply yellow socks and bracelet for high fall risk patients  - Consider moving patient to room near nurses station  Outcome: Progressing     Problem: PAIN - ADULT  Goal: Verbalizes/displays adequate comfort level or baseline comfort level  Description: Interventions:  - Encourage patient to monitor pain and request assistance  - Assess pain using appropriate pain scale  - Administer analgesics based on type and severity of pain and evaluate response  - Implement non-pharmacological measures as appropriate and evaluate response  - Consider cultural and social influences on pain and pain management  - Notify physician/advanced practitioner if interventions unsuccessful or patient reports new pain  Outcome: Progressing     Problem: INFECTION - ADULT  Goal: Absence or prevention of progression during hospitalization  Description: INTERVENTIONS:  - Assess and monitor for signs and symptoms of infection  - Monitor lab/diagnostic results  - Monitor all insertion sites, i e  indwelling lines, tubes, and drains  - Monitor endotracheal if appropriate and nasal secretions for changes in amount and color  - [place] appropriate cooling/warming therapies per order  - Administer medications as ordered  - Instruct and encourage patient and family to use good hand hygiene technique  - Identify and instruct in appropriate isolation precautions for identified infection/condition  Outcome: Progressing  Goal: Absence of fever/infection during neutropenic period  Description: INTERVENTIONS:  - Monitor WBC    Outcome: Progressing     Problem: SAFETY ADULT  Goal: Patient will remain free of falls  Description: INTERVENTIONS:  - Educate patient/family on patient safety including physical limitations  - Instruct patient to call for assistance with activity   - Consult OT/PT to assist with strengthening/mobility   - Keep Call bell within reach  - Keep bed low and locked with side rails adjusted as appropriate  - Keep care items and personal belongings within reach  - Initiate and maintain comfort rounds  - Make Fall Risk Sign visible to staff  - Offer Toileting every 2 Hours, in advance of need  - Initiate/Maintain alarm  - Obtain necessary fall risk management equipment:   - Apply yellow socks and bracelet for high fall risk patients  - Consider moving patient to room near nurses station  Outcome: Progressing  Goal: Maintain or return to baseline ADL function  Description: INTERVENTIONS:  - Educate patient/family on patient safety including physical limitations  - Instruct patient to call for assistance with activity   - Consult OT/PT to assist with strengthening/mobility   - Keep Call bell within reach  - Keep bed low and locked with side rails adjusted as appropriate  - Keep care items and personal belongings within reach  - Initiate and maintain comfort rounds  - Make Fall Risk Sign visible to staff  - Offer Toileting every 2 Hours, in advance of need  - Initiate/Maintain alarm  - Obtain necessary fall risk management equipment:   - Apply yellow socks and bracelet for high fall risk patients  - Consider moving patient to room near nurses station  Outcome: Progressing  Goal: Maintains/Returns to pre admission functional level  Description: INTERVENTIONS:  - Perform BMAT or MOVE assessment daily    - Set and communicate daily mobility goal to care team and patient/family/caregiver  - Collaborate with rehabilitation services on mobility goals if consulted  - Perform Range of Motion 3 times a day  - Reposition patient every 2 hours  - Dangle patient 3 times a day  - Stand patient 3 times a day  - Ambulate patient 3 times a day  - Out of bed to chair 3 times a day   - Out of bed for meals 3 times a day  - Out of bed for toileting  - Record patient progress and toleration of activity level   Outcome: Progressing     Problem: DISCHARGE PLANNING  Goal: Discharge to home or other facility with appropriate resources  Description: INTERVENTIONS:  - Identify barriers to discharge w/patient and caregiver  - Arrange for needed discharge resources and transportation as appropriate  - Identify discharge learning needs (meds, wound care, etc )  - Arrange for interpretive services to assist at discharge as needed  - Refer to Case Management Department for coordinating discharge planning if the patient needs post-hospital services based on physician/advanced practitioner order or complex needs related to functional status, cognitive ability, or social support system  Outcome: Progressing     Problem: Knowledge Deficit  Goal: Patient/family/caregiver demonstrates understanding of disease process, treatment plan, medications, and discharge instructions  Description: Complete learning assessment and assess knowledge base    Interventions:  - Provide teaching at level of understanding  - Provide teaching via preferred learning methods  Outcome: Progressing

## 2022-10-20 PROBLEM — A41.9 SEPSIS WITHOUT ACUTE ORGAN DYSFUNCTION (HCC): Status: ACTIVE | Noted: 2022-10-20

## 2022-10-20 LAB
ALBUMIN SERPL BCP-MCNC: 1.9 G/DL (ref 3.5–5)
ALP SERPL-CCNC: 290 U/L (ref 46–116)
ALT SERPL W P-5'-P-CCNC: 45 U/L (ref 12–78)
ANION GAP SERPL CALCULATED.3IONS-SCNC: 7 MMOL/L (ref 4–13)
APTT PPP: 41 SECONDS (ref 23–37)
AST SERPL W P-5'-P-CCNC: 71 U/L (ref 5–45)
BACTERIA SPEC BFLD CULT: NO GROWTH
BASOPHILS # BLD AUTO: 0.15 THOUSANDS/ÂΜL (ref 0–0.1)
BASOPHILS NFR BLD AUTO: 1 % (ref 0–1)
BILIRUB SERPL-MCNC: 1.8 MG/DL (ref 0.2–1)
BUN SERPL-MCNC: 5 MG/DL (ref 5–25)
CALCIUM ALBUM COR SERPL-MCNC: 10.2 MG/DL (ref 8.3–10.1)
CALCIUM SERPL-MCNC: 8.5 MG/DL (ref 8.3–10.1)
CHLORIDE SERPL-SCNC: 97 MMOL/L (ref 96–108)
CO2 SERPL-SCNC: 29 MMOL/L (ref 21–32)
CREAT SERPL-MCNC: 0.71 MG/DL (ref 0.6–1.3)
EOSINOPHIL # BLD AUTO: 0.49 THOUSAND/ÂΜL (ref 0–0.61)
EOSINOPHIL NFR BLD AUTO: 2 % (ref 0–6)
ERYTHROCYTE [DISTWIDTH] IN BLOOD BY AUTOMATED COUNT: 16.7 % (ref 11.6–15.1)
GFR SERPL CREATININE-BSD FRML MDRD: 105 ML/MIN/1.73SQ M
GLUCOSE SERPL-MCNC: 113 MG/DL (ref 65–140)
GRAM STN SPEC: NORMAL
HCT VFR BLD AUTO: 32.4 % (ref 36.5–49.3)
HGB BLD-MCNC: 11.4 G/DL (ref 12–17)
IMM GRANULOCYTES # BLD AUTO: 0.42 THOUSAND/UL (ref 0–0.2)
IMM GRANULOCYTES NFR BLD AUTO: 2 % (ref 0–2)
INR PPP: 1.21 (ref 0.84–1.19)
LYMPHOCYTES # BLD AUTO: 1.18 THOUSANDS/ÂΜL (ref 0.6–4.47)
LYMPHOCYTES NFR BLD AUTO: 5 % (ref 14–44)
MCH RBC QN AUTO: 35.7 PG (ref 26.8–34.3)
MCHC RBC AUTO-ENTMCNC: 35.2 G/DL (ref 31.4–37.4)
MCV RBC AUTO: 102 FL (ref 82–98)
MONOCYTES # BLD AUTO: 1.32 THOUSAND/ÂΜL (ref 0.17–1.22)
MONOCYTES NFR BLD AUTO: 5 % (ref 4–12)
NEUTROPHILS # BLD AUTO: 21.09 THOUSANDS/ÂΜL (ref 1.85–7.62)
NEUTS SEG NFR BLD AUTO: 85 % (ref 43–75)
NRBC BLD AUTO-RTO: 0 /100 WBCS
PLATELET # BLD AUTO: 366 THOUSANDS/UL (ref 149–390)
PMV BLD AUTO: 9.5 FL (ref 8.9–12.7)
POTASSIUM SERPL-SCNC: 3.9 MMOL/L (ref 3.5–5.3)
PROCALCITONIN SERPL-MCNC: 0.68 NG/ML
PROT SERPL-MCNC: 6 G/DL (ref 6.4–8.4)
PROTHROMBIN TIME: 16.1 SECONDS (ref 11.6–14.5)
RBC # BLD AUTO: 3.19 MILLION/UL (ref 3.88–5.62)
SODIUM SERPL-SCNC: 133 MMOL/L (ref 135–147)
WBC # BLD AUTO: 24.65 THOUSAND/UL (ref 4.31–10.16)

## 2022-10-20 PROCEDURE — 85610 PROTHROMBIN TIME: CPT | Performed by: PHYSICIAN ASSISTANT

## 2022-10-20 PROCEDURE — 80053 COMPREHEN METABOLIC PANEL: CPT | Performed by: PHYSICIAN ASSISTANT

## 2022-10-20 PROCEDURE — 84145 PROCALCITONIN (PCT): CPT | Performed by: PHYSICIAN ASSISTANT

## 2022-10-20 PROCEDURE — 85730 THROMBOPLASTIN TIME PARTIAL: CPT | Performed by: PHYSICIAN ASSISTANT

## 2022-10-20 PROCEDURE — 85025 COMPLETE CBC W/AUTO DIFF WBC: CPT | Performed by: PHYSICIAN ASSISTANT

## 2022-10-20 PROCEDURE — 99233 SBSQ HOSP IP/OBS HIGH 50: CPT | Performed by: STUDENT IN AN ORGANIZED HEALTH CARE EDUCATION/TRAINING PROGRAM

## 2022-10-20 PROCEDURE — 99232 SBSQ HOSP IP/OBS MODERATE 35: CPT | Performed by: INTERNAL MEDICINE

## 2022-10-20 RX ORDER — ACETAMINOPHEN 325 MG/1
650 TABLET ORAL EVERY 6 HOURS PRN
Status: DISCONTINUED | OUTPATIENT
Start: 2022-10-20 | End: 2022-10-20

## 2022-10-20 RX ORDER — SPIRONOLACTONE 25 MG/1
50 TABLET ORAL DAILY
Status: DISCONTINUED | OUTPATIENT
Start: 2022-10-21 | End: 2022-10-25

## 2022-10-20 RX ORDER — IBUPROFEN 400 MG/1
400 TABLET ORAL EVERY 6 HOURS PRN
Status: DISCONTINUED | OUTPATIENT
Start: 2022-10-20 | End: 2022-10-28 | Stop reason: HOSPADM

## 2022-10-20 RX ORDER — FUROSEMIDE 20 MG/1
20 TABLET ORAL ONCE
Status: COMPLETED | OUTPATIENT
Start: 2022-10-20 | End: 2022-10-20

## 2022-10-20 RX ADMIN — POLYETHYLENE GLYCOL 3350 17 G: 17 POWDER, FOR SOLUTION ORAL at 09:18

## 2022-10-20 RX ADMIN — CHLORDIAZEPOXIDE HYDROCHLORIDE 10 MG: 5 CAPSULE ORAL at 21:14

## 2022-10-20 RX ADMIN — SPIRONOLACTONE 25 MG: 25 TABLET ORAL at 09:18

## 2022-10-20 RX ADMIN — FOLIC ACID 1 MG: 1 TABLET ORAL at 09:18

## 2022-10-20 RX ADMIN — NICOTINE 21 MG: 21 PATCH, EXTENDED RELEASE TRANSDERMAL at 09:26

## 2022-10-20 RX ADMIN — MORPHINE SULFATE 2 MG: 2 INJECTION, SOLUTION INTRAMUSCULAR; INTRAVENOUS at 09:17

## 2022-10-20 RX ADMIN — Medication 100 MG: at 09:18

## 2022-10-20 RX ADMIN — LACTULOSE 20 G: 20 SOLUTION ORAL at 09:18

## 2022-10-20 RX ADMIN — CHLORDIAZEPOXIDE HYDROCHLORIDE 10 MG: 5 CAPSULE ORAL at 06:03

## 2022-10-20 RX ADMIN — Medication 3.38 G: at 06:03

## 2022-10-20 RX ADMIN — MULTIPLE VITAMINS W/ MINERALS TAB 1 TABLET: TAB ORAL at 09:18

## 2022-10-20 RX ADMIN — OXYCODONE HYDROCHLORIDE 5 MG: 5 TABLET ORAL at 12:40

## 2022-10-20 RX ADMIN — Medication 3.38 G: at 12:41

## 2022-10-20 RX ADMIN — FUROSEMIDE 20 MG: 20 TABLET ORAL at 14:44

## 2022-10-20 RX ADMIN — OXYCODONE HYDROCHLORIDE 5 MG: 5 TABLET ORAL at 19:43

## 2022-10-20 RX ADMIN — CHLORDIAZEPOXIDE HYDROCHLORIDE 10 MG: 5 CAPSULE ORAL at 14:44

## 2022-10-20 RX ADMIN — OXYCODONE HYDROCHLORIDE 5 MG: 5 TABLET ORAL at 04:55

## 2022-10-20 NOTE — PLAN OF CARE
Problem: Potential for Falls  Goal: Patient will remain free of falls  Description: INTERVENTIONS:  - Educate patient/family on patient safety including physical limitations  - Instruct patient to call for assistance with activity   - Consult OT/PT to assist with strengthening/mobility   - Keep Call bell within reach  - Keep bed low and locked with side rails adjusted as appropriate  - Keep care items and personal belongings within reach  - Initiate and maintain comfort rounds  - Make Fall Risk Sign visible to staff  - Offer Toileting every 2 Hours, in advance of need  - Initiate/Maintain alarm  - Obtain necessary fall risk management equipment:   - Apply yellow socks and bracelet for high fall risk patients  - Consider moving patient to room near nurses station  Outcome: Progressing     Problem: PAIN - ADULT  Goal: Verbalizes/displays adequate comfort level or baseline comfort level  Description: Interventions:  - Encourage patient to monitor pain and request assistance  - Assess pain using appropriate pain scale  - Administer analgesics based on type and severity of pain and evaluate response  - Implement non-pharmacological measures as appropriate and evaluate response  - Consider cultural and social influences on pain and pain management  - Notify physician/advanced practitioner if interventions unsuccessful or patient reports new pain  Outcome: Progressing     Problem: INFECTION - ADULT  Goal: Absence or prevention of progression during hospitalization  Description: INTERVENTIONS:  - Assess and monitor for signs and symptoms of infection  - Monitor lab/diagnostic results  - Monitor all insertion sites, i e  indwelling lines, tubes, and drains  - Monitor endotracheal if appropriate and nasal secretions for changes in amount and color  - [place] appropriate cooling/warming therapies per order  - Administer medications as ordered  - Instruct and encourage patient and family to use good hand hygiene technique  - Identify and instruct in appropriate isolation precautions for identified infection/condition  Outcome: Progressing  Goal: Absence of fever/infection during neutropenic period  Description: INTERVENTIONS:  - Monitor WBC    Outcome: Progressing     Problem: SAFETY ADULT  Goal: Patient will remain free of falls  Description: INTERVENTIONS:  - Educate patient/family on patient safety including physical limitations  - Instruct patient to call for assistance with activity   - Consult OT/PT to assist with strengthening/mobility   - Keep Call bell within reach  - Keep bed low and locked with side rails adjusted as appropriate  - Keep care items and personal belongings within reach  - Initiate and maintain comfort rounds  - Make Fall Risk Sign visible to staff  - Offer Toileting every 2 Hours, in advance of need  - Initiate/Maintain alarm  - Obtain necessary fall risk management equipment:   - Apply yellow socks and bracelet for high fall risk patients  - Consider moving patient to room near nurses station  Outcome: Progressing  Goal: Maintain or return to baseline ADL function  Description: INTERVENTIONS:  - Educate patient/family on patient safety including physical limitations  - Instruct patient to call for assistance with activity   - Consult OT/PT to assist with strengthening/mobility   - Keep Call bell within reach  - Keep bed low and locked with side rails adjusted as appropriate  - Keep care items and personal belongings within reach  - Initiate and maintain comfort rounds  - Make Fall Risk Sign visible to staff  - Offer Toileting every 2 Hours, in advance of need  - Initiate/Maintain alarm  - Obtain necessary fall risk management equipment:   - Apply yellow socks and bracelet for high fall risk patients  - Consider moving patient to room near nurses station  Outcome: Progressing  Goal: Maintains/Returns to pre admission functional level  Description: INTERVENTIONS:  - Perform BMAT or MOVE assessment daily    - Set and communicate daily mobility goal to care team and patient/family/caregiver  - Collaborate with rehabilitation services on mobility goals if consulted  - Perform Range of Motion 3 times a day  - Reposition patient every 3 hours  - Dangle patient 3 times a day  - Stand patient 3 times a day  - Ambulate patient 3 times a day  - Out of bed to chair 3 times a day   - Out of bed for meals 3 times a day  - Out of bed for toileting  - Record patient progress and toleration of activity level   Outcome: Progressing     Problem: DISCHARGE PLANNING  Goal: Discharge to home or other facility with appropriate resources  Description: INTERVENTIONS:  - Identify barriers to discharge w/patient and caregiver  - Arrange for needed discharge resources and transportation as appropriate  - Identify discharge learning needs (meds, wound care, etc )  - Arrange for interpretive services to assist at discharge as needed  - Refer to Case Management Department for coordinating discharge planning if the patient needs post-hospital services based on physician/advanced practitioner order or complex needs related to functional status, cognitive ability, or social support system  Outcome: Progressing     Problem: Knowledge Deficit  Goal: Patient/family/caregiver demonstrates understanding of disease process, treatment plan, medications, and discharge instructions  Description: Complete learning assessment and assess knowledge base    Interventions:  - Provide teaching at level of understanding  - Provide teaching via preferred learning methods  Outcome: Progressing

## 2022-10-20 NOTE — PLAN OF CARE
Problem: Potential for Falls  Goal: Patient will remain free of falls  Description: INTERVENTIONS:  - Educate patient/family on patient safety including physical limitations  - Instruct patient to call for assistance with activity   - Consult OT/PT to assist with strengthening/mobility   - Keep Call bell within reach  - Keep bed low and locked with side rails adjusted as appropriate  - Keep care items and personal belongings within reach  - Initiate and maintain comfort rounds  - Make Fall Risk Sign visible to staff  - Offer Toileting every 2 Hours, in advance of need  - Initiate/Maintain alarm  - Obtain necessary fall risk management equipment:   - Apply yellow socks and bracelet for high fall risk patients  - Consider moving patient to room near nurses station  Outcome: Progressing     Problem: PAIN - ADULT  Goal: Verbalizes/displays adequate comfort level or baseline comfort level  Description: Interventions:  - Encourage patient to monitor pain and request assistance  - Assess pain using appropriate pain scale  - Administer analgesics based on type and severity of pain and evaluate response  - Implement non-pharmacological measures as appropriate and evaluate response  - Consider cultural and social influences on pain and pain management  - Notify physician/advanced practitioner if interventions unsuccessful or patient reports new pain  Outcome: Progressing     Problem: INFECTION - ADULT  Goal: Absence or prevention of progression during hospitalization  Description: INTERVENTIONS:  - Assess and monitor for signs and symptoms of infection  - Monitor lab/diagnostic results  - Monitor all insertion sites, i e  indwelling lines, tubes, and drains  - Monitor endotracheal if appropriate and nasal secretions for changes in amount and color  - [place] appropriate cooling/warming therapies per order  - Administer medications as ordered  - Instruct and encourage patient and family to use good hand hygiene technique  - Identify and instruct in appropriate isolation precautions for identified infection/condition  Outcome: Progressing  Goal: Absence of fever/infection during neutropenic period  Description: INTERVENTIONS:  - Monitor WBC    Outcome: Progressing     Problem: SAFETY ADULT  Goal: Patient will remain free of falls  Description: INTERVENTIONS:  - Educate patient/family on patient safety including physical limitations  - Instruct patient to call for assistance with activity   - Consult OT/PT to assist with strengthening/mobility   - Keep Call bell within reach  - Keep bed low and locked with side rails adjusted as appropriate  - Keep care items and personal belongings within reach  - Initiate and maintain comfort rounds  - Make Fall Risk Sign visible to staff  - Offer Toileting every 2 Hours, in advance of need  - Initiate/Maintain alarm  - Obtain necessary fall risk management equipment:   - Apply yellow socks and bracelet for high fall risk patients  - Consider moving patient to room near nurses station  Outcome: Progressing  Goal: Maintain or return to baseline ADL function  Description: INTERVENTIONS:  - Educate patient/family on patient safety including physical limitations  - Instruct patient to call for assistance with activity   - Consult OT/PT to assist with strengthening/mobility   - Keep Call bell within reach  - Keep bed low and locked with side rails adjusted as appropriate  - Keep care items and personal belongings within reach  - Initiate and maintain comfort rounds  - Make Fall Risk Sign visible to staff  - Offer Toileting every 2 Hours, in advance of need  - Initiate/Maintain alarm  - Obtain necessary fall risk management equipment:   - Apply yellow socks and bracelet for high fall risk patients  - Consider moving patient to room near nurses station  Outcome: Progressing  Goal: Maintains/Returns to pre admission functional level  Description: INTERVENTIONS:  - Perform BMAT or MOVE assessment daily    - Set and communicate daily mobility goal to care team and patient/family/caregiver  - Collaborate with rehabilitation services on mobility goals if consulted  - Perform Range of Motion 3 times a day  - Reposition patient every 2 hours  - Dangle patient 3 times a day  - Stand patient 3 times a day  - Ambulate patient 3 times a day  - Out of bed to chair 3 times a day   - Out of bed for meals 3 times a day  - Out of bed for toileting  - Record patient progress and toleration of activity level   Outcome: Progressing     Problem: DISCHARGE PLANNING  Goal: Discharge to home or other facility with appropriate resources  Description: INTERVENTIONS:  - Identify barriers to discharge w/patient and caregiver  - Arrange for needed discharge resources and transportation as appropriate  - Identify discharge learning needs (meds, wound care, etc )  - Arrange for interpretive services to assist at discharge as needed  - Refer to Case Management Department for coordinating discharge planning if the patient needs post-hospital services based on physician/advanced practitioner order or complex needs related to functional status, cognitive ability, or social support system  Outcome: Progressing     Problem: Knowledge Deficit  Goal: Patient/family/caregiver demonstrates understanding of disease process, treatment plan, medications, and discharge instructions  Description: Complete learning assessment and assess knowledge base    Interventions:  - Provide teaching at level of understanding  - Provide teaching via preferred learning methods  Outcome: Progressing

## 2022-10-20 NOTE — ASSESSMENT & PLAN NOTE
Sepsis, present on admission, due to possible SBP; as evidenced by meeting SIRS criteria on admission with an infectious process present; requiring blood cultures x 2, lactic acid level, and IV Zosyn

## 2022-10-20 NOTE — PROGRESS NOTES
[place]  Progress Note - Mirta Salazar 1965, 56 y o  male MRN: [id number]  Unit/Bed#: -Annabel Encounter: [id number]  Primary Care Provider: Amber Hammond DO   Date and time admitted to hospital: 10/14/2022  1:51 PM    * Alcoholic cirrhosis of liver with ascites [facility])  Assessment & Plan  New onset cirrhosis  Was noted to have abdominal distension, hepatomegaly, with dark stools by PCP on 09/28/2022  · Mild transaminitis with , ALT 72  Alk-phos 400  T bili 1 9, direct bili 1 4  · CT abdomen/pelvis shows new onset cirrhosis with moderate ascites  · Appeared mildly encephalopathic on admission  · Suspect alcoholic liver disease  · On folate/ thiamine/ MTV supplementation    Alcohol use  Assessment & Plan  Drinks vodka and juice daily  Never attempted to quit due to experiencing withdrawals w/ tremors  Denies seizures/hallucinations  CIWA protocol  Thiamine/folate/MTV  On Librium  At risk for DT    Ascites  Assessment & Plan  Presented with diffuse abdominal pain and abdominal distension  Likely secondary to alcoholic cirrhosis  s/p IR paracentesis on 10/17 labs showing possible SBP and portal HTN  S/p IR paracentesis on 10/19 labs pending  Per PCP note, had bloody stools and reported fevers at home   Concern for possible SBP  Trend WBC and fever curve  Completed 5 days of Abx will dc abx  Increased aldactone to 50 mg qd  GI consult appreciated  bld cx NGTD  F/u with GI outpt and will need EGD outpt,     Encephalopathy-resolved as of 10/19/2022  Assessment & Plan  Presented with metabolic encephalopathy in ED POA  Suspect hepatic encephalopathy vs SBP  Ammonia level is 38  Received a dose of lactulose and ammonia levels are normal this morning  Continue thiamine and folate  Trend WBC and fever curve  resolved    Sepsis without acute organ dysfunction [facility])  Assessment & Plan  Sepsis, present on admission, due to possible SBP; as evidenced by meeting SIRS criteria on admission with an infectious process present; requiring blood cultures x 2, lactic acid level, and IV Zosyn  SIRS (systemic inflammatory response syndrome) ([facility])  Assessment & Plan  Meets SIRS criteria w tachycardia (HR 120s) and leukocytosis (WBC 33)  Reports fevers/chills at home, however afebrile while inpatient  No definitive source of infection, however concern for possible SBP  bld cx NGTD    COPD (chronic obstructive pulmonary disease) ([facility])  Assessment & Plan  No shortness of breath/wheezing  Not acute exacerbation  Albuterol inhaler PRN    Nicotine dependence  Assessment & Plan  Smokes approximately a pack a day  Nicotine patch      VTE Pharmacologic Prophylaxis:   Low Risk (Score 0-2) - Encourage Ambulation  Patient Centered Rounds: I performed bedside rounds with nursing staff today  Discussions with Specialists or Other Care Team Provider: GI, IR, CM    Education and Discussions with Family / Patient: pt  Time Spent for Care: 30 minutes  More than 50% of total time spent on counseling and coordination of care as described above  Current Length of Stay: 6 day(s)  Current Patient Status: Inpatient   Certification Statement: The patient will continue to require additional inpatient hospital stay due to cirrhosis  Discharge Plan: Anticipate discharge tomorrow to home  Code Status: Level 1 - Full Code    Subjective: Johnny was seen and examined at bedside  No acute events overnight  Discussed plan of care  All questions and concerns were answered and addressed  Objective:     Vitals:   Temp (24hrs), Av 4 °F (36 9 °C), Min:98 1 °F (36 7 °C), Max:98 6 °F (37 °C)    Temp:  [98 1 °F (36 7 °C)-98 6 °F (37 °C)] 98 1 °F (36 7 °C)  HR:  [103-105] 103  Resp:  [16-21] 18  BP: (131-139)/(64-89) 131/76  SpO2:  [96 %-98 %] 96 %  Body mass index is 23 04 kg/m²  Input and Output Summary (last 24 hours):      Intake/Output Summary (Last 24 hours) at 10/20/2022 1308  Last data filed at 10/20/2022 0901  Gross per 24 hour   Intake 680 ml   Output 1950 ml   Net -1270 ml       Physical Exam:   Physical Exam  Vitals and nursing note reviewed  Constitutional:       Appearance: Normal appearance  He is ill-appearing (chronically)  HENT:      Head: Normocephalic and atraumatic  Cardiovascular:      Rate and Rhythm: Normal rate and regular rhythm  Pulses: Normal pulses  Heart sounds: Normal heart sounds  Pulmonary:      Effort: Pulmonary effort is normal       Breath sounds: Normal breath sounds  Abdominal:      General: Bowel sounds are normal  There is distension  Palpations: Abdomen is soft  Musculoskeletal:      Right lower leg: No edema  Left lower leg: No edema  Skin:     General: Skin is warm  Neurological:      General: No focal deficit present  Mental Status: He is alert and oriented to person, place, and time  Additional Data:     Labs:  Results from last 7 days   Lab Units 10/20/22  0434 10/19/22  0553 10/18/22  0620   WBC Thousand/uL 24 65*   < > 25 68*   HEMOGLOBIN g/dL 11 4*   < > 10 7*   HEMATOCRIT % 32 4*   < > 31 1*   PLATELETS Thousands/uL 366   < > 313   BANDS PCT %  --   --  16*   NEUTROS PCT % 85*   < >  --    LYMPHS PCT % 5*   < >  --    LYMPHO PCT %  --   --  6*   MONOS PCT % 5   < >  --    MONO PCT %  --   --  6   EOS PCT % 2   < > 3    < > = values in this interval not displayed       Results from last 7 days   Lab Units 10/20/22  0434   SODIUM mmol/L 133*   POTASSIUM mmol/L 3 9   CHLORIDE mmol/L 97   CO2 mmol/L 29   BUN mg/dL 5   CREATININE mg/dL 0 71   ANION GAP mmol/L 7   CALCIUM mg/dL 8 5   ALBUMIN g/dL 1 9*   TOTAL BILIRUBIN mg/dL 1 80*   ALK PHOS U/L 290*   ALT U/L 45   AST U/L 71*   GLUCOSE RANDOM mg/dL 113     Results from last 7 days   Lab Units 10/20/22  0434   INR  1 21*             Results from last 7 days   Lab Units 10/20/22  0434 10/16/22  0552 10/14/22  1534   LACTIC ACID mmol/L  --   --  1 9   PROCALCITONIN ng/ml 0 68* 0 64*  --        Lines/Drains:  Invasive Devices  Report    Peripheral Intravenous Line  Duration           Peripheral IV 10/16/22 Left;Proximal;Ventral (anterior) Forearm 3 days                      Imaging: Reviewed radiology reports from this admission including: procedure reports    Recent Cultures (last 7 days):   Results from last 7 days   Lab Units 10/19/22  1449 10/17/22  1342 10/14/22  1534   BLOOD CULTURE   --   --  No Growth After 5 Days  No Growth After 5 Days  GRAM STAIN RESULT  Rare Polys  No bacteria seen No Polys or Bacteria seen  --    BODY FLUID CULTURE, STERILE  No growth No growth  --        Last 24 Hours Medication List:   Current Facility-Administered Medications   Medication Dose Route Frequency Provider Last Rate   • albuterol  2 puff Inhalation Q4H PRN Norma Castro PA-C     • chlordiazePOXIDE  10 mg Oral Q8H Og Milton MD     • folic acid  1 mg Oral Daily Norma Castro PA-C     • HYDROmorphone  0 5 mg Intravenous Q4H PRN Jason Aburto PA-C     • lactulose  20 g Oral Daily MACHO Harrington     • LORazepam  2 mg Intravenous Once Olubusola O Angeline, DO     • morphine injection  2 mg Intravenous Q8H Rajesh Butler MD     • multivitamin-minerals  1 tablet Oral Daily Norma Castro PA-C     • nicotine  21 mg Transdermal Daily Jason Aburto PA-C     • ondansetron  4 mg Intravenous Q4H PRN Norma Castro PA-C     • oxyCODONE  5 mg Oral Q6H PRN Rajesh Butler MD     • polyethylene glycol  17 g Oral Daily MACHO Harrington     • [START ON 10/21/2022] spironolactone  50 mg Oral Daily Prabha Rodriguez MD     • thiamine  100 mg Oral Daily Norma Castro PA-C          Today, Patient Was Seen By: Prabha Rodriguez    **Please Note: This note may have been constructed using a voice recognition system  **

## 2022-10-20 NOTE — CASE MANAGEMENT
Case Management Discharge Planning Note    Patient name Kali Casas  Location /-93 MRN [id number]  : 1965 Date 10/20/2022       Current Admission Date: 10/14/2022  Current Admission Diagnosis:Alcoholic cirrhosis of liver with ascites [facility])   Patient Active Problem List    Diagnosis Date Noted   • Sepsis without acute organ dysfunction ([place] [address] )    • Alcoholic cirrhosis of liver with ascites ([place] [address] ) 10/14/2022   • Ascites 10/14/2022   • SIRS (systemic inflammatory response syndrome) ([place] [address] ) 10/14/2022   • Alcohol use 10/14/2022   • Dark stools 2022   • Hepatomegaly 2022   • Lipoma of left lower extremity 2022   • Finger injury, right, initial encounter 03/10/2020   • Sebaceous cyst 03/10/2020   • Cough 2020   • Mass of left hand 2018   • Cervical radiculopathy due to degenerative joint disease of spine 2018   • Degenerative disc disease, cervical 2018   • Anterolisthesis 2018   • COPD (chronic obstructive pulmonary disease) ([place] [address] ) 2018   • Chronic sinusitis 2017   • GERD (gastroesophageal reflux disease) 10/11/2017   • Bipolar II disorder ([place] [address] ) 2016   • Chronic depression 2016   • Polysubstance dependence ([place] [address] ) 2016   • Nicotine dependence 2014      LOS (days): 6  Geometric Mean LOS (GMLOS) (days): 4 60  Days to GMLOS:-1 3     OBJECTIVE:  Risk of Unplanned Readmission Score: 15 26         Current admission status: Inpatient   Preferred Pharmacy:   [address]  [address]  [address] [facility] [address]  Phone: [phone] Fax: [phone], [address]   [address]   [address] [facility] [address]  Phone: [phone] Fax: [phone]    Primary Care Provider: Abi Santiago,     Primary Insurance: MEDICARE  Secondary Insurance: [facility]    DISCHARGE DETA           IMM Given (Date):: 10/20/22  IMM Given to[de-identified] Patient

## 2022-10-20 NOTE — PROGRESS NOTES
Progress note - Gastroenterology   [facility] 56 y o  male MRN: [id number]  Unit/Bed#: -01 Encounter: [id number]    ASSESSMENT and PLAN    21-year-old male with tobacco and alcohol abuse and COPD admitted with SIRS, possible cirrhosis, alcoholic hepatitis, new ascites with concern for SBP     1) New onset ascites and generalized abdominal pain in setting of SIRS: Patient met SIRS criteria on admission, concern for SBP given new ascites  No other definitive source of infection  UA, blood cultures, chest x-ray negative      On exam,  patient continues to complain of abdominal discomfort increased with palpation  Patient remains mildly distended  Tolerating his meal   Discussed with patient importance of taking his medications and follow-up as an outpatient  Patient had paracentesis yesterday for 2 L  even though patient's SBP has been negative, he was treated with antibiotics for 5 days due to clinical presentation      - SAAG > 1 1 (1 4) 10/17 for detecting portal hypertension   -continue empiric treatment with Zosyn completed  - recommend 2 g sodium diet   - increased Aldactone 50 mg po qd,  will give patient 1 time dose of Lasix 20 mg p o     Will have office contact patient to set up appointment for follow-up as soon as possible  Anticipate having patient referred to Dr Manzo Signs hepatologist for follow-up and treatment  Patient wants to be referred for a new liver         2) Possible cirrhosis with alcoholic hepatitis:   MELD 11 and DF 16 1    Previous lab work-up negative for viral, autoimmune, metabolic etiologies  CT from 10/14 shows slightly lobulated contour of the liver suggestive of cirrhosis, hepatomegaly, new ascites   Liver enzymes trending down   T bili stable at 1 80, AST 71, ALT 45, alkaline phosphatase 290, Creatinine 0 71, hemoglobin 11 4, platelets 989  GAPM 76 3 K      - monitor CBC, CMP, INR (MELD score)  - reinforced need for complete alcohol cessation  -no indication for steroids based on low DF and concern for infection  -recommend EGD as outpatient for varices screening  -patient should follow-up in outpatient GI office for continued monitoring    Explained to patient that the office will contact him for follow-up appointment  He would prefer to come to [facility] versus hours till if possible  Per patient he is scheduled for colonoscopy sometime in November     3) Alcohol abuse:     -patient understands he must stop drinking  -continue folic acid and thiamine  -monitor for withdrawal symptoms     4  Constipation   In patient's current setting and new diagnosis of ascites and rule out cirrhosis, will initiate lactulose 20 mg daily      - MiraLax 17 g daily, hold with diarrhea    Chief Complaint   Patient presents with   • Abdominal Pain     Pt c/o of rioght sided abdomianla pain x 1 days  SUBJECTIVE/HPI   Patient states he has done well today  Tolerating his meal   States he does continue to have abdominal discomfort with his abdominal distension  Explained to patient importance of his follow-up with our office and most likely being referred to our hepatologist   Patient's desire is to get a new liver      /76 (BP Location: Right arm)   Pulse 103   Temp 98 1 °F (36 7 °C) (Oral)   Resp 18   Ht 5' 7" (1 702 m)   Wt 66 7 kg (147 lb 1 6 oz)   SpO2 96%   BMI 23 04 kg/m²     PHYSICALEXAM  General appearance: alert, appears stated age and cooperative  Eyes: PERLLA, EOMI, no icterus   Head: Normocephalic, without obvious abnormality, atraumatic  Lungs: clear to auscultation bilaterally  Heart: regular rate and rhythm, S1, S2 normal, no murmur, click, rub or gallop  Abdomen:  Large, firm, mild tenderness with palpation; bowel sounds normal; no masses,  no organomegaly  Extremities: extremities normal, atraumatic, no cyanosis or edema  Neurologic: Grossly normal    Lab Results   Component Value Date    CALCIUM 8 5 10/20/2022    K 3 9 10/20/2022    CO2 29 10/20/2022 CL 97 10/20/2022    BUN 5 10/20/2022    CREATININE 0 71 10/20/2022     Lab Results   Component Value Date    WBC 24 65 (H) 10/20/2022    HGB 11 4 (L) 10/20/2022    HCT 32 4 (L) 10/20/2022     (H) 10/20/2022     10/20/2022     Lab Results   Component Value Date    ALT 45 10/20/2022    AST 71 (H) 10/20/2022    GGT 1,144 (H) 09/28/2022    ALKPHOS 290 (H) 10/20/2022     Lab Results   Component Value Date    AMYLASE 21 (L) 09/28/2022     Lab Results   Component Value Date    LIPASE 308 10/14/2022     Lab Results   Component Value Date    IRON 96 10/15/2022    TIBC 158 (L) 10/15/2022    FERRITIN 423 (H) 10/15/2022     Lab Results   Component Value Date    INR 1 21 (H) 10/20/2022

## 2022-10-21 ENCOUNTER — APPOINTMENT (INPATIENT)
Dept: NON INVASIVE DIAGNOSTICS | Facility: HOSPITAL | Age: 57
DRG: 871 | End: 2022-10-21
Payer: MEDICARE

## 2022-10-21 ENCOUNTER — APPOINTMENT (INPATIENT)
Dept: INTERVENTIONAL RADIOLOGY/VASCULAR | Facility: HOSPITAL | Age: 57
DRG: 871 | End: 2022-10-21
Attending: STUDENT IN AN ORGANIZED HEALTH CARE EDUCATION/TRAINING PROGRAM
Payer: MEDICARE

## 2022-10-21 PROBLEM — D72.829 LEUKOCYTOSIS: Status: ACTIVE | Noted: 2022-10-21

## 2022-10-21 LAB
ALBUMIN SERPL BCP-MCNC: 1.8 G/DL (ref 3.5–5)
ALP SERPL-CCNC: 276 U/L (ref 46–116)
ALT SERPL W P-5'-P-CCNC: 43 U/L (ref 12–78)
ANION GAP SERPL CALCULATED.3IONS-SCNC: 6 MMOL/L (ref 4–13)
AORTIC ROOT: 3.9 CM
APICAL FOUR CHAMBER EJECTION FRACTION: 62 %
ASCENDING AORTA: 3.6 CM
AST SERPL W P-5'-P-CCNC: 67 U/L (ref 5–45)
BASOPHILS # BLD AUTO: 0.17 THOUSANDS/ÂΜL (ref 0–0.1)
BASOPHILS NFR BLD AUTO: 1 % (ref 0–1)
BILIRUB SERPL-MCNC: 1.6 MG/DL (ref 0.2–1)
BUN SERPL-MCNC: 4 MG/DL (ref 5–25)
CALCIUM ALBUM COR SERPL-MCNC: 9.7 MG/DL (ref 8.3–10.1)
CALCIUM SERPL-MCNC: 7.9 MG/DL (ref 8.3–10.1)
CHLORIDE SERPL-SCNC: 99 MMOL/L (ref 96–108)
CO2 SERPL-SCNC: 29 MMOL/L (ref 21–32)
CREAT SERPL-MCNC: 0.67 MG/DL (ref 0.6–1.3)
DOP CALC LVOT AREA: 3.46 CM2
DOP CALC LVOT DIAMETER: 2.1 CM
E WAVE DECELERATION TIME: 169 MS
EOSINOPHIL # BLD AUTO: 0.49 THOUSAND/ÂΜL (ref 0–0.61)
EOSINOPHIL NFR BLD AUTO: 2 % (ref 0–6)
ERYTHROCYTE [DISTWIDTH] IN BLOOD BY AUTOMATED COUNT: 16.4 % (ref 11.6–15.1)
FRACTIONAL SHORTENING: 35 % (ref 28–44)
GFR SERPL CREATININE-BSD FRML MDRD: 108 ML/MIN/1.73SQ M
GLUCOSE SERPL-MCNC: 130 MG/DL (ref 65–140)
HCT VFR BLD AUTO: 32.8 % (ref 36.5–49.3)
HGB BLD-MCNC: 11.3 G/DL (ref 12–17)
HISTIOCYTES NFR FLD: 12 %
IMM GRANULOCYTES # BLD AUTO: 0.33 THOUSAND/UL (ref 0–0.2)
IMM GRANULOCYTES NFR BLD AUTO: 2 % (ref 0–2)
INTERVENTRICULAR SEPTUM IN DIASTOLE (PARASTERNAL SHORT AXIS VIEW): 1 CM
INTERVENTRICULAR SEPTUM: 1 CM (ref 0.6–1.1)
LEFT ATRIUM AREA SYSTOLE SINGLE PLANE A4C: 7.6 CM2
LEFT ATRIUM SIZE: 2.5 CM
LEFT INTERNAL DIMENSION IN SYSTOLE: 2.2 CM (ref 2.1–4)
LEFT VENTRICULAR INTERNAL DIMENSION IN DIASTOLE: 3.4 CM (ref 3.5–6)
LEFT VENTRICULAR POSTERIOR WALL IN END DIASTOLE: 1 CM
LEFT VENTRICULAR STROKE VOLUME: 30 ML
LVSV (TEICH): 30 ML
LYMPHOCYTES # BLD AUTO: 1.18 THOUSANDS/ÂΜL (ref 0.6–4.47)
LYMPHOCYTES NFR BLD AUTO: 1 %
LYMPHOCYTES NFR BLD AUTO: 5 % (ref 14–44)
MCH RBC QN AUTO: 35.5 PG (ref 26.8–34.3)
MCHC RBC AUTO-ENTMCNC: 34.5 G/DL (ref 31.4–37.4)
MCV RBC AUTO: 103 FL (ref 82–98)
MONOCYTES # BLD AUTO: 1.26 THOUSAND/ÂΜL (ref 0.17–1.22)
MONOCYTES NFR BLD AUTO: 28 %
MONOCYTES NFR BLD AUTO: 6 % (ref 4–12)
MV E'TISSUE VEL-SEP: 5 CM/S
MV PEAK A VEL: 0.8 M/S
MV PEAK E VEL: 57 CM/S
MV STENOSIS PRESSURE HALF TIME: 49 MS
MV VALVE AREA P 1/2 METHOD: 4.49 CM2
NEUTROPHILS # BLD AUTO: 18.93 THOUSANDS/ÂΜL (ref 1.85–7.62)
NEUTS SEG NFR BLD AUTO: 59 %
NEUTS SEG NFR BLD AUTO: 84 % (ref 43–75)
NRBC BLD AUTO-RTO: 0 /100 WBCS
PLATELET # BLD AUTO: 363 THOUSANDS/UL (ref 149–390)
PMV BLD AUTO: 9.5 FL (ref 8.9–12.7)
POTASSIUM SERPL-SCNC: 3.6 MMOL/L (ref 3.5–5.3)
PROT SERPL-MCNC: 5.9 G/DL (ref 6.4–8.4)
RBC # BLD AUTO: 3.18 MILLION/UL (ref 3.88–5.62)
RIGHT ATRIUM AREA SYSTOLE A4C: 9.1 CM2
RIGHT VENTRICLE ID DIMENSION: 2.8 CM
SITE: NORMAL
SL CV LV EF: 62
SL CV PED ECHO LEFT VENTRICLE DIASTOLIC VOLUME (MOD BIPLANE) 2D: 47 ML
SL CV PED ECHO LEFT VENTRICLE SYSTOLIC VOLUME (MOD BIPLANE) 2D: 17 ML
SODIUM SERPL-SCNC: 134 MMOL/L (ref 135–147)
TOTAL CELLS COUNTED SPEC: 100
TRICUSPID ANNULAR PLANE SYSTOLIC EXCURSION: 1.6 CM
WBC # BLD AUTO: 22.36 THOUSAND/UL (ref 4.31–10.16)
WBC # FLD MANUAL: 290 /UL

## 2022-10-21 PROCEDURE — 87070 CULTURE OTHR SPECIMN AEROBIC: CPT | Performed by: STUDENT IN AN ORGANIZED HEALTH CARE EDUCATION/TRAINING PROGRAM

## 2022-10-21 PROCEDURE — 99233 SBSQ HOSP IP/OBS HIGH 50: CPT | Performed by: STUDENT IN AN ORGANIZED HEALTH CARE EDUCATION/TRAINING PROGRAM

## 2022-10-21 PROCEDURE — 49083 ABD PARACENTESIS W/IMAGING: CPT

## 2022-10-21 PROCEDURE — 49083 ABD PARACENTESIS W/IMAGING: CPT | Performed by: RADIOLOGY

## 2022-10-21 PROCEDURE — C1729 CATH, DRAINAGE: HCPCS

## 2022-10-21 PROCEDURE — 93306 TTE W/DOPPLER COMPLETE: CPT | Performed by: INTERNAL MEDICINE

## 2022-10-21 PROCEDURE — 93306 TTE W/DOPPLER COMPLETE: CPT

## 2022-10-21 PROCEDURE — 89051 BODY FLUID CELL COUNT: CPT | Performed by: STUDENT IN AN ORGANIZED HEALTH CARE EDUCATION/TRAINING PROGRAM

## 2022-10-21 PROCEDURE — 99233 SBSQ HOSP IP/OBS HIGH 50: CPT | Performed by: INTERNAL MEDICINE

## 2022-10-21 PROCEDURE — 80053 COMPREHEN METABOLIC PANEL: CPT | Performed by: STUDENT IN AN ORGANIZED HEALTH CARE EDUCATION/TRAINING PROGRAM

## 2022-10-21 PROCEDURE — 85025 COMPLETE CBC W/AUTO DIFF WBC: CPT | Performed by: STUDENT IN AN ORGANIZED HEALTH CARE EDUCATION/TRAINING PROGRAM

## 2022-10-21 PROCEDURE — 87205 SMEAR GRAM STAIN: CPT | Performed by: STUDENT IN AN ORGANIZED HEALTH CARE EDUCATION/TRAINING PROGRAM

## 2022-10-21 PROCEDURE — 0W9G3ZZ DRAINAGE OF PERITONEAL CAVITY, PERCUTANEOUS APPROACH: ICD-10-PCS | Performed by: RADIOLOGY

## 2022-10-21 RX ORDER — SACCHAROMYCES BOULARDII 250 MG
250 CAPSULE ORAL 2 TIMES DAILY
Status: DISCONTINUED | OUTPATIENT
Start: 2022-10-21 | End: 2022-10-28 | Stop reason: HOSPADM

## 2022-10-21 RX ORDER — LIDOCAINE HYDROCHLORIDE 10 MG/ML
INJECTION, SOLUTION EPIDURAL; INFILTRATION; INTRACAUDAL; PERINEURAL CODE/TRAUMA/SEDATION MEDICATION
Status: COMPLETED | OUTPATIENT
Start: 2022-10-21 | End: 2022-10-21

## 2022-10-21 RX ADMIN — POLYETHYLENE GLYCOL 3350 17 G: 17 POWDER, FOR SOLUTION ORAL at 11:09

## 2022-10-21 RX ADMIN — OXYCODONE HYDROCHLORIDE 5 MG: 5 TABLET ORAL at 20:48

## 2022-10-21 RX ADMIN — Medication 100 MG: at 11:09

## 2022-10-21 RX ADMIN — PIPERACILLIN AND TAZOBACTAM 3.38 G: 3; .375 INJECTION, POWDER, LYOPHILIZED, FOR SOLUTION INTRAVENOUS at 22:34

## 2022-10-21 RX ADMIN — OXYCODONE HYDROCHLORIDE 5 MG: 5 TABLET ORAL at 14:50

## 2022-10-21 RX ADMIN — NICOTINE 21 MG: 21 PATCH, EXTENDED RELEASE TRANSDERMAL at 11:16

## 2022-10-21 RX ADMIN — LACTULOSE 20 G: 20 SOLUTION ORAL at 11:09

## 2022-10-21 RX ADMIN — CHLORDIAZEPOXIDE HYDROCHLORIDE 10 MG: 5 CAPSULE ORAL at 21:50

## 2022-10-21 RX ADMIN — MULTIPLE VITAMINS W/ MINERALS TAB 1 TABLET: TAB ORAL at 11:10

## 2022-10-21 RX ADMIN — FOLIC ACID 1 MG: 1 TABLET ORAL at 11:09

## 2022-10-21 RX ADMIN — PIPERACILLIN AND TAZOBACTAM 3.38 G: 3; .375 INJECTION, POWDER, LYOPHILIZED, FOR SOLUTION INTRAVENOUS at 16:45

## 2022-10-21 RX ADMIN — Medication 250 MG: at 11:10

## 2022-10-21 RX ADMIN — PIPERACILLIN AND TAZOBACTAM 3.38 G: 3; .375 INJECTION, POWDER, LYOPHILIZED, FOR SOLUTION INTRAVENOUS at 11:10

## 2022-10-21 RX ADMIN — CHLORDIAZEPOXIDE HYDROCHLORIDE 10 MG: 5 CAPSULE ORAL at 05:29

## 2022-10-21 RX ADMIN — Medication 250 MG: at 16:45

## 2022-10-21 RX ADMIN — OXYCODONE HYDROCHLORIDE 5 MG: 5 TABLET ORAL at 20:49

## 2022-10-21 RX ADMIN — SPIRONOLACTONE 50 MG: 25 TABLET ORAL at 11:09

## 2022-10-21 RX ADMIN — LIDOCAINE HYDROCHLORIDE 9 ML: 10 INJECTION, SOLUTION EPIDURAL; INFILTRATION; INTRACAUDAL; PERINEURAL at 15:24

## 2022-10-21 RX ADMIN — CHLORDIAZEPOXIDE HYDROCHLORIDE 10 MG: 5 CAPSULE ORAL at 14:49

## 2022-10-21 NOTE — ASSESSMENT & PLAN NOTE
Presented with diffuse abdominal pain and abdominal distension  Likely secondary to alcoholic cirrhosis  s/p IR paracentesis on 10/17 labs showing possible SBP and portal HTN  S/p IR paracentesis on 10/19 labs pending  Per PCP note, had bloody stools and reported fevers at home   Concern for possible SBP  Trend WBC and fever curve  Zosyn day 6-> may transition to po on discharge  Increased aldactone to 50 mg qd  GI consult appreciated  bld cx NGTD  F/u with GI outpt and will need EGD outpt,

## 2022-10-21 NOTE — PROGRESS NOTES
[place]  Progress Note - Alessandro Ellsworth 1965, 56 y o  male MRN: [id number]  Unit/Bed#: -Annabel Encounter: [id number]  Primary Care Provider: nIes Quiles DO   Date and time admitted to hospital: 10/14/2022  4:50 PM    * Alcoholic cirrhosis of liver with ascites [facility])  Assessment & Plan  New onset cirrhosis  Was noted to have abdominal distension, hepatomegaly, with dark stools by PCP on 09/28/2022  · Mild transaminitis with , ALT 72  Alk-phos 400  T bili 1 9, direct bili 1 4  · CT abdomen/pelvis shows new onset cirrhosis with moderate ascites  · Appeared mildly encephalopathic on admission  · Suspect alcoholic liver disease  · On folate/ thiamine/ MTV supplementation    Alcohol use  Assessment & Plan  Drinks vodka and juice daily  Never attempted to quit due to experiencing withdrawals w/ tremors  Denies seizures/hallucinations  CIWA protocol  Thiamine/folate/MTV  On Librium  At risk for DT    Ascites  Assessment & Plan  Presented with diffuse abdominal pain and abdominal distension  Likely secondary to alcoholic cirrhosis  s/p IR paracentesis on 10/17 labs showing possible SBP and portal HTN  S/p IR paracentesis on 10/19 labs pending  Per PCP note, had bloody stools and reported fevers at home   Concern for possible SBP  Trend WBC and fever curve  Zosyn day 6-> may transition to po on discharge  Increased aldactone to 50 mg qd  GI consult appreciated  bld cx NGTD  F/u with GI outpt and will need EGD outpt,     Encephalopathy-resolved as of 10/19/2022  Assessment & Plan  Presented with metabolic encephalopathy in ED POA  Suspect hepatic encephalopathy vs SBP  Ammonia level is 38  Received a dose of lactulose and ammonia levels are normal this morning  Continue thiamine and folate  Trend WBC and fever curve  resolved    Leukocytosis  Assessment & Plan  Continues to have elevated WBC  Echo pending  ID consulted  Zosyn day 6    Sepsis without acute organ dysfunction [facility])  Assessment & Plan  Sepsis, present on admission, due to possible SBP; as evidenced by meeting SIRS criteria on admission with an infectious process present; requiring blood cultures x 2, lactic acid level, and IV Zosyn  SIRS (systemic inflammatory response syndrome) ([facility])  Assessment & Plan  Meets SIRS criteria w tachycardia (HR 120s) and leukocytosis (WBC 33)  Reports fevers/chills at home, however afebrile while inpatient  No definitive source of infection, however concern for possible SBP  bld cx NGTD    COPD (chronic obstructive pulmonary disease) ([facility])  Assessment & Plan  No shortness of breath/wheezing  Not acute exacerbation  Albuterol inhaler PRN    Nicotine dependence  Assessment & Plan  Smokes approximately a pack a day  Nicotine patch      VTE Pharmacologic Prophylaxis:   Low Risk (Score 0-2) - Encourage Ambulation  Patient Centered Rounds: I performed bedside rounds with nursing staff today  Discussions with Specialists or Other Care Team Provider: GI, ID, CM    Education and Discussions with Family / Patient: pt  Time Spent for Care: 30 minutes  More than 50% of total time spent on counseling and coordination of care as described above  Current Length of Stay: 7 day(s)  Current Patient Status: Inpatient   Certification Statement: The patient will continue to require additional inpatient hospital stay due to leukocytosis  Discharge Plan: Anticipate discharge in 24-48 hrs to home  Code Status: Level 1 - Full Code    Subjective: Johnny was seen and examined at bedside  No acute events overnight  Discussed plan of care  All questions and concerns were answered and addressed  Objective:     Vitals:   Temp (24hrs), Av 2 °F (36 8 °C), Min:98 1 °F (36 7 °C), Max:98 5 °F (36 9 °C)    Temp:  [98 1 °F (36 7 °C)-98 5 °F (36 9 °C)] 98 5 °F (36 9 °C)  HR:  [] 99  Resp:  [18-20] 20  BP: (129-135)/(76-92) 129/80  SpO2:  [96 %-99 %] 98 %  Body mass index is 23 04 kg/m²  Input and Output Summary (last 24 hours): Intake/Output Summary (Last 24 hours) at 10/21/2022 0757  Last data filed at 10/20/2022 0901  Gross per 24 hour   Intake 240 ml   Output --   Net 240 ml       Physical Exam:   Physical Exam   Vitals and nursing note reviewed  Constitutional:       Appearance: Normal appearance  He is ill-appearing (chronically)  HENT:      Head: Normocephalic and atraumatic  Cardiovascular:      Rate and Rhythm: Normal rate and regular rhythm  Pulses: Normal pulses  Heart sounds: Normal heart sounds  Pulmonary:      Effort: Pulmonary effort is normal       Breath sounds: Normal breath sounds  Abdominal:      General: Bowel sounds are normal  There is distension  Palpations: Abdomen is soft  Musculoskeletal:      Right lower leg: No edema  Left lower leg: No edema  Skin:     General: Skin is warm  Neurological:      General: No focal deficit present  Mental Status: He is alert and oriented to person, place, and time  Additional Data:     Labs:  Results from last 7 days   Lab Units 10/20/22  0434 10/19/22  0553 10/18/22  0620   WBC Thousand/uL 24 65*   < > 25 68*   HEMOGLOBIN g/dL 11 4*   < > 10 7*   HEMATOCRIT % 32 4*   < > 31 1*   PLATELETS Thousands/uL 366   < > 313   BANDS PCT %  --   --  16*   NEUTROS PCT % 85*   < >  --    LYMPHS PCT % 5*   < >  --    LYMPHO PCT %  --   --  6*   MONOS PCT % 5   < >  --    MONO PCT %  --   --  6   EOS PCT % 2   < > 3    < > = values in this interval not displayed       Results from last 7 days   Lab Units 10/20/22  0434   SODIUM mmol/L 133*   POTASSIUM mmol/L 3 9   CHLORIDE mmol/L 97   CO2 mmol/L 29   BUN mg/dL 5   CREATININE mg/dL 0 71   ANION GAP mmol/L 7   CALCIUM mg/dL 8 5   ALBUMIN g/dL 1 9*   TOTAL BILIRUBIN mg/dL 1 80*   ALK PHOS U/L 290*   ALT U/L 45   AST U/L 71*   GLUCOSE RANDOM mg/dL 113     Results from last 7 days   Lab Units 10/20/22  0434   INR  1 21*             Results from last 7 days Lab Units 10/20/22  0434 10/16/22  0552 10/14/22  1534   LACTIC ACID mmol/L  --   --  1 9   PROCALCITONIN ng/ml 0 68* 0 64*  --        Lines/Drains:  Invasive Devices  Report    Peripheral Intravenous Line  Duration           Peripheral IV 10/16/22 Left;Proximal;Ventral (anterior) Forearm 4 days                Imaging: No pertinent imaging reviewed  Recent Cultures (last 7 days):   Results from last 7 days   Lab Units 10/19/22  1449 10/17/22  1342 10/14/22  1534   BLOOD CULTURE   --   --  No Growth After 5 Days  No Growth After 5 Days  GRAM STAIN RESULT  Rare Polys  No bacteria seen No Polys or Bacteria seen  --    BODY FLUID CULTURE, STERILE  No growth No growth  --        Last 24 Hours Medication List:   Current Facility-Administered Medications   Medication Dose Route Frequency Provider Last Rate   • albuterol  2 puff Inhalation Q4H PRN Rin Diane PA-C     • chlordiazePOXIDE  10 mg Oral Q8H Kali Miles MD     • folic acid  1 mg Oral Daily Rin Diane PA-C     • ibuprofen  400 mg Oral Q6H PRN Rupesh Maza MD     • lactulose  20 g Oral Daily MACHO Hawthorne     • LORazepam  2 mg Intravenous Once Bobe Cooler, DO     • multivitamin-minerals  1 tablet Oral Daily Rin Daine PA-C     • nicotine  21 mg Transdermal Daily Luis Arrington PA-C     • ondansetron  4 mg Intravenous Q4H PRN Rin Diane PA-C     • oxyCODONE  5 mg Oral Q6H PRN Paulino Mckeon MD     • piperacillin-tazobactam  3 375 g Intravenous Q6H Rupesh Maza MD     • polyethylene glycol  17 g Oral Daily MACHO Hawthorne     • saccharomyces boulardii  250 mg Oral BID Rupesh Maza MD     • spironolactone  50 mg Oral Daily Rupesh Maza MD     • thiamine  100 mg Oral Daily Rin Diane PA-C          Today, Patient Was Seen By: Rupesh Maza    **Please Note: This note may have been constructed using a voice recognition system  **

## 2022-10-21 NOTE — PLAN OF CARE
Problem: Potential for Falls  Goal: Patient will remain free of falls  Description: INTERVENTIONS:  - Educate patient/family on patient safety including physical limitations  - Instruct patient to call for assistance with activity   - Consult OT/PT to assist with strengthening/mobility   - Keep Call bell within reach  - Keep bed low and locked with side rails adjusted as appropriate  - Keep care items and personal belongings within reach  - Initiate and maintain comfort rounds  - Make Fall Risk Sign visible to staff  - Offer Toileting every 2 Hours, in advance of need  - Initiate/Maintain alarm  - Obtain necessary fall risk management equipment:   - Apply yellow socks and bracelet for high fall risk patients  - Consider moving patient to room near nurses station  Outcome: Progressing     Problem: PAIN - ADULT  Goal: Verbalizes/displays adequate comfort level or baseline comfort level  Description: Interventions:  - Encourage patient to monitor pain and request assistance  - Assess pain using appropriate pain scale  - Administer analgesics based on type and severity of pain and evaluate response  - Implement non-pharmacological measures as appropriate and evaluate response  - Consider cultural and social influences on pain and pain management  - Notify physician/advanced practitioner if interventions unsuccessful or patient reports new pain  Outcome: Progressing     Problem: INFECTION - ADULT  Goal: Absence or prevention of progression during hospitalization  Description: INTERVENTIONS:  - Assess and monitor for signs and symptoms of infection  - Monitor lab/diagnostic results  - Monitor all insertion sites, i e  indwelling lines, tubes, and drains  - Monitor endotracheal if appropriate and nasal secretions for changes in amount and color  - [place] appropriate cooling/warming therapies per order  - Administer medications as ordered  - Instruct and encourage patient and family to use good hand hygiene technique  - Identify and instruct in appropriate isolation precautions for identified infection/condition  Outcome: Progressing  Goal: Absence of fever/infection during neutropenic period  Description: INTERVENTIONS:  - Monitor WBC    Outcome: Progressing     Problem: SAFETY ADULT  Goal: Patient will remain free of falls  Description: INTERVENTIONS:  - Educate patient/family on patient safety including physical limitations  - Instruct patient to call for assistance with activity   - Consult OT/PT to assist with strengthening/mobility   - Keep Call bell within reach  - Keep bed low and locked with side rails adjusted as appropriate  - Keep care items and personal belongings within reach  - Initiate and maintain comfort rounds  - Make Fall Risk Sign visible to staff  - Offer Toileting every 2 Hours, in advance of need  - Initiate/Maintain alarm  - Obtain necessary fall risk management equipment:   - Apply yellow socks and bracelet for high fall risk patients  - Consider moving patient to room near nurses station  Outcome: Progressing  Goal: Maintain or return to baseline ADL function  Description: INTERVENTIONS:  - Educate patient/family on patient safety including physical limitations  - Instruct patient to call for assistance with activity   - Consult OT/PT to assist with strengthening/mobility   - Keep Call bell within reach  - Keep bed low and locked with side rails adjusted as appropriate  - Keep care items and personal belongings within reach  - Initiate and maintain comfort rounds  - Make Fall Risk Sign visible to staff  - Offer Toileting every 2 Hours, in advance of need  - Initiate/Maintain alarm  - Obtain necessary fall risk management equipment:   - Apply yellow socks and bracelet for high fall risk patients  - Consider moving patient to room near nurses station  Outcome: Progressing  Goal: Maintains/Returns to pre admission functional level  Description: INTERVENTIONS:  - Perform BMAT or MOVE assessment daily    - Set and communicate daily mobility goal to care team and patient/family/caregiver  - Collaborate with rehabilitation services on mobility goals if consulted  - Out of bed for toileting  - Record patient progress and toleration of activity level   Outcome: Progressing     Problem: DISCHARGE PLANNING  Goal: Discharge to home or other facility with appropriate resources  Description: INTERVENTIONS:  - Identify barriers to discharge w/patient and caregiver  - Arrange for needed discharge resources and transportation as appropriate  - Identify discharge learning needs (meds, wound care, etc )  - Arrange for interpretive services to assist at discharge as needed  - Refer to Case Management Department for coordinating discharge planning if the patient needs post-hospital services based on physician/advanced practitioner order or complex needs related to functional status, cognitive ability, or social support system  Outcome: Progressing     Problem: Knowledge Deficit  Goal: Patient/family/caregiver demonstrates understanding of disease process, treatment plan, medications, and discharge instructions  Description: Complete learning assessment and assess knowledge base    Interventions:  - Provide teaching at level of understanding  - Provide teaching via preferred learning methods  Outcome: Progressing

## 2022-10-21 NOTE — ASSESSMENT & PLAN NOTE
Drinks vodka and juice daily  Never attempted to quit due to experiencing withdrawals w/ tremors   Denies seizures/hallucinations  [facility] protocol  Thiamine/folate/MTV  On Librium  At risk for DT

## 2022-10-21 NOTE — SEDATION DOCUMENTATION
Therapeutic paracentesis done with removal 2400ml clear yellow fluid  Band aid to site  Patient tolerated well and transferred back to room via w/c  Specimen sent to lab

## 2022-10-21 NOTE — PROGRESS NOTES
Progress note - Gastroenterology   Yevonne Bernheim 56 y o  male MRN: [id number]  Unit/Bed#: -01 Encounter: [id number]    ASSESSMENT and PLAN    80-year-old male with tobacco and alcohol abuse and COPD admitted with SIRS, possible cirrhosis, alcoholic hepatitis, new ascites with concern for SBP     1) New onset ascites and generalized abdominal pain in setting of SIRS: Patient met SIRS criteria on admission, concern for SBP given new ascites  No other definitive source of infection  UA, blood cultures, chest x-ray negative      On exam,  patient continues to complain of generalized abdominal discomfort increased with palpation  Patient remains mildly distended  Tolerating his meal   Discussed with patient importance of taking his medications and follow-up as an outpatient    Patient had paracentesis 10/19 for 2 L  even though patient's SBP has been negative, clinically he was treated with antibiotics  Discussed with Hospitalist/ Dr Bowen Listen patient's noted continued increased WBCs  Infectious Disease has been consulted and echocardiogram ordered  In discussion with patient today he asked me what we were going to do with his abscessed tooth  He pointed to the left back part of his mouth and stated he has had an abscess there for some time  - Paracentesis as needed while IP  - SAAG > 1 1 (1 4) 10/17 for detecting portal hypertension   -continue empiric treatment with Zosyn   - recommend 2 g sodium diet   - Aldactone 50 mg po qd  - patient will need order for p r n  Paracentesis with IR department upon discharge       Will have office contact patient to set up appointment for follow-up as soon as possible  Anticipate having patient referred to Dr Chitra Khan hepatologist for follow-up and treatment    Patient wants to be referred for a new liver         2) Possible cirrhosis with alcoholic hepatitis:   FQKU 99 LZF DF 16 1  (10/20)  Previous lab work-up negative for viral, autoimmune, metabolic etiologies  CT from 10/14 shows slightly lobulated contour of the liver suggestive of cirrhosis, hepatomegaly, new ascites   Liver enzymes trending down  T bili stable at 1 60, AST 67, ALT 43, alkaline phosphatase 276, Creatinine 0 67, hemoglobin 11 3, platelets 589  WNME 89 5 K      - infectious disease consult  - echocardiogram  - monitor CBC, CMP, INR (MELD score)  - reinforced need for complete alcohol cessation  -no indication for steroids based on low DF and concern for infection  -recommend EGD as outpatient for varices screening  -patient should follow-up in outpatient GI office for continued monitoring     Explained to patient that the office will contact him for follow-up appointment  He would prefer to come to [facility] versus hours till if possible  Per patient he is scheduled for colonoscopy sometime in November     3) Alcohol abuse:     -patient understands he must stop drinking  -continue folic acid and thiamine  -monitor for withdrawal symptoms     4  Constipation   In patient's current setting and new diagnosis of ascites and rule out cirrhosis, will initiate lactulose 20 mg daily      - MiraLax 17 g daily, hold with diarrhea    Chief Complaint   Patient presents with   • Abdominal Pain     Pt c/o of rioght sided abdomianla pain x 1 days  SUBJECTIVE/HPI   Patient states he continues to have generalized abdominal discomfort increased with palpation  States he is moving his bowels at least twice a day  Tolerating meals  Patient does state that he apparently has an oral abscess on the left back part of his jaw  Otherwise, discussed with him that his liver enzymes are coming down ever so slightly  Reinforced again his need to have follow-up care with GI once discharged      /80   Pulse 99   Temp 98 5 °F (36 9 °C) (Oral)   Resp 20   Ht 5' 7" (1 702 m)   Wt 66 7 kg (147 lb)   SpO2 98%   BMI 23 02 kg/m²     PHYSICALEXAM  General appearance: alert, appears stated age and cooperative  Eyes: PERLLA, EOMI, no icterus   Head: Normocephalic, without obvious abnormality, atraumatic  Lungs: clear to auscultation bilaterally  Heart: regular rate and rhythm, S1, S2 normal, no murmur, click, rub or gallop  Abdomen: soft, non-tender; bowel sounds normal; no masses,  no organomegaly  Extremities: extremities normal, atraumatic, no cyanosis or edema  Neurologic: Grossly normal    Lab Results   Component Value Date    CALCIUM 7 9 (L) 10/21/2022    K 3 6 10/21/2022    CO2 29 10/21/2022    CL 99 10/21/2022    BUN 4 (L) 10/21/2022    CREATININE 0 67 10/21/2022     Lab Results   Component Value Date    WBC 22 36 (H) 10/21/2022    HGB 11 3 (L) 10/21/2022    HCT 32 8 (L) 10/21/2022     (H) 10/21/2022     10/21/2022     Lab Results   Component Value Date    ALT 43 10/21/2022    AST 67 (H) 10/21/2022    GGT 1,144 (H) 09/28/2022    ALKPHOS 276 (H) 10/21/2022     Lab Results   Component Value Date    AMYLASE 21 (L) 09/28/2022     Lab Results   Component Value Date    LIPASE 308 10/14/2022     Lab Results   Component Value Date    IRON 96 10/15/2022    TIBC 158 (L) 10/15/2022    FERRITIN 423 (H) 10/15/2022     Lab Results   Component Value Date    INR 1 21 (H) 10/20/2022

## 2022-10-21 NOTE — PLAN OF CARE
Problem: Potential for Falls  Goal: Patient will remain free of falls  Description: INTERVENTIONS:  - Educate patient/family on patient safety including physical limitations  - Instruct patient to call for assistance with activity   - Consult OT/PT to assist with strengthening/mobility   - Keep Call bell within reach  - Keep bed low and locked with side rails adjusted as appropriate  - Keep care items and personal belongings within reach  - Initiate and maintain comfort rounds  - Make Fall Risk Sign visible to staff  - Offer Toileting every 2 Hours, in advance of need  - Initiate/Maintain alarm  - Obtain necessary fall risk management equipment:   - Apply yellow socks and bracelet for high fall risk patients  - Consider moving patient to room near nurses station  Outcome: Progressing     Problem: SAFETY ADULT  Goal: Patient will remain free of falls  Description: INTERVENTIONS:  - Educate patient/family on patient safety including physical limitations  - Instruct patient to call for assistance with activity   - Consult OT/PT to assist with strengthening/mobility   - Keep Call bell within reach  - Keep bed low and locked with side rails adjusted as appropriate  - Keep care items and personal belongings within reach  - Initiate and maintain comfort rounds  - Make Fall Risk Sign visible to staff  - Offer Toileting every 2 Hours, in advance of need  - Initiate/Maintain alarm  - Obtain necessary fall risk management equipment:   - Apply yellow socks and bracelet for high fall risk patients  - Consider moving patient to room near nurses station  Outcome: Progressing

## 2022-10-22 ENCOUNTER — APPOINTMENT (INPATIENT)
Dept: CT IMAGING | Facility: HOSPITAL | Age: 57
DRG: 871 | End: 2022-10-22
Payer: MEDICARE

## 2022-10-22 LAB
ALBUMIN SERPL BCP-MCNC: 2 G/DL (ref 3.5–5)
ALP SERPL-CCNC: 318 U/L (ref 46–116)
ALT SERPL W P-5'-P-CCNC: 47 U/L (ref 12–78)
ANION GAP SERPL CALCULATED.3IONS-SCNC: 6 MMOL/L (ref 4–13)
ANISOCYTOSIS BLD QL SMEAR: PRESENT
AST SERPL W P-5'-P-CCNC: 88 U/L (ref 5–45)
BACTERIA SPEC BFLD CULT: NO GROWTH
BASOPHILS # BLD MANUAL: 0 THOUSAND/UL (ref 0–0.1)
BASOPHILS NFR MAR MANUAL: 0 % (ref 0–1)
BILIRUB SERPL-MCNC: 2.3 MG/DL (ref 0.2–1)
BUN SERPL-MCNC: 6 MG/DL (ref 5–25)
CALCIUM ALBUM COR SERPL-MCNC: 10.1 MG/DL (ref 8.3–10.1)
CALCIUM SERPL-MCNC: 8.5 MG/DL (ref 8.3–10.1)
CHLORIDE SERPL-SCNC: 97 MMOL/L (ref 96–108)
CO2 SERPL-SCNC: 27 MMOL/L (ref 21–32)
CREAT SERPL-MCNC: 0.87 MG/DL (ref 0.6–1.3)
EOSINOPHIL # BLD MANUAL: 0 THOUSAND/UL (ref 0–0.4)
EOSINOPHIL NFR BLD MANUAL: 0 % (ref 0–6)
ERYTHROCYTE [DISTWIDTH] IN BLOOD BY AUTOMATED COUNT: 16.7 % (ref 11.6–15.1)
GFR SERPL CREATININE-BSD FRML MDRD: 97 ML/MIN/1.73SQ M
GLUCOSE SERPL-MCNC: 135 MG/DL (ref 65–140)
GRAM STN SPEC: NORMAL
GRAM STN SPEC: NORMAL
HCT VFR BLD AUTO: 35.8 % (ref 36.5–49.3)
HGB BLD-MCNC: 12.6 G/DL (ref 12–17)
INR PPP: 1.12 (ref 0.84–1.19)
LACTATE SERPL-SCNC: 1.4 MMOL/L (ref 0.5–2)
LYMPHOCYTES # BLD AUTO: 1.77 THOUSAND/UL (ref 0.6–4.47)
LYMPHOCYTES # BLD AUTO: 4 % (ref 14–44)
MACROCYTES BLD QL AUTO: PRESENT
MCH RBC QN AUTO: 35.6 PG (ref 26.8–34.3)
MCHC RBC AUTO-ENTMCNC: 35.2 G/DL (ref 31.4–37.4)
MCV RBC AUTO: 101 FL (ref 82–98)
MONOCYTES # BLD AUTO: 0.88 THOUSAND/UL (ref 0–1.22)
MONOCYTES NFR BLD: 2 % (ref 4–12)
NEUTROPHILS # BLD MANUAL: 41.56 THOUSAND/UL (ref 1.85–7.62)
NEUTS BAND NFR BLD MANUAL: 1 % (ref 0–8)
NEUTS SEG NFR BLD AUTO: 93 % (ref 43–75)
PLATELET # BLD AUTO: 454 THOUSANDS/UL (ref 149–390)
PLATELET BLD QL SMEAR: ABNORMAL
PMV BLD AUTO: 9.4 FL (ref 8.9–12.7)
POTASSIUM SERPL-SCNC: 4 MMOL/L (ref 3.5–5.3)
PROCALCITONIN SERPL-MCNC: 1.46 NG/ML
PROT SERPL-MCNC: 6.7 G/DL (ref 6.4–8.4)
PROTHROMBIN TIME: 15.1 SECONDS (ref 11.6–14.5)
RBC # BLD AUTO: 3.54 MILLION/UL (ref 3.88–5.62)
RBC MORPH BLD: PRESENT
SODIUM SERPL-SCNC: 130 MMOL/L (ref 135–147)
STOMATOCYTES BLD QL SMEAR: PRESENT
TARGETS BLD QL SMEAR: PRESENT
WBC # BLD AUTO: 44.21 THOUSAND/UL (ref 4.31–10.16)

## 2022-10-22 PROCEDURE — 87207 SMEAR SPECIAL STAIN: CPT | Performed by: STUDENT IN AN ORGANIZED HEALTH CARE EDUCATION/TRAINING PROGRAM

## 2022-10-22 PROCEDURE — 87040 BLOOD CULTURE FOR BACTERIA: CPT | Performed by: STUDENT IN AN ORGANIZED HEALTH CARE EDUCATION/TRAINING PROGRAM

## 2022-10-22 PROCEDURE — 0202U NFCT DS 22 TRGT SARS-COV-2: CPT | Performed by: STUDENT IN AN ORGANIZED HEALTH CARE EDUCATION/TRAINING PROGRAM

## 2022-10-22 PROCEDURE — 99233 SBSQ HOSP IP/OBS HIGH 50: CPT | Performed by: STUDENT IN AN ORGANIZED HEALTH CARE EDUCATION/TRAINING PROGRAM

## 2022-10-22 PROCEDURE — 85027 COMPLETE CBC AUTOMATED: CPT | Performed by: STUDENT IN AN ORGANIZED HEALTH CARE EDUCATION/TRAINING PROGRAM

## 2022-10-22 PROCEDURE — 85007 BL SMEAR W/DIFF WBC COUNT: CPT | Performed by: STUDENT IN AN ORGANIZED HEALTH CARE EDUCATION/TRAINING PROGRAM

## 2022-10-22 PROCEDURE — G1004 CDSM NDSC: HCPCS

## 2022-10-22 PROCEDURE — 71250 CT THORAX DX C-: CPT

## 2022-10-22 PROCEDURE — 87505 NFCT AGENT DETECTION GI: CPT | Performed by: STUDENT IN AN ORGANIZED HEALTH CARE EDUCATION/TRAINING PROGRAM

## 2022-10-22 PROCEDURE — 85610 PROTHROMBIN TIME: CPT | Performed by: NURSE PRACTITIONER

## 2022-10-22 PROCEDURE — 80053 COMPREHEN METABOLIC PANEL: CPT | Performed by: STUDENT IN AN ORGANIZED HEALTH CARE EDUCATION/TRAINING PROGRAM

## 2022-10-22 PROCEDURE — 83605 ASSAY OF LACTIC ACID: CPT | Performed by: STUDENT IN AN ORGANIZED HEALTH CARE EDUCATION/TRAINING PROGRAM

## 2022-10-22 PROCEDURE — 87493 C DIFF AMPLIFIED PROBE: CPT | Performed by: STUDENT IN AN ORGANIZED HEALTH CARE EDUCATION/TRAINING PROGRAM

## 2022-10-22 PROCEDURE — 99232 SBSQ HOSP IP/OBS MODERATE 35: CPT | Performed by: INTERNAL MEDICINE

## 2022-10-22 PROCEDURE — 84145 PROCALCITONIN (PCT): CPT | Performed by: STUDENT IN AN ORGANIZED HEALTH CARE EDUCATION/TRAINING PROGRAM

## 2022-10-22 PROCEDURE — 74176 CT ABD & PELVIS W/O CONTRAST: CPT

## 2022-10-22 RX ORDER — VANCOMYCIN HYDROCHLORIDE 250 MG/1
500 CAPSULE ORAL EVERY 6 HOURS SCHEDULED
Status: DISCONTINUED | OUTPATIENT
Start: 2022-10-22 | End: 2022-10-28 | Stop reason: HOSPADM

## 2022-10-22 RX ADMIN — CHLORDIAZEPOXIDE HYDROCHLORIDE 10 MG: 5 CAPSULE ORAL at 05:33

## 2022-10-22 RX ADMIN — OXYCODONE HYDROCHLORIDE 5 MG: 5 TABLET ORAL at 18:21

## 2022-10-22 RX ADMIN — Medication 250 MG: at 17:48

## 2022-10-22 RX ADMIN — LACTULOSE 20 G: 20 SOLUTION ORAL at 10:29

## 2022-10-22 RX ADMIN — NICOTINE 21 MG: 21 PATCH, EXTENDED RELEASE TRANSDERMAL at 10:30

## 2022-10-22 RX ADMIN — VANCOMYCIN HYDROCHLORIDE 1250 MG: 1 INJECTION, POWDER, LYOPHILIZED, FOR SOLUTION INTRAVENOUS at 14:27

## 2022-10-22 RX ADMIN — OXYCODONE HYDROCHLORIDE 5 MG: 5 TABLET ORAL at 12:25

## 2022-10-22 RX ADMIN — PIPERACILLIN AND TAZOBACTAM 3.38 G: 3; .375 INJECTION, POWDER, LYOPHILIZED, FOR SOLUTION INTRAVENOUS at 10:28

## 2022-10-22 RX ADMIN — MEROPENEM 1000 MG: 1 INJECTION, POWDER, FOR SOLUTION INTRAVENOUS at 20:33

## 2022-10-22 RX ADMIN — CHLORDIAZEPOXIDE HYDROCHLORIDE 10 MG: 5 CAPSULE ORAL at 12:25

## 2022-10-22 RX ADMIN — MULTIPLE VITAMINS W/ MINERALS TAB 1 TABLET: TAB ORAL at 10:29

## 2022-10-22 RX ADMIN — Medication 250 MG: at 10:29

## 2022-10-22 RX ADMIN — FOLIC ACID 1 MG: 1 TABLET ORAL at 10:29

## 2022-10-22 RX ADMIN — PIPERACILLIN AND TAZOBACTAM 3.38 G: 3; .375 INJECTION, POWDER, LYOPHILIZED, FOR SOLUTION INTRAVENOUS at 04:40

## 2022-10-22 RX ADMIN — CHLORDIAZEPOXIDE HYDROCHLORIDE 10 MG: 5 CAPSULE ORAL at 22:17

## 2022-10-22 RX ADMIN — OXYCODONE HYDROCHLORIDE 5 MG: 5 TABLET ORAL at 02:52

## 2022-10-22 RX ADMIN — VANCOMYCIN HYDROCHLORIDE 500 MG: 250 CAPSULE ORAL at 17:48

## 2022-10-22 RX ADMIN — Medication 100 MG: at 10:29

## 2022-10-22 RX ADMIN — MEROPENEM 1000 MG: 1 INJECTION, POWDER, FOR SOLUTION INTRAVENOUS at 12:28

## 2022-10-22 NOTE — ASSESSMENT & PLAN NOTE
Presented with diffuse abdominal pain and abdominal distension  Likely secondary to alcoholic cirrhosis  s/p IR paracentesis on 10/17 labs showing possible SBP and portal HTN  S/p IR paracentesis on 10/19  S/p IR paracentesis on 10/22  Per PCP note, had bloody stools and reported fevers at home   Concern for possible SBP  Trend WBC and fever curve  Increased aldactone to 50 mg qd  GI consult appreciated  bld cx NGTD  F/u with GI outpt and will need EGD outpt,

## 2022-10-22 NOTE — PROGRESS NOTES
[place]  Progress Note - Mertha Score 1965, 56 y o  male MRN: [id number]  Unit/Bed#: MS [facility] Encounter: [id number]  Primary Care Provider: Timmy Abrams DO   Date and time admitted to hospital: 10/14/2022  1:52 PM    Leukocytosis  Assessment & Plan  Continues to have elevated WBC  WBC spiked to 44 10/22/22  Echo negative for vegetations  ID consulted  Zosyn day 9-> changed to vanc and meropenum  Repeat bld cx  CT C/A/P  UA  Parasite smear  procal  LA  C diff ans stool studies  RPP    * Alcoholic cirrhosis of liver with ascites ([facility] [address] )  Assessment & Plan  New onset cirrhosis  Was noted to have abdominal distension, hepatomegaly, with dark stools by PCP on 09/28/2022  · Mild transaminitis with , ALT 72  Alk-phos 400  T bili 1 9, direct bili 1 4  · CT abdomen/pelvis shows new onset cirrhosis with moderate ascites  · Appeared mildly encephalopathic on admission  · Suspect alcoholic liver disease  · On folate/ thiamine/ MTV supplementation    Alcohol use  Assessment & Plan  Drinks vodka and juice daily  Never attempted to quit due to experiencing withdrawals w/ tremors  Denies seizures/hallucinations  [facility] protocol  Thiamine/folate/MTV  On Librium  At risk for DT    Ascites  Assessment & Plan  Presented with diffuse abdominal pain and abdominal distension  Likely secondary to alcoholic cirrhosis  s/p IR paracentesis on 10/17 labs showing possible SBP and portal HTN  S/p IR paracentesis on 10/19  S/p IR paracentesis on 10/22  Per PCP note, had bloody stools and reported fevers at home   Concern for possible SBP  Trend WBC and fever curve  Increased aldactone to 50 mg qd  GI consult appreciated  bld cx NGTD  F/u with GI outpt and will need EGD outpt,     Encephalopathy-resolved as of 10/19/2022  Assessment & Plan  Presented with metabolic encephalopathy in ED POA  Suspect hepatic encephalopathy vs SBP  Ammonia level is 38  Received a dose of lactulose and ammonia levels are normal this morning  Continue thiamine and folate  Trend WBC and fever curve  resolved    Sepsis without acute organ dysfunction [facility])  Assessment & Plan  Sepsis, present on admission, due to possible SBP; as evidenced by meeting SIRS criteria on admission with an infectious process present; requiring blood cultures x 2, lactic acid level, and IV Zosyn  SIRS (systemic inflammatory response syndrome) ([facility])  Assessment & Plan  Meets SIRS criteria w tachycardia (HR 120s) and leukocytosis (WBC 33)  Reports fevers/chills at home, however afebrile while inpatient  No definitive source of infection, however concern for possible SBP  bld cx NGTD    COPD (chronic obstructive pulmonary disease) ([facility])  Assessment & Plan  No shortness of breath/wheezing  Not acute exacerbation  Albuterol inhaler PRN    Nicotine dependence  Assessment & Plan  Smokes approximately a pack a day  Nicotine patch      VTE Pharmacologic Prophylaxis:   Low Risk (Score 0-2) - Encourage Ambulation  Patient Centered Rounds: I performed bedside rounds with nursing staff today  Discussions with Specialists or Other Care Team Provider: IS, GI, IR, CM    Education and Discussions with Family / Patient: pt  Time Spent for Care: 45 minutes  More than 50% of total time spent on counseling and coordination of care as described above  Current Length of Stay: 8 day(s)  Current Patient Status: Inpatient   Certification Statement: The patient will continue to require additional inpatient hospital stay due to elevated WBC  Discharge Plan: Anticipate discharge in >72 hrs to home  Code Status: Level 1 - Full Code    Subjective: Johnny was seen and examined at bedside  No acute events overnight  Discussed plan of care  All questions and concerns were answered and addressed      Objective:     Vitals:   Temp (24hrs), Av 4 °F (36 9 °C), Min:98 1 °F (36 7 °C), Max:98 7 °F (37 1 °C)    Temp:  [98 1 °F (36 7 °C)-98 7 °F (37 1 °C)] 98 1 °F (36 7 °C)  HR:  [97] 97  Resp:  [16-20] 16  BP: ()/(59-84) 91/59  SpO2:  [97 %] 97 %  Body mass index is 23 02 kg/m²  Input and Output Summary (last 24 hours): Intake/Output Summary (Last 24 hours) at 10/22/2022 1123  Last data filed at 10/22/2022 0901  Gross per 24 hour   Intake 120 ml   Output 2400 ml   Net -2280 ml       Physical Exam:   Physical Exam  Vitals and nursing note reviewed  Constitutional:       Appearance: He is ill-appearing  HENT:      Head: Normocephalic and atraumatic  Cardiovascular:      Rate and Rhythm: Normal rate and regular rhythm  Pulses: Normal pulses  Heart sounds: Normal heart sounds  Pulmonary:      Effort: Pulmonary effort is normal       Breath sounds: Normal breath sounds  Abdominal:      General: Abdomen is flat  There is distension  Palpations: Abdomen is soft  Musculoskeletal:      Right lower leg: No edema  Left lower leg: No edema  Skin:     General: Skin is warm  Neurological:      General: No focal deficit present  Mental Status: He is alert and oriented to person, place, and time            Additional Data:     Labs:  Results from last 7 days   Lab Units 10/22/22  0430 10/21/22  1126   WBC Thousand/uL 44 21* 22 36*   HEMOGLOBIN g/dL 12 6 11 3*   HEMATOCRIT % 35 8* 32 8*   PLATELETS Thousands/uL 454* 363   BANDS PCT % 1  --    NEUTROS PCT %  --  84*   LYMPHS PCT %  --  5*   LYMPHO PCT % 4*  --    MONOS PCT %  --  6   MONO PCT % 2*  --    EOS PCT % 0 2     Results from last 7 days   Lab Units 10/22/22  0430   SODIUM mmol/L 130*   POTASSIUM mmol/L 4 0   CHLORIDE mmol/L 97   CO2 mmol/L 27   BUN mg/dL 6   CREATININE mg/dL 0 87   ANION GAP mmol/L 6   CALCIUM mg/dL 8 5   ALBUMIN g/dL 2 0*   TOTAL BILIRUBIN mg/dL 2 30*   ALK PHOS U/L 318*   ALT U/L 47   AST U/L 88*   GLUCOSE RANDOM mg/dL 135     Results from last 7 days   Lab Units 10/22/22  0430   INR  1 12             Results from last 7 days   Lab Units 10/20/22  0434 10/16/22  1161 PROCALCITONIN ng/ml 0 68* 0 64*       Lines/Drains:  Invasive Devices  Report    Peripheral Intravenous Line  Duration           Peripheral IV 10/22/22 Dorsal (posterior); Right Forearm <1 day                      Imaging: No pertinent imaging reviewed  Recent Cultures (last 7 days):   Results from last 7 days   Lab Units 10/19/22  1449 10/17/22  1342   GRAM STAIN RESULT  Rare Polys  No bacteria seen No Polys or Bacteria seen   BODY FLUID CULTURE, STERILE  No growth No growth       Last 24 Hours Medication List:   Current Facility-Administered Medications   Medication Dose Route Frequency Provider Last Rate   • albuterol  2 puff Inhalation Q4H PRN Deshawn Rollins PA-C     • chlordiazePOXIDE  10 mg Oral Q8H Radha Ram MD     • folic acid  1 mg Oral Daily Deshawn Rollins PA-C     • ibuprofen  400 mg Oral Q6H PRN Julee Bay MD     • lactulose  20 g Oral Daily MACHO Avalos     • LORazepam  2 mg Intravenous Once Sophie Marcus DO     • meropenem  1,000 mg Intravenous Q8H Julee Bay MD     • multivitamin-minerals  1 tablet Oral Daily Deshawn Rollins PA-C     • nicotine  21 mg Transdermal Daily Jhony Montoya PA-C     • ondansetron  4 mg Intravenous Q4H PRN Deshawn Rollins PA-C     • oxyCODONE  5 mg Oral Q6H PRN Caio Pichardo MD     • polyethylene glycol  17 g Oral Daily MACHO Avalos     • saccharomyces boulardii  250 mg Oral BID Julee Bay MD     • spironolactone  50 mg Oral Daily Julee Bay MD     • thiamine  100 mg Oral Daily Deshawn Rollins PA-C     • vancomycin  15 mg/kg Intravenous Q12H Julee Bay MD          Today, Patient Was Seen By: Julee Bay    **Please Note: This note may have been constructed using a voice recognition system  **

## 2022-10-22 NOTE — PLAN OF CARE
Problem: Potential for Falls  Goal: Patient will remain free of falls  Description: INTERVENTIONS:  - Educate patient/family on patient safety including physical limitations  - Instruct patient to call for assistance with activity   - Consult OT/PT to assist with strengthening/mobility   - Keep Call bell within reach  - Keep bed low and locked with side rails adjusted as appropriate  - Keep care items and personal belongings within reach  - Initiate and maintain comfort rounds  - Make Fall Risk Sign visible to staff  - Offer Toileting every 2 Hours, in advance of need  - Initiate/Maintain alarm  - Obtain necessary fall risk management equipment:   - Apply yellow socks and bracelet for high fall risk patients  - Consider moving patient to room near nurses station  Outcome: Progressing     Problem: PAIN - ADULT  Goal: Verbalizes/displays adequate comfort level or baseline comfort level  Description: Interventions:  - Encourage patient to monitor pain and request assistance  - Assess pain using appropriate pain scale  - Administer analgesics based on type and severity of pain and evaluate response  - Implement non-pharmacological measures as appropriate and evaluate response  - Consider cultural and social influences on pain and pain management  - Notify physician/advanced practitioner if interventions unsuccessful or patient reports new pain  Outcome: Progressing     Problem: INFECTION - ADULT  Goal: Absence or prevention of progression during hospitalization  Description: INTERVENTIONS:  - Assess and monitor for signs and symptoms of infection  - Monitor lab/diagnostic results  - Monitor all insertion sites, i e  indwelling lines, tubes, and drains  - Monitor endotracheal if appropriate and nasal secretions for changes in amount and color  - [place] appropriate cooling/warming therapies per order  - Administer medications as ordered  - Instruct and encourage patient and family to use good hand hygiene technique  - Identify and instruct in appropriate isolation precautions for identified infection/condition  Outcome: Progressing  Goal: Absence of fever/infection during neutropenic period  Description: INTERVENTIONS:  - Monitor WBC    Outcome: Progressing     Problem: SAFETY ADULT  Goal: Patient will remain free of falls  Description: INTERVENTIONS:  - Educate patient/family on patient safety including physical limitations  - Instruct patient to call for assistance with activity   - Consult OT/PT to assist with strengthening/mobility   - Keep Call bell within reach  - Keep bed low and locked with side rails adjusted as appropriate  - Keep care items and personal belongings within reach  - Initiate and maintain comfort rounds  - Make Fall Risk Sign visible to staff  - Offer Toileting every 2 Hours, in advance of need  - Initiate/Maintain alarm  - Obtain necessary fall risk management equipment:   - Apply yellow socks and bracelet for high fall risk patients  - Consider moving patient to room near nurses station  Outcome: Progressing  Goal: Maintain or return to baseline ADL function  Description: INTERVENTIONS:  - Educate patient/family on patient safety including physical limitations  - Instruct patient to call for assistance with activity   - Consult OT/PT to assist with strengthening/mobility   - Keep Call bell within reach  - Keep bed low and locked with side rails adjusted as appropriate  - Keep care items and personal belongings within reach  - Initiate and maintain comfort rounds  - Make Fall Risk Sign visible to staff  - Offer Toileting every 2 Hours, in advance of need  - Initiate/Maintain alarm  - Obtain necessary fall risk management equipment:   - Apply yellow socks and bracelet for high fall risk patients  - Consider moving patient to room near nurses station  Outcome: Progressing  Goal: Maintains/Returns to pre admission functional level  Description: INTERVENTIONS:  - Perform BMAT or MOVE assessment daily    - Set and communicate daily mobility goal to care team and patient/family/caregiver  - Collaborate with rehabilitation services on mobility goals if consulted  - Out of bed for toileting  - Record patient progress and toleration of activity level   Outcome: Progressing     Problem: DISCHARGE PLANNING  Goal: Discharge to home or other facility with appropriate resources  Description: INTERVENTIONS:  - Identify barriers to discharge w/patient and caregiver  - Arrange for needed discharge resources and transportation as appropriate  - Identify discharge learning needs (meds, wound care, etc )  - Arrange for interpretive services to assist at discharge as needed  - Refer to Case Management Department for coordinating discharge planning if the patient needs post-hospital services based on physician/advanced practitioner order or complex needs related to functional status, cognitive ability, or social support system  Outcome: Progressing     Problem: Knowledge Deficit  Goal: Patient/family/caregiver demonstrates understanding of disease process, treatment plan, medications, and discharge instructions  Description: Complete learning assessment and assess knowledge base    Interventions:  - Provide teaching at level of understanding  - Provide teaching via preferred learning methods  Outcome: Progressing

## 2022-10-22 NOTE — PROGRESS NOTES
Vancomycin Assessment    Johnny LEON Thelma Campos is a 56 y o  male who is currently ordered vancomycin 1000mg IV Q12H for bacteremia     Relevant clinical data and objective history reviewed:  Creatinine   Date Value Ref Range Status   10/22/2022 0 87 0 60 - 1 30 mg/dL Final     Comment:     Standardized to IDMS reference method   10/21/2022 0 67 0 60 - 1 30 mg/dL Final     Comment:     Standardized to IDMS reference method   10/20/2022 0 71 0 60 - 1 30 mg/dL Final     Comment:     Standardized to IDMS reference method     BP 91/59 (BP Location: Right arm)   Pulse 97   Temp 98 1 °F (36 7 °C) (Oral)   Resp 16   Ht 5' 7" (1 702 m)   Wt 66 7 kg (147 lb)   SpO2 97%   BMI 23 02 kg/m²   I/O last 3 completed shifts:  In: -   Out: 2400 [Other:2400]  Lab Results   Component Value Date/Time    BUN 6 10/22/2022 04:30 AM    WBC 44 21 (HH) 10/22/2022 04:30 AM    HGB 12 6 10/22/2022 04:30 AM    HCT 35 8 (L) 10/22/2022 04:30 AM     (H) 10/22/2022 04:30 AM     (H) 10/22/2022 04:30 AM     Temp Readings from Last 3 Encounters:   10/22/22 98 1 °F (36 7 °C) (Oral)   09/28/22 98 1 °F (36 7 °C)   01/17/22 97 7 °F (36 5 °C) (Tympanic)     Vancomycin Days of Therapy: 1    Assessment/Plan  The patient is currently on vancomycin utilizing scheduled dosing based on actual body weight  Baseline risks associated with therapy include: concomitant nephrotoxic medications  The patient is currently ordered vancomycin 1000mg IV Q12H and after clinical evaluation dose will be changed to 1250mg IV Q12h  Pharmacy will also follow closely for s/sx of nephrotoxicity, infusion reactions, and appropriateness of therapy  BMP and CBC will be ordered per protocol  Plan for trough as patient approaches steady state, prior to the 5th  dose at approximately 1130 on 10/24/22  Due to infection severity, will target a trough of 15-20 (appropriate for most indications)     Pharmacy will continue to follow the patient’s culture results and clinical progress daily      Thuy Villar, Pharmacist

## 2022-10-22 NOTE — PROGRESS NOTES
[facility]   Linda Fernandez 56 y o  male MRN: [id number]  Unit/Bed#: -01 Encounter: [id number]    ASSESSMENT and PLAN    74-year-old male with tobacco and alcohol abuse and COPD admitted with SIRS, possible cirrhosis, alcoholic hepatitis, new ascites with concern for SBP     1) New onset ascites and generalized abdominal pain in setting of SIRS: Patient met SIRS criteria on admission, concern for SBP given new ascites  No other definitive source of infection  UA, blood cultures, chest x-ray negative      Patient had paracentesis 10/19 for 2 L  even though patient's SBP has been negative, clinically he was treated with antibiotics  Peritonitis has been adequately treated but his white count is still high awaiting ID opinion  Tooth? - Aldactone    Stop alcohol        2) Possible cirrhosis with alcoholic hepatitis:   DMQQ 42 LMU DF 16 1  (10/20)   (MELD score)    -recommend EGD as outpatient for varices screening  -patient should follow-up in outpatient GI office for continued monitoring-could be done with colonoscopy which she should have    3) Alcohol abuse:     -patient understands he must stop drinking  -continue folic acid and thiamine  -monitor for withdrawal symptoms    Chief Complaint   Patient presents with   • Abdominal Pain     Pt c/o of rioght sided abdomianla pain x 1 days         SUBJECTIVE/HPI   No GI complaints    BP 91/59 (BP Location: Right arm)   Pulse 97   Temp 98 1 °F (36 7 °C) (Oral)   Resp 16   Ht 5' 7" (1 702 m)   Wt 66 7 kg (147 lb)   SpO2 97%   BMI 23 02 kg/m²     PHYSICALEXAM  General appearance: alert, appears stated age and cooperative  Eyes: PERLLA, EOMI, no icterus   Head: Normocephalic, without obvious abnormality, atraumatic  Abdomen: soft, non-tender; bowel sounds normal; no masses,  perhaps slight hepatomegaly  Extremities: extremities normal, atraumatic, no cyanosis or edema  Neurologic: Grossly normal    Lab Results Component Value Date    CALCIUM 8 5 10/22/2022    K 4 0 10/22/2022    CO2 27 10/22/2022    CL 97 10/22/2022    BUN 6 10/22/2022    CREATININE 0 87 10/22/2022     Lab Results   Component Value Date    WBC 44 21 (HH) 10/22/2022    HGB 12 6 10/22/2022    HCT 35 8 (L) 10/22/2022     (H) 10/22/2022     (H) 10/22/2022     Lab Results   Component Value Date    ALT 47 10/22/2022    AST 88 (H) 10/22/2022    GGT 1,144 (H) 09/28/2022    ALKPHOS 318 (H) 10/22/2022     Lab Results   Component Value Date    AMYLASE 21 (L) 09/28/2022     Lab Results   Component Value Date    LIPASE 308 10/14/2022     Lab Results   Component Value Date    IRON 96 10/15/2022    TIBC 158 (L) 10/15/2022    FERRITIN 423 (H) 10/15/2022     Lab Results   Component Value Date    INR 1 12 10/22/2022

## 2022-10-22 NOTE — NURSING NOTE
Pt was extremely upset about not being able to go home  Pt wanted to see his dog   Nurse took pt to see his dog out front of the hospital

## 2022-10-22 NOTE — PLAN OF CARE
Problem: Potential for Falls  Goal: Patient will remain free of falls  Description: INTERVENTIONS:  - Educate patient/family on patient safety including physical limitations  - Instruct patient to call for assistance with activity   - Consult OT/PT to assist with strengthening/mobility   - Keep Call bell within reach  - Keep bed low and locked with side rails adjusted as appropriate  - Keep care items and personal belongings within reach  - Initiate and maintain comfort rounds  - Make Fall Risk Sign visible to staff  - Offer Toileting every 2 Hours, in advance of need  - Initiate/Maintain alarm  - Obtain necessary fall risk management equipment:   - Apply yellow socks and bracelet for high fall risk patients  - Consider moving patient to room near nurses station  Outcome: Progressing     Problem: PAIN - ADULT  Goal: Verbalizes/displays adequate comfort level or baseline comfort level  Description: Interventions:  - Encourage patient to monitor pain and request assistance  - Assess pain using appropriate pain scale  - Administer analgesics based on type and severity of pain and evaluate response  - Implement non-pharmacological measures as appropriate and evaluate response  - Consider cultural and social influences on pain and pain management  - Notify physician/advanced practitioner if interventions unsuccessful or patient reports new pain  Outcome: Progressing     Problem: INFECTION - ADULT  Goal: Absence or prevention of progression during hospitalization  Description: INTERVENTIONS:  - Assess and monitor for signs and symptoms of infection  - Monitor lab/diagnostic results  - Monitor all insertion sites, i e  indwelling lines, tubes, and drains  - Monitor endotracheal if appropriate and nasal secretions for changes in amount and color  - [place] appropriate cooling/warming therapies per order  - Administer medications as ordered  - Instruct and encourage patient and family to use good hand hygiene technique  - Identify and instruct in appropriate isolation precautions for identified infection/condition  Outcome: Progressing  Goal: Absence of fever/infection during neutropenic period  Description: INTERVENTIONS:  - Monitor WBC    Outcome: Progressing     Problem: SAFETY ADULT  Goal: Patient will remain free of falls  Description: INTERVENTIONS:  - Educate patient/family on patient safety including physical limitations  - Instruct patient to call for assistance with activity   - Consult OT/PT to assist with strengthening/mobility   - Keep Call bell within reach  - Keep bed low and locked with side rails adjusted as appropriate  - Keep care items and personal belongings within reach  - Initiate and maintain comfort rounds  - Make Fall Risk Sign visible to staff  - Offer Toileting every 2 Hours, in advance of need  - Initiate/Maintain alarm  - Obtain necessary fall risk management equipment:   - Apply yellow socks and bracelet for high fall risk patients  - Consider moving patient to room near nurses station  Outcome: Progressing

## 2022-10-22 NOTE — ASSESSMENT & PLAN NOTE
Continues to have elevated WBC  WBC spiked to 44 10/22/22  Echo negative for vegetations  ID consulted  Zosyn day 9-> changed to vanc and meropenum  Repeat bld cx  CT C/A/P  UA  Parasite smear  procal  LA  C diff ans stool studies  RPP

## 2022-10-22 NOTE — ASSESSMENT & PLAN NOTE
Drinks vodka and juice daily  Never attempted to quit due to experiencing withdrawals w/ tremors   Denies seizures/hallucinations  [facility] protocol  Thiamine/folate/MTV  On Librium  At risk for DT

## 2022-10-23 ENCOUNTER — APPOINTMENT (INPATIENT)
Dept: RADIOLOGY | Facility: HOSPITAL | Age: 57
DRG: 871 | End: 2022-10-23
Payer: MEDICARE

## 2022-10-23 ENCOUNTER — APPOINTMENT (INPATIENT)
Dept: CT IMAGING | Facility: HOSPITAL | Age: 57
DRG: 871 | End: 2022-10-23
Payer: MEDICARE

## 2022-10-23 LAB
ALBUMIN SERPL BCP-MCNC: 1.8 G/DL (ref 3.5–5)
ALP SERPL-CCNC: 263 U/L (ref 46–116)
ALT SERPL W P-5'-P-CCNC: 41 U/L (ref 12–78)
AMORPH URATE CRY URNS QL MICRO: ABNORMAL
ANION GAP SERPL CALCULATED.3IONS-SCNC: 4 MMOL/L (ref 4–13)
AST SERPL W P-5'-P-CCNC: 72 U/L (ref 5–45)
B PARAP IS1001 DNA NPH QL NAA+NON-PROBE: NOT DETECTED
B PERT.PT PRMT NPH QL NAA+NON-PROBE: NOT DETECTED
BACTERIA UR QL AUTO: ABNORMAL /HPF
BASOPHILS # BLD AUTO: 0.15 THOUSANDS/ÂΜL (ref 0–0.1)
BASOPHILS NFR BLD AUTO: 1 % (ref 0–1)
BILIRUB SERPL-MCNC: 2 MG/DL (ref 0.2–1)
BILIRUB UR QL STRIP: NEGATIVE
BUN SERPL-MCNC: 6 MG/DL (ref 5–25)
C PNEUM DNA NPH QL NAA+NON-PROBE: NOT DETECTED
CALCIUM ALBUM COR SERPL-MCNC: 10.1 MG/DL (ref 8.3–10.1)
CALCIUM SERPL-MCNC: 8.3 MG/DL (ref 8.3–10.1)
CAMPYLOBACTER DNA SPEC NAA+PROBE: NORMAL
CHLORIDE SERPL-SCNC: 102 MMOL/L (ref 96–108)
CLARITY UR: ABNORMAL
CO2 SERPL-SCNC: 29 MMOL/L (ref 21–32)
COLOR UR: YELLOW
CREAT SERPL-MCNC: 0.8 MG/DL (ref 0.6–1.3)
EOSINOPHIL # BLD AUTO: 0.56 THOUSAND/ÂΜL (ref 0–0.61)
EOSINOPHIL NFR BLD AUTO: 3 % (ref 0–6)
ERYTHROCYTE [DISTWIDTH] IN BLOOD BY AUTOMATED COUNT: 16.6 % (ref 11.6–15.1)
FLUAV RNA NPH QL NAA+NON-PROBE: NOT DETECTED
FLUBV RNA NPH QL NAA+NON-PROBE: NOT DETECTED
GFR SERPL CREATININE-BSD FRML MDRD: 100 ML/MIN/1.73SQ M
GLUCOSE SERPL-MCNC: 106 MG/DL (ref 65–140)
GLUCOSE UR STRIP-MCNC: NEGATIVE MG/DL
GRAN CASTS #/AREA URNS LPF: ABNORMAL /[LPF]
HADV DNA NPH QL NAA+NON-PROBE: NOT DETECTED
HCOV 229E RNA NPH QL NAA+NON-PROBE: NOT DETECTED
HCOV HKU1 RNA NPH QL NAA+NON-PROBE: NOT DETECTED
HCOV NL63 RNA NPH QL NAA+NON-PROBE: NOT DETECTED
HCOV OC43 RNA NPH QL NAA+NON-PROBE: NOT DETECTED
HCT VFR BLD AUTO: 34.5 % (ref 36.5–49.3)
HGB BLD-MCNC: 12 G/DL (ref 12–17)
HGB UR QL STRIP.AUTO: NEGATIVE
HMPV RNA NPH QL NAA+NON-PROBE: NOT DETECTED
HPIV1 RNA NPH QL NAA+NON-PROBE: NOT DETECTED
HPIV2 RNA NPH QL NAA+NON-PROBE: NOT DETECTED
HPIV3 RNA NPH QL NAA+NON-PROBE: NOT DETECTED
HPIV4 RNA NPH QL NAA+NON-PROBE: NOT DETECTED
IMM GRANULOCYTES # BLD AUTO: 0.22 THOUSAND/UL (ref 0–0.2)
IMM GRANULOCYTES NFR BLD AUTO: 1 % (ref 0–2)
KETONES UR STRIP-MCNC: ABNORMAL MG/DL
LEUKOCYTE ESTERASE UR QL STRIP: NEGATIVE
LYMPHOCYTES # BLD AUTO: 1.25 THOUSANDS/ÂΜL (ref 0.6–4.47)
LYMPHOCYTES NFR BLD AUTO: 6 % (ref 14–44)
M PNEUMO DNA NPH QL NAA+NON-PROBE: NOT DETECTED
MAGNESIUM SERPL-MCNC: 1.8 MG/DL (ref 1.6–2.6)
MCH RBC QN AUTO: 36.3 PG (ref 26.8–34.3)
MCHC RBC AUTO-ENTMCNC: 34.8 G/DL (ref 31.4–37.4)
MCV RBC AUTO: 104 FL (ref 82–98)
MONOCYTES # BLD AUTO: 1.11 THOUSAND/ÂΜL (ref 0.17–1.22)
MONOCYTES NFR BLD AUTO: 5 % (ref 4–12)
NEUTROPHILS # BLD AUTO: 17.87 THOUSANDS/ÂΜL (ref 1.85–7.62)
NEUTS SEG NFR BLD AUTO: 84 % (ref 43–75)
NITRITE UR QL STRIP: NEGATIVE
NON-SQ EPI CELLS URNS QL MICRO: ABNORMAL /HPF
NRBC BLD AUTO-RTO: 0 /100 WBCS
OTHER STN SPEC: ABNORMAL
PH UR STRIP.AUTO: 6 [PH]
PLATELET # BLD AUTO: 400 THOUSANDS/UL (ref 149–390)
PMV BLD AUTO: 9.5 FL (ref 8.9–12.7)
POTASSIUM SERPL-SCNC: 3.5 MMOL/L (ref 3.5–5.3)
PROCALCITONIN SERPL-MCNC: 0.74 NG/ML
PROT SERPL-MCNC: 6.2 G/DL (ref 6.4–8.4)
PROT UR STRIP-MCNC: NEGATIVE MG/DL
RBC # BLD AUTO: 3.31 MILLION/UL (ref 3.88–5.62)
RBC #/AREA URNS AUTO: ABNORMAL /HPF
RSV RNA NPH QL NAA+NON-PROBE: NOT DETECTED
RV+EV RNA NPH QL NAA+NON-PROBE: NOT DETECTED
SALMONELLA DNA SPEC QL NAA+PROBE: NORMAL
SARS-COV-2 RNA NPH QL NAA+NON-PROBE: NOT DETECTED
SHIGA TOXIN STX GENE SPEC NAA+PROBE: NORMAL
SHIGELLA DNA SPEC QL NAA+PROBE: NORMAL
SODIUM SERPL-SCNC: 135 MMOL/L (ref 135–147)
SP GR UR STRIP.AUTO: 1.02 (ref 1–1.03)
UROBILINOGEN UR STRIP-ACNC: 8 MG/DL
WBC # BLD AUTO: 21.16 THOUSAND/UL (ref 4.31–10.16)
WBC #/AREA URNS AUTO: ABNORMAL /HPF

## 2022-10-23 PROCEDURE — 99231 SBSQ HOSP IP/OBS SF/LOW 25: CPT | Performed by: INTERNAL MEDICINE

## 2022-10-23 PROCEDURE — 84145 PROCALCITONIN (PCT): CPT | Performed by: STUDENT IN AN ORGANIZED HEALTH CARE EDUCATION/TRAINING PROGRAM

## 2022-10-23 PROCEDURE — 71046 X-RAY EXAM CHEST 2 VIEWS: CPT

## 2022-10-23 PROCEDURE — 80053 COMPREHEN METABOLIC PANEL: CPT | Performed by: STUDENT IN AN ORGANIZED HEALTH CARE EDUCATION/TRAINING PROGRAM

## 2022-10-23 PROCEDURE — 81001 URINALYSIS AUTO W/SCOPE: CPT | Performed by: STUDENT IN AN ORGANIZED HEALTH CARE EDUCATION/TRAINING PROGRAM

## 2022-10-23 PROCEDURE — G1004 CDSM NDSC: HCPCS

## 2022-10-23 PROCEDURE — 70487 CT MAXILLOFACIAL W/DYE: CPT

## 2022-10-23 PROCEDURE — 83735 ASSAY OF MAGNESIUM: CPT | Performed by: STUDENT IN AN ORGANIZED HEALTH CARE EDUCATION/TRAINING PROGRAM

## 2022-10-23 PROCEDURE — 99233 SBSQ HOSP IP/OBS HIGH 50: CPT | Performed by: STUDENT IN AN ORGANIZED HEALTH CARE EDUCATION/TRAINING PROGRAM

## 2022-10-23 PROCEDURE — 85025 COMPLETE CBC W/AUTO DIFF WBC: CPT | Performed by: STUDENT IN AN ORGANIZED HEALTH CARE EDUCATION/TRAINING PROGRAM

## 2022-10-23 RX ORDER — OXYCODONE HYDROCHLORIDE 5 MG/1
5 TABLET ORAL EVERY 6 HOURS PRN
Status: DISCONTINUED | OUTPATIENT
Start: 2022-10-23 | End: 2022-10-23

## 2022-10-23 RX ORDER — OXYCODONE HYDROCHLORIDE 5 MG/1
10 TABLET ORAL EVERY 6 HOURS PRN
Status: DISCONTINUED | OUTPATIENT
Start: 2022-10-23 | End: 2022-10-28 | Stop reason: HOSPADM

## 2022-10-23 RX ORDER — OXYCODONE HYDROCHLORIDE 5 MG/1
15 TABLET ORAL EVERY 6 HOURS PRN
Status: DISCONTINUED | OUTPATIENT
Start: 2022-10-23 | End: 2022-10-28 | Stop reason: HOSPADM

## 2022-10-23 RX ORDER — OXYCODONE HYDROCHLORIDE 5 MG/1
10 TABLET ORAL EVERY 6 HOURS PRN
Status: DISCONTINUED | OUTPATIENT
Start: 2022-10-23 | End: 2022-10-23

## 2022-10-23 RX ORDER — OXYCODONE HYDROCHLORIDE 5 MG/1
5 TABLET ORAL EVERY 6 HOURS PRN
Status: DISCONTINUED | OUTPATIENT
Start: 2022-10-23 | End: 2022-10-28 | Stop reason: HOSPADM

## 2022-10-23 RX ADMIN — Medication 250 MG: at 17:39

## 2022-10-23 RX ADMIN — MEROPENEM 1000 MG: 1 INJECTION, POWDER, FOR SOLUTION INTRAVENOUS at 20:29

## 2022-10-23 RX ADMIN — MEROPENEM 1000 MG: 1 INJECTION, POWDER, FOR SOLUTION INTRAVENOUS at 11:24

## 2022-10-23 RX ADMIN — CHLORDIAZEPOXIDE HYDROCHLORIDE 10 MG: 5 CAPSULE ORAL at 15:24

## 2022-10-23 RX ADMIN — SPIRONOLACTONE 50 MG: 25 TABLET ORAL at 08:08

## 2022-10-23 RX ADMIN — OXYCODONE HYDROCHLORIDE 5 MG: 5 TABLET ORAL at 00:03

## 2022-10-23 RX ADMIN — OXYCODONE HYDROCHLORIDE 5 MG: 5 TABLET ORAL at 08:08

## 2022-10-23 RX ADMIN — Medication 100 MG: at 08:08

## 2022-10-23 RX ADMIN — VANCOMYCIN HYDROCHLORIDE 500 MG: 250 CAPSULE ORAL at 00:03

## 2022-10-23 RX ADMIN — MULTIPLE VITAMINS W/ MINERALS TAB 1 TABLET: TAB ORAL at 08:08

## 2022-10-23 RX ADMIN — MEROPENEM 1000 MG: 1 INJECTION, POWDER, FOR SOLUTION INTRAVENOUS at 04:43

## 2022-10-23 RX ADMIN — FOLIC ACID 1 MG: 1 TABLET ORAL at 08:08

## 2022-10-23 RX ADMIN — IOHEXOL 100 ML: 350 INJECTION, SOLUTION INTRAVENOUS at 14:35

## 2022-10-23 RX ADMIN — Medication 250 MG: at 08:08

## 2022-10-23 RX ADMIN — OXYCODONE HYDROCHLORIDE 15 MG: 5 TABLET ORAL at 18:35

## 2022-10-23 RX ADMIN — VANCOMYCIN HYDROCHLORIDE 1250 MG: 1 INJECTION, POWDER, LYOPHILIZED, FOR SOLUTION INTRAVENOUS at 00:03

## 2022-10-23 RX ADMIN — VANCOMYCIN HYDROCHLORIDE 500 MG: 250 CAPSULE ORAL at 12:28

## 2022-10-23 RX ADMIN — OXYCODONE HYDROCHLORIDE 5 MG: 5 TABLET ORAL at 15:24

## 2022-10-23 RX ADMIN — VANCOMYCIN HYDROCHLORIDE 500 MG: 250 CAPSULE ORAL at 06:06

## 2022-10-23 RX ADMIN — VANCOMYCIN HYDROCHLORIDE 1250 MG: 1 INJECTION, POWDER, LYOPHILIZED, FOR SOLUTION INTRAVENOUS at 12:29

## 2022-10-23 RX ADMIN — NICOTINE 21 MG: 21 PATCH, EXTENDED RELEASE TRANSDERMAL at 08:08

## 2022-10-23 RX ADMIN — CHLORDIAZEPOXIDE HYDROCHLORIDE 10 MG: 5 CAPSULE ORAL at 22:15

## 2022-10-23 RX ADMIN — LACTULOSE 20 G: 20 SOLUTION ORAL at 08:08

## 2022-10-23 RX ADMIN — VANCOMYCIN HYDROCHLORIDE 500 MG: 250 CAPSULE ORAL at 17:39

## 2022-10-23 RX ADMIN — VANCOMYCIN HYDROCHLORIDE 1250 MG: 1 INJECTION, POWDER, LYOPHILIZED, FOR SOLUTION INTRAVENOUS at 23:11

## 2022-10-23 RX ADMIN — VANCOMYCIN HYDROCHLORIDE 500 MG: 250 CAPSULE ORAL at 23:11

## 2022-10-23 RX ADMIN — CHLORDIAZEPOXIDE HYDROCHLORIDE 10 MG: 5 CAPSULE ORAL at 06:06

## 2022-10-23 NOTE — PLAN OF CARE
Problem: Potential for Falls  Goal: Patient will remain free of falls  Description: INTERVENTIONS:  - Educate patient/family on patient safety including physical limitations  - Instruct patient to call for assistance with activity   - Consult OT/PT to assist with strengthening/mobility   - Keep Call bell within reach  - Keep bed low and locked with side rails adjusted as appropriate  - Keep care items and personal belongings within reach  - Initiate and maintain comfort rounds  - Make Fall Risk Sign visible to staff  - Offer Toileting every 2 Hours, in advance of need  - Initiate/Maintain alarm  - Obtain necessary fall risk management equipment:   - Apply yellow socks and bracelet for high fall risk patients  - Consider moving patient to room near nurses station  Outcome: Progressing     Problem: PAIN - ADULT  Goal: Verbalizes/displays adequate comfort level or baseline comfort level  Description: Interventions:  - Encourage patient to monitor pain and request assistance  - Assess pain using appropriate pain scale  - Administer analgesics based on type and severity of pain and evaluate response  - Implement non-pharmacological measures as appropriate and evaluate response  - Consider cultural and social influences on pain and pain management  - Notify physician/advanced practitioner if interventions unsuccessful or patient reports new pain  Outcome: Progressing     Problem: INFECTION - ADULT  Goal: Absence or prevention of progression during hospitalization  Description: INTERVENTIONS:  - Assess and monitor for signs and symptoms of infection  - Monitor lab/diagnostic results  - Monitor all insertion sites, i e  indwelling lines, tubes, and drains  - Monitor endotracheal if appropriate and nasal secretions for changes in amount and color  - [place] appropriate cooling/warming therapies per order  - Administer medications as ordered  - Instruct and encourage patient and family to use good hand hygiene technique  - Identify and instruct in appropriate isolation precautions for identified infection/condition  Outcome: Progressing  Goal: Absence of fever/infection during neutropenic period  Description: INTERVENTIONS:  - Monitor WBC    Outcome: Progressing     Problem: SAFETY ADULT  Goal: Patient will remain free of falls  Description: INTERVENTIONS:  - Educate patient/family on patient safety including physical limitations  - Instruct patient to call for assistance with activity   - Consult OT/PT to assist with strengthening/mobility   - Keep Call bell within reach  - Keep bed low and locked with side rails adjusted as appropriate  - Keep care items and personal belongings within reach  - Initiate and maintain comfort rounds  - Make Fall Risk Sign visible to staff  - Offer Toileting every 2 Hours, in advance of need  - Initiate/Maintain alarm  - Obtain necessary fall risk management equipment:   - Apply yellow socks and bracelet for high fall risk patients  - Consider moving patient to room near nurses station  Outcome: Progressing  Goal: Maintain or return to baseline ADL function  Description: INTERVENTIONS:  - Educate patient/family on patient safety including physical limitations  - Instruct patient to call for assistance with activity   - Consult OT/PT to assist with strengthening/mobility   - Keep Call bell within reach  - Keep bed low and locked with side rails adjusted as appropriate  - Keep care items and personal belongings within reach  - Initiate and maintain comfort rounds  - Make Fall Risk Sign visible to staff  - Offer Toileting every 2 Hours, in advance of need  - Initiate/Maintain alarm  - Obtain necessary fall risk management equipment:   - Apply yellow socks and bracelet for high fall risk patients  - Consider moving patient to room near nurses station  Outcome: Progressing  Goal: Maintains/Returns to pre admission functional level  Description: INTERVENTIONS:  - Perform BMAT or MOVE assessment daily    - Set and communicate daily mobility goal to care team and patient/family/caregiver  - Collaborate with rehabilitation services on mobility goals if consulted  - Out of bed for toileting  - Record patient progress and toleration of activity level   Outcome: Progressing     Problem: DISCHARGE PLANNING  Goal: Discharge to home or other facility with appropriate resources  Description: INTERVENTIONS:  - Identify barriers to discharge w/patient and caregiver  - Arrange for needed discharge resources and transportation as appropriate  - Identify discharge learning needs (meds, wound care, etc )  - Arrange for interpretive services to assist at discharge as needed  - Refer to Case Management Department for coordinating discharge planning if the patient needs post-hospital services based on physician/advanced practitioner order or complex needs related to functional status, cognitive ability, or social support system  Outcome: Progressing     Problem: Knowledge Deficit  Goal: Patient/family/caregiver demonstrates understanding of disease process, treatment plan, medications, and discharge instructions  Description: Complete learning assessment and assess knowledge base    Interventions:  - Provide teaching at level of understanding  - Provide teaching via preferred learning methods  Outcome: Progressing

## 2022-10-23 NOTE — ASSESSMENT & PLAN NOTE
Continues to have elevated WBC  WBC spiked to 44 10/22/22  Echo negative for vegetations  ID consulted  Zosyn changed to vanc and meropenum day1-> day 10 total  Repeat bld cx pending  CT C/A/P negative for any obvious new infection  CXR pending  CTH for dental abscess pending  UA negative for UTI  Parasite smear pending  procal fluctuating-> continue to trend  LA 1 4  C diff and stool studies pending  RPP negative  Trending down

## 2022-10-23 NOTE — ASSESSMENT & PLAN NOTE
Drinks vodka and juice daily  Never attempted to quit due to experiencing withdrawals w/ tremors   Denies seizures/hallucinations  [facility] protocol  Thiamine/folate/MTV  On Librium  At risk for DT

## 2022-10-23 NOTE — PROGRESS NOTES
Patient called me into the room to collect stool samples  When I entered the room it smelled strongly of cigarettes  I informed the patient that I could smell their cigarettes and that they could not smoke in the hospital; that there is oxygen running through the walls and its very dangerous  Offered him additional smoking cessation meds such as gum or lozenges which he declined  Informed the patient that if he does it again that we will go through his belongings  Charge and hospital supervisor made aware

## 2022-10-23 NOTE — PROGRESS NOTES
Progress note - [facility]astroenterology   Robbin Ground 56 y o  male MRN: [id number]  Unit/Bed#: MS [facility] Encounter: [id number]    ASSESSMENT and PLAN    61-year-old male with tobacco and alcohol abuse and COPD admitted with SIRS, possible cirrhosis, alcoholic hepatitis, new ascites with concern for SBP     1) New onset ascites and generalized abdominal pain in setting of SIRS: Patient met SIRS criteria on admission, concern for SBP given new ascites  No other definitive source of infection  UA, blood cultures, chest x-ray negative      Patient had paracentesis 10/19 for 2 L  even though patient's SBP has been negative, clinically he was treated with antibiotics      Peritonitis has been adequately treated but his white count is still high awaiting ID opinion  Tooth?-I guess he could have a micro perf is from a paracentesis that is seeding the ascites but clinically does not have a perforated bowel  Review the id consult        2) Possible cirrhosis with alcoholic hepatitis:   XONJ 14 KBN DF 16 1  (10/20)   (MELD score)     -recommend EGD as outpatient for varices screening  -patient should follow-up in outpatient GI office for continued monitoring-could be done with colonoscopy which she should have     3) Alcohol abuse:     -patient understands he must stop drinking  -continue folic acid and thiamine  -monitor for withdrawal symptoms       Chief Complaint   Patient presents with   • Abdominal Pain     Pt c/o of rioght sided abdomianla pain x 1 days  SUBJECTIVE/HPI   Sleeping  Nurse reports he is comfortable without GI complaints        /73 (BP Location: Right arm)   Pulse 101   Temp 98 4 °F (36 9 °C) (Oral)   Resp 16   Ht 5' 7" (1 702 m)   Wt 66 7 kg (147 lb)   SpO2 97%   BMI 23 02 kg/m²     PHYSICALEXAM  General appearance:  Sleeping right now appears comfortable      Lab Results   Component Value Date    CALCIUM 8 3 10/23/2022    K 3 5 10/23/2022    CO2 29 10/23/2022     10/23/2022    BUN 6 10/23/2022    CREATININE 0 80 10/23/2022     Lab Results   Component Value Date    WBC 21 16 (H) 10/23/2022    HGB 12 0 10/23/2022    HCT 34 5 (L) 10/23/2022     (H) 10/23/2022     (H) 10/23/2022     Lab Results   Component Value Date    ALT 41 10/23/2022    AST 72 (H) 10/23/2022    GGT 1,144 (H) 09/28/2022    ALKPHOS 263 (H) 10/23/2022     Lab Results   Component Value Date    AMYLASE 21 (L) 09/28/2022     Lab Results   Component Value Date    LIPASE 308 10/14/2022     Lab Results   Component Value Date    IRON 96 10/15/2022    TIBC 158 (L) 10/15/2022    FERRITIN 423 (H) 10/15/2022     Lab Results   Component Value Date    INR 1 12 10/22/2022

## 2022-10-23 NOTE — CONSULTS
Consultation - Infectious Disease   Christine Santana 56 y o  male MRN: [id number]  Unit/Bed#: -01 Encounter: [id number]      Assessment/Plan   1  Presumed SBP/? Tooth abscess/Fever/Leukocytosis/Acute metab encephalopathy: Pt presented with abd pain and bloating  He did not have fever on admission but WBC count was 25K  He was confused c/w acute metab encephalopathy  I reviewed admission CXR which showed bibasilar atelectasis  Abd CT and abd US showed cirrhosis and ascites - most likely due to ARLD  He was placed on Zosyn for tx of presumed SBP in setting of newly dx'd cirrhosis    He developed low grade fever on 1017 and underwent paracentesis  Ascitic fluid cell count did not quite reach neutrophil count of 250 but Pt had been on abx tx x 4 days prior to the procedure which could have partially tx'd presume SBP and affected the cell count  Fluid cx was neg but abx tx could affexct the cx results  WBC count decreased to 22 3K on 10/21 but increased to 44K  Need to be concerned with new bowel perf after multiple paracenteses, C diff diarrhea since he's been on prolonged course of abx tx, UTI, pneumonia, etc  Doubt tooth abscess is the source of his worsening leukocytosis since he's been on abx which would tx dental infxn  ? Spurious result       - Would repeat bld cx's, UA, and urine cx and broaden abx tx to Vanco and Meropenem  - Would do repeat Abd/Pelvic to look for new intra-abd process  - Will check C diff and start po Vanco  - Would do CT of his mouth to eval for dental abscess  - Awaiting results of repeat cx's  - Will do CXR in AM to look for development of pneumonia  - Will follow WBC count  - Will monitor for the development of toxicity to current abx tx    Discussed case with Dr Leni Webster        History of Present Illness   Physician Requesting Consult: Leni Webster MD  Reason for Consult / Principal Problem: Rising WBC count    HPI: Mertha Score is a 56y o  year old male with ETOH abuse, depression,and left tooth abscess who was admitted on 10/14 with c/o abd pain x 2 weeks and increasing abd distention x several days  On admission, he did not have fever but WBC count was 25K  He has confusion  Abd CT showed cirrhosis and ascites  He was placed on Zosyn for possible SBP  I reviewed admission CXR which showed bibasilar atelectasis  He developed low grade fever on 10?17  Paracentesis was done and ascitic fluid cell count showed 352 WBC's with 62% polys which did not quite fit the definition for SBP but Pt had been on abx x 4 days before the procedure was done which could have partially tx'd SBP and lowered the cell count  Fluid cx was neg  Admission bld cx's are neg  He underwent repeat paracentisis on 10/19 and 10/21, WBC count peaked at 30 5K on 10/13 but decreased to 22 3K on 10/21  This AM's WBC count increased to 44 2K  Pt remains on Zosyn    Pt denied cough, SOB, CP, N/V, or dysuria  He has multiple soft stools  He has tooth discomfort on lower left tooth  Inpatient consult to Infectious Diseases  Consult performed by: Yeni Edwards MD  Consult ordered by: Julee Bay MD          ROS: See HPI  14 systems reviewed, remainder is neg      Historical Information   Past Medical History:   Diagnosis Date   • Bronchitis, asthmatic     last assessed: 3/21/2016   • Depression    • Pneumonia     last assessed: 12/27/2016   • Pneumonia of left lower lobe due to Haemophilus influenzae ([facility] [address] )     last assessed: 11/27/2015   • Sebaceous cyst     last assessed: 12/26/2013   • Substance abuse ([facility] [address] )    • Suicidal ideation     last assessed: 12/27/2016     Past Surgical History:   Procedure Laterality Date   • IR PARACENTESIS  10/17/2022   • IR PARACENTESIS  10/19/2022     Social History   Social History     Substance and Sexual Activity   Alcohol Use Yes    Comment: 10 shots a day of vodka mixed with juice of some sort     Social History     Substance and Sexual Activity   Drug Use Not Currently   • Types: Prescription, Methamphetamines    Comment: opiates     Social History     Tobacco Use   Smoking Status Current Every Day Smoker   • Packs/day: 1 00   Smokeless Tobacco Never Used     Family History   Problem Relation Age of Onset   • Colon cancer Mother    • Diabetes Mother         mellitus       Meds/Allergies   MEDS:  Vanco: #1  Meropenem: #1  Oral Vanco: #1  Zosyn: #9      Current Facility-Administered Medications:   •  albuterol (PROVENTIL HFA,VENTOLIN HFA) inhaler 2 puff, 2 puff, Inhalation, Q4H PRN, Douglas Head PA-C  •  chlordiazePOXIDE (LIBRIUM) capsule 10 mg, 10 mg, Oral, Q8H [facility], Ever De Los Santos MD, 10 mg at 34/88/53 1712  •  folic acid (FOLVITE) tablet 1 mg, 1 mg, Oral, Daily, Douglas Head PA-C, 1 mg at 10/22/22 1029  •  ibuprofen (MOTRIN) tablet 400 mg, 400 mg, Oral, Q6H PRN, Micah Flores MD  •  lactulose oral solution 20 g, 20 g, Oral, Daily, MACHO Ndiaye, 20 g at 10/22/22 1029  •  LORazepam (ATIVAN) injection 2 mg, 2 mg, Intravenous, Once, Sophie Marcus DO  •  meropenem (MERREM) 1,000 mg in sodium chloride 0 9 % 100 mL IVPB, 1,000 mg, Intravenous, Q8H, Micah Flores MD, Last Rate: 100 mL/hr at 10/22/22 2033, 1,000 mg at 10/22/22 2033  •  multivitamin-minerals (CENTRUM) tablet 1 tablet, 1 tablet, Oral, Daily, Douglas Head PA-C, 1 tablet at 10/22/22 1029  •  nicotine (NICODERM CQ) 21 mg/24 hr TD 24 hr patch 21 mg, 21 mg, Transdermal, Daily, Teena Ayers PA-C, 21 mg at 10/22/22 1030  •  ondansetron (ZOFRAN) injection 4 mg, 4 mg, Intravenous, Q4H PRN, Douglas Head PA-C, 4 mg at 10/14/22 2118  •  oxyCODONE (ROXICODONE) IR tablet 5 mg, 5 mg, Oral, Q6H PRN, Ever De Los Santos MD, 5 mg at 10/22/22 1821  •  polyethylene glycol (MIRALAX) packet 17 g, 17 g, Oral, Daily, MACHO Ndiaye, 17 g at 10/21/22 1109  •  saccharomyces boulardii (FLORASTOR) capsule 250 mg, 250 mg, Oral, BID, Micah Flores MD, 250 mg at 10/22/22 1748  •  spironolactone (ALDACTONE) tablet 50 mg, 50 mg, Oral, Daily, Luz Borges MD, 50 mg at 10/21/22 1109  •  thiamine tablet 100 mg, 100 mg, Oral, Daily, MARINO Heller, 100 mg at 10/22/22 1029  •  vancomycin (VANCOCIN) 1,250 mg in sodium chloride 0 9 % 250 mL IVPB, 17 5 mg/kg, Intravenous, Q12H, Luz Borges MD, Last Rate: 166 7 mL/hr at 10/22/22 1427, 1,250 mg at 10/22/22 1427  •  vancomycin (VANCOCIN) capsule 500 mg, 500 mg, Oral, Q6H [address], Yocasta Lemus MD, 500 mg at 10/22/22 1748    Allergies   Allergen Reactions   • Levofloxacin    • Quinolones          Intake/Output Summary (Last 24 hours) at 10/22/2022 2249  Last data filed at 10/22/2022 1701  Gross per 24 hour   Intake 700 ml   Output --   Net 700 ml       PE:  WD, WN, WM who appeared chronically ill and very weak  VSS, Tmax: 98 7  HEENT:  No scleral icterus, pharynx clear, poor dentition  NECK: Supple  CARDIAC:  RRR, nml S1, S2  LUNGS:  Clear anteriorly  ABDOMEN:  +BS, some distention  MUSCULOSKELETAL:  No calf tenderness  EXTREMITIES:  +LE edema  SKIN:  No rash  NEURO:  Grossly nonfocal    Invasive Devices:   Peripheral IV 10/22/22 Dorsal (posterior); Right Forearm (Active)   Site Assessment Clean;Dry; Intact 10/22/22 2000   Dressing Type Transparent 10/22/22 2000   Line Status Flushed;Saline locked 10/22/22 2000   Dressing Status Clean;Dry; Intact 10/22/22 2000           Lab Results:   No results displayed because visit has over 200 results  Imaging Studies: I have personally reviewed pertinent reports  EKG, Pathology, and Other Studies: I have personally reviewed pertinent reports  Culture  Lab Results   Component Value Date    BLOODCX No Growth After 5 Days  10/14/2022    BLOODCX No Growth After 5 Days  10/14/2022    BLOODCX No Growth After 5 Days  10/15/2018    BLOODCX No Growth After 5 Days   10/15/2018     No results found for: WOUNDCULT  No results found for: URINECX  No results found for: SPUTUMCULTUR    Principal Problem:    Alcoholic cirrhosis of liver with ascites (HCC)  Active Problems:    Nicotine dependence    COPD (chronic obstructive pulmonary disease) (HCC)    Ascites    SIRS (systemic inflammatory response syndrome) (HCC)    Alcohol use    Sepsis without acute organ dysfunction (HCC)    Leukocytosis

## 2022-10-23 NOTE — PROGRESS NOTES
Noting varied meal completions per nursing flowsheets, 0-100% of meals  Will order ensure max protein daily to promote adequate PRO intake for cirrhosis  Continue with 2gm Na restriction at this time for ascites

## 2022-10-23 NOTE — PLAN OF CARE
Problem: Potential for Falls  Goal: Patient will remain free of falls  Description: INTERVENTIONS:  - Educate patient/family on patient safety including physical limitations  - Instruct patient to call for assistance with activity   - Consult OT/PT to assist with strengthening/mobility   - Keep Call bell within reach  - Keep bed low and locked with side rails adjusted as appropriate  - Keep care items and personal belongings within reach  - Initiate and maintain comfort rounds  - Make Fall Risk Sign visible to staff  - Offer Toileting every 2 Hours, in advance of need  - Initiate/Maintain alarm  - Obtain necessary fall risk management equipment:   - Apply yellow socks and bracelet for high fall risk patients  - Consider moving patient to room near nurses station  Outcome: Progressing     Problem: PAIN - ADULT  Goal: Verbalizes/displays adequate comfort level or baseline comfort level  Description: Interventions:  - Encourage patient to monitor pain and request assistance  - Assess pain using appropriate pain scale  - Administer analgesics based on type and severity of pain and evaluate response  - Implement non-pharmacological measures as appropriate and evaluate response  - Consider cultural and social influences on pain and pain management  - Notify physician/advanced practitioner if interventions unsuccessful or patient reports new pain  Outcome: Progressing     Problem: INFECTION - ADULT  Goal: Absence or prevention of progression during hospitalization  Description: INTERVENTIONS:  - Assess and monitor for signs and symptoms of infection  - Monitor lab/diagnostic results  - Monitor all insertion sites, i e  indwelling lines, tubes, and drains  - Monitor endotracheal if appropriate and nasal secretions for changes in amount and color  - [place] appropriate cooling/warming therapies per order  - Administer medications as ordered  - Instruct and encourage patient and family to use good hand hygiene technique  - Identify and instruct in appropriate isolation precautions for identified infection/condition  Outcome: Progressing  Goal: Absence of fever/infection during neutropenic period  Description: INTERVENTIONS:  - Monitor WBC    Outcome: Progressing     Problem: SAFETY ADULT  Goal: Patient will remain free of falls  Description: INTERVENTIONS:  - Educate patient/family on patient safety including physical limitations  - Instruct patient to call for assistance with activity   - Consult OT/PT to assist with strengthening/mobility   - Keep Call bell within reach  - Keep bed low and locked with side rails adjusted as appropriate  - Keep care items and personal belongings within reach  - Initiate and maintain comfort rounds  - Make Fall Risk Sign visible to staff  - Offer Toileting every 2 Hours, in advance of need  - Initiate/Maintain alarm  - Obtain necessary fall risk management equipment:   - Apply yellow socks and bracelet for high fall risk patients  - Consider moving patient to room near nurses station  Outcome: Progressing  Goal: Maintain or return to baseline ADL function  Description: INTERVENTIONS:  - Educate patient/family on patient safety including physical limitations  - Instruct patient to call for assistance with activity   - Consult OT/PT to assist with strengthening/mobility   - Keep Call bell within reach  - Keep bed low and locked with side rails adjusted as appropriate  - Keep care items and personal belongings within reach  - Initiate and maintain comfort rounds  - Make Fall Risk Sign visible to staff  - Offer Toileting every 2 Hours, in advance of need  - Initiate/Maintain alarm  - Obtain necessary fall risk management equipment:   - Apply yellow socks and bracelet for high fall risk patients  - Consider moving patient to room near nurses station  Outcome: Progressing  Goal: Maintains/Returns to pre admission functional level  Description: INTERVENTIONS:  - Perform BMAT or MOVE assessment daily    - Set and communicate daily mobility goal to care team and patient/family/caregiver  - Collaborate with rehabilitation services on mobility goals if consulted  - Perform Range of Motion 3 times a day  - Reposition patient every 2 hours  - Dangle patient 3 times a day  - Stand patient 3 times a day  - Ambulate patient 3 times a day  - Out of bed to chair 3 times a day   - Out of bed for meals   Problem: DISCHARGE PLANNING  Goal: Discharge to home or other facility with appropriate resources  Description: INTERVENTIONS:  - Identify barriers to discharge w/patient and caregiver  - Arrange for needed discharge resources and transportation as appropriate  - Identify discharge learning needs (meds, wound care, etc )  - Arrange for interpretive services to assist at discharge as needed  - Refer to Case Management Department for coordinating discharge planning if the patient needs post-hospital services based on physician/advanced practitioner order or complex needs related to functional status, cognitive ability, or social support system  Outcome: Progressing     Problem: Knowledge Deficit  Goal: Patient/family/caregiver demonstrates understanding of disease process, treatment plan, medications, and discharge instructions  Description: Complete learning assessment and assess knowledge base    Interventions:  - Provide teaching at level of understanding  - Provide teaching via preferred learning methods  Outcome: Progressing   3 times a day  - Out of bed for toileting  - Record patient progress and toleration of activity level   Outcome: Progressing

## 2022-10-23 NOTE — PROGRESS NOTES
Vancomycin IV Pharmacy-to-Dose Consultation    Rea Kiran is a 56 y o  male who is currently receiving Vancomycin IV with management by the Pharmacy Consult service  Assessment/Plan:    The patient was reviewed  Renal function is stable and no signs or symptoms of nephrotoxicity and/or infusion reactions were documented in the chart  Based on today’s assessment, continue current vancomycin (day # 2) dosing of 1250mg IV Q12H, with a plan for trough to be drawn at 1130 on 10/24/22  We will continue to follow the patient’s culture results and clinical progress daily      Laila Olivarez

## 2022-10-23 NOTE — PROGRESS NOTES
[place]  Progress Note - Madelyn Rogers 1965, 56 y o  male MRN: [id number]  Unit/Bed#: MS [facility]Ann[facility] Encounter: [id number]  Primary Care Provider: Razia Hernandez,    Date and time admitted to hospital: 10/14/2022  1:52 PM    Leukocytosis  Assessment & Plan  Continues to have elevated WBC  WBC spiked to 44 10/22/22  Echo negative for vegetations  ID consulted  Zosyn changed to vanc and meropenum day1-> day 10 total  Repeat bld cx pending  CT C/A/P negative for any obvious new infection  CXR pending  CTH for dental abscess pending  UA negative for UTI  Parasite smear pending  procal fluctuating-> continue to trend  LA 1 4  C diff and stool studies pending  RPP negative  Trending down    * Ascites  Assessment & Plan  Presented with diffuse abdominal pain and abdominal distension  Likely secondary to alcoholic cirrhosis  s/p IR paracentesis on 10/17 labs showing possible SBP and portal HTN  S/p IR paracentesis on 10/19  S/p IR paracentesis on 10/22  Per PCP note, had bloody stools and reported fevers at home  Concern for possible SBP  Trend WBC and fever curve  Increased aldactone to 50 mg qd  GI consult appreciated  bld cx NGTD  F/u with GI outpt and will need EGD outpt,     Alcohol use  Assessment & Plan  Drinks vodka and juice daily  Never attempted to quit due to experiencing withdrawals w/ tremors  Denies seizures/hallucinations  CIWA protocol  Thiamine/folate/MTV  On Librium  At risk for DT    Alcoholic cirrhosis of liver with ascites [facility])  Assessment & Plan  New onset cirrhosis  Was noted to have abdominal distension, hepatomegaly, with dark stools by PCP on 09/28/2022  · Mild transaminitis with , ALT 72  Alk-phos 400  T bili 1 9, direct bili 1 4    · CT abdomen/pelvis shows new onset cirrhosis with moderate ascites  · Appeared mildly encephalopathic on admission  · Suspect alcoholic liver disease  · On folate/ thiamine/ MTV supplementation    Encephalopathy-resolved as of 10/19/2022  Assessment & Plan  Presented with metabolic encephalopathy in ED POA  Suspect hepatic encephalopathy vs SBP  Ammonia level is 38  Received a dose of lactulose and ammonia levels are normal this morning  Continue thiamine and folate  Trend WBC and fever curve  resolved    Sepsis without acute organ dysfunction ([facility])  Assessment & Plan  Sepsis, present on admission, due to possible SBP; as evidenced by meeting SIRS criteria on admission with an infectious process present; requiring blood cultures x 2, lactic acid level, and IV Zosyn  SIRS (systemic inflammatory response syndrome) ([facility])  Assessment & Plan  Meets SIRS criteria w tachycardia (HR 120s) and leukocytosis (WBC 33)  Reports fevers/chills at home, however afebrile while inpatient  No definitive source of infection, however concern for possible SBP  bld cx NGTD    COPD (chronic obstructive pulmonary disease) ([facility])  Assessment & Plan  No shortness of breath/wheezing  Not acute exacerbation  Albuterol inhaler PRN    Nicotine dependence  Assessment & Plan  Smokes approximately a pack a day  Nicotine patch    VTE Pharmacologic Prophylaxis:   Low Risk (Score 0-2) - Encourage Ambulation      Patient Centered Rounds: I performed bedside rounds with nursing staff today  Discussions with Specialists or Other Care Team Provider: IS, GI, IR, CM     Education and Discussions with Family / Patient: pt       Time Spent for Care: 45 minutes  More than 50% of total time spent on counseling and coordination of care as described above      Current Length of Stay: 9 day(s)  Current Patient Status: Inpatient   Certification Statement: The patient will continue to require additional inpatient hospital stay due to elevated WBC  Discharge Plan: Anticipate discharge in >72 hrs to home      Code Status: Level 1 - Full Code     Subjective: Johnny was seen and examined at bedside  No acute events overnight  Discussed plan of care    All questions and concerns were answered and addressed  Objective:     Vitals:   Temp (24hrs), Av 3 °F (36 8 °C), Min:98 2 °F (36 8 °C), Max:98 4 °F (36 9 °C)    Temp:  [98 2 °F (36 8 °C)-98 4 °F (36 9 °C)] 98 4 °F (36 9 °C)  HR:  [] 101  Resp:  [16] 16  BP: (110-115)/(73-77) 110/73  SpO2:  [97 %-98 %] 97 %  Body mass index is 23 02 kg/m²  Input and Output Summary (last 24 hours): Intake/Output Summary (Last 24 hours) at 10/23/2022 7319  Last data filed at 10/23/2022 0601  Gross per 24 hour   Intake 580 ml   Output 100 ml   Net 480 ml       Physical Exam:   Physical Exam   Vitals and nursing note reviewed  Constitutional:       Appearance: He is ill-appearing  HENT:      Head: Normocephalic and atraumatic  Cardiovascular:      Rate and Rhythm: Normal rate and regular rhythm  Pulses: Normal pulses  Heart sounds: Normal heart sounds  Pulmonary:      Effort: Pulmonary effort is normal       Breath sounds: Normal breath sounds  Abdominal:      General: Abdomen is flat  There is distension  Palpations: Abdomen is soft  Musculoskeletal:      Right lower leg: No edema  Left lower leg: No edema  Skin:     General: Skin is warm  Neurological:      General: No focal deficit present  Mental Status: He is alert and oriented to person, place, and time      Additional Data:     Labs:  Results from last 7 days   Lab Units 10/23/22  0529 10/22/22  0430   WBC Thousand/uL 21 16* 44 21*   HEMOGLOBIN g/dL 12 0 12 6   HEMATOCRIT % 34 5* 35 8*   PLATELETS Thousands/uL 400* 454*   BANDS PCT %  --  1   NEUTROS PCT % 84*  --    LYMPHS PCT % 6*  --    LYMPHO PCT %  --  4*   MONOS PCT % 5  --    MONO PCT %  --  2*   EOS PCT % 3 0     Results from last 7 days   Lab Units 10/23/22  0529   SODIUM mmol/L 135   POTASSIUM mmol/L 3 5   CHLORIDE mmol/L 102   CO2 mmol/L 29   BUN mg/dL 6   CREATININE mg/dL 0 80   ANION GAP mmol/L 4   CALCIUM mg/dL 8 3   ALBUMIN g/dL 1 8*   TOTAL BILIRUBIN mg/dL 2 00*   ALK PHOS U/L 263*   ALT U/L 41   AST U/L 72*   GLUCOSE RANDOM mg/dL 106     Results from last 7 days   Lab Units 10/22/22  0430   INR  1 12             Results from last 7 days   Lab Units 10/23/22  0529 10/22/22  1210 10/20/22  0434   LACTIC ACID mmol/L  --  1 4  --    PROCALCITONIN ng/ml 0 74* 1 46* 0 68*       Lines/Drains:  Invasive Devices  Report    Peripheral Intravenous Line  Duration           Peripheral IV 10/22/22 Dorsal (posterior); Right Forearm 1 day                      Imaging: Reviewed radiology reports from this admission including: chest CT scan and abdominal/pelvic CT    Recent Cultures (last 7 days):   Results from last 7 days   Lab Units 10/22/22  1212 10/21/22  1544 10/19/22  1449 10/17/22  1342   BLOOD CULTURE  Received in Microbiology Lab  Culture in Progress  Received in Microbiology Lab  Culture in Progress    --   --   --    GRAM STAIN RESULT   --  Rare Polys  No organisms seen Rare Polys  No bacteria seen No Polys or Bacteria seen   BODY FLUID CULTURE, STERILE   --   --  No growth No growth       Last 24 Hours Medication List:   Current Facility-Administered Medications   Medication Dose Route Frequency Provider Last Rate   • albuterol  2 puff Inhalation Q4H PRN Oneta GivensMARINO     • chlordiazePOXIDE  10 mg Oral Q8H Lui Blanco MD     • folic acid  1 mg Oral Daily Oneta GivenMARINO chin     • ibuprofen  400 mg Oral Q6H PRN Anne-Marie Sampson MD     • lactulose  20 g Oral Daily MACHO Potts     • LORazepam  2 mg Intravenous Once El [place] DO     • meropenem  1,000 mg Intravenous Q8H Anne-Marie Sampson MD 1,000 mg (10/23/22 0443)   • multivitamin-minerals  1 tablet Oral Daily Oneta GivensMARINO     • nicotine  21 mg Transdermal Daily David Tiwari PA-C     • ondansetron  4 mg Intravenous Q4H PRN Oneta GivenMARINO chin     • oxyCODONE  5 mg Oral Q6H PRN Parker Schlatter, MD     • polyethylene glycol  17 g Oral Daily MACHO Magallanes     • saccharomyces boulardii  250 mg Oral BID Yanira Tan MD     • spironolactone  50 mg Oral Daily Yanira Tan MD     • thiamine  100 mg Oral Daily Rito Mahmood PA-C     • vancomycin  17 5 mg/kg Intravenous Q12H Yanira Tan MD 1,250 mg (10/23/22 0003)   • vancomycin (VANCOCIN) capsule 500 mg  500 mg Oral Q6H Silvino Hightower MD          Today, Patient Was Seen By: Yanira Tan    **Please Note: This note may have been constructed using a voice recognition system  **

## 2022-10-24 ENCOUNTER — APPOINTMENT (INPATIENT)
Dept: NON INVASIVE DIAGNOSTICS | Facility: HOSPITAL | Age: 57
DRG: 871 | End: 2022-10-24
Payer: MEDICARE

## 2022-10-24 PROBLEM — K76.9 LIVER LESION: Status: ACTIVE | Noted: 2022-10-24

## 2022-10-24 PROBLEM — R65.20 SEPSIS WITH ACUTE ORGAN DYSFUNCTION (HCC): Status: ACTIVE | Noted: 2022-10-14

## 2022-10-24 PROBLEM — A41.9 SEPSIS WITH ACUTE ORGAN DYSFUNCTION (HCC): Status: ACTIVE | Noted: 2022-10-14

## 2022-10-24 LAB
% PARASITEMIA: 0
ALBUMIN SERPL BCP-MCNC: 1.7 G/DL (ref 3.5–5)
ALP SERPL-CCNC: 240 U/L (ref 46–116)
ALT SERPL W P-5'-P-CCNC: 41 U/L (ref 12–78)
AMMONIA PLAS-SCNC: 48 UMOL/L (ref 11–35)
ANION GAP SERPL CALCULATED.3IONS-SCNC: 8 MMOL/L (ref 4–13)
AST SERPL W P-5'-P-CCNC: 68 U/L (ref 5–45)
BASOPHILS # BLD AUTO: 0.18 THOUSANDS/ÂΜL (ref 0–0.1)
BASOPHILS NFR BLD AUTO: 1 % (ref 0–1)
BILIRUB SERPL-MCNC: 1.5 MG/DL (ref 0.2–1)
BUN SERPL-MCNC: 6 MG/DL (ref 5–25)
C DIFF TOX A+B STL QL IA: POSITIVE
C DIFF TOX GENS STL QL NAA+PROBE: POSITIVE
CALCIUM ALBUM COR SERPL-MCNC: 10.1 MG/DL (ref 8.3–10.1)
CALCIUM SERPL-MCNC: 8.3 MG/DL (ref 8.3–10.1)
CHLORIDE SERPL-SCNC: 100 MMOL/L (ref 96–108)
CO2 SERPL-SCNC: 25 MMOL/L (ref 21–32)
CREAT SERPL-MCNC: 0.74 MG/DL (ref 0.6–1.3)
EOSINOPHIL # BLD AUTO: 0.55 THOUSAND/ÂΜL (ref 0–0.61)
EOSINOPHIL NFR BLD AUTO: 2 % (ref 0–6)
ERYTHROCYTE [DISTWIDTH] IN BLOOD BY AUTOMATED COUNT: 16.1 % (ref 11.6–15.1)
GFR SERPL CREATININE-BSD FRML MDRD: 103 ML/MIN/1.73SQ M
GLUCOSE SERPL-MCNC: 121 MG/DL (ref 65–140)
HCT VFR BLD AUTO: 31 % (ref 36.5–49.3)
HGB BLD-MCNC: 11 G/DL (ref 12–17)
IMM GRANULOCYTES # BLD AUTO: 0.27 THOUSAND/UL (ref 0–0.2)
IMM GRANULOCYTES NFR BLD AUTO: 1 % (ref 0–2)
LYMPHOCYTES # BLD AUTO: 1.59 THOUSANDS/ÂΜL (ref 0.6–4.47)
LYMPHOCYTES NFR BLD AUTO: 6 % (ref 14–44)
MAGNESIUM SERPL-MCNC: 1.9 MG/DL (ref 1.6–2.6)
MCH RBC QN AUTO: 36.5 PG (ref 26.8–34.3)
MCHC RBC AUTO-ENTMCNC: 35.5 G/DL (ref 31.4–37.4)
MCV RBC AUTO: 103 FL (ref 82–98)
MONOCYTES # BLD AUTO: 1.41 THOUSAND/ÂΜL (ref 0.17–1.22)
MONOCYTES NFR BLD AUTO: 5 % (ref 4–12)
NEUTROPHILS # BLD AUTO: 23.45 THOUSANDS/ÂΜL (ref 1.85–7.62)
NEUTS SEG NFR BLD AUTO: 85 % (ref 43–75)
NRBC BLD AUTO-RTO: 0 /100 WBCS
PARASITE BLD: NO
PATHOLOGIST INTERPRETATION: NORMAL
PLATELET # BLD AUTO: 384 THOUSANDS/UL (ref 149–390)
PMV BLD AUTO: 9.2 FL (ref 8.9–12.7)
POTASSIUM SERPL-SCNC: 3.8 MMOL/L (ref 3.5–5.3)
PROCALCITONIN SERPL-MCNC: 0.89 NG/ML
PROT SERPL-MCNC: 5.7 G/DL (ref 6.4–8.4)
RBC # BLD AUTO: 3.01 MILLION/UL (ref 3.88–5.62)
SODIUM SERPL-SCNC: 133 MMOL/L (ref 135–147)
VANCOMYCIN TROUGH SERPL-MCNC: 11.5 UG/ML (ref 10–20)
WBC # BLD AUTO: 27.45 THOUSAND/UL (ref 4.31–10.16)

## 2022-10-24 PROCEDURE — 80053 COMPREHEN METABOLIC PANEL: CPT | Performed by: STUDENT IN AN ORGANIZED HEALTH CARE EDUCATION/TRAINING PROGRAM

## 2022-10-24 PROCEDURE — 85025 COMPLETE CBC W/AUTO DIFF WBC: CPT | Performed by: STUDENT IN AN ORGANIZED HEALTH CARE EDUCATION/TRAINING PROGRAM

## 2022-10-24 PROCEDURE — 99232 SBSQ HOSP IP/OBS MODERATE 35: CPT | Performed by: PHYSICIAN ASSISTANT

## 2022-10-24 PROCEDURE — 83735 ASSAY OF MAGNESIUM: CPT | Performed by: STUDENT IN AN ORGANIZED HEALTH CARE EDUCATION/TRAINING PROGRAM

## 2022-10-24 PROCEDURE — 80202 ASSAY OF VANCOMYCIN: CPT | Performed by: INTERNAL MEDICINE

## 2022-10-24 PROCEDURE — 82140 ASSAY OF AMMONIA: CPT | Performed by: PHYSICIAN ASSISTANT

## 2022-10-24 PROCEDURE — 84145 PROCALCITONIN (PCT): CPT | Performed by: STUDENT IN AN ORGANIZED HEALTH CARE EDUCATION/TRAINING PROGRAM

## 2022-10-24 PROCEDURE — 99232 SBSQ HOSP IP/OBS MODERATE 35: CPT | Performed by: INTERNAL MEDICINE

## 2022-10-24 RX ORDER — ENOXAPARIN SODIUM 100 MG/ML
40 INJECTION SUBCUTANEOUS
Status: DISCONTINUED | OUTPATIENT
Start: 2022-10-24 | End: 2022-10-28 | Stop reason: HOSPADM

## 2022-10-24 RX ORDER — METRONIDAZOLE 500 MG/100ML
500 INJECTION, SOLUTION INTRAVENOUS EVERY 8 HOURS
Status: DISCONTINUED | OUTPATIENT
Start: 2022-10-24 | End: 2022-10-28 | Stop reason: HOSPADM

## 2022-10-24 RX ORDER — CHLORDIAZEPOXIDE HYDROCHLORIDE 5 MG/1
10 CAPSULE, GELATIN COATED ORAL 2 TIMES DAILY
Status: DISCONTINUED | OUTPATIENT
Start: 2022-10-24 | End: 2022-10-27

## 2022-10-24 RX ADMIN — ENOXAPARIN SODIUM 40 MG: 100 INJECTION SUBCUTANEOUS at 13:49

## 2022-10-24 RX ADMIN — VANCOMYCIN HYDROCHLORIDE 500 MG: 250 CAPSULE ORAL at 05:54

## 2022-10-24 RX ADMIN — CHLORDIAZEPOXIDE HYDROCHLORIDE 10 MG: 5 CAPSULE ORAL at 05:54

## 2022-10-24 RX ADMIN — MULTIPLE VITAMINS W/ MINERALS TAB 1 TABLET: TAB ORAL at 08:55

## 2022-10-24 RX ADMIN — LACTULOSE 20 G: 20 SOLUTION ORAL at 08:55

## 2022-10-24 RX ADMIN — OXYCODONE HYDROCHLORIDE 10 MG: 5 TABLET ORAL at 18:05

## 2022-10-24 RX ADMIN — FOLIC ACID 1 MG: 1 TABLET ORAL at 08:55

## 2022-10-24 RX ADMIN — SPIRONOLACTONE 50 MG: 25 TABLET ORAL at 08:55

## 2022-10-24 RX ADMIN — METRONIDAZOLE 500 MG: 500 INJECTION, SOLUTION INTRAVENOUS at 13:48

## 2022-10-24 RX ADMIN — VANCOMYCIN HYDROCHLORIDE 500 MG: 250 CAPSULE ORAL at 12:03

## 2022-10-24 RX ADMIN — MEROPENEM 1000 MG: 1 INJECTION, POWDER, FOR SOLUTION INTRAVENOUS at 02:28

## 2022-10-24 RX ADMIN — CHLORDIAZEPOXIDE HYDROCHLORIDE 10 MG: 5 CAPSULE ORAL at 18:02

## 2022-10-24 RX ADMIN — OXYCODONE HYDROCHLORIDE 10 MG: 5 TABLET ORAL at 12:02

## 2022-10-24 RX ADMIN — Medication 250 MG: at 08:55

## 2022-10-24 RX ADMIN — Medication 250 MG: at 18:02

## 2022-10-24 RX ADMIN — NICOTINE 21 MG: 21 PATCH, EXTENDED RELEASE TRANSDERMAL at 09:00

## 2022-10-24 RX ADMIN — POLYETHYLENE GLYCOL 3350 17 G: 17 POWDER, FOR SOLUTION ORAL at 08:54

## 2022-10-24 RX ADMIN — VANCOMYCIN HYDROCHLORIDE 500 MG: 250 CAPSULE ORAL at 18:02

## 2022-10-24 RX ADMIN — OXYCODONE HYDROCHLORIDE 5 MG: 5 TABLET ORAL at 06:01

## 2022-10-24 RX ADMIN — MEROPENEM 1000 MG: 1 INJECTION, POWDER, FOR SOLUTION INTRAVENOUS at 12:13

## 2022-10-24 RX ADMIN — METRONIDAZOLE 500 MG: 500 INJECTION, SOLUTION INTRAVENOUS at 21:24

## 2022-10-24 RX ADMIN — Medication 100 MG: at 08:55

## 2022-10-24 NOTE — PLAN OF CARE
Problem: Potential for Falls  Goal: Patient will remain free of falls  Description: INTERVENTIONS:  - Educate patient/family on patient safety including physical limitations  - Instruct patient to call for assistance with activity   - Consult OT/PT to assist with strengthening/mobility   - Keep Call bell within reach  - Keep bed low and locked with side rails adjusted as appropriate  - Keep care items and personal belongings within reach  - Initiate and maintain comfort rounds  - Make Fall Risk Sign visible to staff  - Offer Toileting every 2 Hours, in advance of need  - Initiate/Maintain alarm  - Obtain necessary fall risk management equipment:   - Apply yellow socks and bracelet for high fall risk patients  - Consider moving patient to room near nurses station  Outcome: Progressing     Problem: PAIN - ADULT  Goal: Verbalizes/displays adequate comfort level or baseline comfort level  Description: Interventions:  - Encourage patient to monitor pain and request assistance  - Assess pain using appropriate pain scale  - Administer analgesics based on type and severity of pain and evaluate response  - Implement non-pharmacological measures as appropriate and evaluate response  - Consider cultural and social influences on pain and pain management  - Notify physician/advanced practitioner if interventions unsuccessful or patient reports new pain  Outcome: Progressing     Problem: INFECTION - ADULT  Goal: Absence or prevention of progression during hospitalization  Description: INTERVENTIONS:  - Assess and monitor for signs and symptoms of infection  - Monitor lab/diagnostic results  - Monitor all insertion sites, i e  indwelling lines, tubes, and drains  - Monitor endotracheal if appropriate and nasal secretions for changes in amount and color  - [place] appropriate cooling/warming therapies per order  - Administer medications as ordered  - Instruct and encourage patient and family to use good hand hygiene technique  - Identify and instruct in appropriate isolation precautions for identified infection/condition  Outcome: Progressing  Goal: Absence of fever/infection during neutropenic period  Description: INTERVENTIONS:  - Monitor WBC    Outcome: Progressing     Problem: SAFETY ADULT  Goal: Patient will remain free of falls  Description: INTERVENTIONS:  - Educate patient/family on patient safety including physical limitations  - Instruct patient to call for assistance with activity   - Consult OT/PT to assist with strengthening/mobility   - Keep Call bell within reach  - Keep bed low and locked with side rails adjusted as appropriate  - Keep care items and personal belongings within reach  - Initiate and maintain comfort rounds  - Make Fall Risk Sign visible to staff  - Offer Toileting every 2 Hours, in advance of need  - Initiate/Maintain alarm  - Obtain necessary fall risk management equipment:   - Apply yellow socks and bracelet for high fall risk patients  - Consider moving patient to room near nurses station  Outcome: Progressing  Goal: Maintain or return to baseline ADL function  Description: INTERVENTIONS:  - Educate patient/family on patient safety including physical limitations  - Instruct patient to call for assistance with activity   - Consult OT/PT to assist with strengthening/mobility   - Keep Call bell within reach  - Keep bed low and locked with side rails adjusted as appropriate  - Keep care items and personal belongings within reach  - Initiate and maintain comfort rounds  - Make Fall Risk Sign visible to staff  - Offer Toileting every 2 Hours, in advance of need  - Initiate/Maintain alarm  - Obtain necessary fall risk management equipment:   - Apply yellow socks and bracelet for high fall risk patients  - Consider moving patient to room near nurses station  Outcome: Progressing  Goal: Maintains/Returns to pre admission functional level  Description: INTERVENTIONS:  - Perform BMAT or MOVE assessment daily    - Set and communicate daily mobility goal to care team and patient/family/caregiver  - Collaborate with rehabilitation services on mobility goals if consulted  - Perform Range of Motion 3 times a day  - Reposition patient every 2 hours  - Dangle patient 3 times a day  - Stand patient 6 times a day  - Ambulate patient 3 times a day  - Out of bed to chair 3 times a day   - Out of bed for meals 3 times a day  - Out of bed for toileting  - Record patient progress and toleration of activity level   Outcome: Progressing     Problem: DISCHARGE PLANNING  Goal: Discharge to home or other facility with appropriate resources  Description: INTERVENTIONS:  - Identify barriers to discharge w/patient and caregiver  - Arrange for needed discharge resources and transportation as appropriate  - Identify discharge learning needs (meds, wound care, etc )  - Arrange for interpretive services to assist at discharge as needed  - Refer to Case Management Department for coordinating discharge planning if the patient needs post-hospital services based on physician/advanced practitioner order or complex needs related to functional status, cognitive ability, or social support system  Outcome: Progressing     Problem: Knowledge Deficit  Goal: Patient/family/caregiver demonstrates understanding of disease process, treatment plan, medications, and discharge instructions  Description: Complete learning assessment and assess knowledge base  Interventions:  - Provide teaching at level of understanding  - Provide teaching via preferred learning methods  Outcome: Progressing     Problem: Nutrition/Hydration-ADULT  Goal: Nutrient/Hydration intake appropriate for improving, restoring or maintaining nutritional needs  Description: Monitor and assess patient's nutrition/hydration status for malnutrition  Collaborate with interdisciplinary team and initiate plan and interventions as ordered    Monitor patient's weight and dietary intake as ordered or per policy  Utilize nutrition screening tool and intervene as necessary  Determine patient's food preferences and provide high-protein, high-caloric foods as appropriate  INTERVENTIONS:  - Monitor oral intake, urinary output, labs, and treatment plans  - Assess nutrition and hydration status and recommend course of action  - Evaluate amount of meals eaten  - Assist patient with eating if necessary   - Allow adequate time for meals  - Recommend/ encourage appropriate diets, oral nutritional supplements, and vitamin/mineral supplements  - Order, calculate, and assess calorie counts as needed  - Recommend, monitor, and adjust tube feedings and TPN/PPN based on assessed needs  - Assess need for intravenous fluids  - Provide specific nutrition/hydration education as appropriate  - Include patient/family/caregiver in decisions related to nutrition  Outcome: Progressing     Problem: MOBILITY - ADULT  Goal: Maintain or return to baseline ADL function  Description: INTERVENTIONS:  - Educate patient/family on patient safety including physical limitations  - Instruct patient to call for assistance with activity   - Consult OT/PT to assist with strengthening/mobility   - Keep Call bell within reach  - Keep bed low and locked with side rails adjusted as appropriate  - Keep care items and personal belongings within reach  - Initiate and maintain comfort rounds  - Make Fall Risk Sign visible to staff  - Offer Toileting every 2 Hours, in advance of need  - Initiate/Maintain alarm  - Obtain necessary fall risk management equipment:   - Apply yellow socks and bracelet for high fall risk patients  - Consider moving patient to room near nurses station  Outcome: Progressing  Goal: Maintains/Returns to pre admission functional level  Description: INTERVENTIONS:  - Perform BMAT or MOVE assessment daily    - Set and communicate daily mobility goal to care team and patient/family/caregiver     - Collaborate with rehabilitation services on mobility goals if consulted  - Perform Range of Motion 3 times a day  - Reposition patient every 2 hours    - Dangle patient 4 times a day  - Stand patient 6 times a day  - Ambulate patient 3 times a day  - Out of bed to chair 3 times a day   - Out of bed for meals 3 times a day  - Out of bed for toileting  - Record patient progress and toleration of activity level   Outcome: Progressing

## 2022-10-24 NOTE — PROGRESS NOTES
Progress note - Gastroenterology   Davidson Bui 56 y o  male MRN: [id number]  Unit/Bed#: -01 Encounter: [id number]    ASSESSMENT and PLAN   1) New onset ascites and generalized abdominal pain in setting of SIRS: Patient met SIRS criteria on admission, concern for SBP given new ascites  No other definitive source of infection  UA, blood cultures, chest x-ray negative  Patient had paracentesis 10/19 for 2 L  even though patient's SBP has been negative, clinically he was treated with antibiotics  WBCs remain elevated at 27 4 but trending down  Stool cultures negative but C diff still pending- on IV and oral vanco  - ID following-tooth abscess possible source of infection    2  Cirrhosis  Newly diagnosed, decompensated by ascites  No evidence of hepatic encephalopathy, GI bleeding  CT scan 10/14/2020 2-for HCC and portal vein thrombus  Meld score-13, discriminant function 8 9 using INR from 10/22  Labs negative for viral, and autoimmune etiology  CT scan 10/22/2022 shows hepatomegaly, cirrhotic liver morphology, possible small right lobe cyst, small to moderate ascites  -may need repeat paracentesis in the near future as abdomen again appears distended  -reinforced the need for alcohol sensation-was drinking daily prior to admission  -plan for outpatient follow-up with EGD for esophageal varices screening    Chief Complaint   Patient presents with   • Abdominal Pain     Pt c/o of rioght sided abdomianla pain x 1 days         SUBJECTIVE/HPI   Denies abdominal pain  Appetite fair, no nausea or vomiting    /81 (BP Location: Right arm)   Pulse 98   Temp 98 6 °F (37 °C) (Oral)   Resp 16   Ht 5' 7" (1 702 m)   Wt 66 7 kg (147 lb)   SpO2 91%   BMI 23 02 kg/m²     PHYSICALEXAM  General appearance:  No acute distress  Eyes: PERLLA, EOMI, no icterus   Head: Normocephalic, without obvious abnormality, atraumatic  Lungs: clear to auscultation bilaterally  Heart: regular rate and rhythm, S1, S2 normal, no murmur, click, rub or gallop  Abdomen:  Distended, firm, nontender with palpation  Bowel sounds present    Reports soft bowel movement last evening, no melena or rectal bleeding  Extremities: extremities normal, atraumatic, no cyanosis or edema  Neurologic: Grossly normal    Lab Results   Component Value Date    CALCIUM 8 3 10/24/2022    K 3 8 10/24/2022    CO2 25 10/24/2022     10/24/2022    BUN 6 10/24/2022    CREATININE 0 74 10/24/2022     Lab Results   Component Value Date    WBC 27 45 (H) 10/24/2022    HGB 11 0 (L) 10/24/2022    HCT 31 0 (L) 10/24/2022     (H) 10/24/2022     10/24/2022     Lab Results   Component Value Date    ALT 41 10/24/2022    AST 68 (H) 10/24/2022    GGT 1,144 (H) 09/28/2022    ALKPHOS 240 (H) 10/24/2022     Lab Results   Component Value Date    AMYLASE 21 (L) 09/28/2022     Lab Results   Component Value Date    LIPASE 308 10/14/2022     Lab Results   Component Value Date    IRON 96 10/15/2022    TIBC 158 (L) 10/15/2022    FERRITIN 423 (H) 10/15/2022     Lab Results   Component Value Date    INR 1 12 10/22/2022

## 2022-10-24 NOTE — ASSESSMENT & PLAN NOTE
· Continues to have elevated WBC  · WBC spiked to 44 10/22/22  · Echo negative for vegetations  · ID consulted  · Zosyn changed to vanc and meropenum day1-> day 10 total  · Repeat bld cx pending  · CT C/A/P negative for any obvious new infection  · CXR pending  · CTH for dental abscess pending  · UA negative for UTI  · Parasite smear pending  · procal fluctuating-> continue to trend  · LA 1 4  · C diff positive, continue oral vancomycin  · Respiratory panel negative  · Trending down

## 2022-10-24 NOTE — PLAN OF CARE
Problem: Potential for Falls  Goal: Patient will remain free of falls  Description: INTERVENTIONS:  - Educate patient/family on patient safety including physical limitations  - Instruct patient to call for assistance with activity   - Consult OT/PT to assist with strengthening/mobility   - Keep Call bell within reach  - Keep bed low and locked with side rails adjusted as appropriate  - Keep care items and personal belongings within reach  - Initiate and maintain comfort rounds  - Make Fall Risk Sign visible to staff  - Offer Toileting every 2 Hours, in advance of need  - Initiate/Maintain alarm  - Obtain necessary fall risk management equipment:   - Apply yellow socks and bracelet for high fall risk patients  - Consider moving patient to room near nurses station  Outcome: Progressing     Problem: PAIN - ADULT  Goal: Verbalizes/displays adequate comfort level or baseline comfort level  Description: Interventions:  - Encourage patient to monitor pain and request assistance  - Assess pain using appropriate pain scale  - Administer analgesics based on type and severity of pain and evaluate response  - Implement non-pharmacological measures as appropriate and evaluate response  - Consider cultural and social influences on pain and pain management  - Notify physician/advanced practitioner if interventions unsuccessful or patient reports new pain  Outcome: Progressing     Problem: INFECTION - ADULT  Goal: Absence or prevention of progression during hospitalization  Description: INTERVENTIONS:  - Assess and monitor for signs and symptoms of infection  - Monitor lab/diagnostic results  - Monitor all insertion sites, i e  indwelling lines, tubes, and drains  - Monitor endotracheal if appropriate and nasal secretions for changes in amount and color  - [place] appropriate cooling/warming therapies per order  - Administer medications as ordered  - Instruct and encourage patient and family to use good hand hygiene technique  - Identify and instruct in appropriate isolation precautions for identified infection/condition  Outcome: Progressing  Goal: Absence of fever/infection during neutropenic period  Description: INTERVENTIONS:  - Monitor WBC    Outcome: Progressing     Problem: DISCHARGE PLANNING  Goal: Discharge to home or other facility with appropriate resources  Description: INTERVENTIONS:  - Identify barriers to discharge w/patient and caregiver  - Arrange for needed discharge resources and transportation as appropriate  - Identify discharge learning needs (meds, wound care, etc )  - Arrange for interpretive services to assist at discharge as needed  - Refer to Case Management Department for coordinating discharge planning if the patient needs post-hospital services based on physician/advanced practitioner order or complex needs related to functional status, cognitive ability, or social support system  Outcome: Progressing     Problem: Knowledge Deficit  Goal: Patient/family/caregiver demonstrates understanding of disease process, treatment plan, medications, and discharge instructions  Description: Complete learning assessment and assess knowledge base  Interventions:  - Provide teaching at level of understanding  - Provide teaching via preferred learning methods  Outcome: Progressing     Problem: Nutrition/Hydration-ADULT  Goal: Nutrient/Hydration intake appropriate for improving, restoring or maintaining nutritional needs  Description: Monitor and assess patient's nutrition/hydration status for malnutrition  Collaborate with interdisciplinary team and initiate plan and interventions as ordered  Monitor patient's weight and dietary intake as ordered or per policy  Utilize nutrition screening tool and intervene as necessary  Determine patient's food preferences and provide high-protein, high-caloric foods as appropriate       INTERVENTIONS:  - Monitor oral intake, urinary output, labs, and treatment plans  - Assess nutrition and hydration status and recommend course of action  - Evaluate amount of meals eaten  - Assist patient with eating if necessary   - Allow adequate time for meals  - Recommend/ encourage appropriate diets, oral nutritional supplements, and vitamin/mineral supplements  - Order, calculate, and assess calorie counts as needed  - Recommend, monitor, and adjust tube feedings and TPN/PPN based on assessed needs  - Assess need for intravenous fluids  - Provide specific nutrition/hydration education as appropriate  - Include patient/family/caregiver in decisions related to nutrition  Outcome: Progressing

## 2022-10-24 NOTE — ASSESSMENT & PLAN NOTE
· Meets SIRS criteria w tachycardia (HR 120s) and leukocytosis (WBC 33)  · Reports fevers/chills at home, however afebrile while inpatient  · No definitive source of infection, however concern for possible SBP  · Blood culture negative x24 hours  · Respiratory panel negative  · C diff positive, continue oral vancomycin  · Infectious disease following  · Continue vancomycin and meropenem  · Repeat imaging reveals possible gastroenteritis findings consistent with C diff    Reveals possible liver lesion, will need reimaging with MRI, IV contrast on outpatient basis

## 2022-10-24 NOTE — ASSESSMENT & PLAN NOTE
· Presented with diffuse abdominal pain and abdominal distension  · Secondary to alcoholic cirrhosis  · Status post paracentesis 10/17, 10/19 and 10/22  · 10/17 labs revealed possible SBP and portal hypertension  · Per PCP note, had bloody stools and reported fevers at home   Concern for possible SBP  · Trend WBC and fever curve  · Increased aldactone to 50 mg qd  · GI consult appreciated  · Blood cultures negative x24 hours  · F/u with GI outpt and will need EGD outpt

## 2022-10-24 NOTE — ASSESSMENT & PLAN NOTE
· New onset cirrhosis  Was noted to have abdominal distension, hepatomegaly, with dark stools by PCP on 09/28/2022  · Mild transaminitis with , ALT 72  Alk-phos 400  T bili 1 9, direct bili 1 4    · CT abdomen/pelvis shows new onset cirrhosis with moderate ascites  · Appeared mildly encephalopathic on admission  · Ammonia level ordered 10/24  · Suspected alcoholic liver disease  · On folate/ thiamine/ multivitamin supplementation

## 2022-10-24 NOTE — ASSESSMENT & PLAN NOTE
· Drinks vodka and juice daily  · Never attempted to quit due to experiencing withdrawals w/ tremors   Denies seizures/hallucinations  · CIWA protocol  · Thiamine/folate/multivitamin  · On Librium 10 mg b i d , wean gradually  · At risk for DT

## 2022-10-24 NOTE — PROGRESS NOTES
[place]  Progress Note - Vini Credit 1965, 56 y o  male MRN: [id number]  Unit/Bed#: -01 Encounter: [id number]  Primary Care Provider: Sharita Black DO   Date and time admitted to hospital: 10/14/2022  1:52 PM    Sepsis with acute organ dysfunction [facility])  Assessment & Plan  · Meets SIRS criteria w tachycardia (HR 120s) and leukocytosis (WBC 33)  · Reports fevers/chills at home, however afebrile while inpatient  · No definitive source of infection, however concern for possible SBP  · Blood culture negative x24 hours  · Respiratory panel negative  · C diff positive, continue oral vancomycin  · Infectious disease following  · Continue vancomycin and meropenem  · Repeat imaging reveals possible gastroenteritis findings consistent with C diff  Reveals possible liver lesion, will need reimaging with MRI, IV contrast on outpatient basis    Liver lesion  Assessment & Plan  · Noted on noncontrast CT  · Will need follow-up with GI and reimaging    Alcohol use  Assessment & Plan  · Drinks vodka and juice daily  · Never attempted to quit due to experiencing withdrawals w/ tremors  Denies seizures/hallucinations  · CIWA protocol  · Thiamine/folate/multivitamin  · On Librium 10 mg b i d , wean gradually  · At risk for DT    Alcoholic cirrhosis of liver with ascites ([facility] [address] )  Assessment & Plan  · New onset cirrhosis  Was noted to have abdominal distension, hepatomegaly, with dark stools by PCP on 09/28/2022  · Mild transaminitis with , ALT 72  Alk-phos 400  T bili 1 9, direct bili 1 4  · CT abdomen/pelvis shows new onset cirrhosis with moderate ascites  · Appeared mildly encephalopathic on admission  · Ammonia level ordered 10/24  · Suspected alcoholic liver disease  · On folate/ thiamine/ multivitamin supplementation    COPD (chronic obstructive pulmonary disease) (HCC)  Assessment & Plan  · No shortness of breath/wheezing  Not acute exacerbation    · Albuterol inhaler PRN    Nicotine dependence  Assessment & Plan  · Smokes approximately a pack a day  · Nicotine patch  ·  on cessation    * Ascites  Assessment & Plan  · Presented with diffuse abdominal pain and abdominal distension  · Secondary to alcoholic cirrhosis  · Status post paracentesis 10/17, 10/19 and 10/22  · 10/17 labs revealed possible SBP and portal hypertension  · Per PCP note, had bloody stools and reported fevers at home  Concern for possible SBP  · Trend WBC and fever curve  · Increased aldactone to 50 mg qd  · GI consult appreciated  · Blood cultures negative x24 hours  · F/u with GI outpt and will need EGD outpt       VTE Pharmacologic Prophylaxis: VTE Score: 3 Moderate Risk (Score 3-4) - Pharmacological DVT Prophylaxis Ordered: enoxaparin (Lovenox)  Patient Centered Rounds: I performed bedside rounds with nursing staff today  Discussions with Specialists or Other Care Team Provider:     Education and Discussions with Family / Patient: Updated  (father) at bedside  Time Spent for Care: 30 minutes  More than 50% of total time spent on counseling and coordination of care as described above  Current Length of Stay: 10 day(s)  Current Patient Status: Inpatient   Certification Statement: The patient will continue to require additional inpatient hospital stay due to Sepsis, on IV antibiotics  Discharge Plan: Anticipate discharge in >72 hrs to home with home services  Code Status: Level 1 - Full Code    Subjective:   Patient is "fucking done with [us]" Reports we are taking a toll on his mental health, telling him 1 day he will go tomorrow and then continuing to keep him  Enforced that he is still septic and will not be leaving the hospital within the foreseeable future      Objective:     Vitals:   Temp (24hrs), Av 9 °F (37 2 °C), Min:98 6 °F (37 °C), Max:99 1 °F (37 3 °C)    Temp:  [98 6 °F (37 °C)-99 1 °F (37 3 °C)] 98 6 °F (37 °C)  HR:  [] 98  Resp:  [16] 16  BP: (114-131)/(81-82) 114/81  SpO2:  [91 %-99 %] 91 %  Body mass index is 23 02 kg/m²  Input and Output Summary (last 24 hours): Intake/Output Summary (Last 24 hours) at 10/24/2022 1323  Last data filed at 10/24/2022 1016  Gross per 24 hour   Intake 240 ml   Output --   Net 240 ml       Physical Exam:   Physical Exam  Vitals and nursing note reviewed  Constitutional:       General: He is not in acute distress  Appearance: Normal appearance  He is well-developed  HENT:      Head: Normocephalic and atraumatic  Eyes:      General: No scleral icterus  Conjunctiva/sclera: Conjunctivae normal    Cardiovascular:      Rate and Rhythm: Normal rate and regular rhythm  Heart sounds: No murmur heard  Pulmonary:      Effort: Pulmonary effort is normal       Breath sounds: No wheezing, rhonchi or rales  Abdominal:      General: There is distension  Palpations: There is fluid wave  Skin:     General: Skin is warm and dry  Neurological:      General: No focal deficit present  Mental Status: He is alert  Psychiatric:         Mood and Affect: Mood normal         Additional Data:     Labs:  Results from last 7 days   Lab Units 10/24/22  0232 10/23/22  0529 10/22/22  0430   WBC Thousand/uL 27 45*   < > 44 21*   HEMOGLOBIN g/dL 11 0*   < > 12 6   HEMATOCRIT % 31 0*   < > 35 8*   PLATELETS Thousands/uL 384   < > 454*   BANDS PCT %  --   --  1   NEUTROS PCT % 85*   < >  --    LYMPHS PCT % 6*   < >  --    LYMPHO PCT %  --   --  4*   MONOS PCT % 5   < >  --    MONO PCT %  --   --  2*   EOS PCT % 2   < > 0    < > = values in this interval not displayed       Results from last 7 days   Lab Units 10/24/22  0232   SODIUM mmol/L 133*   POTASSIUM mmol/L 3 8   CHLORIDE mmol/L 100   CO2 mmol/L 25   BUN mg/dL 6   CREATININE mg/dL 0 74   ANION GAP mmol/L 8   CALCIUM mg/dL 8 3   ALBUMIN g/dL 1 7*   TOTAL BILIRUBIN mg/dL 1 50*   ALK PHOS U/L 240*   ALT U/L 41   AST U/L 68*   GLUCOSE RANDOM mg/dL 121     Results from last 7 days   Lab Units 10/22/22  0430   INR  1 12             Results from last 7 days   Lab Units 10/24/22  0232 10/23/22  0529 10/22/22  1210 10/20/22  0434   LACTIC ACID mmol/L  --   --  1 4  --    PROCALCITONIN ng/ml 0 89* 0 74* 1 46* 0 68*       Lines/Drains:  Invasive Devices  Report    Peripheral Intravenous Line  Duration           Peripheral IV 10/22/22 Dorsal (posterior); Right Forearm 2 days                      Imaging: Reviewed radiology reports from this admission including: chest xray, abdominal/pelvic CT and CT head    Recent Cultures (last 7 days):   Results from last 7 days   Lab Units 10/22/22  2247 10/22/22  1212 10/21/22  1544 10/19/22  1449 10/17/22  1342   BLOOD CULTURE   --  No Growth at 24 hrs    No Growth at 24 hrs   --   --   --    GRAM STAIN RESULT   --   --  Rare Polys  No organisms seen Rare Polys  No bacteria seen No Polys or Bacteria seen   BODY FLUID CULTURE, STERILE   --   --  No growth No growth No growth   C DIFF TOXIN B BY PCR  Positive*  --   --   --   --        Last 24 Hours Medication List:   Current Facility-Administered Medications   Medication Dose Route Frequency Provider Last Rate   • albuterol  2 puff Inhalation Q4H PRN Rin Diane PA-C     • chlordiazePOXIDE  10 mg Oral BID Dana ALVAREZ PA-C     • enoxaparin  40 mg Subcutaneous Q24H [facility] Terri ALVAREZ PA-C     • folic acid  1 mg Oral Daily Rin Diane PA-C     • ibuprofen  400 mg Oral Q6H PRN Rupesh Maza MD     • lactulose  20 g Oral Daily MACHO Hawthorne     • LORazepam  2 mg Intravenous Once Sophie Marcus DO     • metroNIDAZOLE  500 mg Intravenous Q8H Marlin Thurston MD     • multivitamin-minerals  1 tablet Oral Daily Rin Diane PA-C     • nicotine  21 mg Transdermal Daily Teena Ayers PA-C     • ondansetron  4 mg Intravenous Q4H PRN Rin Diane PA-C     • oxyCODONE  5 mg Oral Q6H PRN Rupesh Maza MD      Or   • oxyCODONE  10 mg Oral Q6H PRN Rupesh Maza MD Or   • oxyCODONE  15 mg Oral Q6H PRN Julee Bay MD     • polyethylene glycol  17 g Oral Daily MACHO Soares     • saccharomyces boulardii  250 mg Oral BID Julee Bay MD     • spironolactone  50 mg Oral Daily Julee aBy MD     • thiamine  100 mg Oral Daily Deshawn Rollins PA-C     • vancomycin (VANCOCIN) capsule 500 mg  500 mg Oral Q6H Favian Epps MD          Today, Patient Was Seen By: Janes Lucas    **Please Note: This note may have been constructed using a voice recognition system  **

## 2022-10-24 NOTE — ASSESSMENT & PLAN NOTE
· Sepsis, present on admission, due to possible SBP; as evidenced by meeting SIRS criteria on admission with an infectious process present; requiring blood cultures x 2, lactic acid level, and IV Zosyn

## 2022-10-24 NOTE — CASE MANAGEMENT
Case Management Progress Note    Patient name Rani Fierro  Location /-19 MRN [id number]  : 1965 Date 10/24/2022       LOS (days): 10  Geometric Mean LOS (GMLOS) (days): 5 00  Days to GMLOS:-4 9        OBJECTIVE:        Current admission status: Inpatient  Preferred Pharmacy:   [address]  [address] [facility] [place],[address]  [facility] [address]  Phone: [phone] Fax: [phone] [facility] [address], [address]   [address]   [address] [facility] [address]  Phone: [phone] Fax: [phone]    Primary Care Provider: Mallorie Marroquin DO    Primary Insurance: MEDICARE  Secondary Insurance: [facility]    PROGRESS NOTE:    Per care coordination rounds, pt is not medically stable for dc today  Continuing to monitor WBC  Plan for dc to home with no needs when medically stable

## 2022-10-25 LAB
ALBUMIN SERPL BCP-MCNC: 1.8 G/DL (ref 3.5–5)
ALP SERPL-CCNC: 246 U/L (ref 46–116)
ALT SERPL W P-5'-P-CCNC: 39 U/L (ref 12–78)
AMMONIA PLAS-SCNC: 41 UMOL/L (ref 11–35)
ANION GAP SERPL CALCULATED.3IONS-SCNC: 7 MMOL/L (ref 4–13)
AST SERPL W P-5'-P-CCNC: 72 U/L (ref 5–45)
BACTERIA SPEC BFLD CULT: NO GROWTH
BASOPHILS # BLD AUTO: 0.17 THOUSANDS/ÂΜL (ref 0–0.1)
BASOPHILS NFR BLD AUTO: 1 % (ref 0–1)
BILIRUB SERPL-MCNC: 1.7 MG/DL (ref 0.2–1)
BUN SERPL-MCNC: 7 MG/DL (ref 5–25)
CALCIUM ALBUM COR SERPL-MCNC: 10 MG/DL (ref 8.3–10.1)
CALCIUM SERPL-MCNC: 8.2 MG/DL (ref 8.3–10.1)
CHLORIDE SERPL-SCNC: 101 MMOL/L (ref 96–108)
CO2 SERPL-SCNC: 26 MMOL/L (ref 21–32)
CREAT SERPL-MCNC: 0.62 MG/DL (ref 0.6–1.3)
EOSINOPHIL # BLD AUTO: 0.53 THOUSAND/ÂΜL (ref 0–0.61)
EOSINOPHIL NFR BLD AUTO: 2 % (ref 0–6)
ERYTHROCYTE [DISTWIDTH] IN BLOOD BY AUTOMATED COUNT: 16.1 % (ref 11.6–15.1)
GFR SERPL CREATININE-BSD FRML MDRD: 111 ML/MIN/1.73SQ M
GLUCOSE SERPL-MCNC: 116 MG/DL (ref 65–140)
GRAM STN SPEC: NORMAL
GRAM STN SPEC: NORMAL
HCT VFR BLD AUTO: 31.1 % (ref 36.5–49.3)
HGB BLD-MCNC: 10.7 G/DL (ref 12–17)
IMM GRANULOCYTES # BLD AUTO: 0.25 THOUSAND/UL (ref 0–0.2)
IMM GRANULOCYTES NFR BLD AUTO: 1 % (ref 0–2)
LYMPHOCYTES # BLD AUTO: 1.46 THOUSANDS/ÂΜL (ref 0.6–4.47)
LYMPHOCYTES NFR BLD AUTO: 6 % (ref 14–44)
MCH RBC QN AUTO: 36.3 PG (ref 26.8–34.3)
MCHC RBC AUTO-ENTMCNC: 34.4 G/DL (ref 31.4–37.4)
MCV RBC AUTO: 105 FL (ref 82–98)
MONOCYTES # BLD AUTO: 1.28 THOUSAND/ÂΜL (ref 0.17–1.22)
MONOCYTES NFR BLD AUTO: 5 % (ref 4–12)
NEUTROPHILS # BLD AUTO: 20.66 THOUSANDS/ÂΜL (ref 1.85–7.62)
NEUTS SEG NFR BLD AUTO: 85 % (ref 43–75)
NRBC BLD AUTO-RTO: 0 /100 WBCS
PLATELET # BLD AUTO: 392 THOUSANDS/UL (ref 149–390)
PMV BLD AUTO: 9.3 FL (ref 8.9–12.7)
POTASSIUM SERPL-SCNC: 4 MMOL/L (ref 3.5–5.3)
PROT SERPL-MCNC: 5.9 G/DL (ref 6.4–8.4)
RBC # BLD AUTO: 2.95 MILLION/UL (ref 3.88–5.62)
SODIUM SERPL-SCNC: 134 MMOL/L (ref 135–147)
WBC # BLD AUTO: 24.35 THOUSAND/UL (ref 4.31–10.16)

## 2022-10-25 PROCEDURE — 80053 COMPREHEN METABOLIC PANEL: CPT | Performed by: PHYSICIAN ASSISTANT

## 2022-10-25 PROCEDURE — 85025 COMPLETE CBC W/AUTO DIFF WBC: CPT | Performed by: PHYSICIAN ASSISTANT

## 2022-10-25 PROCEDURE — 99232 SBSQ HOSP IP/OBS MODERATE 35: CPT | Performed by: PHYSICIAN ASSISTANT

## 2022-10-25 PROCEDURE — 82140 ASSAY OF AMMONIA: CPT | Performed by: PHYSICIAN ASSISTANT

## 2022-10-25 RX ORDER — SPIRONOLACTONE 25 MG/1
100 TABLET ORAL DAILY
Status: DISCONTINUED | OUTPATIENT
Start: 2022-10-26 | End: 2022-10-28 | Stop reason: HOSPADM

## 2022-10-25 RX ADMIN — VANCOMYCIN HYDROCHLORIDE 500 MG: 250 CAPSULE ORAL at 17:55

## 2022-10-25 RX ADMIN — Medication 250 MG: at 17:55

## 2022-10-25 RX ADMIN — VANCOMYCIN HYDROCHLORIDE 500 MG: 250 CAPSULE ORAL at 13:35

## 2022-10-25 RX ADMIN — Medication 100 MG: at 10:26

## 2022-10-25 RX ADMIN — OXYCODONE HYDROCHLORIDE 10 MG: 5 TABLET ORAL at 06:07

## 2022-10-25 RX ADMIN — METRONIDAZOLE 500 MG: 500 INJECTION, SOLUTION INTRAVENOUS at 21:12

## 2022-10-25 RX ADMIN — OXYCODONE HYDROCHLORIDE 10 MG: 5 TABLET ORAL at 00:34

## 2022-10-25 RX ADMIN — FOLIC ACID 1 MG: 1 TABLET ORAL at 10:25

## 2022-10-25 RX ADMIN — CHLORDIAZEPOXIDE HYDROCHLORIDE 10 MG: 5 CAPSULE ORAL at 10:26

## 2022-10-25 RX ADMIN — VANCOMYCIN HYDROCHLORIDE 500 MG: 250 CAPSULE ORAL at 00:34

## 2022-10-25 RX ADMIN — LACTULOSE 20 G: 20 SOLUTION ORAL at 10:24

## 2022-10-25 RX ADMIN — CHLORDIAZEPOXIDE HYDROCHLORIDE 10 MG: 5 CAPSULE ORAL at 17:55

## 2022-10-25 RX ADMIN — Medication 250 MG: at 10:25

## 2022-10-25 RX ADMIN — VANCOMYCIN HYDROCHLORIDE 500 MG: 250 CAPSULE ORAL at 06:08

## 2022-10-25 RX ADMIN — MULTIPLE VITAMINS W/ MINERALS TAB 1 TABLET: TAB ORAL at 10:25

## 2022-10-25 RX ADMIN — ENOXAPARIN SODIUM 40 MG: 100 INJECTION SUBCUTANEOUS at 10:24

## 2022-10-25 RX ADMIN — VANCOMYCIN HYDROCHLORIDE 500 MG: 250 CAPSULE ORAL at 23:57

## 2022-10-25 RX ADMIN — OXYCODONE HYDROCHLORIDE 10 MG: 5 TABLET ORAL at 17:59

## 2022-10-25 RX ADMIN — METRONIDAZOLE 500 MG: 500 INJECTION, SOLUTION INTRAVENOUS at 04:18

## 2022-10-25 RX ADMIN — NICOTINE 21 MG: 21 PATCH, EXTENDED RELEASE TRANSDERMAL at 10:26

## 2022-10-25 RX ADMIN — SPIRONOLACTONE 50 MG: 25 TABLET ORAL at 10:25

## 2022-10-25 RX ADMIN — OXYCODONE HYDROCHLORIDE 10 MG: 5 TABLET ORAL at 23:57

## 2022-10-25 RX ADMIN — METRONIDAZOLE 500 MG: 500 INJECTION, SOLUTION INTRAVENOUS at 13:37

## 2022-10-25 NOTE — ASSESSMENT & PLAN NOTE
· Presented with diffuse abdominal pain and abdominal distension  · Secondary to alcoholic cirrhosis  · Status post paracentesis 10/17, 10/19 and 10/22  · Consider repeat paracentesis prior to discharge if needed  · Trend WBC and fever curve  · Gastroenterology increased Aldactone 100 mg daily 10/25  · GI consult appreciated  · Blood cultures negative x48 hours  · F/u with GI outpt and will need EGD outpt

## 2022-10-25 NOTE — PROGRESS NOTES
Progress note - [facility]   Brenita Bence 56 y o  male MRN: [id number]  Unit/Bed#: -Ann[place] Encounter: [id number]    ASSESSMENT and PLAN    1) leukocytosis  met SIRS criteria on admission, concern for SBP given new ascites  UA, blood cultures, chest x-ray negative   paracentesis 10/19 negative for SBP but he had already received antibiotics  Stool C diff toxin/PCR returned positive 10/24  Still has loose stools but leukocytosis shows mild improvement on p o  Vanco/IV Flagyl     2  Cirrhosis  Newly diagnosed, decompensated by ascites  No evidence of hepatic encephalopathy, GI bleeding  CT scan 10/14/2022 negative for HCC and portal vein thrombus  Meld score-13, discriminant function 8 9 using INR from 10/22  Labs negative for viral, and autoimmune etiology  Continue spironolactone, will increase to 100 mg daily  Plan therapeutic paracentesis prior to discharge if ascites persists  outpatient  EGD for variceal screening  Reviewed the importance of total alcohol cessation       Chief Complaint   Patient presents with   • Abdominal Pain     Pt c/o of rioght sided abdomianla pain x 1 days  SUBJECTIVE/HPI   One or 2 soft stools today  No abdominal pain, nausea or vomiting  Tolerating diet  Abdomen feels more distended      /74 (BP Location: Left arm)   Pulse 98   Temp 98 3 °F (36 8 °C) (Oral)   Resp 16   Ht 5' 7" (1 702 m)   Wt 66 7 kg (147 lb)   SpO2 95%   BMI 23 02 kg/m²     PHYSICALEXAM  General appearance: alert, appears stated age and cooperative  Eyes: PERLLA, EOMI, no icterus   Head: Normocephalic, without obvious abnormality, atraumatic  Lungs: clear to auscultation bilaterally  Heart: regular rate and rhythm, S1, S2 normal, no murmur, click, rub or gallop  Abdomen: soft, non-tender; bowel sounds normal; no masses,  no organomegaly  Extremities: extremities normal, atraumatic, no cyanosis or edema  Neurologic: Grossly normal    Lab Results   Component Value Date CALCIUM 8 2 (L) 10/25/2022    K 4 0 10/25/2022    CO2 26 10/25/2022     10/25/2022    BUN 7 10/25/2022    CREATININE 0 62 10/25/2022     Lab Results   Component Value Date    WBC 24 35 (H) 10/25/2022    HGB 10 7 (L) 10/25/2022    HCT 31 1 (L) 10/25/2022     (H) 10/25/2022     (H) 10/25/2022     Lab Results   Component Value Date    ALT 39 10/25/2022    AST 72 (H) 10/25/2022    GGT 1,144 (H) 09/28/2022    ALKPHOS 246 (H) 10/25/2022     Lab Results   Component Value Date    AMYLASE 21 (L) 09/28/2022     Lab Results   Component Value Date    LIPASE 308 10/14/2022     Lab Results   Component Value Date    IRON 96 10/15/2022    TIBC 158 (L) 10/15/2022    FERRITIN 423 (H) 10/15/2022     Lab Results   Component Value Date    INR 1 12 10/22/2022

## 2022-10-25 NOTE — ASSESSMENT & PLAN NOTE
· Drinks vodka and juice daily  · Never attempted to quit due to experiencing withdrawals w/ tremors   Denies seizures/hallucinations  · CIWA protocol  · Thiamine/folate/multivitamin  · Librium titrated down to 10 mg b i d  as of yesterday 10/24  · Continue to wean down as tolerated  · At risk for DT

## 2022-10-25 NOTE — ASSESSMENT & PLAN NOTE
· New onset cirrhosis  Was noted to have abdominal distension, hepatomegaly, with dark stools by PCP on 09/28/2022  · Mild transaminitis with , ALT 72  Alk-phos 400  T bili 1 9, direct bili 1 4    · CT abdomen/pelvis shows new onset cirrhosis with moderate ascites  · Appeared mildly encephalopathic on admission  · Also appears mildly encephalopathic on evaluation 10/25, follow-up repeat ammonia level  · Suspected alcoholic liver disease  · On folate/ thiamine/ multivitamin supplementation

## 2022-10-25 NOTE — PROGRESS NOTES
Tristen Santana  56 y o   male  1965  mrn [id number]    Assessment/Plan:  1  Presumed SBP/C diff diarrhea/Fever/Leukocytosis/Acute metab encephalopathy: Pt presented with abd pain and bloating  He did not have fever on admission but WBC count was 25K  He was confused c/w acute metab encephalopathy  I reviewed admission CXR which showed bibasilar atelectasis  Abd CT and abd US showed cirrhosis and ascites - most likely due to ARLD  He was placed on Zosyn for tx of presumed SBP in setting of newly dx'd cirrhosis     He developed low grade fever on 1017 and underwent paracentesis  Ascitic fluid cell count did not quite reach neutrophil count of 250 but Pt had been on abx tx x 4 days prior to the procedure which could have partially tx'd presume SBP and affected the cell count  Fluid cx was neg but abx tx could affexct the cx results      WBC count decreased to 22 3K on 10/21 but increased to 44K  Need to be concerned with new bowel perf after multiple paracenteses, C diff diarrhea since he's been on prolonged course of abx tx, UTI, pneumonia, etc  Doubt tooth abscess is the source of his worsening leukocytosis since he's been on abx which would tx dental infxn  ? Spurious result        10/24: Remains afebrile and WBC count has decreased to 21 2K  C diff is positive which is the most likely etiology for his leukocytosis  He is currently on po Vanco  CT of mouth was neg for dental abscess  Repeat bld cx's are neg  Pt has received adeq abx tx for presumed SBP  Abd/Chest CT did not show any evidence of new infxn    10/25: No fever and WBC count decreased slightly to 24 3K  He is on po Vanco and IV Flagyl for tx of C diff diarrhea  Vanco and Meropenem were discontinued on 10/24  His diarrhea is decreasing   Repeat bld cx's are neg so far      - Will follow Pt off systemic abx for clinical deterioration  - Cont po Vanco and IV Flagyl for tx of C diff diarrhea  - Awaiting results of repeat bld cx's  - Will follow WBC count  - Will cont to monitor for the development of toxicity to current abx tx       Subjective: Seems more awake and alert  Still has some abd discomfort because of distention from ascites  Having decreased frequency of BM's but more formed  No fever and WBC count is decreasing  No probs with current abx tx    Objective:  VSS, Tmax: 98 5  HEENT:  No scleral icterus  NECK: Supple  CARDIAC:  RRR, nml S1, s2  LUNGS:  Clear anteriorly  ABDOMEN:  +BS, +distention, nontender  MUSCULOSKELETAL: No calf tenderness  EXTREMITIES:  No LE edema  SKIN:  No rash      Labs:  CBC w/diff  Recent Labs     10/25/22  0428   WBC 24 35*   HGB 10 7*   HCT 31 1*   *   NEUTOPHILPCT 85*   LYMPHOPCT 6*   MONOPCT 5   EOSPCT 2     BMP  Recent Labs     10/25/22  0428   K 4 0      CO2 26   BUN 7   CREATININE 0 62   CALCIUM 8 2*     CMP  Recent Labs     10/25/22  0428   K 4 0      CO2 26   BUN 7   CREATININE 0 62   CALCIUM 8 2*   ALKPHOS 246*   ALT 39   AST 72*         labrc    Cultures:  Lab Results   Component Value Date    BLOODCX No Growth at 48 hrs  10/22/2022    BLOODCX No Growth at 48 hrs  10/22/2022    BLOODCX No Growth After 5 Days  10/14/2022    BLOODCX No Growth After 5 Days  10/14/2022    BLOODCX No Growth After 5 Days  10/15/2018    BLOODCX No Growth After 5 Days   10/15/2018     No results found for: WOUNDCULT  No results found for: URINECX  No results found for: SPUTUMCULTUR    MED:  Flagyl: #2  Oral Vanco: #4  Off other abx: #1      Completed:   Vanco x 3 days on 10/24  Meropenem x 3 days on 10/24  Abx x 11 days on 10/24        Completed:  Zosyn x 9 days on 10/22         Current Facility-Administered Medications:   •  albuterol (PROVENTIL HFA,VENTOLIN HFA) inhaler 2 puff, 2 puff, Inhalation, Q4H PRN, Gallo Mahmood PA-C  •  chlordiazePOXIDE (LIBRIUM) capsule 10 mg, 10 mg, Oral, BID, Terri ALVAREZ PA-C, 10 mg at 10/25/22 1026  •  enoxaparin (LOVENOX) subcutaneous injection 40 mg, 40 mg, Subcutaneous, Q24H [facility], Terri Craven V, PA-C, 40 mg at 52/37/51 1451  •  folic acid (FOLVITE) tablet 1 mg, 1 mg, Oral, Daily, Rice Bless, PA-C, 1 mg at 10/25/22 1025  •  ibuprofen (MOTRIN) tablet 400 mg, 400 mg, Oral, Q6H PRN, Duarte Ramsey MD  •  lactulose oral solution 20 g, 20 g, Oral, Daily, Tee MACHO Aguayo, 20 g at 10/25/22 1024  •  LORazepam (ATIVAN) injection 2 mg, 2 mg, Intravenous, Once, Sophie Marcus DO  •  metroNIDAZOLE (FLAGYL) IVPB (premix) 500 mg 100 mL, 500 mg, Intravenous, Q8H, Amber Naranjo MD, Last Rate: 200 mL/hr at 10/25/22 1337, 500 mg at 10/25/22 1337  •  multivitamin-minerals (CENTRUM) tablet 1 tablet, 1 tablet, Oral, Daily, Rice Blevero, PA-C, 1 tablet at 10/25/22 1025  •  nicotine (NICODERM CQ) 21 mg/24 hr TD 24 hr patch 21 mg, 21 mg, Transdermal, Daily, Teena Ayers PA-C, 21 mg at 10/25/22 1026  •  ondansetron (ZOFRAN) injection 4 mg, 4 mg, Intravenous, Q4H PRN, Rice Rupert, PA-C, 4 mg at 10/14/22 2118  •  oxyCODONE (ROXICODONE) IR tablet 5 mg, 5 mg, Oral, Q6H PRN, 5 mg at 10/24/22 0601 **OR** oxyCODONE (ROXICODONE) IR tablet 10 mg, 10 mg, Oral, Q6H PRN, 10 mg at 10/25/22 0607 **OR** oxyCODONE (ROXICODONE) IR tablet 15 mg, 15 mg, Oral, Q6H PRN, Duarte Ramsey MD, 15 mg at 10/23/22 1835  •  polyethylene glycol (MIRALAX) packet 17 g, 17 g, Oral, Daily, TeeMACHO Rand, 17 g at 10/24/22 0364  •  saccharomyces boulardii (FLORASTOR) capsule 250 mg, 250 mg, Oral, BID, Duarte Ramsey MD, 250 mg at 10/25/22 1025  •  [START ON 10/26/2022] spironolactone (ALDACTONE) tablet 100 mg, 100 mg, Oral, Daily, Jesus Delarosa DO  •  thiamine tablet 100 mg, 100 mg, Oral, Daily, Angelo Emery PA-C, 100 mg at 10/25/22 1026  •  vancomycin (VANCOCIN) capsule 500 mg, 500 mg, Oral, Q6H [address], Amber Naranjo MD, 500 mg at 10/25/22 1335    Principal Problem:    Ascites  Active Problems:    Nicotine dependence    COPD (chronic obstructive pulmonary disease) (HCC)    Alcoholic cirrhosis of liver with ascites ([address] )    Sepsis with acute organ dysfunction ([address] )    Alcohol use    Liver lesion      Brandon Saul MD

## 2022-10-25 NOTE — ASSESSMENT & PLAN NOTE
· Meets SIRS criteria w tachycardia (HR 120s) and leukocytosis (WBC 33)  · Reports fevers/chills at home, however afebrile while inpatient  · Likely related to C diff  · Blood culture negative x48 hours  · Respiratory panel negative  · C diff positive, continue oral vancomycin  · Infectious disease following  · Continue oral vancomycin and IV Flagyl  · Repeat imaging reveals possible gastroenteritis findings consistent with C diff    Reveals possible liver lesion, will need reimaging with MRI, IV contrast on outpatient basis  · Trend WBC and fever curve - still with leukocytosis but down trending to 24 K

## 2022-10-25 NOTE — PROGRESS NOTES
[place]     Progress Note - Chito Mew 1965, 56 y o  male MRN: [id number]  Unit/Bed#: -01 Encounter: [id number]  Primary Care Provider: Yolie Wolff DO   Date and time admitted to hospital: 10/14/2022  1:52 PM    * Ascites  Assessment & Plan  · Presented with diffuse abdominal pain and abdominal distension  · Secondary to alcoholic cirrhosis  · Status post paracentesis 10/17, 10/19 and 10/22  · Consider repeat paracentesis prior to discharge if needed  · Trend WBC and fever curve  · Gastroenterology increased Aldactone 100 mg daily 10/25  · GI consult appreciated  · Blood cultures negative x48 hours  · F/u with GI outpt and will need EGD outpt     Sepsis with acute organ dysfunction ([facility] )  Assessment & Plan  · Meets SIRS criteria w tachycardia (HR 120s) and leukocytosis (WBC 33)  · Reports fevers/chills at home, however afebrile while inpatient  · Likely related to C diff  · Blood culture negative x48 hours  · Respiratory panel negative  · C diff positive, continue oral vancomycin  · Infectious disease following  · Continue oral vancomycin and IV Flagyl  · Repeat imaging reveals possible gastroenteritis findings consistent with C diff  Reveals possible liver lesion, will need reimaging with MRI, IV contrast on outpatient basis  · Trend WBC and fever curve - still with leukocytosis but down trending to 24 K    Liver lesion  Assessment & Plan  · Noted on noncontrast CT  · Will need follow-up with GI and reimaging    Alcohol use  Assessment & Plan  · Drinks vodka and juice daily  · Never attempted to quit due to experiencing withdrawals w/ tremors  Denies seizures/hallucinations  · WA protocol  · Thiamine/folate/multivitamin  · Librium titrated down to 10 mg b i d  as of yesterday 10/24  · Continue to wean down as tolerated  · At risk for DT    Alcoholic cirrhosis of liver with ascites ([facility] )  Assessment & Plan  · New onset cirrhosis    Was noted to have abdominal distension, hepatomegaly, with dark stools by PCP on 2022  · Mild transaminitis with , ALT 72  Alk-phos 400  T bili 1 9, direct bili 1 4  · CT abdomen/pelvis shows new onset cirrhosis with moderate ascites  · Appeared mildly encephalopathic on admission  · Also appears mildly encephalopathic on evaluation 10/25, follow-up repeat ammonia level  · Suspected alcoholic liver disease  · On folate/ thiamine/ multivitamin supplementation    COPD (chronic obstructive pulmonary disease) (HCC)  Assessment & Plan  · No shortness of breath/wheezing  Not acute exacerbation  · Albuterol inhaler PRN    Nicotine dependence  Assessment & Plan  · Smokes approximately a pack a day  · Nicotine patch  ·  on cessation    VTE Pharmacologic Prophylaxis: VTE Score: 3 Moderate Risk (Score 3-4) - Pharmacological DVT Prophylaxis Ordered: enoxaparin (Lovenox)  Patient Centered Rounds: I performed bedside rounds with nursing staff today  Discussions with Specialists or Other Care Team Provider: CHUY, kamala GI Input  Will discuss with ID    Education and Discussions with Family / Patient: Patient declined call to   Offered to call  Time Spent for Care: 30 minutes  More than 50% of total time spent on counseling and coordination of care as described above  Current Length of Stay: 11 day(s)  Current Patient Status: Inpatient   Certification Statement: The patient will continue to require additional inpatient hospital stay due to C diff, persistent leukocytosis  Discharge Plan: Anticipate discharge in 24-48 hrs to home  Code Status: Level 1 - Full Code    Subjective:   Reports poor sleep overnight  Frustrated with current length of stay  Feels his abdomen is still bloated  Still with loose stools      Objective:     Vitals:   Temp (24hrs), Av 4 °F (36 9 °C), Min:98 3 °F (36 8 °C), Max:98 5 °F (36 9 °C)    Temp:  [98 3 °F (36 8 °C)-98 5 °F (36 9 °C)] 98 3 °F (36 8 °C)  HR:  [] 98  Resp:  [15-16] 16  BP: (108-118)/(74-81) 108/74  SpO2:  [94 %-97 %] 95 %  Body mass index is 23 02 kg/m²  Input and Output Summary (last 24 hours): Intake/Output Summary (Last 24 hours) at 10/25/2022 1330  Last data filed at 10/25/2022 0000  Gross per 24 hour   Intake 240 ml   Output --   Net 240 ml       Physical Exam:   Physical Exam  Vitals and nursing note reviewed  Constitutional:       Appearance: He is well-developed  Comments: No acute distress   HENT:      Head: Normocephalic and atraumatic  Eyes:      General: No scleral icterus  Extraocular Movements: Extraocular movements intact  Conjunctiva/sclera: Conjunctivae normal    Cardiovascular:      Rate and Rhythm: Normal rate and regular rhythm  Heart sounds: S1 normal and S2 normal  No murmur heard  Pulmonary:      Effort: Pulmonary effort is normal  No respiratory distress  Breath sounds: Normal breath sounds  No wheezing, rhonchi or rales  Abdominal:      General: Bowel sounds are normal  There is distension  Palpations: Abdomen is soft  Tenderness: There is no abdominal tenderness  Musculoskeletal:      Cervical back: Normal range of motion  Comments: Able to move upper/lower extremities bilaterally, no edema   Skin:     General: Skin is warm and dry  Neurological:      Mental Status: He is alert and oriented to person, place, and time  Comments: Appears slightly slow to respond, no focal deficits   Psychiatric:         Mood and Affect: Affect is angry            Additional Data:     Labs:  Results from last 7 days   Lab Units 10/25/22  0428 10/23/22  0529 10/22/22  0430   WBC Thousand/uL 24 35*   < > 44 21*   HEMOGLOBIN g/dL 10 7*   < > 12 6   HEMATOCRIT % 31 1*   < > 35 8*   PLATELETS Thousands/uL 392*   < > 454*   BANDS PCT %  --   --  1   NEUTROS PCT % 85*   < >  --    LYMPHS PCT % 6*   < >  --    LYMPHO PCT %  --   --  4*   MONOS PCT % 5   < >  --    MONO PCT %  --   --  2*   EOS PCT % 2   < > 0    < > = values in this interval not displayed  Results from last 7 days   Lab Units 10/25/22  0428   SODIUM mmol/L 134*   POTASSIUM mmol/L 4 0   CHLORIDE mmol/L 101   CO2 mmol/L 26   BUN mg/dL 7   CREATININE mg/dL 0 62   ANION GAP mmol/L 7   CALCIUM mg/dL 8 2*   ALBUMIN g/dL 1 8*   TOTAL BILIRUBIN mg/dL 1 70*   ALK PHOS U/L 246*   ALT U/L 39   AST U/L 72*   GLUCOSE RANDOM mg/dL 116     Results from last 7 days   Lab Units 10/22/22  0430   INR  1 12             Results from last 7 days   Lab Units 10/24/22  0232 10/23/22  0529 10/22/22  1210 10/20/22  0434   LACTIC ACID mmol/L  --   --  1 4  --    PROCALCITONIN ng/ml 0 89* 0 74* 1 46* 0 68*       Lines/Drains:  Invasive Devices  Report    Peripheral Intravenous Line  Duration           Peripheral IV 10/24/22 Left Antecubital <1 day                      Imaging: Reviewed radiology reports from this admission including: chest xray, chest CT scan, abdominal/pelvic CT and CT head    Recent Cultures (last 7 days):   Results from last 7 days   Lab Units 10/22/22  2247 10/22/22  1212 10/21/22  1544 10/19/22  1449   BLOOD CULTURE   --  No Growth at 48 hrs    No Growth at 48 hrs   --   --    GRAM STAIN RESULT   --   --  Rare Polys  No organisms seen Rare Polys  No bacteria seen   BODY FLUID CULTURE, STERILE   --   --  No growth No growth   C DIFF TOXIN B BY PCR  Positive*  --   --   --        Last 24 Hours Medication List:   Current Facility-Administered Medications   Medication Dose Route Frequency Provider Last Rate   • albuterol  2 puff Inhalation Q4H PRN Gallo Mahmood PA-C     • chlordiazePOXIDE  10 mg Oral BID Terri ALVAREZ PA-C     • enoxaparin  40 mg Subcutaneous Q24H [address] Terri ALVAREZ PA-C     • folic acid  1 mg Oral Daily Gallo Mahmood PA-C     • ibuprofen  400 mg Oral Q6H PRN Román Grewal MD     • lactulose  20 g Oral Daily MACHO Belle     • LORazepam  2 mg Intravenous Once Olubusola O Oyesanmi, DO     • metroNIDAZOLE 500 mg Intravenous Greta Isabel  mg (10/25/22 0418)   • multivitamin-minerals  1 tablet Oral Daily [facility], MARINO     • nicotine  21 mg Transdermal Daily Teena Ayers PA-C     • ondansetron  4 mg Intravenous Q4H [facility]     • oxyCODONE  5 mg Oral Q6H PRN Ariana Simmons MD      Or   • oxyCODONE  10 mg Oral Q6H PRN Ariana Simmons MD      Or   • oxyCODONE  15 mg Oral Q6H PRN Ariana Simmons MD     • polyethylene glycol  17 g Oral Daily MACHO Parra     • saccharomyces boulardii  250 mg Oral BID Ariana Simmons MD     • [START ON 10/26/2022] spironolactone  100 mg Oral Daily Nancyann Dakins, DO     • thiamine  100 mg [facility]     • vancomycin (VANCOCIN) capsule 500 mg  500 mg Oral Q6H Lyndon Petersen MD          Today, Patient Was Seen By: Yohana Burciaga    **Please Note: This note may have been constructed using a voice recognition system  **

## 2022-10-25 NOTE — PLAN OF CARE
Problem: Potential for Falls  Goal: Patient will remain free of falls  Description: INTERVENTIONS:  - Educate patient/family on patient safety including physical limitations  - Instruct patient to call for assistance with activity   - Consult OT/PT to assist with strengthening/mobility   - Keep Call bell within reach  - Keep bed low and locked with side rails adjusted as appropriate  - Keep care items and personal belongings within reach  - Initiate and maintain comfort rounds  - Make Fall Risk Sign visible to staff  - Offer Toileting every 2 Hours, in advance of need  - Initiate/Maintain alarm  - Obtain necessary fall risk management equipment:   - Apply yellow socks and bracelet for high fall risk patients  - Consider moving patient to room near nurses station  Outcome: Progressing     Problem: PAIN - ADULT  Goal: Verbalizes/displays adequate comfort level or baseline comfort level  Description: Interventions:  - Encourage patient to monitor pain and request assistance  - Assess pain using appropriate pain scale  - Administer analgesics based on type and severity of pain and evaluate response  - Implement non-pharmacological measures as appropriate and evaluate response  - Consider cultural and social influences on pain and pain management  - Notify physician/advanced practitioner if interventions unsuccessful or patient reports new pain  Outcome: Progressing     Problem: INFECTION - ADULT  Goal: Absence or prevention of progression during hospitalization  Description: INTERVENTIONS:  - Assess and monitor for signs and symptoms of infection  - Monitor lab/diagnostic results  - Monitor all insertion sites, i e  indwelling lines, tubes, and drains  - Monitor endotracheal if appropriate and nasal secretions for changes in amount and color  - [place] appropriate cooling/warming therapies per order  - Administer medications as ordered  - Instruct and encourage patient and family to use good hand hygiene technique  - Identify and instruct in appropriate isolation precautions for identified infection/condition  Outcome: Progressing  Goal: Absence of fever/infection during neutropenic period  Description: INTERVENTIONS:  - Monitor WBC    Outcome: Progressing     Problem: SAFETY ADULT  Goal: Patient will remain free of falls  Description: INTERVENTIONS:  - Educate patient/family on patient safety including physical limitations  - Instruct patient to call for assistance with activity   - Consult OT/PT to assist with strengthening/mobility   - Keep Call bell within reach  - Keep bed low and locked with side rails adjusted as appropriate  - Keep care items and personal belongings within reach  - Initiate and maintain comfort rounds  - Make Fall Risk Sign visible to staff  - Offer Toileting every 2 Hours, in advance of need  - Initiate/Maintain alarm  - Obtain necessary fall risk management equipment:   - Apply yellow socks and bracelet for high fall risk patients  - Consider moving patient to room near nurses station  Outcome: Progressing  Goal: Maintain or return to baseline ADL function  Description: INTERVENTIONS:  - Educate patient/family on patient safety including physical limitations  - Instruct patient to call for assistance with activity   - Consult OT/PT to assist with strengthening/mobility   - Keep Call bell within reach  - Keep bed low and locked with side rails adjusted as appropriate  - Keep care items and personal belongings within reach  - Initiate and maintain comfort rounds  - Make Fall Risk Sign visible to staff  - Offer Toileting every 2 Hours, in advance of need  - Initiate/Maintain alarm  - Obtain necessary fall risk management equipment:   - Apply yellow socks and bracelet for high fall risk patients  - Consider moving patient to room near nurses station  Outcome: Progressing  Goal: Maintains/Returns to pre admission functional level  Description: INTERVENTIONS:  - Perform BMAT or MOVE assessment daily    - Set and communicate daily mobility goal to care team and patient/family/caregiver  - Collaborate with rehabilitation services on mobility goals if consulted  - Perform Range of Motion 3 times a day  - Reposition patient every 1 hours  - Dangle patient 3 times a day  - Stand patient 3 times a day  - Ambulate patient 5 times a day  - Out of bed to chair 5 times a day   - Out of bed for meals 3 times a day  - Out of bed for toileting  - Record patient progress and toleration of activity level   Outcome: Progressing     Problem: DISCHARGE PLANNING  Goal: Discharge to home or other facility with appropriate resources  Description: INTERVENTIONS:  - Identify barriers to discharge w/patient and caregiver  - Arrange for needed discharge resources and transportation as appropriate  - Identify discharge learning needs (meds, wound care, etc )  - Arrange for interpretive services to assist at discharge as needed  - Refer to Case Management Department for coordinating discharge planning if the patient needs post-hospital services based on physician/advanced practitioner order or complex needs related to functional status, cognitive ability, or social support system  Outcome: Progressing     Problem: Knowledge Deficit  Goal: Patient/family/caregiver demonstrates understanding of disease process, treatment plan, medications, and discharge instructions  Description: Complete learning assessment and assess knowledge base  Interventions:  - Provide teaching at level of understanding  - Provide teaching via preferred learning methods  Outcome: Progressing     Problem: Nutrition/Hydration-ADULT  Goal: Nutrient/Hydration intake appropriate for improving, restoring or maintaining nutritional needs  Description: Monitor and assess patient's nutrition/hydration status for malnutrition  Collaborate with interdisciplinary team and initiate plan and interventions as ordered    Monitor patient's weight and dietary intake as ordered or per policy  Utilize nutrition screening tool and intervene as necessary  Determine patient's food preferences and provide high-protein, high-caloric foods as appropriate  INTERVENTIONS:  - Monitor oral intake, urinary output, labs, and treatment plans  - Assess nutrition and hydration status and recommend course of action  - Evaluate amount of meals eaten  - Assist patient with eating if necessary   - Allow adequate time for meals  - Recommend/ encourage appropriate diets, oral nutritional supplements, and vitamin/mineral supplements  - Order, calculate, and assess calorie counts as needed  - Recommend, monitor, and adjust tube feedings and TPN/PPN based on assessed needs  - Assess need for intravenous fluids  - Provide specific nutrition/hydration education as appropriate  - Include patient/family/caregiver in decisions related to nutrition  Outcome: Progressing     Problem: MOBILITY - ADULT  Goal: Maintain or return to baseline ADL function  Description: INTERVENTIONS:  - Educate patient/family on patient safety including physical limitations  - Instruct patient to call for assistance with activity   - Consult OT/PT to assist with strengthening/mobility   - Keep Call bell within reach  - Keep bed low and locked with side rails adjusted as appropriate  - Keep care items and personal belongings within reach  - Initiate and maintain comfort rounds  - Make Fall Risk Sign visible to staff  - Offer Toileting every 2 Hours, in advance of need  - Initiate/Maintain alarm  - Obtain necessary fall risk management equipment:   - Apply yellow socks and bracelet for high fall risk patients  - Consider moving patient to room near nurses station  Outcome: Progressing  Goal: Maintains/Returns to pre admission functional level  Description: INTERVENTIONS:  - Perform BMAT or MOVE assessment daily    - Set and communicate daily mobility goal to care team and patient/family/caregiver     - Collaborate with rehabilitation services on mobility goals if consulted  - Perform Range of Motion 3 times a day    - Reposition patient every 1 hour   - Dangle patient 3 times a day  - Stand patient 5 times a day  - Ambulate patient 5 times a day  - Out of bed to chair 3 times a day   - Out of bed for meals 3 times a day  - Out of bed for toileting  - Record patient progress and toleration of activity level   Outcome: Progressing

## 2022-10-25 NOTE — PROGRESS NOTES
Tucker Santana  56 y o   male  1965  mrn [id number]    Assessment/Plan:    1  Presumed SBP/C diff diarrhea/Fever/Leukocytosis/Acute metab encephalopathy: Pt presented with abd pain and bloating  He did not have fever on admission but WBC count was 25K  He was confused c/w acute metab encephalopathy  I reviewed admission CXR which showed bibasilar atelectasis  Abd CT and abd US showed cirrhosis and ascites - most likely due to ARLD  He was placed on Zosyn for tx of presumed SBP in setting of newly dx'd cirrhosis     He developed low grade fever on 1017 and underwent paracentesis  Ascitic fluid cell count did not quite reach neutrophil count of 250 but Pt had been on abx tx x 4 days prior to the procedure which could have partially tx'd presume SBP and affected the cell count  Fluid cx was neg but abx tx could affexct the cx results      WBC count decreased to 22 3K on 10/21 but increased to 44K  Need to be concerned with new bowel perf after multiple paracenteses, C diff diarrhea since he's been on prolonged course of abx tx, UTI, pneumonia, etc  Doubt tooth abscess is the source of his worsening leukocytosis since he's been on abx which would tx dental infxn  ? Spurious result       10/24: Remains afebrile and WBC count has decreased to 21 2K  C diff is positive which is the most likely etiology for his leukocytosis  He is currently on po Vanco  CT of mouth was neg for dental abscess  Repeat bld cx's are neg  Pt has received adeq abx tx for presumed SBP  Abd/Chest CT did not show any evidence of new infxn     - Will d/c Vanco and Meropenem since no evidence of new bacterial infxn  - Cont po Vanco for tx of C diff diarrhea - will add IVF Flagyl  - Awaiting results of repeat cx's  - Will follow WBC count  - Will cont to monitor for the development of toxicity to current abx tx        Subjective: Pt looks alittle better  He reports some abd bloating but no pain  He is having some loose stools   No fever and WBC count is decreasing  No probs with current abx tx  Objective:  VSS, Tmax: 99 1  HEENT:  No scleral icterus  NECK: Supple  CARDIAC:   RRR, nml S1, S2  LUNGS:  Clear anteriorly   ABDOMEN:  +BS, soft, nontender  MUSCULOSKELETAL:  No calf tenderness  EXTREMITIES:  No LE edema  SKIN:  No rash      Labs:  CBC w/diff  Recent Labs     10/24/22  0232   WBC 27 45*   HGB 11 0*   HCT 31 0*      NEUTOPHILPCT 85*   LYMPHOPCT 6*   MONOPCT 5   EOSPCT 2     BMP  Recent Labs     10/24/22  0232   K 3 8      CO2 25   BUN 6   CREATININE 0 74   CALCIUM 8 3     CMP  Recent Labs     10/24/22  0232   K 3 8      CO2 25   BUN 6   CREATININE 0 74   CALCIUM 8 3   ALKPHOS 240*   ALT 41   AST 68*         labrc    Cultures:  Lab Results   Component Value Date    BLOODCX No Growth at 24 hrs  10/22/2022    BLOODCX No Growth at 24 hrs  10/22/2022    BLOODCX No Growth After 5 Days  10/14/2022    BLOODCX No Growth After 5 Days  10/14/2022    BLOODCX No Growth After 5 Days  10/15/2018    BLOODCX No Growth After 5 Days   10/15/2018     No results found for: WOUNDCULT  No results found for: URINECX  No results found for: SPUTUMCULTUR    MED:  Flagyl: #1  Vanco: #3  Meropenem: #3  Oral Vanco: #3  Abx: #11      Completed:  Zosyn x 9 days on 10/22         Current Facility-Administered Medications:   •  albuterol (PROVENTIL HFA,VENTOLIN HFA) inhaler 2 puff, 2 puff, Inhalation, Q4H PRN, KAYY Ortiz-C  •  chlordiazePOXIDE (LIBRIUM) capsule 10 mg, 10 mg, Oral, BID, KAYY Funes-C, 10 mg at 10/24/22 1802  •  enoxaparin (LOVENOX) subcutaneous injection 40 mg, 40 mg, Subcutaneous, Q24H [facility], KAYY Funes-C, 40 mg at 40/83/34 2317  •  folic acid (FOLVITE) tablet 1 mg, 1 mg, Oral, Daily, Teresa Bruno PA-C, 1 mg at 10/24/22 1730  •  ibuprofen (MOTRIN) tablet 400 mg, 400 mg, Oral, Q6H PRN, Maribel Gould MD  •  lactulose oral solution 20 g, 20 g, Oral, Daily, MACHO Marquez, 20 g at 10/24/22 0836  •  LORazepam (ATIVAN) injection 2 mg, 2 mg, Intravenous, Once, Sophie Marcus DO  •  metroNIDAZOLE (FLAGYL) IVPB (premix) 500 mg 100 mL, 500 mg, Intravenous, Q8H, Rush Stevens MD, Last Rate: 200 mL/hr at 10/24/22 2124, 500 mg at 10/24/22 2124  •  multivitamin-minerals (CENTRUM) tablet 1 tablet, 1 tablet, Oral, Daily, KAYY Vela-C, 1 tablet at 10/24/22 9333  •  nicotine (NICODERM CQ) 21 mg/24 hr TD 24 hr patch 21 mg, 21 mg, Transdermal, Daily, Teena Ayers PA-C, 21 mg at 10/24/22 0900  •  ondansetron (ZOFRAN) injection 4 mg, 4 mg, Intravenous, Q4H PRN, BEATRIZ VelaC, 4 mg at 10/14/22 2118  •  oxyCODONE (ROXICODONE) IR tablet 5 mg, 5 mg, Oral, Q6H PRN, 5 mg at 10/24/22 0601 **OR** oxyCODONE (ROXICODONE) IR tablet 10 mg, 10 mg, Oral, Q6H PRN, 10 mg at 10/24/22 1805 **OR** oxyCODONE (ROXICODONE) IR tablet 15 mg, 15 mg, Oral, Q6H PRN, Parul Hilliard MD, 15 mg at 10/23/22 1835  •  polyethylene glycol (MIRALAX) packet 17 g, 17 g, Oral, Daily, Candance Gene Sharrer, CRNP, 17 g at 10/24/22 0791  •  saccharomyces boulardii (FLORASTOR) capsule 250 mg, 250 mg, Oral, BID, Parul Hilliard MD, 250 mg at 10/24/22 1802  •  spironolactone (ALDACTONE) tablet 50 mg, 50 mg, Oral, Daily, Parul Hilliard MD, 50 mg at 10/24/22 8800  •  thiamine tablet 100 mg, 100 mg, Oral, Daily, Luis Arias PA-C, 100 mg at 10/24/22 7281  •  vancomycin (VANCOCIN) capsule 500 mg, 500 mg, Oral, Q6H [address], Rush Stevens MD, 500 mg at 10/24/22 1802    Principal Problem:    Ascites  Active Problems:    Nicotine dependence    COPD (chronic obstructive pulmonary disease) (HCC)    [facility]    Sepsis with acute organ dysfunction ([facility] 75 )    Alcohol use    Liver lesion      Rush Stevens MD

## 2022-10-26 LAB
ALBUMIN SERPL BCP-MCNC: 2 G/DL (ref 3.5–5)
ALP SERPL-CCNC: 260 U/L (ref 46–116)
ALT SERPL W P-5'-P-CCNC: 43 U/L (ref 12–78)
AMMONIA PLAS-SCNC: 26 UMOL/L (ref 11–35)
ANION GAP SERPL CALCULATED.3IONS-SCNC: 5 MMOL/L (ref 4–13)
AST SERPL W P-5'-P-CCNC: 79 U/L (ref 5–45)
BASOPHILS # BLD AUTO: 0.18 THOUSANDS/ÂΜL (ref 0–0.1)
BASOPHILS NFR BLD AUTO: 1 % (ref 0–1)
BILIRUB SERPL-MCNC: 1.6 MG/DL (ref 0.2–1)
BUN SERPL-MCNC: 8 MG/DL (ref 5–25)
CALCIUM ALBUM COR SERPL-MCNC: 9.8 MG/DL (ref 8.3–10.1)
CALCIUM SERPL-MCNC: 8.2 MG/DL (ref 8.3–10.1)
CHLORIDE SERPL-SCNC: 99 MMOL/L (ref 96–108)
CO2 SERPL-SCNC: 30 MMOL/L (ref 21–32)
CREAT SERPL-MCNC: 0.78 MG/DL (ref 0.6–1.3)
EOSINOPHIL # BLD AUTO: 0.58 THOUSAND/ÂΜL (ref 0–0.61)
EOSINOPHIL NFR BLD AUTO: 3 % (ref 0–6)
ERYTHROCYTE [DISTWIDTH] IN BLOOD BY AUTOMATED COUNT: 15.9 % (ref 11.6–15.1)
GFR SERPL CREATININE-BSD FRML MDRD: 101 ML/MIN/1.73SQ M
GLUCOSE SERPL-MCNC: 122 MG/DL (ref 65–140)
HAV AB SER QL IA: NORMAL
HCT VFR BLD AUTO: 34.5 % (ref 36.5–49.3)
HGB BLD-MCNC: 11.4 G/DL (ref 12–17)
IMM GRANULOCYTES # BLD AUTO: 0.22 THOUSAND/UL (ref 0–0.2)
IMM GRANULOCYTES NFR BLD AUTO: 1 % (ref 0–2)
LYMPHOCYTES # BLD AUTO: 1.86 THOUSANDS/ÂΜL (ref 0.6–4.47)
LYMPHOCYTES NFR BLD AUTO: 8 % (ref 14–44)
MCH RBC QN AUTO: 35.3 PG (ref 26.8–34.3)
MCHC RBC AUTO-ENTMCNC: 33 G/DL (ref 31.4–37.4)
MCV RBC AUTO: 107 FL (ref 82–98)
MONOCYTES # BLD AUTO: 1.23 THOUSAND/ÂΜL (ref 0.17–1.22)
MONOCYTES NFR BLD AUTO: 5 % (ref 4–12)
NEUTROPHILS # BLD AUTO: 18.7 THOUSANDS/ÂΜL (ref 1.85–7.62)
NEUTS SEG NFR BLD AUTO: 82 % (ref 43–75)
NRBC BLD AUTO-RTO: 0 /100 WBCS
PLATELET # BLD AUTO: 404 THOUSANDS/UL (ref 149–390)
PMV BLD AUTO: 9.4 FL (ref 8.9–12.7)
POTASSIUM SERPL-SCNC: 3.9 MMOL/L (ref 3.5–5.3)
PROT SERPL-MCNC: 6.4 G/DL (ref 6.4–8.4)
RBC # BLD AUTO: 3.23 MILLION/UL (ref 3.88–5.62)
SODIUM SERPL-SCNC: 134 MMOL/L (ref 135–147)
WBC # BLD AUTO: 22.77 THOUSAND/UL (ref 4.31–10.16)

## 2022-10-26 PROCEDURE — 99232 SBSQ HOSP IP/OBS MODERATE 35: CPT | Performed by: PHYSICIAN ASSISTANT

## 2022-10-26 PROCEDURE — 86708 HEPATITIS A ANTIBODY: CPT | Performed by: INTERNAL MEDICINE

## 2022-10-26 PROCEDURE — 80053 COMPREHEN METABOLIC PANEL: CPT | Performed by: PHYSICIAN ASSISTANT

## 2022-10-26 PROCEDURE — 85025 COMPLETE CBC W/AUTO DIFF WBC: CPT | Performed by: PHYSICIAN ASSISTANT

## 2022-10-26 PROCEDURE — 82140 ASSAY OF AMMONIA: CPT | Performed by: PHYSICIAN ASSISTANT

## 2022-10-26 RX ORDER — ALBUMIN (HUMAN) 12.5 G/50ML
12.5 SOLUTION INTRAVENOUS ONCE
Status: COMPLETED | OUTPATIENT
Start: 2022-10-26 | End: 2022-10-27

## 2022-10-26 RX ADMIN — MULTIPLE VITAMINS W/ MINERALS TAB 1 TABLET: TAB ORAL at 08:24

## 2022-10-26 RX ADMIN — ALBUMIN (HUMAN) 12.5 G: 0.25 INJECTION, SOLUTION INTRAVENOUS at 10:16

## 2022-10-26 RX ADMIN — Medication 250 MG: at 18:13

## 2022-10-26 RX ADMIN — VANCOMYCIN HYDROCHLORIDE 500 MG: 250 CAPSULE ORAL at 18:13

## 2022-10-26 RX ADMIN — OXYCODONE HYDROCHLORIDE 15 MG: 5 TABLET ORAL at 22:36

## 2022-10-26 RX ADMIN — LACTULOSE 20 G: 20 SOLUTION ORAL at 08:26

## 2022-10-26 RX ADMIN — FOLIC ACID 1 MG: 1 TABLET ORAL at 08:43

## 2022-10-26 RX ADMIN — CHLORDIAZEPOXIDE HYDROCHLORIDE 10 MG: 5 CAPSULE ORAL at 08:22

## 2022-10-26 RX ADMIN — VANCOMYCIN HYDROCHLORIDE 500 MG: 250 CAPSULE ORAL at 13:10

## 2022-10-26 RX ADMIN — OXYCODONE HYDROCHLORIDE 10 MG: 5 TABLET ORAL at 16:18

## 2022-10-26 RX ADMIN — Medication 250 MG: at 08:23

## 2022-10-26 RX ADMIN — OXYCODONE HYDROCHLORIDE 10 MG: 5 TABLET ORAL at 08:24

## 2022-10-26 RX ADMIN — METRONIDAZOLE 500 MG: 500 INJECTION, SOLUTION INTRAVENOUS at 13:10

## 2022-10-26 RX ADMIN — ENOXAPARIN SODIUM 40 MG: 100 INJECTION SUBCUTANEOUS at 08:28

## 2022-10-26 RX ADMIN — METRONIDAZOLE 500 MG: 500 INJECTION, SOLUTION INTRAVENOUS at 21:13

## 2022-10-26 RX ADMIN — VANCOMYCIN HYDROCHLORIDE 500 MG: 250 CAPSULE ORAL at 22:37

## 2022-10-26 RX ADMIN — METRONIDAZOLE 500 MG: 500 INJECTION, SOLUTION INTRAVENOUS at 04:23

## 2022-10-26 RX ADMIN — CHLORDIAZEPOXIDE HYDROCHLORIDE 10 MG: 5 CAPSULE ORAL at 18:13

## 2022-10-26 RX ADMIN — NICOTINE 21 MG: 21 PATCH, EXTENDED RELEASE TRANSDERMAL at 08:28

## 2022-10-26 RX ADMIN — SPIRONOLACTONE 100 MG: 25 TABLET ORAL at 08:23

## 2022-10-26 RX ADMIN — VANCOMYCIN HYDROCHLORIDE 500 MG: 250 CAPSULE ORAL at 06:08

## 2022-10-26 RX ADMIN — Medication 100 MG: at 08:22

## 2022-10-26 NOTE — PLAN OF CARE
Problem: Potential for Falls  Goal: Patient will remain free of falls  Description: INTERVENTIONS:  - Educate patient/family on patient safety including physical limitations  - Instruct patient to call for assistance with activity   - Consult OT/PT to assist with strengthening/mobility   - Keep Call bell within reach  - Keep bed low and locked with side rails adjusted as appropriate  - Keep care items and personal belongings within reach  - Initiate and maintain comfort rounds  - Make Fall Risk Sign visible to staff  - Offer Toileting every 2 Hours, in advance of need  - Initiate/Maintain alarm  - Obtain necessary fall risk management equipment:   - Apply yellow socks and bracelet for high fall risk patients  - Consider moving patient to room near nurses station  Outcome: Progressing     Problem: INFECTION - ADULT  Goal: Absence or prevention of progression during hospitalization  Description: INTERVENTIONS:  - Assess and monitor for signs and symptoms of infection  - Monitor lab/diagnostic results  - Monitor all insertion sites, i e  indwelling lines, tubes, and drains  - Monitor endotracheal if appropriate and nasal secretions for changes in amount and color  - [place] appropriate cooling/warming therapies per order  - Administer medications as ordered  - Instruct and encourage patient and family to use good hand hygiene technique  - Identify and instruct in appropriate isolation precautions for identified infection/condition  Outcome: Progressing  Goal: Absence of fever/infection during neutropenic period  Description: INTERVENTIONS:  - Monitor WBC    Outcome: Progressing     Problem: SAFETY ADULT  Goal: Patient will remain free of falls  Description: INTERVENTIONS:  - Educate patient/family on patient safety including physical limitations  - Instruct patient to call for assistance with activity   - Consult OT/PT to assist with strengthening/mobility   - Keep Call bell within reach  - Keep bed low and locked with side rails adjusted as appropriate  - Keep care items and personal belongings within reach  - Initiate and maintain comfort rounds  - Make Fall Risk Sign visible to staff  - Offer Toileting every 2 Hours, in advance of need  - Initiate/Maintain alarm  - Obtain necessary fall risk management equipment:   - Apply yellow socks and bracelet for high fall risk patients  - Consider moving patient to room near nurses station  Outcome: Progressing

## 2022-10-26 NOTE — ASSESSMENT & PLAN NOTE
· New onset cirrhosis  Was noted to have abdominal distension, hepatomegaly, with dark stools by PCP on 09/28/2022  · Mild transaminitis with , ALT 72  Alk-phos 400  T bili 1 9, direct bili 1 4    · CT abdomen/pelvis shows new onset cirrhosis with moderate ascites  · Appeared mildly encephalopathic on admission, improved  · Suspected alcoholic liver disease  · On folate/ thiamine/ multivitamin supplementation

## 2022-10-26 NOTE — PLAN OF CARE
Problem: Potential for Falls  Goal: Patient will remain free of falls  Description: INTERVENTIONS:  - Educate patient/family on patient safety including physical limitations  - Instruct patient to call for assistance with activity   - Consult OT/PT to assist with strengthening/mobility   - Keep Call bell within reach  - Keep bed low and locked with side rails adjusted as appropriate  - Keep care items and personal belongings within reach  - Initiate and maintain comfort rounds  - Make Fall Risk Sign visible to staff  - Offer Toileting every 2 Hours, in advance of need  - Initiate/Maintain alarm  - Obtain necessary fall risk management equipment:   - Apply yellow socks and bracelet for high fall risk patients  - Consider moving patient to room near nurses station  Outcome: Progressing     Problem: PAIN - ADULT  Goal: Verbalizes/displays adequate comfort level or baseline comfort level  Description: Interventions:  - Encourage patient to monitor pain and request assistance  - Assess pain using appropriate pain scale  - Administer analgesics based on type and severity of pain and evaluate response  - Implement non-pharmacological measures as appropriate and evaluate response  - Consider cultural and social influences on pain and pain management  - Notify physician/advanced practitioner if interventions unsuccessful or patient reports new pain  Outcome: Progressing     Problem: INFECTION - ADULT  Goal: Absence or prevention of progression during hospitalization  Description: INTERVENTIONS:  - Assess and monitor for signs and symptoms of infection  - Monitor lab/diagnostic results  - Monitor all insertion sites, i e  indwelling lines, tubes, and drains  - Monitor endotracheal if appropriate and nasal secretions for changes in amount and color  - [place] appropriate cooling/warming therapies per order  - Administer medications as ordered  - Instruct and encourage patient and family to use good hand hygiene technique  - Identify and instruct in appropriate isolation precautions for identified infection/condition  Outcome: Progressing  Goal: Absence of fever/infection during neutropenic period  Description: INTERVENTIONS:  - Monitor WBC    Outcome: Progressing     Problem: SAFETY ADULT  Goal: Patient will remain free of falls  Description: INTERVENTIONS:  - Educate patient/family on patient safety including physical limitations  - Instruct patient to call for assistance with activity   - Consult OT/PT to assist with strengthening/mobility   - Keep Call bell within reach  - Keep bed low and locked with side rails adjusted as appropriate  - Keep care items and personal belongings within reach  - Initiate and maintain comfort rounds  - Make Fall Risk Sign visible to staff  - Offer Toileting every 2 Hours, in advance of need  - Initiate/Maintain alarm  - Obtain necessary fall risk management equipment:   - Apply yellow socks and bracelet for high fall risk patients  - Consider moving patient to room near nurses station  Outcome: Progressing  Goal: Maintain or return to baseline ADL function  Description: INTERVENTIONS:  - Educate patient/family on patient safety including physical limitations  - Instruct patient to call for assistance with activity   - Consult OT/PT to assist with strengthening/mobility   - Keep Call bell within reach  - Keep bed low and locked with side rails adjusted as appropriate  - Keep care items and personal belongings within reach  - Initiate and maintain comfort rounds  - Make Fall Risk Sign visible to staff  - Offer Toileting every 2 Hours, in advance of need  - Initiate/Maintain alarm  - Obtain necessary fall risk management equipment:   - Apply yellow socks and bracelet for high fall risk patients  - Consider moving patient to room near nurses station  Outcome: Progressing  Goal: Maintains/Returns to pre admission functional level  Description: INTERVENTIONS:  - Perform BMAT or MOVE assessment daily    - Set and communicate daily mobility goal to care team and patient/family/caregiver  - Collaborate with rehabilitation services on mobility goals if consulted  - Perform Range of Motion 3 times a day  - Reposition patient every 2 hours  - Dangle patient 5 times a day  - Stand patient 3 times a day  - Ambulate patient 5 times a day  - Out of bed to chair 3 times a day   - Out of bed for meals 3 times a day  - Out of bed for toileting  - Record patient progress and toleration of activity level   Outcome: Progressing     Problem: DISCHARGE PLANNING  Goal: Discharge to home or other facility with appropriate resources  Description: INTERVENTIONS:  - Identify barriers to discharge w/patient and caregiver  - Arrange for needed discharge resources and transportation as appropriate  - Identify discharge learning needs (meds, wound care, etc )  - Arrange for interpretive services to assist at discharge as needed  - Refer to Case Management Department for coordinating discharge planning if the patient needs post-hospital services based on physician/advanced practitioner order or complex needs related to functional status, cognitive ability, or social support system  Outcome: Progressing     Problem: Knowledge Deficit  Goal: Patient/family/caregiver demonstrates understanding of disease process, treatment plan, medications, and discharge instructions  Description: Complete learning assessment and assess knowledge base  Interventions:  - Provide teaching at level of understanding  - Provide teaching via preferred learning methods  Outcome: Progressing     Problem: Nutrition/Hydration-ADULT  Goal: Nutrient/Hydration intake appropriate for improving, restoring or maintaining nutritional needs  Description: Monitor and assess patient's nutrition/hydration status for malnutrition  Collaborate with interdisciplinary team and initiate plan and interventions as ordered    Monitor patient's weight and dietary intake as ordered or per policy  Utilize nutrition screening tool and intervene as necessary  Determine patient's food preferences and provide high-protein, high-caloric foods as appropriate  INTERVENTIONS:  - Monitor oral intake, urinary output, labs, and treatment plans  - Assess nutrition and hydration status and recommend course of action  - Evaluate amount of meals eaten  - Assist patient with eating if necessary   - Allow adequate time for meals  - Recommend/ encourage appropriate diets, oral nutritional supplements, and vitamin/mineral supplements  - Order, calculate, and assess calorie counts as needed  - Recommend, monitor, and adjust tube feedings and TPN/PPN based on assessed needs  - Assess need for intravenous fluids  - Provide specific nutrition/hydration education as appropriate  - Include patient/family/caregiver in decisions related to nutrition  Outcome: Progressing     Problem: MOBILITY - ADULT  Goal: Maintain or return to baseline ADL function  Description: INTERVENTIONS:  - Educate patient/family on patient safety including physical limitations  - Instruct patient to call for assistance with activity   - Consult OT/PT to assist with strengthening/mobility   - Keep Call bell within reach  - Keep bed low and locked with side rails adjusted as appropriate  - Keep care items and personal belongings within reach  - Initiate and maintain comfort rounds  - Make Fall Risk Sign visible to staff  - Offer Toileting every 2 Hours, in advance of need  - Initiate/Maintain alarm  - Obtain necessary fall risk management equipment:   - Apply yellow socks and bracelet for high fall risk patients  - Consider moving patient to room near nurses station  Outcome: Progressing  Goal: Maintains/Returns to pre admission functional level  Description: INTERVENTIONS:  - Perform BMAT or MOVE assessment daily    - Set and communicate daily mobility goal to care team and patient/family/caregiver     - Collaborate with rehabilitation services on mobility goals if consulted  - Perform Range of Motion 3 times a day  - Reposition patient every 2 hours    - Dangle patient 5 times a day  - Stand patient 5 times a day  - Ambulate patient 5 times a day  - Out of bed to chair 3  times a day   - Out of bed for meals 3 times a day  - Out of bed for toileting  - Record patient progress and toleration of activity level   Outcome: Progressing

## 2022-10-26 NOTE — ASSESSMENT & PLAN NOTE
· Meets SIRS criteria w tachycardia (HR 120s) and leukocytosis (WBC 33)  · Reports fevers/chills at home, however afebrile while inpatient  · Likely related to C diff  · Blood culture negative x72 hours  · Respiratory panel negative  · C diff positive, continue oral vancomycin  · Infectious disease following  · Continue oral vancomycin and IV Flagyl  · Repeat imaging reveals possible gastroenteritis findings consistent with C diff    Reveals possible liver lesion, will need reimaging with MRI, IV contrast on outpatient basis  · Trend WBC and fever curve - still with leukocytosis but down trending to 22 K

## 2022-10-26 NOTE — PROGRESS NOTES
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  Newly diagnosed, decompensated by ascites  No evidence of hepatic encephalopathy, GI bleeding   CT scan 10/14/2022 negative for HCC and portal vein thrombus  A repeat noncontrast CT 10/22 suggests abnormalities in the left lobe  Plan outpatient imaging  Meld score-13, discriminant function 8 9 using INR from 10/22  Viral and autoimmune serologies negative  Check hep a and B antibodies to determine if vaccination is necessary  Continue spironolactone, increased to 100 mg daily 10/25  Plan therapeutic paracentesis prior to discharge  Reviewed the importance of total alcohol cessation  Outpatient hepatology follow-up    Chief Complaint   Patient presents with   • Abdominal Pain     Pt c/o of rioght sided abdomianla pain x 1 days  SUBJECTIVE/HPI   Tolerating diet without nausea, vomiting abdominal pain  Abdomen remained distended with ascites  Had 2 or 3 loose stools yesterday, 1 so far today  No rectal bleeding      /77 (BP Location: Right arm)   Pulse 104   Temp 98 5 °F (36 9 °C) (Oral)   Resp 18   Ht 5' 7" (1 702 m)   Wt 66 7 kg (147 lb)   SpO2 96%   BMI 23 02 kg/m²     PHYSICALEXAM  General appearance: alert, appears stated age and cooperative  Eyes: PERLLA, EOMI, no icterus   Head: Normocephalic, without obvious abnormality, atraumatic  Lungs: clear to auscultation bilaterally  Heart: regular rate and rhythm, S1, S2 normal, no murmur, click, rub or gallop  Abdomen: soft, non-tender; bowel sounds normal; no masses,  no organomegaly, moderately distended with ascites  Extremities: extremities normal, atraumatic, no cyanosis or edema  Neurologic: Grossly normal    Lab Results   Component Value Date    CALCIUM 8 2 (L) 10/26/2022    K 3 9 10/26/2022    CO2 30 10/26/2022    CL 99 10/26/2022    BUN 8 10/26/2022    CREATININE 0 78 10/26/2022     Lab Results   Component Value Date    WBC 22 77 (H) 10/26/2022    HGB 11 4 (L) 10/26/2022    HCT 34 5 (L) 10/26/2022     (H) 10/26/2022     (H) 10/26/2022     Lab Results   Component Value Date    ALT 43 10/26/2022    AST 79 (H) 10/26/2022    GGT 1,144 (H) 09/28/2022    ALKPHOS 260 (H) 10/26/2022     Lab Results   Component Value Date    AMYLASE 21 (L) 09/28/2022     Lab Results   Component Value Date    LIPASE 308 10/14/2022     Lab Results   Component Value Date    IRON 96 10/15/2022    TIBC 158 (L) 10/15/2022    FERRITIN 423 (H) 10/15/2022     Lab Results   Component Value Date    INR 1 12 10/22/2022

## 2022-10-26 NOTE — ASSESSMENT & PLAN NOTE
· Drinks vodka and juice daily  · Never attempted to quit due to experiencing withdrawals w/ tremors   Denies seizures/hallucinations  · CIWA protocol  · Thiamine/folate/multivitamin  · Librium titrated down to 10 mg b i d  as of 10/24 - will further decrease today 10/26

## 2022-10-26 NOTE — ASSESSMENT & PLAN NOTE
· Presented with diffuse abdominal pain and abdominal distension  · Secondary to alcoholic cirrhosis  · Status post paracentesis 10/17, 10/19 and 10/22  · Consider repeat paracentesis prior to discharge if needed  · Trend WBC and fever curve  · Gastroenterology increased Aldactone 100 mg daily 10/25  · GI consult appreciated  · Blood cultures negative x72 hours  · F/u with GI outpt and will need EGD outpt

## 2022-10-26 NOTE — PROGRESS NOTES
[place]     Progress Note - Jonh Villanueva 1965, 56 y o  male MRN: [id number]  Unit/Bed#: -01 Encounter: [id number]  Primary Care Provider: Zain Orozco DO   Date and time admitted to hospital: 10/14/2022  1:52 PM    * Ascites  Assessment & Plan  · Presented with diffuse abdominal pain and abdominal distension  · Secondary to alcoholic cirrhosis  · Status post paracentesis 10/17, 10/19 and 10/22  · Consider repeat paracentesis prior to discharge if needed  · Trend WBC and fever curve  · Gastroenterology increased Aldactone 100 mg daily 10/25  · GI consult appreciated  · Blood cultures negative x72 hours  · F/u with GI outpt and will need EGD outpt     Sepsis with acute organ dysfunction ([facility] )  Assessment & Plan  · Meets SIRS criteria w tachycardia (HR 120s) and leukocytosis (WBC 33)  · Reports fevers/chills at home, however afebrile while inpatient  · Likely related to C diff  · Blood culture negative x72 hours  · Respiratory panel negative  · C diff positive, continue oral vancomycin  · Infectious disease following  · Continue oral vancomycin and IV Flagyl  · Repeat imaging reveals possible gastroenteritis findings consistent with C diff  Reveals possible liver lesion, will need reimaging with MRI, IV contrast on outpatient basis  · Trend WBC and fever curve - still with leukocytosis but down trending to 22 K    Liver lesion  Assessment & Plan  · Noted on noncontrast CT  · Will need follow-up with GI and reimaging    Alcohol use  Assessment & Plan  · Drinks vodka and juice daily  · Never attempted to quit due to experiencing withdrawals w/ tremors  Denies seizures/hallucinations  · [place] protocol  · Thiamine/folate/multivitamin  · Librium titrated down to 10 mg b i d  as of 10/24 - will further decrease today 83/11    Alcoholic cirrhosis of liver with ascites ([facility] )  Assessment & Plan  · New onset cirrhosis    Was noted to have abdominal distension, hepatomegaly, with dark stools by PCP on 2022  · Mild transaminitis with , ALT 72  Alk-phos 400  T bili 1 9, direct bili 1 4  · CT abdomen/pelvis shows new onset cirrhosis with moderate ascites  · Appeared mildly encephalopathic on admission, improved  · Suspected alcoholic liver disease  · On folate/ thiamine/ multivitamin supplementation    COPD (chronic obstructive pulmonary disease) (HCC)  Assessment & Plan  · No shortness of breath/wheezing  Not acute exacerbation  · Albuterol inhaler PRN    Nicotine dependence  Assessment & Plan  · Smokes approximately a pack a day  · Nicotine patch  ·  on cessation    VTE Pharmacologic Prophylaxis: VTE Score: 3 Moderate Risk (Score 3-4) - Pharmacological DVT Prophylaxis Ordered: enoxaparin (Lovenox)  Patient Centered Rounds: I performed bedside rounds with nursing staff today  Discussions with Specialists or Other Care Team Provider: CM, GI, ID    Education and Discussions with Family / Patient: Patient declined call to   Offered to call  Time Spent for Care: 30 minutes  More than 50% of total time spent on counseling and coordination of care as described above  Current Length of Stay: 12 day(s)  Current Patient Status: Inpatient   Certification Statement: The patient will continue to require additional inpatient hospital stay due to cdiff diarrhea, requiring IV abx  Discharge Plan: Anticipate discharge in 48 hrs to home  Code Status: Level 1 - Full Code    Subjective:   Expresses frustration regarding length of stay  Still with abdominal pain and bloating  Denies chest pain/palpitations, shortness of breath, nausea/vomiting  Objective:     Vitals:   Temp (24hrs), Av 3 °F (36 8 °C), Min:98 °F (36 7 °C), Max:98 5 °F (36 9 °C)    Temp:  [98 °F (36 7 °C)-98 5 °F (36 9 °C)] 98 5 °F (36 9 °C)  HR:  [] 104  Resp:  [16-18] 18  BP: (114-122)/(69-77) 122/77  SpO2:  [96 %] 96 %  Body mass index is 23 02 kg/m²       Input and Output Summary (last 24 hours):   No intake or output data in the 24 hours ending 10/26/22 1124    Physical Exam:   Physical Exam  Vitals and nursing note reviewed  Constitutional:       Appearance: He is well-developed  Comments: No acute distress   HENT:      Head: Normocephalic and atraumatic  Eyes:      General: No scleral icterus  Extraocular Movements: Extraocular movements intact  Conjunctiva/sclera: Conjunctivae normal    Cardiovascular:      Rate and Rhythm: Normal rate and regular rhythm  Heart sounds: S1 normal and S2 normal  No murmur heard  Pulmonary:      Effort: Pulmonary effort is normal  No respiratory distress  Breath sounds: Normal breath sounds  No wheezing, rhonchi or rales  Abdominal:      General: Bowel sounds are normal  There is distension  Palpations: Abdomen is soft  Tenderness: There is no abdominal tenderness  There is no guarding or rebound  Musculoskeletal:      Cervical back: Normal range of motion  Comments: Able to move upper/lower ext bilaterally, no edema   Skin:     General: Skin is warm and dry  Neurological:      Mental Status: He is alert and oriented to person, place, and time  Psychiatric:         Mood and Affect: Affect is angry  Additional Data:     Labs:  Results from last 7 days   Lab Units 10/26/22  0440 10/23/22  0529 10/22/22  0430   WBC Thousand/uL 22 77*   < > 44 21*   HEMOGLOBIN g/dL 11 4*   < > 12 6   HEMATOCRIT % 34 5*   < > 35 8*   PLATELETS Thousands/uL 404*   < > 454*   BANDS PCT %  --   --  1   NEUTROS PCT % 82*   < >  --    LYMPHS PCT % 8*   < >  --    LYMPHO PCT %  --   --  4*   MONOS PCT % 5   < >  --    MONO PCT %  --   --  2*   EOS PCT % 3   < > 0    < > = values in this interval not displayed       Results from last 7 days   Lab Units 10/26/22  0440   SODIUM mmol/L 134*   POTASSIUM mmol/L 3 9   CHLORIDE mmol/L 99   CO2 mmol/L 30   BUN mg/dL 8   CREATININE mg/dL 0 78   ANION GAP mmol/L 5   CALCIUM mg/dL 8 2*   ALBUMIN g/dL 2 0*   TOTAL BILIRUBIN mg/dL 1 60*   ALK PHOS U/L 260*   ALT U/L 43   AST U/L 79*   GLUCOSE RANDOM mg/dL 122     Results from last 7 days   Lab Units 10/22/22  0430   INR  1 12             Results from last 7 days   Lab Units 10/24/22  0232 10/23/22  0529 10/22/22  1210 10/20/22  0434   LACTIC ACID mmol/L  --   --  1 4  --    PROCALCITONIN ng/ml 0 89* 0 74* 1 46* 0 68*       Lines/Drains:  Invasive Devices  Report    Peripheral Intravenous Line  Duration           Peripheral IV 10/24/22 Left Antecubital 1 day                      Imaging: No pertinent imaging reviewed  Recent Cultures (last 7 days):   Results from last 7 days   Lab Units 10/22/22  2247 10/22/22  1212 10/21/22  1544 10/19/22  1449   BLOOD CULTURE   --  No Growth at 72 hrs    No Growth at 72 hrs   --   --    GRAM STAIN RESULT   --   --  Rare Polys  No organisms seen Rare Polys  No bacteria seen   BODY FLUID CULTURE, STERILE   --   --  No growth No growth   C DIFF TOXIN B BY PCR  Positive*  --   --   --        Last 24 Hours Medication List:   Current Facility-Administered Medications   Medication Dose Route Frequency Provider Last Rate   • albuterol  2 puff Inhalation Q4H PRN Margarita Madison PA-C     • chlordiazePOXIDE  10 mg Oral BID Josemanuel ALVAREZ PA-C     • enoxaparin  40 mg Subcutaneous Q24H [facility] Terri ALVAREZ PA-C     • folic acid  1 mg Oral Daily Margarita Madison PA-C     • ibuprofen  400 mg Oral Q6H PRN Alyssa Esquivel MD     • lactulose  20 g Oral Daily Sherryle Borne Sharrer, CRNP     • LORazepam  2 mg Intravenous Once Alton Bowser DO     • metroNIDAZOLE  500 mg Intravenous Q8H Aliya Burnette  mg (10/26/22 3253)   • multivitamin-minerals  1 tablet Oral Daily Margarita Madison PA-C     • nicotine  21 mg Transdermal Daily Teena Ayers PA-C     • ondansetron  4 mg Intravenous Q4H PRN Margarita Madison PA-C     • oxyCODONE  5 mg Oral Q6H PRN Alyssa Esquivel MD      Or   • oxyCODONE  10 mg Oral Q6H PRN Micah Flores MD      Or   • oxyCODONE  15 mg Oral Q6H PRN Micah Flores MD     • polyethylene glycol  17 g Oral Daily MACHO Ndiaye     • saccharomyces boulardii  250 mg Oral BID Micah Flores MD     • spironolactone  100 mg Oral Daily Zac Llamas DO     • thiamine  100 mg Oral Daily Douglas Head PA-C     • vancomycin (VANCOCIN) capsule 500 mg  500 mg Oral Q6H Sumaya Hunt MD          Today, Patient Was Seen By: Keren Sal    **Please Note: This note may have been constructed using a voice recognition system  **

## 2022-10-27 ENCOUNTER — APPOINTMENT (INPATIENT)
Dept: INTERVENTIONAL RADIOLOGY/VASCULAR | Facility: HOSPITAL | Age: 57
DRG: 871 | End: 2022-10-27
Attending: INTERNAL MEDICINE
Payer: MEDICARE

## 2022-10-27 LAB
ALBUMIN SERPL BCP-MCNC: 2 G/DL (ref 3.5–5)
ALP SERPL-CCNC: 229 U/L (ref 46–116)
ALT SERPL W P-5'-P-CCNC: 36 U/L (ref 12–78)
ANION GAP SERPL CALCULATED.3IONS-SCNC: 8 MMOL/L (ref 4–13)
APPEARANCE FLD: CLEAR
AST SERPL W P-5'-P-CCNC: 64 U/L (ref 5–45)
BASOPHILS # BLD AUTO: 0.13 THOUSANDS/ÂΜL (ref 0–0.1)
BASOPHILS NFR BLD AUTO: 1 % (ref 0–1)
BILIRUB SERPL-MCNC: 1.2 MG/DL (ref 0.2–1)
BUN SERPL-MCNC: 7 MG/DL (ref 5–25)
CALCIUM ALBUM COR SERPL-MCNC: 9.7 MG/DL (ref 8.3–10.1)
CALCIUM SERPL-MCNC: 8.1 MG/DL (ref 8.3–10.1)
CHLORIDE SERPL-SCNC: 99 MMOL/L (ref 96–108)
CO2 SERPL-SCNC: 27 MMOL/L (ref 21–32)
COLOR FLD: NORMAL
CREAT SERPL-MCNC: 0.7 MG/DL (ref 0.6–1.3)
EOSINOPHIL # BLD AUTO: 0.46 THOUSAND/ÂΜL (ref 0–0.61)
EOSINOPHIL NFR BLD AUTO: 2 % (ref 0–6)
EOSINOPHIL NFR FLD MANUAL: 1 %
ERYTHROCYTE [DISTWIDTH] IN BLOOD BY AUTOMATED COUNT: 15.6 % (ref 11.6–15.1)
GFR SERPL CREATININE-BSD FRML MDRD: 106 ML/MIN/1.73SQ M
GLUCOSE SERPL-MCNC: 129 MG/DL (ref 65–140)
HBV SURFACE AB SER-ACNC: <3.1 MIU/ML
HCT VFR BLD AUTO: 30.8 % (ref 36.5–49.3)
HGB BLD-MCNC: 10.5 G/DL (ref 12–17)
HISTIOCYTES NFR FLD: 11 %
IMM GRANULOCYTES # BLD AUTO: 0.19 THOUSAND/UL (ref 0–0.2)
IMM GRANULOCYTES NFR BLD AUTO: 1 % (ref 0–2)
LYMPHOCYTES # BLD AUTO: 1.22 THOUSANDS/ÂΜL (ref 0.6–4.47)
LYMPHOCYTES NFR BLD AUTO: 4 %
LYMPHOCYTES NFR BLD AUTO: 6 % (ref 14–44)
MCH RBC QN AUTO: 35.8 PG (ref 26.8–34.3)
MCHC RBC AUTO-ENTMCNC: 34.1 G/DL (ref 31.4–37.4)
MCV RBC AUTO: 105 FL (ref 82–98)
MONO+MESO NFR FLD MANUAL: 3 %
MONOCYTES # BLD AUTO: 1.08 THOUSAND/ÂΜL (ref 0.17–1.22)
MONOCYTES NFR BLD AUTO: 1 %
MONOCYTES NFR BLD AUTO: 5 % (ref 4–12)
NEUTROPHILS # BLD AUTO: 17.33 THOUSANDS/ÂΜL (ref 1.85–7.62)
NEUTS SEG NFR BLD AUTO: 80 %
NEUTS SEG NFR BLD AUTO: 85 % (ref 43–75)
NRBC BLD AUTO-RTO: 0 /100 WBCS
PLATELET # BLD AUTO: 390 THOUSANDS/UL (ref 149–390)
PMV BLD AUTO: 9.3 FL (ref 8.9–12.7)
POTASSIUM SERPL-SCNC: 3.8 MMOL/L (ref 3.5–5.3)
PROT SERPL-MCNC: 6 G/DL (ref 6.4–8.4)
RBC # BLD AUTO: 2.93 MILLION/UL (ref 3.88–5.62)
SITE: NORMAL
SODIUM SERPL-SCNC: 134 MMOL/L (ref 135–147)
TOTAL CELLS COUNTED SPEC: 100
WBC # BLD AUTO: 20.41 THOUSAND/UL (ref 4.31–10.16)
WBC # FLD MANUAL: 367 /UL

## 2022-10-27 PROCEDURE — 87070 CULTURE OTHR SPECIMN AEROBIC: CPT | Performed by: INTERNAL MEDICINE

## 2022-10-27 PROCEDURE — 49083 ABD PARACENTESIS W/IMAGING: CPT

## 2022-10-27 PROCEDURE — 99232 SBSQ HOSP IP/OBS MODERATE 35: CPT | Performed by: PHYSICIAN ASSISTANT

## 2022-10-27 PROCEDURE — 80053 COMPREHEN METABOLIC PANEL: CPT | Performed by: HOSPITALIST

## 2022-10-27 PROCEDURE — 85025 COMPLETE CBC W/AUTO DIFF WBC: CPT | Performed by: HOSPITALIST

## 2022-10-27 PROCEDURE — 89051 BODY FLUID CELL COUNT: CPT | Performed by: INTERNAL MEDICINE

## 2022-10-27 PROCEDURE — 87205 SMEAR GRAM STAIN: CPT | Performed by: INTERNAL MEDICINE

## 2022-10-27 PROCEDURE — 86706 HEP B SURFACE ANTIBODY: CPT | Performed by: HOSPITALIST

## 2022-10-27 RX ORDER — CHLORDIAZEPOXIDE HYDROCHLORIDE 5 MG/1
10 CAPSULE, GELATIN COATED ORAL DAILY
Status: DISCONTINUED | OUTPATIENT
Start: 2022-10-27 | End: 2022-10-28 | Stop reason: HOSPADM

## 2022-10-27 RX ADMIN — Medication 250 MG: at 17:29

## 2022-10-27 RX ADMIN — VANCOMYCIN HYDROCHLORIDE 500 MG: 250 CAPSULE ORAL at 17:29

## 2022-10-27 RX ADMIN — OXYCODONE HYDROCHLORIDE 10 MG: 5 TABLET ORAL at 17:37

## 2022-10-27 RX ADMIN — VANCOMYCIN HYDROCHLORIDE 500 MG: 250 CAPSULE ORAL at 14:18

## 2022-10-27 RX ADMIN — METRONIDAZOLE 500 MG: 500 INJECTION, SOLUTION INTRAVENOUS at 14:17

## 2022-10-27 RX ADMIN — CHLORDIAZEPOXIDE HYDROCHLORIDE 10 MG: 5 CAPSULE ORAL at 10:18

## 2022-10-27 RX ADMIN — METRONIDAZOLE 500 MG: 500 INJECTION, SOLUTION INTRAVENOUS at 04:20

## 2022-10-27 RX ADMIN — Medication 100 MG: at 10:18

## 2022-10-27 RX ADMIN — OXYCODONE HYDROCHLORIDE 10 MG: 5 TABLET ORAL at 04:19

## 2022-10-27 RX ADMIN — NICOTINE 21 MG: 21 PATCH, EXTENDED RELEASE TRANSDERMAL at 10:19

## 2022-10-27 RX ADMIN — FOLIC ACID 1 MG: 1 TABLET ORAL at 10:18

## 2022-10-27 RX ADMIN — SPIRONOLACTONE 100 MG: 25 TABLET ORAL at 10:18

## 2022-10-27 RX ADMIN — VANCOMYCIN HYDROCHLORIDE 500 MG: 250 CAPSULE ORAL at 06:12

## 2022-10-27 RX ADMIN — MULTIPLE VITAMINS W/ MINERALS TAB 1 TABLET: TAB ORAL at 10:18

## 2022-10-27 RX ADMIN — Medication 250 MG: at 10:18

## 2022-10-27 RX ADMIN — LACTULOSE 20 G: 20 SOLUTION ORAL at 10:19

## 2022-10-27 RX ADMIN — METRONIDAZOLE 500 MG: 500 INJECTION, SOLUTION INTRAVENOUS at 21:00

## 2022-10-27 NOTE — PROGRESS NOTES
Progress note - [facility]   Mauro Coronado 56 y o  male MRN: [id number]  Unit/Bed#: -01 Encounter: [id number]    ASSESSMENT and PLAN    1) leukocytosis  met SIRS criteria on admission, concern for SBP given new ascites  UA, blood cultures, chest x-ray negative   paracentesis 10/19 negative for SBP but he had already received antibiotics  Stool C diff toxin/PCR returned positive 10/24  Stool frequency/consistency gradually improving on p o  Vanco/IV Flagyl  Remains afebrile and White count slowly improving     2  Cirrhosis  Newly diagnosed, decompensated by ascites  No evidence of hepatic encephalopathy, GI bleeding   CT scan 10/14/2022 negative for HCC and portal vein thrombus  Meld score-13, discriminant function 8 9 using INR from 10/22  Labs negative for viral, and autoimmune etiology  Continue spironolactone 100 mg daily  Headed to therapeutic paracentesis  outpatient  EGD for variceal screening  Reviewed the importance of total alcohol cessation    No further inpatient GI workup necessary  Okay for discharge after paracentesis on C diff therapy  We will arrange short interval office follow-up to follow-up on C diff and for further management of decompensated cirrhosis    Discussed with Cary Smallwood PA-C      Chief Complaint   Patient presents with   • Abdominal Pain     Pt c/o of rioght sided abdomianla pain x 1 days  SUBJECTIVE/HPI   Getting in a wheelchair for transfer to paracentesis  Tolerating diet without nausea, vomiting abdominal pain    Reports that diarrhea is gradually improving    /85 (BP Location: Right arm)   Pulse (!) 113   Temp 98 7 °F (37 1 °C) (Oral)   Resp 20   Ht 5' 7" (1 702 m)   Wt 66 7 kg (147 lb)   SpO2 95%   BMI 23 02 kg/m²     PHYSICALEXAM  General appearance: alert, appears stated age and cooperative  Eyes: PERLLA, EOMI, no icterus   Head: Normocephalic, without obvious abnormality, atraumatic  Lungs: clear to auscultation bilaterally  Heart: regular rate and rhythm, S1, S2 normal, no murmur, click, rub or gallop  Abdomen: soft, non-tender; bowel sounds normal; no masses,  no organomegaly, distended with ascites  Extremities: extremities normal, atraumatic, no cyanosis or edema  Neurologic: Grossly normal    Lab Results   Component Value Date    CALCIUM 8 1 (L) 10/27/2022    K 3 8 10/27/2022    CO2 27 10/27/2022    CL 99 10/27/2022    BUN 7 10/27/2022    CREATININE 0 70 10/27/2022     Lab Results   Component Value Date    WBC 20 41 (H) 10/27/2022    HGB 10 5 (L) 10/27/2022    HCT 30 8 (L) 10/27/2022     (H) 10/27/2022     10/27/2022     Lab Results   Component Value Date    ALT 36 10/27/2022    AST 64 (H) 10/27/2022    GGT 1,144 (H) 09/28/2022    ALKPHOS 229 (H) 10/27/2022     Lab Results   Component Value Date    AMYLASE 21 (L) 09/28/2022     Lab Results   Component Value Date    LIPASE 308 10/14/2022     Lab Results   Component Value Date    IRON 96 10/15/2022    TIBC 158 (L) 10/15/2022    FERRITIN 423 (H) 10/15/2022     Lab Results   Component Value Date    INR 1 12 10/22/2022

## 2022-10-27 NOTE — ASSESSMENT & PLAN NOTE
· Meets SIRS criteria w tachycardia (HR 120s) and leukocytosis (WBC 33)  · Reports fevers/chills at home, however afebrile while inpatient  · Likely related to C diff  · Blood culture negative x2 after 4 days  · Respiratory panel negative  · C diff positive, continue oral vancomycin  · Infectious disease following  · Continue oral vancomycin and IV Flagyl  · Repeat imaging reveals possible gastroenteritis findings consistent with C diff    Reveals possible liver lesion, will need reimaging with MRI, IV contrast on outpatient basis  · Trend WBC and fever curve - still with leukocytosis but down trending to 20 K

## 2022-10-27 NOTE — PROGRESS NOTES
Ld Santana  56 y o   male  1965  mrn [id number]    Assessment/Plan:  1  Presumed SBP/C diff diarrhea/Fever/Leukocytosis/Acute metab encephalopathy: Pt presented with abd pain and bloating  He did not have fever on admission but WBC count was 25K  He was confused c/w acute metab encephalopathy  I reviewed admission CXR which showed bibasilar atelectasis  Abd CT and abd US showed cirrhosis and ascites - most likely due to ARLD  He was placed on Zosyn for tx of presumed SBP in setting of newly dx'd cirrhosis     He developed low grade fever on 1017 and underwent paracentesis  Ascitic fluid cell count did not quite reach neutrophil count of 250 but Pt had been on abx tx x 4 days prior to the procedure which could have partially tx'd presume SBP and affected the cell count  Fluid cx was neg but abx tx could affexct the cx results      WBC count decreased to 22 3K on 10/21 but increased to 44K  Need to be concerned with new bowel perf after multiple paracenteses, C diff diarrhea since he's been on prolonged course of abx tx, UTI, pneumonia, etc  Doubt tooth abscess is the source of his worsening leukocytosis since he's been on abx which would tx dental infxn  ? Spurious result        10/24: Remains afebrile and WBC count has decreased to 21 2K  C diff is positive which is the most likely etiology for his leukocytosis  He is currently on po Vanco  CT of mouth was neg for dental abscess  Repeat bld cx's are neg  Pt has received adeq abx tx for presumed SBP  Abd/Chest CT did not show any evidence of new infxn     10/25: No fever and WBC count decreased slightly to 24 3K  He is on po Vanco and IV Flagyl for tx of C diff diarrhea  Vanco and Meropenem were discontinued on 10/24  His diarrhea is decreasing  Repeat bld cx's are neg so far      10/26: Remains afebrile  WBC count conts to be elevated but decreased to 22 7K  He is having less diarrhea - stools are becoming more formed and BM's are occurring less frequently  Bld cx's drawn on 10/22 are neg  He is receiving po Vanco and IV Flagyl for tx of C diff diarrhea    10/27: No fever  WBC count remains elevated but decreased to 20 4K  His diarrhea has improved  He is on po Vanco and IV Flagyl for tx of C diff diarrhea  He underwent repeat paracentesis today  Ascitic fluid cell count showed 367 WBC's with 80% polys - total neutrophil count is 293  Repeat bld cx's are neg       - Will cont to follow Pt off systemic abx for clinical deterioration  - Cont po Vanco and IV Flagyl for tx of C diff diarrhea  - Awaiting results of repeat ascitic fluid cx - Although ascitic fluid total neutrophil count is slightly >250, I don't think he has SBP since he completed 11 day course of broad spectrum IV abx on 10/24 - all fluid cx's were neg  - Awaiting results of repeat bld cx's  - Will follow WBC count  - Will cont to monitor for the development of toxicity to current abx tx  - If his WBC count conts to decrease, could d/c Pt tomorrow on Vanco 125 mg po QID and cont through 11/7     Discussed case with Marzella Shone PA-C          Subjective: Feels better  Diarrhea has decreased and stools are more formed  He underwent repeat paracentesis today  No fever  WBC count is elevated but decreasing  No probs with current abx tx    Objective:  VSS, Tmax: 98 8  HEENT:  No scleral icterus  NECK: Supple  CARDIAC:  RRR, nml S1, S2  LUNGS:  Clear  ABDOMEN:  +BS, soft, decreased distention, nontender  MUSCULOSKELETAL:  No calf tenderness  EXTREMITIES:  No LE edema  SKIN: No rash      Labs:  CBC w/diff  Recent Labs     10/27/22  0423   WBC 20 41*   HGB 10 5*   HCT 30 8*      NEUTOPHILPCT 85*   LYMPHOPCT 6*   MONOPCT 5   EOSPCT 2     BMP  Recent Labs     10/27/22  0423   K 3 8   CL 99   CO2 27   BUN 7   CREATININE 0 70   CALCIUM 8 1*     CMP  Recent Labs     10/27/22  0423   K 3 8   CL 99   CO2 27   BUN 7   CREATININE 0 70   CALCIUM 8 1*   ALKPHOS 229*   ALT 36   AST 64*         labrc    Cultures:  Lab Results   Component Value Date    BLOODCX No Growth After 4 Days  10/22/2022    BLOODCX No Growth After 4 Days  10/22/2022    BLOODCX No Growth After 5 Days  10/14/2022    BLOODCX No Growth After 5 Days  10/14/2022    BLOODCX No Growth After 5 Days  10/15/2018    BLOODCX No Growth After 5 Days   10/15/2018     No results found for: WOUNDCULT  No results found for: [place]  No results found for: SPUTUMCULTUR    MED:  Flagyl: #4  Oral Vanco: #6  Off other abx: #3        Completed:   Zosyn x 9 days on 10/22  Vanco x 3 days on 10/24  Meropenem x 3 days on 10/24  Abx x 11 days on 10/24         Current Facility-Administered Medications:   •  albuterol (PROVENTIL HFA,VENTOLIN HFA) inhaler 2 puff, 2 puff, Inhalation, Q4H PRN, Margarita Madison PA-C  •  chlordiazePOXIDE (LIBRIUM) capsule 10 mg, 10 mg, Oral, Daily, [facility] lo[facility], 10 mg at 10/27/22 1018  •  enoxaparin (LOVENOX) subcutaneous injection 40 mg, 40 mg, Subcutaneous, Q24H [address], Terri ALVAREZ PA-C, 40 mg at 55/48/76 1812  •  folic acid (FOLVITE) tablet 1 mg, 1 mg, Oral, Daily, Margarita Madison PA-C, 1 mg at 10/27/22 1018  •  ibuprofen (MOTRIN) tablet 400 mg, 400 mg, Oral, Q6H PRN, Alyssa Esquivel MD  •  lactulose oral solution 20 g, 20 g, Oral, Daily, Sherryle Borne Sharrer, CRNP, 20 g at 10/27/22 1019  •  metroNIDAZOLE (FLAGYL) IVPB (premix) 500 mg 100 mL, 500 mg, Intravenous, Q8H, Aliya Burnette MD, Last Rate: 200 mL/hr at 10/27/22 1417, 500 mg at 10/27/22 1417  •  multivitamin-minerals (CENTRUM) tablet 1 tablet, 1 tablet, Oral, Daily, Margarita Madison PA-C, 1 tablet at 10/27/22 1018  •  nicotine (NICODERM CQ) 21 mg/24 hr TD 24 hr patch 21 mg, 21 mg, Transdermal, Daily, Teena Ayers PA-C, 21 mg at 10/27/22 1019  •  ondansetron (ZOFRAN) injection 4 mg, 4 mg, Intravenous, Q4H PRN, Margarita Madison PA-C, 4 mg at 10/14/22 2118  •  oxyCODONE (ROXICODONE) IR tablet 5 mg, 5 mg, Oral, Q6H PRN, 5 mg at 10/24/22 0601 **OR** oxyCODONE (ROXICODONE) IR tablet 10 mg, 10 mg, Oral, Q6H PRN, 10 mg at 10/27/22 5439 **OR** oxyCODONE (ROXICODONE) IR tablet 15 mg, 15 mg, Oral, Q6H PRN, Leni Webster MD, 15 mg at 10/26/22 2236  •  polyethylene glycol (MIRALAX) packet 17 g, 17 g, Oral, Daily, MACHO Angel, 17 g at 10/24/22 2156  •  saccharomyces boulardii (FLORASTOR) capsule 250 mg, 250 mg, Oral, BID, Leni Webster MD, 250 mg at 10/27/22 1018  •  spironolactone (ALDACTONE) tablet 100 mg, 100 mg, Oral, Daily, Genoveva Calderón DO, 100 mg at 10/27/22 1018  •  thiamine tablet 100 mg, 100 mg, Oral, Daily, [employer], PA-C, 100 mg at 10/27/22 1018  •  vancomycin (VANCOCIN) capsule 500 mg, 500 mg, Oral, Q6H [facility], Tea Cheney MD, 500 mg at 10/27/22 1418    Principal Problem:    Ascites  Active Problems:    Nicotine dependence    COPD (chronic obstructive pulmonary disease) (HCC)    Alcoholic cirrhosis of liver with ascites ([facility] [address] )    [facility]    Alcohol use    Liver lesion      Tea Cheney MD

## 2022-10-27 NOTE — ASSESSMENT & PLAN NOTE
· Drinks vodka and juice daily  · Never attempted to quit due to experiencing withdrawals w/ tremors   Denies seizures/hallucinations  · CIWA protocol  · Thiamine/folate/multivitamin  · Downtitrating librium

## 2022-10-27 NOTE — BRIEF OP NOTE (RAD/CATH)
Paracentesis Procedure Note    PATIENT NAME: Oswaldo South  : 1965  MRN: [id number]     Pre-op Diagnosis:   1  Ascites    2  Cirrhosis ([facility] [address] )    3  Abdominal pain    4  Leukocytosis, unspecified type      Post-op Diagnosis:   1  Ascites    2  Cirrhosis ([facility] [address] )    3  Abdominal pain    4  Leukocytosis, unspecified type        Surgeon:   Angie Mendoza  Assistants:     No qualified resident was available      Estimated Blood Loss: none  Findings: 2160 mL ascites, RLQ access    Specimens: ascites    Complications:  none    Anesthesia: local    Angie Mendoza     Date: 10/27/2022  Time: 10:03 AM

## 2022-10-27 NOTE — PROGRESS NOTES
[place]     Progress Note - Biju Espinoza 1965, 56 y o  male MRN: [id number]  Unit/Bed#: -01 Encounter: [id number]  Primary Care Provider: Eitan Yanez DO   Date and time admitted to hospital: 10/14/2022  1:52 PM    * Ascites  Assessment & Plan  · Presented with diffuse abdominal pain and abdominal distension  · Secondary to alcoholic cirrhosis  · Status post paracentesis 10/17, 10/19 and 10/22  · Additional paracentesis today 10/27 for 2 1 L  · Trend WBC and fever curve  · Gastroenterology increased Aldactone 100 mg daily 10/25  · GI consult appreciated  · Blood cultures negative x2 after 4 days  · F/u with GI outpt and will need EGD outpt     [facility]  Assessment & Plan  · Meets SIRS criteria w tachycardia (HR 120s) and leukocytosis (WBC 33)  · Reports fevers/chills at home, however afebrile while inpatient  · Likely related to C diff  · Blood culture negative x2 after 4 days  · Respiratory panel negative  · C diff positive, continue oral vancomycin  · Infectious disease following  · Continue oral vancomycin and IV Flagyl  · Repeat imaging reveals possible gastroenteritis findings consistent with C diff  Reveals possible liver lesion, will need reimaging with MRI, IV contrast on outpatient basis  · Trend WBC and fever curve - still with leukocytosis but down trending to 20 K    Liver lesion  Assessment & Plan  · Noted on noncontrast CT  · Will need follow-up with GI and reimaging    Alcohol use  Assessment & Plan  · Drinks vodka and juice daily  · Never attempted to quit due to experiencing withdrawals w/ tremors  Denies seizures/hallucinations  · CIWA protocol  · Thiamine/folate/multivitamin  · Downtitrating librium     Alcoholic cirrhosis of liver with ascites ([facility] [address] )  Assessment & Plan  · New onset cirrhosis  Was noted to have abdominal distension, hepatomegaly, with dark stools by PCP on 09/28/2022    · Mild transaminitis with , ALT 72  Alk-phos 400  T bili 1 9, direct bili 1 4  · CT abdomen/pelvis shows new onset cirrhosis with moderate ascites  · Appeared mildly encephalopathic on admission, improved  · Suspected alcoholic liver disease  · On folate/ thiamine/ multivitamin supplementation    COPD (chronic obstructive pulmonary disease) (HCC)  Assessment & Plan  · No shortness of breath/wheezing  Not acute exacerbation  · Albuterol inhaler PRN    Nicotine dependence  Assessment & Plan  · Smokes approximately a pack a day  · Nicotine patch  ·  on cessation    VTE Pharmacologic Prophylaxis: VTE Score: 3 Moderate Risk (Score 3-4) - Pharmacological DVT Prophylaxis Ordered: enoxaparin (Lovenox)  Patient Centered Rounds: I performed bedside rounds with nursing staff today  Discussions with Specialists or Other Care Team Provider: CM, GI    Education and Discussions with Family / Patient: Updated  (father) at bedside  Time Spent for Care: 30 minutes  More than 50% of total time spent on counseling and coordination of care as described above  Current Length of Stay: 13 day(s)  Current Patient Status: Inpatient   Certification Statement: The patient will continue to require additional inpatient hospital stay due to leukocytosis, cdiff infection, pending ID clearance  Discharge Plan: Anticipate discharge in 24-48 hrs to home  Code Status: Level 1 - Full Code    Subjective:   Denies chest pain/palpitations, shortness of breath, nausea/vomiting  Abdominal pain/distension improved s/p paracentesis earlier this morning  Stools are becoming more formed  Objective:     Vitals:   Temp (24hrs), Av 7 °F (37 1 °C), Min:98 7 °F (37 1 °C), Max:98 8 °F (37 1 °C)    Temp:  [98 7 °F (37 1 °C)-98 8 °F (37 1 °C)] 98 7 °F (37 1 °C)  HR:  [] 105  Resp:  [18-20] 18  BP: (110-125)/(74-85) 110/77  SpO2:  [92 %-99 %] 98 %  Body mass index is 23 02 kg/m²       Input and Output Summary (last 24 hours): Intake/Output Summary (Last 24 hours) at 10/27/2022 1108  Last data filed at 10/27/2022 1041  Gross per 24 hour   Intake 870 ml   Output 2160 ml   Net -1290 ml       Physical Exam:   Physical Exam  Vitals and nursing note reviewed  Constitutional:       Appearance: He is well-developed  Comments: No acute distress   HENT:      Head: Normocephalic and atraumatic  Eyes:      General: No scleral icterus  Extraocular Movements: Extraocular movements intact  Conjunctiva/sclera: Conjunctivae normal    Cardiovascular:      Rate and Rhythm: Normal rate and regular rhythm  Heart sounds: S1 normal and S2 normal  No murmur heard  Pulmonary:      Effort: Pulmonary effort is normal  No respiratory distress  Breath sounds: Normal breath sounds  No wheezing, rhonchi or rales  Abdominal:      General: Bowel sounds are normal       Palpations: Abdomen is soft  Tenderness: There is no abdominal tenderness  There is no guarding or rebound  Musculoskeletal:      Cervical back: Normal range of motion  Comments: Able to move upper/lower ext bilaterally, no edema   Skin:     General: Skin is warm and dry  Neurological:      Mental Status: He is alert and oriented to person, place, and time  Psychiatric:         Mood and Affect: Mood normal          Speech: Speech normal          Behavior: Behavior normal           Additional Data:     Labs:  Results from last 7 days   Lab Units 10/27/22  0423 10/23/22  0529 10/22/22  0430   WBC Thousand/uL 20 41*   < > 44 21*   HEMOGLOBIN g/dL 10 5*   < > 12 6   HEMATOCRIT % 30 8*   < > 35 8*   PLATELETS Thousands/uL 390   < > 454*   BANDS PCT %  --   --  1   NEUTROS PCT % 85*   < >  --    LYMPHS PCT % 6*   < >  --    LYMPHO PCT %  --   --  4*   MONOS PCT % 5   < >  --    MONO PCT %  --   --  2*   EOS PCT % 2   < > 0    < > = values in this interval not displayed       Results from last 7 days   Lab Units 10/27/22  0423   SODIUM mmol/L 134*   POTASSIUM mmol/L 3 8   CHLORIDE mmol/L 99   CO2 mmol/L 27   BUN mg/dL 7   CREATININE mg/dL 0 70   ANION GAP mmol/L 8   CALCIUM mg/dL 8 1*   ALBUMIN g/dL 2 0*   TOTAL BILIRUBIN mg/dL 1 20*   ALK PHOS U/L 229*   ALT U/L 36   AST U/L 64*   GLUCOSE RANDOM mg/dL 129     Results from last 7 days   Lab Units 10/22/22  0430   INR  1 12             Results from last 7 days   Lab Units 10/24/22  0232 10/23/22  0529 10/22/22  1210   LACTIC ACID mmol/L  --   --  1 4   PROCALCITONIN ng/ml 0 89* 0 74* 1 46*       Lines/Drains:  Invasive Devices  Report    Peripheral Intravenous Line  Duration           Peripheral IV 10/24/22 Left Antecubital 2 days                      Imaging: No pertinent imaging reviewed  Recent Cultures (last 7 days):   Results from last 7 days   Lab Units 10/22/22  2247 10/22/22  1212 10/21/22  1544   BLOOD CULTURE   --  No Growth After 4 Days  No Growth After 4 Days    --    GRAM STAIN RESULT   --   --  Rare Polys  No organisms seen   BODY FLUID CULTURE, STERILE   --   --  No growth   C DIFF TOXIN B BY PCR  Positive*  --   --        Last 24 Hours Medication List:   Current Facility-Administered Medications   Medication Dose Route Frequency Provider Last Rate   • albuterol  2 puff Inhalation Q4H PRN Mary Cole PA-C     • chlordiazePOXIDE  10 mg Oral Daily Juma Garg PA-C     • enoxaparin  40 mg Subcutaneous Q24H [address] Terri ALVAREZ PA-C     • folic acid  1 mg Oral Daily Mary Cole PA-C     • ibuprofen  400 mg Oral Q6H PRN Rosy Nava MD     • lactulose  20 g Oral Daily MACHO Rodriguez     • metroNIDAZOLE  500 mg Intravenous Q8H Rebeca Cardenas  mg (10/27/22 0420)   • multivitamin-minerals  1 tablet Oral Daily Mary Cole PA-C     • nicotine  21 mg Transdermal Daily Teena Ayers PA-C     • ondansetron  4 mg Intravenous Q4H PRN Mary Cole PA-C     • oxyCODONE  5 mg Oral Q6H PRN Rosy Nava MD      Or   • oxyCODONE  10 mg Oral Q6H PRN Rosy Nava MD      Or   • oxyCODONE  15 mg Oral Q6H PRN Mic Carcamo MD     • polyethylene glycol  17 g Oral Daily MACHO Sabillon     • saccharomyces boulardii  250 mg Oral BID Mic Carcamo MD     • spironolactone  100 mg Oral Daily Ruy Rosenbaum DO     • thiamine  100 mg Oral Daily Dale Lua PA-C     • vancomycin (VANCOCIN) capsule 500 mg  500 mg Oral Q6H Whitney Johnston MD          Today, Patient Was Seen By: Rasheeda Griffin    **Please Note: This note may have been constructed using a voice recognition system  **

## 2022-10-27 NOTE — ASSESSMENT & PLAN NOTE
· Presented with diffuse abdominal pain and abdominal distension  · Secondary to alcoholic cirrhosis  · Status post paracentesis 10/17, 10/19 and 10/22  · Additional paracentesis today 10/27 for 2 1 L  · Trend WBC and fever curve  · Gastroenterology increased Aldactone 100 mg daily 10/25  · GI consult appreciated  · Blood cultures negative x2 after 4 days  · F/u with GI outpt and will need EGD outpt

## 2022-10-27 NOTE — PROGRESS NOTES
[place] Sai Santana  56 y o   male  1965  mrn [id number]    Assessment/Plan:  1  Presumed SBP/C diff diarrhea/Fever/Leukocytosis/Acute metab encephalopathy: Pt presented with abd pain and bloating  He did not have fever on admission but WBC count was 25K  He was confused c/w acute metab encephalopathy  I reviewed admission CXR which showed bibasilar atelectasis  Abd CT and abd US showed cirrhosis and ascites - most likely due to ARLD  He was placed on Zosyn for tx of presumed SBP in setting of newly dx'd cirrhosis     He developed low grade fever on 1017 and underwent paracentesis  Ascitic fluid cell count did not quite reach neutrophil count of 250 but Pt had been on abx tx x 4 days prior to the procedure which could have partially tx'd presume SBP and affected the cell count  Fluid cx was neg but abx tx could affexct the cx results      WBC count decreased to 22 3K on 10/21 but increased to 44K  Need to be concerned with new bowel perf after multiple paracenteses, C diff diarrhea since he's been on prolonged course of abx tx, UTI, pneumonia, etc  Doubt tooth abscess is the source of his worsening leukocytosis since he's been on abx which would tx dental infxn  ? Spurious result        10/24: Remains afebrile and WBC count has decreased to 21 2K  C diff is positive which is the most likely etiology for his leukocytosis  He is currently on po Vanco  CT of mouth was neg for dental abscess  Repeat bld cx's are neg  Pt has received adeq abx tx for presumed SBP  Abd/Chest CT did not show any evidence of new infxn     10/25: No fever and WBC count decreased slightly to 24 3K  He is on po Vanco and IV Flagyl for tx of C diff diarrhea  Vanco and Meropenem were discontinued on 10/24  His diarrhea is decreasing  Repeat bld cx's are neg so far  10/26: Remains afebrile  WBC count conts to be elevated but decreased to 22 7K  He is having less diarrhea - stools are becoming more formed and BM's are occurring less frequently  Bld cx's drawn on 10/22 are neg  He is receiving po Vanco and IV Flagyl for tx of C diff diarrhea     - Will follow Pt off systemic abx for clinical deterioration  - Cont po Vanco and IV Flagyl for tx of C diff diarrhea  - Awaiting results of repeat bld cx's  - Will follow WBC count  - Will cont to monitor for the development of toxicity to current abx tx  - Would like to see WBC count decrease further before he is d/c'd since his WBC count increased to 44K due to C diff colitis    Discussed case with Alexandria Leigh PA-C       Subjective: Pt is feeling stronger  He is having less diarrhea  He has abd bloating but denies significant abd pain  No fever but WBC count conts to be elevated but decreasing   No probs witj current abx tx    Objective:  VSS, Tmax: 98 7  HEENT:  No scleral icterus  NECK: Supple  CARDIAC:  RRR, nml S1, S2  LUNGS:  Clear anteriorly  ABDOMEN:  +BS, soft, distended, nontender  MUSCULOSKELETAL:  No calf tenderness  EXTREMITIES:  No LE edema  SKIN:  No rash      Labs:  CBC w/diff  Recent Labs     10/26/22  0440   WBC 22 77*   HGB 11 4*   HCT 34 5*   *   NEUTOPHILPCT 82*   LYMPHOPCT 8*   MONOPCT 5   EOSPCT 3     BMP  Recent Labs     10/26/22  0440   K 3 9   CL 99   CO2 30   BUN 8   CREATININE 0 78   CALCIUM 8 2*     CMP  Recent Labs     10/26/22  0440   K 3 9   CL 99   CO2 30   BUN 8   CREATININE 0 78   CALCIUM 8 2*   ALKPHOS 260*   ALT 43   AST 79*         labrc    Cultures:  Lab Results   Component Value Date    BLOODCX No Growth at 72 hrs  10/22/2022    BLOODCX No Growth at 72 hrs  10/22/2022    BLOODCX No Growth After 5 Days  10/14/2022    BLOODCX No Growth After 5 Days  10/14/2022    BLOODCX No Growth After 5 Days  10/15/2018    BLOODCX No Growth After 5 Days   10/15/2018     No results found for: WOUNDCULT  No results found for: URINECX  No results found for: SPUTUMCULTUR    MED:  Flagyl: #3  Oral Vanco: #5  Off other abx: #2        Completed:   Zosyn x 9 days on 10/22  Vanco x 3 days on 10/24  Meropenem x 3 days on 10/24  Abx x 11 days on 10/24                   Current Facility-Administered Medications:   •  albuterol (PROVENTIL HFA,VENTOLIN HFA) inhaler 2 puff, 2 puff, Inhalation, Q4H PRN, Luciana Zabala PA-C  •  chlordiazePOXIDE (LIBRIUM) capsule 10 mg, 10 mg, Oral, BID, Terri ALVAREZ PA-C, 10 mg at 10/26/22 1813  •  enoxaparin (LOVENOX) subcutaneous injection 40 mg, 40 mg, Subcutaneous, Q24H CRISTIAN, Terri ALVAREZ PA-C, 40 mg at 36/76/91 7095  •  folic acid (FOLVITE) tablet 1 mg, 1 mg, Oral, Daily, Luciana Zabala PA-C, 1 mg at 10/26/22 0819  •  ibuprofen (MOTRIN) tablet 400 mg, 400 mg, Oral, Q6H PRN, Cindy Pyle MD  •  lactulose oral solution 20 g, 20 g, Oral, Daily, MACHO Hameed, 20 g at 10/26/22 7049  •  LORazepam (ATIVAN) injection 2 mg, 2 mg, Intravenous, Once, Sophie Marcus DO  •  metroNIDAZOLE (FLAGYL) IVPB (premix) 500 mg 100 mL, 500 mg, Intravenous, Q8H, Denise Suárez MD, Last Rate: 200 mL/hr at 10/26/22 2113, 500 mg at 10/26/22 2113  •  multivitamin-minerals (CENTRUM) tablet 1 tablet, 1 tablet, Oral, Daily, Luciana Zabala PA-C, 1 tablet at 10/26/22 3128  •  nicotine (NICODERM CQ) 21 mg/24 hr TD 24 hr patch 21 mg, 21 mg, Transdermal, Daily, Teena Ayers PA-C, 21 mg at 10/26/22 3478  •  ondansetron (ZOFRAN) injection 4 mg, 4 mg, Intravenous, Q4H PRN, Luciana Zabala PA-C, 4 mg at 10/14/22 2118  •  oxyCODONE (ROXICODONE) IR tablet 5 mg, 5 mg, Oral, Q6H PRN, 5 mg at 10/24/22 0601 **OR** oxyCODONE (ROXICODONE) IR tablet 10 mg, 10 mg, Oral, Q6H PRN, 10 mg at 10/26/22 1618 **OR** oxyCODONE (ROXICODONE) IR tablet 15 mg, 15 mg, Oral, Q6H PRN, Cindy Pyle MD, 15 mg at 10/26/22 2236  •  polyethylene glycol (MIRALAX) packet 17 g, 17 g, Oral, Daily, MACHO Hameed, 17 g at 10/24/22 3365  •  saccharomyces boulardii (FLORASTOR) capsule 250 mg, 250 mg, Oral, BID, Cindy Pyle MD, 250 mg at 10/26/22 1813  •  spironolactone (ALDACTONE) tablet 100 mg, 100 mg, Oral, Daily, Jesus Delarosa DO, 100 mg at 10/26/22 3321  •  thiamine tablet 100 mg, 100 mg, Oral, Daily, Angelo Emery PA-C, 100 mg at 10/26/22 8955  •  vancomycin (VANCOCIN) capsule 500 mg, 500 mg, Oral, Q6H [address], Amber Naranjo MD, 500 mg at 10/26/22 0674    Principal Problem:    Ascites  Active Problems:    Nicotine dependence    COPD (chronic obstructive pulmonary disease) (HCC)    Alcoholic cirrhosis of liver with ascites ([facility] [address] )    Sepsis with acute organ dysfunction ([facility] [address] )    Alcohol use    Liver lesion      Amber Naranjo MD

## 2022-10-27 NOTE — SEDATION DOCUMENTATION
Diagnostic paracentesis  Removed 2160ml clear yellow fluid, RLQ  Patient tolerated the procedure well

## 2022-10-28 ENCOUNTER — TELEPHONE (OUTPATIENT)
Dept: OTHER | Facility: OTHER | Age: 57
End: 2022-10-28

## 2022-10-28 ENCOUNTER — TRANSITIONAL CARE MANAGEMENT (OUTPATIENT)
Dept: FAMILY MEDICINE CLINIC | Facility: CLINIC | Age: 57
End: 2022-10-28

## 2022-10-28 VITALS
DIASTOLIC BLOOD PRESSURE: 57 MMHG | BODY MASS INDEX: 23.07 KG/M2 | SYSTOLIC BLOOD PRESSURE: 92 MMHG | WEIGHT: 147 LBS | OXYGEN SATURATION: 94 % | RESPIRATION RATE: 9 BRPM | TEMPERATURE: 98.1 F | HEIGHT: 67 IN | HEART RATE: 91 BPM

## 2022-10-28 LAB
ALBUMIN SERPL BCP-MCNC: 1.8 G/DL (ref 3.5–5)
ALP SERPL-CCNC: 203 U/L (ref 46–116)
ALT SERPL W P-5'-P-CCNC: 31 U/L (ref 12–78)
ANION GAP SERPL CALCULATED.3IONS-SCNC: 5 MMOL/L (ref 4–13)
AST SERPL W P-5'-P-CCNC: 86 U/L (ref 5–45)
BACTERIA BLD CULT: NORMAL
BACTERIA BLD CULT: NORMAL
BASOPHILS # BLD AUTO: 0.13 THOUSANDS/ÂΜL (ref 0–0.1)
BASOPHILS NFR BLD AUTO: 1 % (ref 0–1)
BILIRUB DIRECT SERPL-MCNC: 0.25 MG/DL (ref 0–0.2)
BILIRUB SERPL-MCNC: 1.1 MG/DL (ref 0.2–1)
BUN SERPL-MCNC: 6 MG/DL (ref 5–25)
CALCIUM SERPL-MCNC: 7.8 MG/DL (ref 8.3–10.1)
CHLORIDE SERPL-SCNC: 101 MMOL/L (ref 96–108)
CO2 SERPL-SCNC: 26 MMOL/L (ref 21–32)
CREAT SERPL-MCNC: 0.61 MG/DL (ref 0.6–1.3)
EOSINOPHIL # BLD AUTO: 0.5 THOUSAND/ÂΜL (ref 0–0.61)
EOSINOPHIL NFR BLD AUTO: 3 % (ref 0–6)
ERYTHROCYTE [DISTWIDTH] IN BLOOD BY AUTOMATED COUNT: 15.7 % (ref 11.6–15.1)
GFR SERPL CREATININE-BSD FRML MDRD: 112 ML/MIN/1.73SQ M
GLUCOSE SERPL-MCNC: 119 MG/DL (ref 65–140)
HCT VFR BLD AUTO: 29 % (ref 36.5–49.3)
HGB BLD-MCNC: 10.1 G/DL (ref 12–17)
IMM GRANULOCYTES # BLD AUTO: 0.12 THOUSAND/UL (ref 0–0.2)
IMM GRANULOCYTES NFR BLD AUTO: 1 % (ref 0–2)
LYMPHOCYTES # BLD AUTO: 1.11 THOUSANDS/ÂΜL (ref 0.6–4.47)
LYMPHOCYTES NFR BLD AUTO: 6 % (ref 14–44)
MCH RBC QN AUTO: 36.1 PG (ref 26.8–34.3)
MCHC RBC AUTO-ENTMCNC: 34.8 G/DL (ref 31.4–37.4)
MCV RBC AUTO: 104 FL (ref 82–98)
MONOCYTES # BLD AUTO: 1.06 THOUSAND/ÂΜL (ref 0.17–1.22)
MONOCYTES NFR BLD AUTO: 6 % (ref 4–12)
NEUTROPHILS # BLD AUTO: 16.28 THOUSANDS/ÂΜL (ref 1.85–7.62)
NEUTS SEG NFR BLD AUTO: 83 % (ref 43–75)
NRBC BLD AUTO-RTO: 0 /100 WBCS
PLATELET # BLD AUTO: 341 THOUSANDS/UL (ref 149–390)
PMV BLD AUTO: 9.5 FL (ref 8.9–12.7)
POTASSIUM SERPL-SCNC: 5 MMOL/L (ref 3.5–5.3)
PROT SERPL-MCNC: 5.7 G/DL (ref 6.4–8.4)
RBC # BLD AUTO: 2.8 MILLION/UL (ref 3.88–5.62)
SODIUM SERPL-SCNC: 132 MMOL/L (ref 135–147)
WBC # BLD AUTO: 19.2 THOUSAND/UL (ref 4.31–10.16)

## 2022-10-28 PROCEDURE — 80048 BASIC METABOLIC PNL TOTAL CA: CPT | Performed by: PHYSICIAN ASSISTANT

## 2022-10-28 PROCEDURE — 80076 HEPATIC FUNCTION PANEL: CPT | Performed by: PHYSICIAN ASSISTANT

## 2022-10-28 PROCEDURE — 85025 COMPLETE CBC W/AUTO DIFF WBC: CPT | Performed by: PHYSICIAN ASSISTANT

## 2022-10-28 RX ORDER — FOLIC ACID 1 MG/1
1 TABLET ORAL DAILY
Qty: 30 TABLET | Refills: 0 | Status: SHIPPED | OUTPATIENT
Start: 2022-10-28 | End: 2022-11-27

## 2022-10-28 RX ORDER — NICOTINE 21 MG/24HR
1 PATCH, TRANSDERMAL 24 HOURS TRANSDERMAL DAILY
Qty: 28 PATCH | Refills: 0 | Status: SHIPPED | OUTPATIENT
Start: 2022-10-28

## 2022-10-28 RX ORDER — LACTULOSE 20 G/30ML
20 SOLUTION ORAL DAILY
Qty: 473 ML | Refills: 0 | Status: SHIPPED | OUTPATIENT
Start: 2022-10-28

## 2022-10-28 RX ORDER — OXYCODONE HYDROCHLORIDE 5 MG/1
5 TABLET ORAL EVERY 6 HOURS PRN
Qty: 20 TABLET | Refills: 0 | Status: SHIPPED | OUTPATIENT
Start: 2022-10-28 | End: 2022-11-07

## 2022-10-28 RX ORDER — SPIRONOLACTONE 100 MG/1
100 TABLET, FILM COATED ORAL DAILY
Qty: 30 TABLET | Refills: 0 | Status: SHIPPED | OUTPATIENT
Start: 2022-10-29

## 2022-10-28 RX ORDER — SACCHAROMYCES BOULARDII 250 MG
250 CAPSULE ORAL 2 TIMES DAILY
Qty: 60 CAPSULE | Refills: 0 | Status: SHIPPED | OUTPATIENT
Start: 2022-10-28

## 2022-10-28 RX ORDER — POLYETHYLENE GLYCOL 3350 17 G/17G
17 POWDER, FOR SOLUTION ORAL DAILY
Qty: 30 EACH | Refills: 0 | Status: SHIPPED | OUTPATIENT
Start: 2022-10-29

## 2022-10-28 RX ORDER — LANOLIN ALCOHOL/MO/W.PET/CERES
100 CREAM (GRAM) TOPICAL DAILY
Qty: 30 TABLET | Refills: 0 | Status: SHIPPED | OUTPATIENT
Start: 2022-10-28 | End: 2022-11-27

## 2022-10-28 RX ORDER — VANCOMYCIN HYDROCHLORIDE 125 MG/1
125 CAPSULE ORAL 4 TIMES DAILY
Qty: 40 CAPSULE | Refills: 0 | Status: SHIPPED | OUTPATIENT
Start: 2022-10-28 | End: 2022-11-07

## 2022-10-28 RX ORDER — METRONIDAZOLE 500 MG/1
500 TABLET ORAL EVERY 8 HOURS SCHEDULED
Qty: 30 TABLET | Refills: 0 | Status: CANCELLED | OUTPATIENT
Start: 2022-10-28 | End: 2022-11-07

## 2022-10-28 RX ADMIN — VANCOMYCIN HYDROCHLORIDE 500 MG: 250 CAPSULE ORAL at 00:23

## 2022-10-28 RX ADMIN — METRONIDAZOLE 500 MG: 500 INJECTION, SOLUTION INTRAVENOUS at 04:16

## 2022-10-28 RX ADMIN — OXYCODONE HYDROCHLORIDE 10 MG: 5 TABLET ORAL at 00:23

## 2022-10-28 RX ADMIN — MULTIPLE VITAMINS W/ MINERALS TAB 1 TABLET: TAB ORAL at 09:39

## 2022-10-28 RX ADMIN — OXYCODONE HYDROCHLORIDE 10 MG: 5 TABLET ORAL at 06:36

## 2022-10-28 RX ADMIN — NICOTINE 21 MG: 21 PATCH, EXTENDED RELEASE TRANSDERMAL at 09:43

## 2022-10-28 RX ADMIN — CHLORDIAZEPOXIDE HYDROCHLORIDE 10 MG: 5 CAPSULE ORAL at 09:39

## 2022-10-28 RX ADMIN — OXYCODONE HYDROCHLORIDE 10 MG: 5 TABLET ORAL at 11:20

## 2022-10-28 RX ADMIN — Medication 100 MG: at 09:39

## 2022-10-28 RX ADMIN — FOLIC ACID 1 MG: 1 TABLET ORAL at 09:38

## 2022-10-28 RX ADMIN — Medication 250 MG: at 09:39

## 2022-10-28 RX ADMIN — ENOXAPARIN SODIUM 40 MG: 100 INJECTION SUBCUTANEOUS at 09:39

## 2022-10-28 RX ADMIN — VANCOMYCIN HYDROCHLORIDE 500 MG: 250 CAPSULE ORAL at 06:13

## 2022-10-28 NOTE — TELEPHONE ENCOUNTER
Pt calling and stated was looking to be put on a Liver Transplant List and also had other concerns  Patient requesting a call back from the office

## 2022-10-28 NOTE — CASE MANAGEMENT
Case Management Discharge Planning Note    Patient name Ava Clinton  Location /-17 MRN [id number]  : 1965 Date 10/28/2022       Current Admission Date: 10/14/2022  Current Admission Diagnosis:Ascites   Patient Active Problem List    Diagnosis Date Noted   • Liver lesion    • Alcoholic cirrhosis of liver with ascites ([place] [address] ) 10/14/2022   • Ascites 10/14/2022   • Sepsis with acute organ dysfunction ([place] [address] ) 10/14/2022   • Alcohol use 10/14/2022   • Dark stools 2022   • Hepatomegaly 2022   • Lipoma of left lower extremity 2022   • Finger injury, right, initial encounter 03/10/2020   • Sebaceous cyst 03/10/2020   • Cough 2020   • Mass of left hand 2018   • Cervical radiculopathy due to degenerative joint disease of spine 2018   • Degenerative disc disease, cervical 2018   • Anterolisthesis 2018   • COPD (chronic obstructive pulmonary disease) ([place] [address] ) 2018   • Chronic sinusitis 2017   • GERD (gastroesophageal reflux disease) 10/11/2017   • Bipolar II disorder ([place] [address] ) 2016   • Chronic depression 2016   • Polysubstance dependence ([place] [address] ) 2016   • Nicotine dependence 2014      LOS (days): 14  Geometric Mean LOS (GMLOS) (days): 5 00  Days to GMLOS:-8 7     OBJECTIVE:  Risk of Unplanned Readmission Score: 14 43         Current admission status: Inpatient   Preferred Pharmacy:   [facility] [address], [address]  [address]  [address] [facility] [address]  Phone: [phone] Fax: [phone] [facility] [address], [address]   [address]   [address] [facility] [address] [address]  Phone: [phone] Fax: [phone]    Primary Care Provider: Angelita Soto DO    Primary Insurance: MEDICARE  Secondary Insurance: [facility]    DISCHARGE DETAILS:       IMM Given (Date):: 10/28/22  IMM Given to[de-identified] Patient  IMM reviewed with patient, patient agrees with discharge determination

## 2022-10-28 NOTE — ASSESSMENT & PLAN NOTE
· Presented with diffuse abdominal pain and abdominal distension  · Secondary to alcoholic cirrhosis  · Status post paracentesis 10/17, 10/19 and 10/22  · Additional paracentesis 10/27 for 2 1 L  · Trend WBC and fever curve  · Gastroenterology increased Aldactone 100 mg daily 10/25  · GI consult appreciated  · Blood cultures negative x2 after 5 days  · F/u with GI outpt and will need EGD outpt

## 2022-10-28 NOTE — ASSESSMENT & PLAN NOTE
· Noted on noncontrast CT  · Will need follow-up with GI and reimaging with MRI electively outpatient

## 2022-10-28 NOTE — ASSESSMENT & PLAN NOTE
· New onset cirrhosis  Was noted to have abdominal distension, hepatomegaly, with dark stools by PCP on 09/28/2022  · Mild transaminitis with , ALT 72  Alk-phos 400  T bili 1 9, direct bili 1 4    · CT abdomen/pelvis shows new onset cirrhosis with moderate ascites  · Appeared mildly encephalopathic on admission, improved  · Suspected alcoholic liver disease  · On folate/ thiamine/ multivitamin supplementation  · Close outpatient follow-up with GI  · Spironolactone added this admission

## 2022-10-28 NOTE — ASSESSMENT & PLAN NOTE
· Drinks vodka and juice daily  · Never attempted to quit due to experiencing withdrawals w/ tremors   Denies seizures/hallucinations  · CIWA protocol  · Thiamine/folate/multivitamin  · Downtitrating librium, discontinue on discharge as patient has been weaned

## 2022-10-28 NOTE — DISCHARGE SUMMARY
[place]  Discharge- Linda Fernandez 1965, 56 y o  male MRN: [id number]  Unit/Bed#: -01 Encounter: [id number]  Primary Care Provider: Carla High DO   Date and time admitted to hospital: 10/14/2022  1:52 PM    Sepsis with acute organ dysfunction [facility])  Assessment & Plan  · Meets SIRS criteria w tachycardia (HR 120s) and leukocytosis (WBC 33)  · Reports fevers/chills at home, however afebrile while inpatient  · Likely related to C diff  · Blood culture negative x2 after 4 days  · Respiratory panel negative  · C diff positive, continue oral vancomycin  · Infectious disease following  · Continue oral vancomycin and IV Flagyl while admitted  · Repeat imaging reveals possible gastroenteritis findings consistent with C diff  Reveals possible liver lesion, will need reimaging with MRI, IV contrast on outpatient basis  · Trend WBC and fever curve - still with leukocytosis but down trending  · ID cleared for discharge on oral vancomycin 125 mg q i d  through 11/7    Liver lesion  Assessment & Plan  · Noted on noncontrast CT  · Will need follow-up with GI and reimaging with MRI electively outpatient    Alcohol use  Assessment & Plan  · Drinks vodka and juice daily  · Never attempted to quit due to experiencing withdrawals w/ tremors  Denies seizures/hallucinations  · [facility] protocol  · Thiamine/folate/multivitamin  · Downtitrating librium, discontinue on discharge as patient has been weaned    Alcoholic cirrhosis of liver with ascites ([facility] [address] )  Assessment & Plan  · New onset cirrhosis  Was noted to have abdominal distension, hepatomegaly, with dark stools by PCP on 09/28/2022  · Mild transaminitis with , ALT 72  Alk-phos 400  T bili 1 9, direct bili 1 4    · CT abdomen/pelvis shows new onset cirrhosis with moderate ascites  · Appeared mildly encephalopathic on admission, improved  · Suspected alcoholic liver disease  · On folate/ thiamine/ multivitamin supplementation  · Close outpatient follow-up with GI  · Spironolactone added this admission    COPD (chronic obstructive pulmonary disease) (HCC)  Assessment & Plan  · No shortness of breath/wheezing  Not acute exacerbation  · Albuterol inhaler PRN    Nicotine dependence  Assessment & Plan  · Smokes approximately a pack a day  · Nicotine patch  ·  on cessation  · Routine lung CA screening    * Ascites  Assessment & Plan  · Presented with diffuse abdominal pain and abdominal distension  · Secondary to alcoholic cirrhosis  · Status post paracentesis 10/17, 10/19 and 10/22  · Additional paracentesis 10/27 for 2 1 L  · Trend WBC and fever curve  · Gastroenterology increased Aldactone 100 mg daily 10/25  · GI consult appreciated  · Blood cultures negative x2 after 5 days  · F/u with GI outpt and will need EGD outpt     Medical Problems             Resolved Problems  Date Reviewed: 10/28/2022          Resolved    Encephalopathy 10/19/2022     Resolved by  Mic Carcamo MD              Discharging Physician / Practitioner: Bala Enriquez  PCP: Ariane Hubbard DO  Admission Date:   Admission Orders (From admission, onward)     Ordered        10/14/22 [facility]  Once                      Discharge Date: 10/28/22    Consultations During Hospital Stay:  · Infectious disease  · Gastroenterology    Procedures Performed:   · Paracentesis:  10/17, 10/19, 10/21, 10/27    Significant Findings / Test Results:   · CT abdomen pelvis 10/14:  Slightly micro lobulated contour of the liver suggestive of cirrhosis  Hepatomegaly  New moderate abdominal/pelvic ascites  · CXR 10/15:  Bibasilar subsegmental atelectasis with nothing to indicate pneumonia  · U/S abdomen limited 10/19:  Small to moderate ascites in all 4 quadrants  · CT chest abdomen pelvis 10/22:  · Mild COPD  Mild right middle and lower lobe dependent/compressive atelectasis with mildly elevated right diaphragm    Small patchy groundglass density in the lingula, nonspecific although could represent focal atelectasis vs infectious or inflammatory etiology  Follow-up plain films may be helpful  · Cirrhosis with indeterminant left lobe lesion(s)  Follow-up MRI abdomen with IV contrast (Gadavist) recommended on a nonemergent basis  Small to moderate amount of abdominopelvic ascites  · Diffusely fluid-filled small bowel without evidence for obstruction may be indicative of gastroenteritis  · Colonic diverticulosis without evidence of focal diverticulitis  · CXR 10/23:  Bibasilar linear atelectasis versus scarring, stable  No findings for focal infiltrate  · CT facial bone 10/23:  No evidence of periapical, subperiosteal or soft tissue abscess or cellulitis  · Blood culture 10/14:  2/2 Negative x5 days  · LDH paracentesis 10/17:72  · Albumin paracentesis 10/17:0 6  · Total protein paracentesis 10/17: <2   · Body fluid culture paracentesis 10/17:  No growth, no bacteria  · Body fluid culture from paracentesis 10/21:  No growth  · Blood parasite smear 10/20 2:0  · Blood culture 10/22: 2/2  Negative x5 days  · Stool bacterial panel 10/22:  Negative  · C diff 10/22:  Positive  · Body fluid culture paracentesis 10/27:  Rare polys, no organisms    Incidental Findings:   · See above     Test Results Pending at Discharge (will require follow up): · None     Outpatient Tests Requested:  · MRI abdomen to further evaluate liver  · EGD    Complications:  None    Reason for Admission:  New onset cirrhosis    Hospital Course: Kali Casas is a 56 y o  male patient with past medical history of alcohol use, tobacco use, COPD, GERD who originally presented to the hospital on 10/14/2022 due to diffuse abdominal pain for 2 weeks  Patient appeared encephalopathic, he did see his PCP 9/28 who reported distended abdomen, hepatomegaly, dark stools with GI referral   Patient did not follow-up with GI  Patient tachycardic with distended abdomen on presentation    Filiberto Larsen bili elevated on admission with mild hyponatremia noted  CT abdomen pelvis revealed new onset cirrhosis and ascites  Admitted for evaluation of new onset cirrhosis  GI consulted  Double paracentesis performed as above  ETOH cirrhosis with components of EtOH hepatitis  There was concern for sepsis as patient did meet SIRS criteria with leukocytosis of 33 and had tachycardia  He initially was placed on Zosyn  Evaluation ultimately revealed C diff infection  He was continued on Flagyl and vancomycin  Infectious disease recommending continuing 125 mg vancomycin q i d  through 11/7  Will need follow-up with GI closely outpatient  Given concern for liver lesion as above, will also need abdominal MRI performed electively outpatient  Hemodynamically stable at time of discharge and appropriate for outpatient follow-up  Please see above list of diagnoses and related plan for additional information  Condition at Discharge: stable    Discharge Day Visit / Exam:   Subjective:  Unable to engage in meaningful conversation as patient had to take a phone call with his friend during discussion  However, appears comfortable and tells his friend he is being released from the hospital today while this provider was waiting at bedside  Upon revisiting patient he reports he feels fine, "it is what it is" and asks discharge process to be expedited for him so he can "catch a ride with his friend "    Vitals: Blood Pressure: 92/57 (10/28/22 0910)  Pulse: 91 (10/28/22 0910)  Temperature: 98 1 °F (36 7 °C) (10/28/22 0910)  Temp Source: Oral (10/28/22 0910)  Respirations: (!) 9 (10/28/22 0910)  Height: 5' 7" (170 2 cm) (10/23/22 1432)  Weight - Scale: 66 7 kg (147 lb) (10/21/22 1030)  SpO2: 94 % (10/28/22 0910)  Exam:   Physical Exam  Vitals and nursing note reviewed  Constitutional:       General: He is not in acute distress  Appearance: Normal appearance  He is well-developed     HENT:      Head: Normocephalic and atraumatic  Eyes:      General: No scleral icterus  Conjunctiva/sclera: Conjunctivae normal    Cardiovascular:      Rate and Rhythm: Normal rate and regular rhythm  Heart sounds: No murmur heard  Pulmonary:      Effort: Pulmonary effort is normal       Breath sounds: No wheezing, rhonchi or rales  Abdominal:      Palpations: Abdomen is soft  There is fluid wave  Skin:     General: Skin is warm and dry  Neurological:      General: No focal deficit present  Mental Status: He is alert  Psychiatric:         Mood and Affect: Mood normal           Discussion with Family: Patient declined call to   Discharge instructions/Information to patient and family:   See after visit summary for information provided to patient and family  Provisions for Follow-Up Care:  See after visit summary for information related to follow-up care and any pertinent home health orders  Disposition:   Home    Planned Readmission: None     Discharge Statement:  I spent 65 minutes discharging the patient  This time was spent on the day of discharge  I had direct contact with the patient on the day of discharge  Greater than 50% of the total time was spent examining patient, answering all patient questions, arranging and discussing plan of care with patient as well as directly providing post-discharge instructions  Additional time then spent on discharge activities  Discharge Medications:  See after visit summary for reconciled discharge medications provided to patient and/or family        **Please Note: This note may have been constructed using a voice recognition system**

## 2022-10-28 NOTE — PLAN OF CARE
Problem: PAIN - ADULT  Goal: Verbalizes/displays adequate comfort level or baseline comfort level  Description: Interventions:  - Encourage patient to monitor pain and request assistance  - Assess pain using appropriate pain scale  - Administer analgesics based on type and severity of pain and evaluate response  - Implement non-pharmacological measures as appropriate and evaluate response  - Consider cultural and social influences on pain and pain management  - Notify physician/advanced practitioner if interventions unsuccessful or patient reports new pain  Outcome: Progressing     Problem: INFECTION - ADULT  Goal: Absence or prevention of progression during hospitalization  Description: INTERVENTIONS:  - Assess and monitor for signs and symptoms of infection  - Monitor lab/diagnostic results  - Monitor all insertion sites, i e  indwelling lines, tubes, and drains  - Monitor endotracheal if appropriate and nasal secretions for changes in amount and color  - [place] appropriate cooling/warming therapies per order  - Administer medications as ordered  - Instruct and encourage patient and family to use good hand hygiene technique  - Identify and instruct in appropriate isolation precautions for identified infection/condition  Outcome: Progressing     Problem: SAFETY ADULT  Goal: Patient will remain free of falls  Description: INTERVENTIONS:  - Educate patient/family on patient safety including physical limitations  - Instruct patient to call for assistance with activity   - Consult OT/PT to assist with strengthening/mobility   - Keep Call bell within reach  - Keep bed low and locked with side rails adjusted as appropriate  - Keep care items and personal belongings within reach  - Initiate and maintain comfort rounds  - Make Fall Risk Sign visible to staff  - Offer Toileting every 2 Hours, in advance of need  - Initiate/Maintain alarm  - Obtain necessary fall risk management equipment:   - Apply yellow socks and bracelet for high fall risk patients  - Consider moving patient to room near nurses station  Outcome: Progressing

## 2022-10-28 NOTE — PLAN OF CARE
Problem: Potential for Falls  Goal: Patient will remain free of falls  Description: INTERVENTIONS:  - Educate patient/family on patient safety including physical limitations  - Instruct patient to call for assistance with activity   - Consult OT/PT to assist with strengthening/mobility   - Keep Call bell within reach  - Keep bed low and locked with side rails adjusted as appropriate  - Keep care items and personal belongings within reach  - Initiate and maintain comfort rounds  - Make Fall Risk Sign visible to staff  - Offer Toileting every 2 Hours, in advance of need  - Initiate/Maintain alarm  - Obtain necessary fall risk management equipment:   - Apply yellow socks and bracelet for high fall risk patients  - Consider moving patient to room near nurses station  Outcome: Adequate for Discharge     Problem: PAIN - ADULT  Goal: Verbalizes/displays adequate comfort level or baseline comfort level  Description: Interventions:  - Encourage patient to monitor pain and request assistance  - Assess pain using appropriate pain scale  - Administer analgesics based on type and severity of pain and evaluate response  - Implement non-pharmacological measures as appropriate and evaluate response  - Consider cultural and social influences on pain and pain management  - Notify physician/advanced practitioner if interventions unsuccessful or patient reports new pain  Outcome: Adequate for Discharge     Problem: INFECTION - ADULT  Goal: Absence or prevention of progression during hospitalization  Description: INTERVENTIONS:  - Assess and monitor for signs and symptoms of infection  - Monitor lab/diagnostic results  - Monitor all insertion sites, i e  indwelling lines, tubes, and drains  - Monitor endotracheal if appropriate and nasal secretions for changes in amount and color  - [place] appropriate cooling/warming therapies per order  - Administer medications as ordered  - Instruct and encourage patient and family to use good hand hygiene technique  - Identify and instruct in appropriate isolation precautions for identified infection/condition  Outcome: Adequate for Discharge  Goal: Absence of fever/infection during neutropenic period  Description: INTERVENTIONS:  - Monitor WBC    Outcome: Adequate for Discharge     Problem: SAFETY ADULT  Goal: Patient will remain free of falls  Description: INTERVENTIONS:  - Educate patient/family on patient safety including physical limitations  - Instruct patient to call for assistance with activity   - Consult OT/PT to assist with strengthening/mobility   - Keep Call bell within reach  - Keep bed low and locked with side rails adjusted as appropriate  - Keep care items and personal belongings within reach  - Initiate and maintain comfort rounds  - Make Fall Risk Sign visible to staff  - Offer Toileting every 2 Hours, in advance of need  - Initiate/Maintain alarm  - Obtain necessary fall risk management equipment:   - Apply yellow socks and bracelet for high fall risk patients  - Consider moving patient to room near nurses station  Outcome: Adequate for Discharge  Goal: Maintain or return to baseline ADL function  Description: INTERVENTIONS:  - Educate patient/family on patient safety including physical limitations  - Instruct patient to call for assistance with activity   - Consult OT/PT to assist with strengthening/mobility   - Keep Call bell within reach  - Keep bed low and locked with side rails adjusted as appropriate  - Keep care items and personal belongings within reach  - Initiate and maintain comfort rounds  - Make Fall Risk Sign visible to staff  - Offer Toileting every 2 Hours, in advance of need  - Initiate/Maintain alarm  - Obtain necessary fall risk management equipment:   - Apply yellow socks and bracelet for high fall risk patients  - Consider moving patient to room near nurses station  Outcome: Adequate for Discharge  Goal: Maintains/Returns to pre admission functional level  Description: INTERVENTIONS:  - Perform BMAT or MOVE assessment daily    - Set and communicate daily mobility goal to care team and patient/family/caregiver  - Collaborate with rehabilitation services on mobility goals if consulted  - Out of bed for toileting  - Record patient progress and toleration of activity level   Outcome: Adequate for Discharge     Problem: DISCHARGE PLANNING  Goal: Discharge to home or other facility with appropriate resources  Description: INTERVENTIONS:  - Identify barriers to discharge w/patient and caregiver  - Arrange for needed discharge resources and transportation as appropriate  - Identify discharge learning needs (meds, wound care, etc )  - Arrange for interpretive services to assist at discharge as needed  - Refer to Case Management Department for coordinating discharge planning if the patient needs post-hospital services based on physician/advanced practitioner order or complex needs related to functional status, cognitive ability, or social support system  Outcome: Adequate for Discharge     Problem: Knowledge Deficit  Goal: Patient/family/caregiver demonstrates understanding of disease process, treatment plan, medications, and discharge instructions  Description: Complete learning assessment and assess knowledge base  Interventions:  - Provide teaching at level of understanding  - Provide teaching via preferred learning methods  Outcome: Adequate for Discharge     Problem: Nutrition/Hydration-ADULT  Goal: Nutrient/Hydration intake appropriate for improving, restoring or maintaining nutritional needs  Description: Monitor and assess patient's nutrition/hydration status for malnutrition  Collaborate with interdisciplinary team and initiate plan and interventions as ordered  Monitor patient's weight and dietary intake as ordered or per policy  Utilize nutrition screening tool and intervene as necessary   Determine patient's food preferences and provide high-protein, high-caloric foods as appropriate  INTERVENTIONS:  - Monitor oral intake, urinary output, labs, and treatment plans  - Assess nutrition and hydration status and recommend course of action  - Evaluate amount of meals eaten  - Assist patient with eating if necessary   - Allow adequate time for meals  - Recommend/ encourage appropriate diets, oral nutritional supplements, and vitamin/mineral supplements  - Order, calculate, and assess calorie counts as needed  - Recommend, monitor, and adjust tube feedings and TPN/PPN based on assessed needs  - Assess need for intravenous fluids  - Provide specific nutrition/hydration education as appropriate  - Include patient/family/caregiver in decisions related to nutrition  Outcome: Adequate for Discharge     Problem: MOBILITY - ADULT  Goal: Maintain or return to baseline ADL function  Description: INTERVENTIONS:  - Educate patient/family on patient safety including physical limitations  - Instruct patient to call for assistance with activity   - Consult OT/PT to assist with strengthening/mobility   - Keep Call bell within reach  - Keep bed low and locked with side rails adjusted as appropriate  - Keep care items and personal belongings within reach  - Initiate and maintain comfort rounds  - Make Fall Risk Sign visible to staff  - Offer Toileting every 2 Hours, in advance of need  - Initiate/Maintain alarm  - Obtain necessary fall risk management equipment:   - Apply yellow socks and bracelet for high fall risk patients  - Consider moving patient to room near nurses station  Outcome: Adequate for Discharge  Goal: Maintains/Returns to pre admission functional level  Description: INTERVENTIONS:  - Perform BMAT or MOVE assessment daily    - Set and communicate daily mobility goal to care team and patient/family/caregiver     - Collaborate with rehabilitation services on mobility goals if consulted  - Out of bed for toileting  - Record patient progress and toleration of activity level   Outcome: Adequate for Discharge     Problem: Prexisting or High Potential for Compromised Skin Integrity  Goal: Skin integrity is maintained or improved  Description: INTERVENTIONS:  - Identify patients at risk for skin breakdown  - Assess and monitor skin integrity  - Assess and monitor nutrition and hydration status  - Monitor labs   - Assess for incontinence   - Turn and reposition patient  - Assist with mobility/ambulation  - Relieve pressure over bony prominences  - Avoid friction and shearing  - Provide appropriate hygiene as needed including keeping skin clean and dry  - Evaluate need for skin moisturizer/barrier cream  - Collaborate with interdisciplinary team   - Patient/family teaching  - Consider wound care consult   Outcome: Adequate for Discharge

## 2022-10-28 NOTE — PLAN OF CARE
Problem: Potential for Falls  Goal: Patient will remain free of falls  Description: INTERVENTIONS:  - Educate patient/family on patient safety including physical limitations  - Instruct patient to call for assistance with activity   - Consult OT/PT to assist with strengthening/mobility   - Keep Call bell within reach  - Keep bed low and locked with side rails adjusted as appropriate  - Keep care items and personal belongings within reach  - Initiate and maintain comfort rounds  - Make Fall Risk Sign visible to staff  - Offer Toileting every 2 Hours, in advance of need  - Initiate/Maintain alarm  - Obtain necessary fall risk management equipment:   - Apply yellow socks and bracelet for high fall risk patients  - Consider moving patient to room near nurses station  Outcome: Progressing     Problem: PAIN - ADULT  Goal: Verbalizes/displays adequate comfort level or baseline comfort level  Description: Interventions:  - Encourage patient to monitor pain and request assistance  - Assess pain using appropriate pain scale  - Administer analgesics based on type and severity of pain and evaluate response  - Implement non-pharmacological measures as appropriate and evaluate response  - Consider cultural and social influences on pain and pain management  - Notify physician/advanced practitioner if interventions unsuccessful or patient reports new pain  Outcome: Progressing     Problem: INFECTION - ADULT  Goal: Absence or prevention of progression during hospitalization  Description: INTERVENTIONS:  - Assess and monitor for signs and symptoms of infection  - Monitor lab/diagnostic results  - Monitor all insertion sites, i e  indwelling lines, tubes, and drains  - Monitor endotracheal if appropriate and nasal secretions for changes in amount and color  - [place] appropriate cooling/warming therapies per order  - Administer medications as ordered  - Instruct and encourage patient and family to use good hand hygiene technique  - Identify and instruct in appropriate isolation precautions for identified infection/condition  Outcome: Progressing  Goal: Absence of fever/infection during neutropenic period  Description: INTERVENTIONS:  - Monitor WBC    Outcome: Progressing     Problem: SAFETY ADULT  Goal: Patient will remain free of falls  Description: INTERVENTIONS:  - Educate patient/family on patient safety including physical limitations  - Instruct patient to call for assistance with activity   - Consult OT/PT to assist with strengthening/mobility   - Keep Call bell within reach  - Keep bed low and locked with side rails adjusted as appropriate  - Keep care items and personal belongings within reach  - Initiate and maintain comfort rounds  - Make Fall Risk Sign visible to staff  - Offer Toileting every 2 Hours, in advance of need  - Initiate/Maintain alarm  - Obtain necessary fall risk management equipment:   - Apply yellow socks and bracelet for high fall risk patients  - Consider moving patient to room near nurses station  Outcome: Progressing  Goal: Maintain or return to baseline ADL function  Description: INTERVENTIONS:  - Educate patient/family on patient safety including physical limitations  - Instruct patient to call for assistance with activity   - Consult OT/PT to assist with strengthening/mobility   - Keep Call bell within reach  - Keep bed low and locked with side rails adjusted as appropriate  - Keep care items and personal belongings within reach  - Initiate and maintain comfort rounds  - Make Fall Risk Sign visible to staff  - Offer Toileting every 2 Hours, in advance of need  - Initiate/Maintain alarm  - Obtain necessary fall risk management equipment:   - Apply yellow socks and bracelet for high fall risk patients  - Consider moving patient to room near nurses station  Outcome: Progressing  Goal: Maintains/Returns to pre admission functional level  Description: INTERVENTIONS:  - Perform BMAT or MOVE assessment daily    - Set and communicate daily mobility goal to care team and patient/family/caregiver  - Collaborate with rehabilitation services on mobility goals if consulted  - Perform Range of Motion X times a day  - Reposition patient every X hours  - Dangle patient X times a day  - Stand patient X times a day  - Ambulate patient X times a day  - Out of bed to chair X times a day   - Out of bed for meals X times a day  - Out of bed for toileting  - Record patient progress and toleration of activity level   Outcome: Progressing     Problem: DISCHARGE PLANNING  Goal: Discharge to home or other facility with appropriate resources  Description: INTERVENTIONS:  - Identify barriers to discharge w/patient and caregiver  - Arrange for needed discharge resources and transportation as appropriate  - Identify discharge learning needs (meds, wound care, etc )  - Arrange for interpretive services to assist at discharge as needed  - Refer to Case Management Department for coordinating discharge planning if the patient needs post-hospital services based on physician/advanced practitioner order or complex needs related to functional status, cognitive ability, or social support system  Outcome: Progressing     Problem: Knowledge Deficit  Goal: Patient/family/caregiver demonstrates understanding of disease process, treatment plan, medications, and discharge instructions  Description: Complete learning assessment and assess knowledge base  Interventions:  - Provide teaching at level of understanding  - Provide teaching via preferred learning methods  Outcome: Progressing     Problem: Nutrition/Hydration-ADULT  Goal: Nutrient/Hydration intake appropriate for improving, restoring or maintaining nutritional needs  Description: Monitor and assess patient's nutrition/hydration status for malnutrition  Collaborate with interdisciplinary team and initiate plan and interventions as ordered    Monitor patient's weight and dietary intake as ordered or per policy  Utilize nutrition screening tool and intervene as necessary  Determine patient's food preferences and provide high-protein, high-caloric foods as appropriate  INTERVENTIONS:  - Monitor oral intake, urinary output, labs, and treatment plans  - Assess nutrition and hydration status and recommend course of action  - Evaluate amount of meals eaten  - Assist patient with eating if necessary   - Allow adequate time for meals  - Recommend/ encourage appropriate diets, oral nutritional supplements, and vitamin/mineral supplements  - Order, calculate, and assess calorie counts as needed  - Recommend, monitor, and adjust tube feedings and TPN/PPN based on assessed needs  - Assess need for intravenous fluids  - Provide specific nutrition/hydration education as appropriate  - Include patient/family/caregiver in decisions related to nutrition  Outcome: Progressing     Problem: MOBILITY - ADULT  Goal: Maintain or return to baseline ADL function  Description: INTERVENTIONS:  - Educate patient/family on patient safety including physical limitations  - Instruct patient to call for assistance with activity   - Consult OT/PT to assist with strengthening/mobility   - Keep Call bell within reach  - Keep bed low and locked with side rails adjusted as appropriate  - Keep care items and personal belongings within reach  - Initiate and maintain comfort rounds  - Make Fall Risk Sign visible to staff  - Offer Toileting every 2 Hours, in advance of need  - Initiate/Maintain alarm  - Obtain necessary fall risk management equipment:   - Apply yellow socks and bracelet for high fall risk patients  - Consider moving patient to room near nurses station  Outcome: Progressing  Goal: Maintains/Returns to pre admission functional level  Description: INTERVENTIONS:  - Perform BMAT or MOVE assessment daily    - Set and communicate daily mobility goal to care team and patient/family/caregiver     - Collaborate with rehabilitation services on mobility goals if consulted  - Perform Range of Motion X times a day  - Reposition patient every XXX hours    - Dangle patient X times a day  - Stand patient X times a day  - Ambulate patient X times a day  - Out of bed to chair X times a day   - Out of bed for meals X times a day  - Out of bed for toileting  - Record patient progress and toleration of activity level   Outcome: Progressing     Problem: Prexisting or High Potential for Compromised Skin Integrity  Goal: Skin integrity is maintained or improved  Description: INTERVENTIONS:  - Identify patients at risk for skin breakdown  - Assess and monitor skin integrity  - Assess and monitor nutrition and hydration status  - Monitor labs   - Assess for incontinence   - Turn and reposition patient  - Assist with mobility/ambulation  - Relieve pressure over bony prominences  - Avoid friction and shearing  - Provide appropriate hygiene as needed including keeping skin clean and dry  - Evaluate need for skin moisturizer/barrier cream  - Collaborate with interdisciplinary team   - Patient/family teaching  - Consider wound care consult   Outcome: Progressing   X

## 2022-10-28 NOTE — ASSESSMENT & PLAN NOTE
· Meets SIRS criteria w tachycardia (HR 120s) and leukocytosis (WBC 33)  · Reports fevers/chills at home, however afebrile while inpatient  · Likely related to C diff  · Blood culture negative x2 after 4 days  · Respiratory panel negative  · C diff positive, continue oral vancomycin  · Infectious disease following  · Continue oral vancomycin and IV Flagyl while admitted  · Repeat imaging reveals possible gastroenteritis findings consistent with C diff    Reveals possible liver lesion, will need reimaging with MRI, IV contrast on outpatient basis  · Trend WBC and fever curve - still with leukocytosis but down trending  · ID cleared for discharge on oral vancomycin 125 mg q i d  through 11/7

## 2022-10-30 LAB
BACTERIA SPEC BFLD CULT: NO GROWTH
GRAM STN SPEC: NORMAL
GRAM STN SPEC: NORMAL

## 2022-10-31 ENCOUNTER — TELEPHONE (OUTPATIENT)
Dept: FAMILY MEDICINE CLINIC | Facility: CLINIC | Age: 57
End: 2022-10-31

## 2022-10-31 ENCOUNTER — PREP FOR PROCEDURE (OUTPATIENT)
Dept: GASTROENTEROLOGY | Facility: CLINIC | Age: 57
End: 2022-10-31

## 2022-10-31 DIAGNOSIS — K70.31 ALCOHOLIC CIRRHOSIS OF LIVER WITH ASCITES (HCC): Primary | ICD-10-CM

## 2022-10-31 RX ORDER — ALBUMIN (HUMAN) 12.5 G/50ML
12.5 SOLUTION INTRAVENOUS ONCE
Status: CANCELLED | OUTPATIENT
Start: 2022-10-31 | End: 2022-10-31

## 2022-10-31 NOTE — TELEPHONE ENCOUNTER
This s not something that we order- he needs to call the GI specialist that he was referred to in the hospital   We can give him the number for Romaine CRAFT

## 2022-10-31 NOTE — TELEPHONE ENCOUNTER
Had paracentesis during recent hospitalization at [facility], discharge this weekend  Okay to arrange weekly paracentesis as needed at [facility], with albumin protocol  Please arrange consult with hepatology/Dr Christiano Orta

## 2022-10-31 NOTE — TELEPHONE ENCOUNTER
Patients GI provider: New Pt     Number to return call: [phone]    Reason for call: Pt calling requesting paracentesis test  Pt was recently discharged from hospital yesterday  Offered to make appt, pt declined and stated no need for appointment   Pt can be contacted at [phone]    Scheduled procedure/appointment date if applicable: n/a

## 2022-10-31 NOTE — TELEPHONE ENCOUNTER
Patient advised order entered and given phone # [phone] option 2  Automatic Data Text to NAMITA Morales to make her aware  Patient advised we would be reaching out to Dr Juan C [place] office to get appointment

## 2022-10-31 NOTE — TELEPHONE ENCOUNTER
Johnny was in the hospital and he was referred to GI- He has not gotten a hold of them    He needs a paracentesis done and has no order for this  And possible  Is this something you can please order for him?   Please advise  Thank you

## 2022-11-01 NOTE — TELEPHONE ENCOUNTER
11/1 Phoned patient to schedule appt with Dr Jony Diane (openings on 12/2) Unable to leave message, voicemail full  [email or web address]

## 2022-11-02 ENCOUNTER — HOSPITAL ENCOUNTER (OUTPATIENT)
Dept: INTERVENTIONAL RADIOLOGY/VASCULAR | Facility: HOSPITAL | Age: 57
Discharge: HOME | End: 2022-11-02
Attending: INTERNAL MEDICINE

## 2022-11-02 VITALS
HEART RATE: 102 BPM | RESPIRATION RATE: 20 BRPM | DIASTOLIC BLOOD PRESSURE: 93 MMHG | OXYGEN SATURATION: 98 % | SYSTOLIC BLOOD PRESSURE: 136 MMHG

## 2022-11-02 DIAGNOSIS — K70.31 ALCOHOLIC CIRRHOSIS OF LIVER WITH ASCITES (HCC): ICD-10-CM

## 2022-11-02 RX ORDER — LIDOCAINE HYDROCHLORIDE 10 MG/ML
INJECTION, SOLUTION EPIDURAL; INFILTRATION; INTRACAUDAL; PERINEURAL CODE/TRAUMA/SEDATION MEDICATION
Status: COMPLETED | OUTPATIENT
Start: 2022-11-02 | End: 2022-11-02

## 2022-11-02 RX ADMIN — LIDOCAINE HYDROCHLORIDE 10 ML: 10 INJECTION, SOLUTION EPIDURAL; INFILTRATION; INTRACAUDAL; PERINEURAL at 14:05

## 2022-11-02 NOTE — SEDATION DOCUMENTATION
Therapeutic paracentesis  Removed 2600ml clear yellow fluid, RLQ  Patient tolerated the procedure well

## 2022-11-02 NOTE — DISCHARGE INSTRUCTIONS
Abdominal Paracentesis     WHAT YOU NEED TO KNOW:   Abdominal paracentesis is a procedure to remove abnormal fluid buildup in your abdomen  Fluid builds up because of liver problems, such as swelling and scarring  Heart failure, kidney disease, a mass, or problems with your pancreas may also cause fluid buildup  DISCHARGE INSTRUCTIONS:     Follow up with your healthcare provider as directed: Write down your questions so you remember to ask them during your visits  Wound care: Remove dressing after 24 hours  Leave glue in place  Return to your normal activities    Contact Interventional Radiology at [phone] Ste[place] PATIENTS: Contact Interventional Radiology at [phone]) Jensen Magallon PATIENTS: Contact Interventional Radiology at [phone]) if:  You have a fever and your wound is red and swollen  You have yellow, green, or bad-smelling discharge coming from your wound  You have pain or swelling in your abdomen  You have an upset stomach or you vomit  You have sudden, sharp pain in your abdomen  You urinate very little or not at all  You feel confused and more tired than usual    Your arm or leg feels warm, tender, and painful  It may look swollen and red  You suddenly feel lightheaded and have trouble breathing

## 2022-11-04 ENCOUNTER — APPOINTMENT (OUTPATIENT)
Dept: LAB | Facility: CLINIC | Age: 57
End: 2022-11-04

## 2022-11-04 ENCOUNTER — TELEPHONE (OUTPATIENT)
Dept: OTHER | Facility: OTHER | Age: 57
End: 2022-11-04

## 2022-11-04 ENCOUNTER — TELEPHONE (OUTPATIENT)
Dept: GASTROENTEROLOGY | Facility: CLINIC | Age: 57
End: 2022-11-04

## 2022-11-04 ENCOUNTER — OFFICE VISIT (OUTPATIENT)
Dept: FAMILY MEDICINE CLINIC | Facility: CLINIC | Age: 57
End: 2022-11-04

## 2022-11-04 ENCOUNTER — TELEPHONE (OUTPATIENT)
Dept: FAMILY MEDICINE CLINIC | Facility: CLINIC | Age: 57
End: 2022-11-04

## 2022-11-04 VITALS
OXYGEN SATURATION: 98 % | DIASTOLIC BLOOD PRESSURE: 84 MMHG | SYSTOLIC BLOOD PRESSURE: 132 MMHG | BODY MASS INDEX: 23.02 KG/M2 | HEART RATE: 104 BPM | HEIGHT: 67 IN | TEMPERATURE: 97.5 F

## 2022-11-04 DIAGNOSIS — R16.0 HEPATOMEGALY: ICD-10-CM

## 2022-11-04 DIAGNOSIS — K70.31 ALCOHOLIC CIRRHOSIS OF LIVER WITH ASCITES (HCC): Primary | ICD-10-CM

## 2022-11-04 DIAGNOSIS — R18.8 OTHER ASCITES: ICD-10-CM

## 2022-11-04 DIAGNOSIS — K76.9 LIVER LESION: ICD-10-CM

## 2022-11-04 DIAGNOSIS — K21.9 GASTROESOPHAGEAL REFLUX DISEASE WITHOUT ESOPHAGITIS: ICD-10-CM

## 2022-11-04 DIAGNOSIS — F17.219 CIGARETTE NICOTINE DEPENDENCE WITH NICOTINE-INDUCED DISORDER: ICD-10-CM

## 2022-11-04 DIAGNOSIS — K70.31 ALCOHOLIC CIRRHOSIS OF LIVER WITH ASCITES (HCC): ICD-10-CM

## 2022-11-04 PROBLEM — R65.20 SEPSIS WITH ACUTE ORGAN DYSFUNCTION (HCC): Status: RESOLVED | Noted: 2022-10-14 | Resolved: 2022-11-04

## 2022-11-04 PROBLEM — A41.9 SEPSIS WITH ACUTE ORGAN DYSFUNCTION (HCC): Status: RESOLVED | Noted: 2022-10-14 | Resolved: 2022-11-04

## 2022-11-04 LAB
ALBUMIN SERPL BCP-MCNC: 2.6 G/DL (ref 3.5–5)
ALP SERPL-CCNC: 240 U/L (ref 46–116)
ALT SERPL W P-5'-P-CCNC: 38 U/L (ref 12–78)
ANION GAP SERPL CALCULATED.3IONS-SCNC: 10 MMOL/L (ref 4–13)
AST SERPL W P-5'-P-CCNC: 68 U/L (ref 5–45)
BASOPHILS # BLD AUTO: 0.17 THOUSANDS/ÂΜL (ref 0–0.1)
BASOPHILS NFR BLD AUTO: 1 % (ref 0–1)
BILIRUB SERPL-MCNC: 1.11 MG/DL (ref 0.2–1)
BUN SERPL-MCNC: 4 MG/DL (ref 5–25)
CALCIUM ALBUM COR SERPL-MCNC: 10.5 MG/DL (ref 8.3–10.1)
CALCIUM SERPL-MCNC: 9.4 MG/DL (ref 8.3–10.1)
CHLORIDE SERPL-SCNC: 100 MMOL/L (ref 96–108)
CO2 SERPL-SCNC: 23 MMOL/L (ref 21–32)
CREAT SERPL-MCNC: 0.72 MG/DL (ref 0.6–1.3)
EOSINOPHIL # BLD AUTO: 0.79 THOUSAND/ÂΜL (ref 0–0.61)
EOSINOPHIL NFR BLD AUTO: 2 % (ref 0–6)
ERYTHROCYTE [DISTWIDTH] IN BLOOD BY AUTOMATED COUNT: 15.4 % (ref 11.6–15.1)
GFR SERPL CREATININE-BSD FRML MDRD: 105 ML/MIN/1.73SQ M
GLUCOSE P FAST SERPL-MCNC: 101 MG/DL (ref 65–99)
HCT VFR BLD AUTO: 41.7 % (ref 36.5–49.3)
HGB BLD-MCNC: 12.8 G/DL (ref 12–17)
IMM GRANULOCYTES # BLD AUTO: 0.38 THOUSAND/UL (ref 0–0.2)
IMM GRANULOCYTES NFR BLD AUTO: 1 % (ref 0–2)
LYMPHOCYTES # BLD AUTO: 1.47 THOUSANDS/ÂΜL (ref 0.6–4.47)
LYMPHOCYTES NFR BLD AUTO: 4 % (ref 14–44)
MCH RBC QN AUTO: 34.5 PG (ref 26.8–34.3)
MCHC RBC AUTO-ENTMCNC: 30.7 G/DL (ref 31.4–37.4)
MCV RBC AUTO: 112 FL (ref 82–98)
MONOCYTES # BLD AUTO: 1.68 THOUSAND/ÂΜL (ref 0.17–1.22)
MONOCYTES NFR BLD AUTO: 5 % (ref 4–12)
NEUTROPHILS # BLD AUTO: 31.92 THOUSANDS/ÂΜL (ref 1.85–7.62)
NEUTS SEG NFR BLD AUTO: 87 % (ref 43–75)
NRBC BLD AUTO-RTO: 0 /100 WBCS
PLATELET # BLD AUTO: 476 THOUSANDS/UL (ref 149–390)
PMV BLD AUTO: 10 FL (ref 8.9–12.7)
POTASSIUM SERPL-SCNC: 3.9 MMOL/L (ref 3.5–5.3)
PROT SERPL-MCNC: 7.1 G/DL (ref 6.4–8.4)
RBC # BLD AUTO: 3.71 MILLION/UL (ref 3.88–5.62)
SODIUM SERPL-SCNC: 133 MMOL/L (ref 135–147)
WBC # BLD AUTO: 36.41 THOUSAND/UL (ref 4.31–10.16)

## 2022-11-04 NOTE — TELEPHONE ENCOUNTER
Received tiger text and spoke w PCP Dr Preeti Watters, pt is in office now and ascites is reaccumulating  Will need paracentesis early next week  Sounds like pt was unaware that he has a standing order for weekly as needed paracentesis  Since his cellphone mailbox has been full, she will ask him to call our office  When he calls, please help him set up weekly para at [address] (should just to stading appt rather than prn)  Thanks

## 2022-11-04 NOTE — TELEPHONE ENCOUNTER
I contacted IR scheduling and patient does have standing order for paracentesis  They will reach out to schedule patient next week

## 2022-11-04 NOTE — PROGRESS NOTES
Assessment/Plan:    Problem List Items Addressed This Visit        Digestive    Alcoholic cirrhosis of liver with ascites ([facility] [address] ) - Primary    Relevant Orders    Ambulatory Referral to Hepatology    Ambulatory Referral to Complex Care Management Program    Comprehensive metabolic panel (Completed)    CBC and differential (Completed)    Ambulatory Referral to Gastroenterology    GERD (gastroesophageal reflux disease)    Relevant Orders    Ambulatory Referral to Hepatology    Ambulatory Referral to Complex Care Management Program    Comprehensive metabolic panel (Completed)    CBC and differential (Completed)    Ambulatory Referral to Gastroenterology    Hepatomegaly    Relevant Orders    Ambulatory Referral to Hepatology    Ambulatory Referral to Complex Care Management Program    Comprehensive metabolic panel (Completed)    CBC and differential (Completed)    Ambulatory Referral to Gastroenterology       Other    Ascites    Relevant Orders    Ambulatory Referral to Hepatology    Ambulatory Referral to Complex Care Management Program    Comprehensive metabolic panel (Completed)    CBC and differential (Completed)    Ambulatory Referral to Gastroenterology    Liver lesion    Relevant Orders    Ambulatory Referral to Hepatology    Ambulatory Referral to Complex Care Management Program    Comprehensive metabolic panel (Completed)    CBC and differential (Completed)    Ambulatory Referral to Gastroenterology    Nicotine dependence     Tobacco Cessation Counseling: Tobacco cessation counseling and education was provided  The patient is sincerely urged to quit consumption of tobacco  He is not ready to quit tobacco  The numerous health risks of tobacco consumption were discussed  If he decides to quit, there are a number of helpful adjunctive aids, and he can see me to discuss nicotine replacement therapy, chantix, or bupropion anytime in the future               The patient was markedly distended demonstrating worsening of his ascites on exam   While the patient was in office, I did contact Dr Ulysses Wetzel with [facility] who is one  of the providers to take care of the patient  He did confirm that the patient has a standing order for outpatient IR guided paracentesis  His office will arrange for the patient to have 1 next week and every week  In addition, we reached out to Dr Maxi Valdes office, the hepatologist and help to arrange a follow-up appointment for the patient next week  In addition, I will arrange to have him evaluated by our complex care manager who will help to arrange outpatient services transportation for him  Patient was strongly advised to avoid alcohol  I did advise him that if he feels worse he should contact 911 to be taken to the emergency room  I stressed the importance of adequate hydration and compliance with his medication  He was advised to follow-up with his follow-up appointments as recommended  We will send him for additional lab work and will follow up with the results  He will continue with his current medications  Chief Complaint   Patient presents with   • Transition of Care Management     Patient was seen at [address] [place] [address] 10/14-10/28 for ascites  Patient reports that he's been feeling fatigued, weak, and without an appetite  Subjective:    Patient ID:  Bao Escobar is a 56 y  o male  Bao Escobar is a 56 y o  male who presents today for transitional care management visit following his recent hospitalization at [place] [address] from 10/14/2022 until 10/28/2022 for ascites, alcoholic cirrhosis, sepsis with acute organ dysfunction, C diff positive, liver lesion, and history of alcohol abuse  The patient has a history of alcohol use, tobacco use, COPD, GERD, and bipolar disorder  He presented to the hospital on 10/14/2022 with diffuse abdominal pain for 2 weeks  The patient had appeared encephalopathic at the time    The patient had not followed up with any of our recommendations prior to this including seeing GI and going for testing  The patient was tachycardic on presentation along with a distended abdomen  He did have an elevated bilirubin level with hyponatremia  The CT of his abdomen pelvis demonstrated new onset cirrhosis and ascites  He was admitted for evaluation of the new onset cirrhosis  GI was consulted  He had 2 paracentesis performed during the hospitalization  He had evidence of alcohol cirrhosis with components of alcoholic hepatitis  There was a concern for sepsis and he did meet the SIRS criteria with leukocytosis of 33 and tachycardia  He was initially placed on Zosyn  Evaluation ultimately revealed a C diff infection  He was started on a course of Flagyl and vancomycin  He was seen by infectious disease who recommended continuing 125 milligrams of vancomycin q i d  until 11/7/2022  He was advised to follow up closely with GI as an outpatient and was started on spironolactone  He did have evidence of a liver lesion and was advised that he would need an MRI follow-up electively as an outpatient  He was stable at the time of discharge  Since discharge he states that he is still feeling very poorly  He feels very weak and tired  He had a outpatient paracentesis on 11/02/2022- he is filling up with fluid again  He apparently has a standing order for IR paracentesis which she is not aware of  The patient has not yet scheduled an appointment with GI  They also had want him to see Dr Abigail Beasley, the hepatologist and their office had been reaching out to the patient repeatedly and not been able to reach him since he was not answering his phone in his voicemail was full  The patient states he is still having pain across his lower abdomen and he starting become more distended again  He has normal stools  He denies any vomiting  There is no fever  He states he is still taking the vancomycin for his C diff  He has normal stools    He is passing little urine  His mouth has been dry  He is not checking his temp  He has not had fevers   The patient states that he is not been drinking alcohol since his home but he did have 1 shot earlier in the week  He is concerned about transportation issues in getting to follow-up appointments in his paracentesis  His roommate did bring him to his appointment today         The following portions of the patient's history were reviewed and updated as appropriate: allergies, current medications, past family history, past medical history, past social history, past surgical history and problem list   Patient Active Problem List   Diagnosis   • Bipolar II disorder ([facility] [address] )   • Chronic depression   • Chronic sinusitis   • GERD (gastroesophageal reflux disease)   • Nicotine dependence   • Polysubstance dependence ([facility] )   • COPD (chronic obstructive pulmonary disease) ([facility] [address] )   • Cervical radiculopathy due to degenerative joint disease of spine   • Degenerative disc disease, cervical   • Anterolisthesis   • Mass of left hand   • Cough   • Finger injury, right, initial encounter   • Sebaceous cyst   • Lipoma of left lower extremity   • Dark stools   • Hepatomegaly   • Alcoholic cirrhosis of liver with ascites ([facility] [address] )   • Ascites   • Alcohol use   • Liver lesion     Past Medical History:   Diagnosis Date   • Bronchitis, asthmatic     last assessed: 3/21/2016   • Depression    • Pneumonia     last assessed: 12/27/2016   • Pneumonia of left lower lobe due to Haemophilus influenzae ([facility] [address] )     last assessed: 11/27/2015   • Sebaceous cyst     last assessed: 12/26/2013   • Sepsis with acute organ dysfunction ([facility] [address] ) 10/14/2022   • Substance abuse ([facility] )    • Suicidal ideation     last assessed: 12/27/2016     Past Surgical History:   Procedure Laterality Date   • IR PARACENTESIS  10/17/2022   • IR PARACENTESIS  10/19/2022   • IR PARACENTESIS  10/21/2022   • IR PARACENTESIS  10/27/2022   • IR PARACENTESIS  11/2/2022   • IR PARACENTESIS  11/5/2022 Allergies   Allergen Reactions   • Levofloxacin    • Quinolones      Family History   Problem Relation Age of Onset   • Colon cancer Mother    • Diabetes Mother         mellitus     Social History     Socioeconomic History   • Marital status: Single     Spouse name: Not on file   • Number of children: Not on file   • Years of education: Not on file   • Highest education level: Not on file   Occupational History   • Not on file   Tobacco Use   • Smoking status: Current Every Day Smoker     Packs/day: 1 00   • Smokeless tobacco: Never Used   Vaping Use   • Vaping Use: Never used   Substance and Sexual Activity   • Alcohol use: Yes     Comment: 10 shots a day of vodka mixed with juice of some sort   • Drug use: Not Currently     Types: Prescription, Methamphetamines     Comment: opiates   • Sexual activity: Not on file   Other Topics Concern   • Not on file   Social History Narrative    Coffee consumption (3 cups/day)     Social Determinants of Health     Financial Resource Strain: Not on file   Food Insecurity: No Food Insecurity   • Worried About Running Out of Food in the Last Year: Never true   • Ran Out of Food in the Last Year: Never true   Transportation Needs: No Transportation Needs   • Lack of Transportation (Medical): No   • Lack of Transportation (Non-Medical):  No   Physical Activity: Not on file   Stress: Not on file   Social Connections: Not on file   Intimate Partner Violence: Not At Risk   • Fear of Current or Ex-Partner: No   • Emotionally Abused: No   • Physically Abused: No   • Sexually Abused: No   Housing Stability: Low Risk    • Unable to Pay for Housing in the Last Year: No   • Number of Places Lived in the Last Year: 1   • Unstable Housing in the Last Year: No     Current Outpatient Medications on File Prior to Visit   Medication Sig Dispense Refill   • folic acid (FOLVITE) 1 mg tablet Take 1 tablet (1 mg total) by mouth daily 30 tablet 0   • lactulose 20 g/30 mL Take 30 mL (20 g total) by mouth daily 473 mL 0   • omeprazole (PriLOSEC) 20 mg delayed release capsule Take 1 capsule (20 mg total) by mouth daily 30 capsule 2   • oxyCODONE (ROXICODONE) 5 immediate release tablet Take 1 tablet (5 mg total) by mouth every 6 (six) hours as needed for moderate pain for up to 10 days Max Daily Amount: 20 mg 20 tablet 0   • polyethylene glycol (MIRALAX) 17 g packet Take 17 g by mouth daily Do not start before October 29, 2022  30 each 0   • spironolactone (ALDACTONE) 100 mg tablet Take 1 tablet (100 mg total) by mouth daily Do not start before October 29, 2022  30 tablet 0   • thiamine 100 MG tablet Take 1 tablet (100 mg total) by mouth daily 30 tablet 0   • vancomycin (VANCOCIN) 125 MG capsule Take 1 capsule (125 mg total) by mouth 4 (four) times a day for 10 days 40 capsule 0   • nicotine (NICODERM CQ) 21 mg/24 hr TD 24 hr patch Place 1 patch on the skin daily (Patient not taking: No sig reported) 28 patch 0   • saccharomyces boulardii (FLORASTOR) 250 mg capsule Take 1 capsule (250 mg total) by mouth 2 (two) times a day (Patient not taking: No sig reported) 60 capsule 0     No current facility-administered medications on file prior to visit  Review of Systems   Constitutional: Positive for fatigue  Negative for activity change, appetite change, chills, diaphoresis and fever  Respiratory: Negative for apnea, cough, choking, chest tightness, shortness of breath, wheezing and stridor  Cardiovascular: Negative for chest pain, palpitations and leg swelling  Gastrointestinal: Positive for abdominal distention and abdominal pain  Negative for anal bleeding, blood in stool, constipation, diarrhea, nausea, rectal pain and vomiting         Objective:    Vitals:    11/04/22 1004   BP: 132/84   BP Location: Left arm   Patient Position: Sitting   Cuff Size: Standard   Pulse: 104   Temp: 97 5 °F (36 4 °C)   TempSrc: Tympanic   SpO2: 98%   Height: 5' 7" (1 702 m)     Physical Exam  Vitals and nursing note reviewed  Constitutional:       General: He is not in acute distress  Appearance: He is well-developed  He is ill-appearing  He is not toxic-appearing or diaphoretic  HENT:      Head: Normocephalic and atraumatic  Right Ear: External ear normal       Left Ear: External ear normal       Nose: Nose normal       Mouth/Throat:      Pharynx: No oropharyngeal exudate  Eyes:      Conjunctiva/sclera: Conjunctivae normal       Pupils: Pupils are equal, round, and reactive to light  Neck:      Thyroid: No thyromegaly  Trachea: No tracheal deviation  Cardiovascular:      Rate and Rhythm: Normal rate and regular rhythm  Heart sounds: Normal heart sounds  No murmur heard  No friction rub  No gallop  Pulmonary:      Effort: Pulmonary effort is normal  No respiratory distress  Breath sounds: Normal breath sounds  No stridor  No wheezing, rhonchi or rales  Chest:      Chest wall: No tenderness  Abdominal:      General: Bowel sounds are normal  There is distension  Palpations: Abdomen is soft  There is no mass  Tenderness: There is abdominal tenderness  There is guarding  There is no right CVA tenderness, left CVA tenderness or rebound  Hernia: No hernia is present  Genitourinary:     Penis: Normal  No tenderness  Prostate: Normal       Rectum: Normal  Guaiac result negative  Musculoskeletal:         General: No tenderness or deformity  Normal range of motion  Cervical back: Normal range of motion and neck supple  Lymphadenopathy:      Cervical: No cervical adenopathy  Skin:     General: Skin is warm and dry  Coloration: Skin is not pale  Findings: No erythema or rash  Neurological:      Mental Status: He is alert and oriented to person, place, and time  Cranial Nerves: No cranial nerve deficit  Sensory: No sensory deficit  Motor: No abnormal muscle tone        Coordination: Coordination normal       Deep Tendon Reflexes: Reflexes normal    Psychiatric:         Behavior: Behavior normal          Thought Content: Thought content normal          Judgment: Judgment normal            TCM Call     Date and time call was made  10/28/2022  5:21 PM    Hospital care reviewed  Records reviewed    Patient was hospitial[facility] at  [facility]    Date of Admission  10/14/22    Date of discharge  10/28/22    Diagnosis  Ascites    Disposition  Home    Were the patients medications reviewed and updated  Yes    Current Symptoms  None; Swelling      TCM Call     Post hospital issues  None    Should patient be enrolled in anticoag monitoring? No    Scheduled for follow up? Yes    Patients specialists  Other (comment)    Other specialists names  GI    Did you obtain your prescribed medications  Yes    Do you need help managing your prescriptions or medications  No    Is transportation to your appointment needed  No    I have advised the patient to call PCP with any new or worsening symptoms  [facility]    Living Arrangements  Alone    Support System  None    Are you recieving any outpatient services  No    Are you recieving home care services  No    Are you using any community resources  No    Current waiver services  No    Have you fallen in the last 12 months  No    Interperter language line needed  No    Counseling  Patient    Counseling topics  patient and family education

## 2022-11-05 ENCOUNTER — TELEPHONE (OUTPATIENT)
Dept: FAMILY MEDICINE CLINIC | Facility: CLINIC | Age: 57
End: 2022-11-05

## 2022-11-05 ENCOUNTER — APPOINTMENT (EMERGENCY)
Dept: RADIOLOGY | Facility: HOSPITAL | Age: 57
End: 2022-11-05

## 2022-11-05 ENCOUNTER — HOSPITAL ENCOUNTER (EMERGENCY)
Facility: HOSPITAL | Age: 57
Discharge: HOME | End: 2022-11-05
Attending: EMERGENCY MEDICINE

## 2022-11-05 ENCOUNTER — APPOINTMENT (EMERGENCY)
Dept: INTERVENTIONAL RADIOLOGY/VASCULAR | Facility: HOSPITAL | Age: 57
End: 2022-11-05

## 2022-11-05 VITALS
TEMPERATURE: 98.2 F | WEIGHT: 150 LBS | RESPIRATION RATE: 20 BRPM | BODY MASS INDEX: 23.54 KG/M2 | OXYGEN SATURATION: 96 % | SYSTOLIC BLOOD PRESSURE: 118 MMHG | DIASTOLIC BLOOD PRESSURE: 83 MMHG | HEIGHT: 67 IN | HEART RATE: 90 BPM

## 2022-11-05 DIAGNOSIS — D72.829 LEUKOCYTOSIS: ICD-10-CM

## 2022-11-05 DIAGNOSIS — K70.31 ALCOHOLIC CIRRHOSIS OF LIVER WITH ASCITES (HCC): Primary | ICD-10-CM

## 2022-11-05 LAB
ALBUMIN SERPL BCP-MCNC: 2.4 G/DL (ref 3.5–5)
ALP SERPL-CCNC: 231 U/L (ref 46–116)
ALT SERPL W P-5'-P-CCNC: 34 U/L (ref 12–78)
ANION GAP SERPL CALCULATED.3IONS-SCNC: 4 MMOL/L (ref 4–13)
APPEARANCE FLD: CLEAR
APTT PPP: 40 SECONDS (ref 23–37)
BASOPHILS # BLD AUTO: 0.16 THOUSANDS/ÂΜL (ref 0–0.1)
BASOPHILS NFR BLD AUTO: 1 % (ref 0–1)
BILIRUB SERPL-MCNC: 1 MG/DL (ref 0.2–1)
BUN SERPL-MCNC: 7 MG/DL (ref 5–25)
CALCIUM ALBUM COR SERPL-MCNC: 10 MG/DL (ref 8.3–10.1)
CALCIUM SERPL-MCNC: 8.7 MG/DL (ref 8.3–10.1)
CHLORIDE SERPL-SCNC: 102 MMOL/L (ref 96–108)
CO2 SERPL-SCNC: 28 MMOL/L (ref 21–32)
COLOR FLD: NORMAL
CREAT SERPL-MCNC: 0.78 MG/DL (ref 0.6–1.3)
EOSINOPHIL # BLD AUTO: 0.54 THOUSAND/ÂΜL (ref 0–0.61)
EOSINOPHIL NFR BLD AUTO: 2 % (ref 0–6)
ERYTHROCYTE [DISTWIDTH] IN BLOOD BY AUTOMATED COUNT: 14.7 % (ref 11.6–15.1)
GFR SERPL CREATININE-BSD FRML MDRD: 101 ML/MIN/1.73SQ M
GLUCOSE SERPL-MCNC: 136 MG/DL (ref 65–140)
HCT VFR BLD AUTO: 38.2 % (ref 36.5–49.3)
HGB BLD-MCNC: 12.9 G/DL (ref 12–17)
HISTIOCYTES NFR FLD: 10 %
IMM GRANULOCYTES # BLD AUTO: 0.22 THOUSAND/UL (ref 0–0.2)
IMM GRANULOCYTES NFR BLD AUTO: 1 % (ref 0–2)
INR PPP: 1.34 (ref 0.84–1.19)
LIPASE SERPL-CCNC: 435 U/L (ref 73–393)
LYMPHOCYTES # BLD AUTO: 1.31 THOUSANDS/ÂΜL (ref 0.6–4.47)
LYMPHOCYTES NFR BLD AUTO: 20 %
LYMPHOCYTES NFR BLD AUTO: 5 % (ref 14–44)
MCH RBC QN AUTO: 35.1 PG (ref 26.8–34.3)
MCHC RBC AUTO-ENTMCNC: 33.8 G/DL (ref 31.4–37.4)
MCV RBC AUTO: 103 FL (ref 82–98)
MONO+MESO NFR FLD MANUAL: 1 %
MONOCYTES # BLD AUTO: 1.11 THOUSAND/ÂΜL (ref 0.17–1.22)
MONOCYTES NFR BLD AUTO: 2 %
MONOCYTES NFR BLD AUTO: 4 % (ref 4–12)
NEUTROPHILS # BLD AUTO: 26.04 THOUSANDS/ÂΜL (ref 1.85–7.62)
NEUTS SEG NFR BLD AUTO: 67 %
NEUTS SEG NFR BLD AUTO: 87 % (ref 43–75)
NRBC BLD AUTO-RTO: 0 /100 WBCS
PLATELET # BLD AUTO: 461 THOUSANDS/UL (ref 149–390)
PMV BLD AUTO: 9.5 FL (ref 8.9–12.7)
POTASSIUM SERPL-SCNC: 4 MMOL/L (ref 3.5–5.3)
PROT SERPL-MCNC: 7 G/DL (ref 6.4–8.4)
PROTHROMBIN TIME: 17.4 SECONDS (ref 11.6–14.5)
RBC # BLD AUTO: 3.67 MILLION/UL (ref 3.88–5.62)
SITE: NORMAL
SODIUM SERPL-SCNC: 134 MMOL/L (ref 135–147)
TOTAL CELLS COUNTED SPEC: 100
WBC # BLD AUTO: 29.38 THOUSAND/UL (ref 4.31–10.16)
WBC # FLD MANUAL: 291 /UL

## 2022-11-05 RX ORDER — LIDOCAINE HYDROCHLORIDE 10 MG/ML
10 INJECTION, SOLUTION EPIDURAL; INFILTRATION; INTRACAUDAL; PERINEURAL ONCE
Status: COMPLETED | OUTPATIENT
Start: 2022-11-05 | End: 2022-11-05

## 2022-11-05 RX ADMIN — LIDOCAINE HYDROCHLORIDE 10 ML: 10 INJECTION, SOLUTION EPIDURAL; INFILTRATION; INTRACAUDAL; PERINEURAL at 12:07

## 2022-11-05 NOTE — TELEPHONE ENCOUNTER
Spoke with Albaro father -still unable to reach amna - he will notify jake and have [address] go to the ER for evaluation   He will call if no response

## 2022-11-05 NOTE — TELEPHONE ENCOUNTER
Received call from service patient's critical labs - WBC [phone] [id number]-seen in office today by DR Charlie Daniel  Arrangements made for paracentesis early next week  Attempted to call patient to go to ER for admission due to critical high labs   Unavailable and unable to  Leave message -mailbox was full will reattempt in AM

## 2022-11-05 NOTE — ED PROVIDER NOTES
History  Chief Complaint   Patient presents with   • Abdominal Pain     Pt arrives asking for paracentesis  44-year-old male presents for evaluation of abdominal distension  Patient has a history of alcoholic cirrhosis and states that he needs a paracentesis  He states that he had last week but he is filling up with fluid having some lower abdominal pressure  The patient denies any fevers or chills  He denies any nausea vomiting or diarrhea  He has associated sherman colored stools  Is actively followed by GI  States the symptoms got a little worse after he did a shot of vodka 2 days ago          Prior to Admission Medications   Prescriptions Last Dose Informant Patient Reported? Taking?   folic acid (FOLVITE) 1 mg tablet  Self No No   Sig: Take 1 tablet (1 mg total) by mouth daily   lactulose 20 g/30 mL  Self No No   Sig: Take 30 mL (20 g total) by mouth daily   nicotine (NICODERM CQ) 21 mg/24 hr TD 24 hr patch  Self No No   Sig: Place 1 patch on the skin daily   Patient not taking: No sig reported   omeprazole (PriLOSEC) 20 mg delayed release capsule  Self No No   Sig: Take 1 capsule (20 mg total) by mouth daily   oxyCODONE (ROXICODONE) 5 immediate release tablet  Self No No   Sig: Take 1 tablet (5 mg total) by mouth every 6 (six) hours as needed for moderate pain for up to 10 days Max Daily Amount: 20 mg   polyethylene glycol (MIRALAX) 17 g packet  Self No No   Sig: Take 17 g by mouth daily Do not start before October 29, 2022  saccharomyces boulardii (FLORASTOR) 250 mg capsule  Self No No   Sig: Take 1 capsule (250 mg total) by mouth 2 (two) times a day   Patient not taking: No sig reported   spironolactone (ALDACTONE) 100 mg tablet  Self No No   Sig: Take 1 tablet (100 mg total) by mouth daily Do not start before October 29, 2022     thiamine 100 MG tablet  Self No No   Sig: Take 1 tablet (100 mg total) by mouth daily   vancomycin (VANCOCIN) 125 MG capsule  Self No No   Sig: Take 1 capsule (125 mg total) by mouth 4 (four) times a day for 10 days      Facility-Administered Medications: None       Past Medical History:   Diagnosis Date   • Bronchitis, asthmatic     last assessed: 3/21/2016   • Depression    • Pneumonia     last assessed: 12/27/2016   • Pneumonia of left lower lobe due to Haemophilus influenzae ([facility] )     last assessed: 11/27/2015   • Sebaceous cyst     last assessed: 12/26/2013   • Sepsis with acute organ dysfunction ([facility] ) 10/14/2022   • Substance abuse ([facility] )    • Suicidal ideation     last assessed: 12/27/2016       Past Surgical History:   Procedure Laterality Date   • IR PARACENTESIS  10/17/2022   • IR PARACENTESIS  10/19/2022   • IR PARACENTESIS  10/21/2022   • IR PARACENTESIS  10/27/2022   • IR PARACENTESIS  11/2/2022   • IR PARACENTESIS  11/5/2022       Family History   Problem Relation Age of Onset   • Colon cancer Mother    • Diabetes Mother         mellitus     I have reviewed and agree with the history as documented  E-Cigarette/Vaping   • E-Cigarette Use Never User      E-Cigarette/Vaping Substances   • Nicotine No    • THC No    • CBD No    • Flavoring No    • Other No    • Unknown No      Social History     Tobacco Use   • Smoking status: Current Every Day Smoker     Packs/day: 1 00   • Smokeless tobacco: Never Used   Vaping Use   • Vaping Use: Never used   Substance Use Topics   • Alcohol use: Yes     Comment: 10 shots a day of vodka mixed with juice of some sort   • Drug use: Not Currently     Types: Prescription, Methamphetamines     Comment: opiates       Review of Systems   Constitutional: Negative for chills, fatigue and fever  HENT: Negative for sore throat  Respiratory: Negative for cough, chest tightness and shortness of breath  Cardiovascular: Negative for chest pain and palpitations  Gastrointestinal: Positive for abdominal distention  Negative for abdominal pain, constipation, diarrhea, nausea and vomiting     Genitourinary: Negative for difficulty urinating and dysuria  Musculoskeletal: Negative for back pain  Skin: Negative for rash  Neurological: Negative for dizziness, seizures, syncope, weakness and headaches  All other systems reviewed and are negative  Physical Exam  Physical Exam  Vitals and nursing note reviewed  Constitutional:       General: He is not in acute distress  Appearance: He is well-developed  HENT:      Head: Normocephalic and atraumatic  Right Ear: External ear normal       Left Ear: External ear normal       Mouth/Throat:      Pharynx: No oropharyngeal exudate  Eyes:      General: No scleral icterus  Pupils: Pupils are equal, round, and reactive to light  Cardiovascular:      Rate and Rhythm: Normal rate and regular rhythm  Heart sounds: Normal heart sounds  Pulmonary:      Effort: Pulmonary effort is normal  No respiratory distress  Breath sounds: Normal breath sounds  Abdominal:      General: Bowel sounds are normal  There is distension  Palpations: Abdomen is soft  There is fluid wave and hepatomegaly  Tenderness: There is abdominal tenderness in the suprapubic area  There is no guarding or rebound  Musculoskeletal:         General: Normal range of motion  Cervical back: Normal range of motion and neck supple  Skin:     General: Skin is warm and dry  Findings: No rash  Neurological:      Mental Status: He is alert and oriented to person, place, and time           Vital Signs  ED Triage Vitals [11/05/22 1022]   Temperature Pulse Respirations Blood Pressure SpO2   98 2 °F (36 8 °C) 98 16 125/75 98 %      Temp Source Heart Rate Source Patient Position - Orthostatic VS BP Location FiO2 (%)   Oral Monitor Lying Right arm --      Pain Score       10 - Worst Possible Pain           Vitals:    11/05/22 1030 11/05/22 1100 11/05/22 1130 11/05/22 1200   BP: 126/80 116/79 119/76 118/83   Pulse: 90 87 84 92   Patient Position - Orthostatic VS: Lying Lying Lying Lying         Visual Acuity      ED Medications  Medications   lidocaine (PF) (XYLOCAINE-MPF) 1 % injection 10 mL (10 mL Infiltration Given 11/5/22 1207)       Diagnostic Studies  Results Reviewed     Procedure Component Value Units Date/Time    Body fluid culture and Gram stain [[id number]] Collected: 11/05/22 1250    Lab Status: In process Specimen: Body Fluid from Paracentesis Updated: 11/05/22 1327    Body fluid white cell count with differential [[id number]] Collected: 11/05/22 1250    Lab Status: In process Specimen:  Body Fluid from Paracentesis Updated: 11/05/22 1327    Protime-INR [[id number]]  (Abnormal) Collected: 11/05/22 1048    Lab Status: Final result Specimen: Blood from Arm, Right Updated: 11/05/22 1123     Protime 17 4 seconds      INR 1 34    APTT [[id number]]  (Abnormal) Collected: 11/05/22 1048    Lab Status: Final result Specimen: Blood from Arm, Right Updated: 11/05/22 1123     PTT 40 seconds     Comprehensive metabolic panel [[id number]]  (Abnormal) Collected: 11/05/22 1048    Lab Status: Final result Specimen: Blood from Arm, Right Updated: 11/05/22 1122     Sodium 134 mmol/L      Potassium 4 0 mmol/L      Chloride 102 mmol/L      CO2 28 mmol/L      ANION GAP 4 mmol/L      BUN 7 mg/dL      Creatinine 0 78 mg/dL      Glucose 136 mg/dL      Calcium 8 7 mg/dL      Corrected Calcium 10 0 mg/dL      AST --     ALT 34 U/L      Alkaline Phosphatase 231 U/L      Total Protein 7 0 g/dL      Albumin 2 4 g/dL      Total Bilirubin 1 00 mg/dL      eGFR 101 ml/min/1 73sq m     Narrative:      Elio guidelines for Chronic Kidney Disease (CKD):   •  Stage 1 with normal or high GFR (GFR > 90 mL/min/1 73 square meters)  •  Stage 2 Mild CKD (GFR = 60-89 mL/min/1 73 square meters)  •  Stage 3A Moderate CKD (GFR = 45-59 mL/min/1 73 square meters)  •  Stage 3B Moderate CKD (GFR = 30-44 mL/min/1 73 square meters)  •  Stage 4 Severe CKD (GFR = 15-29 mL/min/1 73 square meters)  •  Stage 5 End Stage CKD (GFR <15 mL/min/1 73 square meters)  Note: GFR calculation is accurate only with a steady state creatinine    Lipase [[id number]]  (Abnormal) Collected: 11/05/22 1048    Lab Status: Final result Specimen: Blood from Arm, Right Updated: 11/05/22 1120     Lipase 435 u/L     CBC and differential [[id number]]  (Abnormal) Collected: 11/05/22 1048    Lab Status: Final result Specimen: Blood from Arm, Right Updated: 11/05/22 1104     WBC 29 38 Thousand/uL      RBC 3 67 Million/uL      Hemoglobin 12 9 g/dL      Hematocrit 38 2 %       fL      MCH 35 1 pg      MCHC 33 8 g/dL      RDW 14 7 %      MPV 9 5 fL      Platelets 961 Thousands/uL      nRBC 0 /100 WBCs      Neutrophils Relative 87 %      Immat GRANS % 1 %      Lymphocytes Relative 5 %      Monocytes Relative 4 %      Eosinophils Relative 2 %      Basophils Relative 1 %      Neutrophils Absolute 26 04 Thousands/µL      Immature Grans Absolute 0 22 Thousand/uL      Lymphocytes Absolute 1 31 Thousands/µL      Monocytes Absolute 1 11 Thousand/µL      Eosinophils Absolute 0 54 Thousand/µL      Basophils Absolute 0 16 Thousands/µL                  IR INPATIENT Paracentesis   Final Result by Tamera Buerger, DO (11/05 1309)   Impression: Successful diagnostic and therapeutic paracentesis         Workstation performed: [id number]         XR chest 1 view portable   ED Interpretation by Terence James DO (11/05 1129)   Elevated right hemidiaphragm  No other acute cardiopulmonary pathology      Final Result by Joann Pedraza MD (11/05 1304)      No acute cardiopulmonary disease  Workstation performed: [id number]                    Procedures  Procedures         ED Course  ED Course as of 11/05/22 1333   Sat Nov 05, 2022   1132 WBC(!): 29 38  Decreased when compared to previous  GI paged   [address] requesting Paracentesis from IR, discussed with Dr Garvey Samples from IR who will tap today   1328 PATIENT IMPROVED UPON RE-EVALUATION AFTER PARACENTESIS  PLAN FOR DISCHARGE IN OUTPATIENT GI FOLLOW-UP  SBIRT 22yo+    Flowsheet Row Most Recent Value   SBIRT (23 yo +)    In order to provide better care to our patients, we are screening all of our patients for alcohol and drug use  Would it be okay to ask you these screening questions? Yes Filed at: 11/05/2022 1024   Initial Alcohol Screen: US AUDIT-C     1  How often do you have a drink containing alcohol? 5  [i had a few shots yesterday] Filed at: 11/05/2022 1024   2  How many drinks containing alcohol do you have on a typical day you are drinking? 1 Filed at: 11/05/2022 1024   3a  Male UNDER 65: How often do you have five or more drinks on one occasion? 0 Filed at: 11/05/2022 1024   3b  FEMALE Any Age, or MALE 65+: How often do you have 4 or more drinks on one occassion? 0 Filed at: 11/05/2022 1024   Audit-C Score 6 Filed at: 11/05/2022 1024   JUAN: How many times in the past year have you    Used an illegal drug or used a prescription medication for non-medical reasons? Never Filed at: 11/05/2022 1024                    MDM  Number of Diagnoses or Management Options  Alcoholic cirrhosis of liver with ascites ([facility] )  Leukocytosis  Diagnosis management comments: The plan is check laboratories this patient has a noted elevated leukocytosis from the other day  The patient does not have respiratory distress, tense distention or fevers  Laboratories reviewed and then subsequently discussed with GI  The plan is for IR paracentesis with fluid testing  Patient can then be discharged for outpatient GI follow-up  The patient (and any family present) verbalized understanding of the discharge instructions and warnings that would necessitate return to the Emergency Department  All questions were answered prior to discharge         Amount and/or Complexity of Data Reviewed  Clinical lab tests: reviewed  Tests in the radiology section of CPT®: reviewed  Decide to obtain previous medical records or to obtain history from someone other than the patient: yes  Review and summarize past medical records: yes  Discuss the patient with other providers: yes (GI and Interventional Radiology)  Independent visualization of images, tracings, or specimens: yes        Disposition  Final diagnoses:   Alcoholic cirrhosis of liver with ascites ([facility] )   Leukocytosis     Time reflects when diagnosis was documented in both MDM as applicable and the Disposition within this note     Time User Action Codes Description Comment    11/5/2022  1:29 PM Tri Billt Add [G89 02] Alcoholic cirrhosis of liver with ascites ([facility] )     11/5/2022  1:29 PM Tri Billt Add [E03 154] Leukocytosis       ED Disposition     ED Disposition   Discharge    Condition   Stable    Date/Time   Sat Nov 5, 2022  1:29 PM    Comment   Leanna Santana discharge to home/self care  Follow-up Information     Follow up With Specialties Details Why Contact Info Additional [address]  [facility] Gastroenterology Schedule an appointment as soon as possible for a visit  For further evaluation [address] [place] [id number]  [phone] [facility] [address] [place] [address]  [id number] [phone]          Patient's Medications   Discharge Prescriptions    No medications on file       No discharge procedures on file      PDMP Review       Value Time User    PDMP Reviewed  Yes 10/19/2022  8:10 AM Atiya Fitzpatrick MD          ED Provider  Electronically Signed by           Anjelica Avila DO  11/05/22 6116

## 2022-11-05 NOTE — DISCHARGE INSTRUCTIONS
Abdominal Paracentesis     WHAT YOU NEED TO KNOW:   Abdominal paracentesis is a procedure to remove abnormal fluid buildup in your abdomen  Fluid builds up because of liver problems, such as swelling and scarring  Heart failure, kidney disease, a mass, or problems with your pancreas may also cause fluid buildup  DISCHARGE INSTRUCTIONS:     Follow up with your healthcare provider as directed: Write down your questions so you remember to ask them during your visits  Wound care: Remove dressing after 24 hours  Leave glue in place  Return to your normal activities    Contact Interventional Radiology at [phone] Steph PATIENTS: Contact Interventional Radiology at [phone]) Jesi Briggs PATIENTS: Contact Interventional Radiology at [phone]) if:  You have a fever and your wound is red and swollen  You have yellow, green, or bad-smelling discharge coming from your wound  You have pain or swelling in your abdomen  You have an upset stomach or you vomit  You have sudden, sharp pain in your abdomen  You urinate very little or not at all  You feel confused and more tired than usual    Your arm or leg feels warm, tender, and painful  It may look swollen and red  You suddenly feel lightheaded and have trouble breathing

## 2022-11-05 NOTE — TELEPHONE ENCOUNTER
Lab Result: WBC 36 41    Date/Time Drawn: 11/4 11:34a    Ordering Provider: Sundeep Lawrence    Lab Personnel's Name: Adelina Geronimo  The following critical/stat result was read back to the lab as stated above and [employer] to the on-call provider  The provider confirmed receipt of the message

## 2022-11-05 NOTE — TELEPHONE ENCOUNTER
Spoke with Edi Darnell Johnny's father was able to reach Johnny and he is on the way to the ER for evaluation

## 2022-11-07 ENCOUNTER — PATIENT OUTREACH (OUTPATIENT)
Dept: FAMILY MEDICINE CLINIC | Facility: CLINIC | Age: 57
End: 2022-11-07

## 2022-11-07 DIAGNOSIS — Z71.89 COMPLEX CARE COORDINATION: Primary | ICD-10-CM

## 2022-11-09 LAB
BACTERIA SPEC BFLD CULT: NO GROWTH
GRAM STN SPEC: NORMAL
GRAM STN SPEC: NORMAL

## 2022-11-10 ENCOUNTER — PATIENT OUTREACH (OUTPATIENT)
Dept: FAMILY MEDICINE CLINIC | Facility: CLINIC | Age: 57
End: 2022-11-10

## 2022-11-10 NOTE — PROGRESS NOTES
OP CHUY ELLIOTT received in basket from [facility] RN requesting to outreach patient and assess for transportation needs  Per chart, patient had visit with PCP on 11/04 and needed assistance with transportation to his follow up appointments  Patient was in ER on 11/05 for a paracentesis  OP CHUY ELLIOTT unable to reach patient, left message  Awaiting call back

## 2022-11-15 ENCOUNTER — OFFICE VISIT (OUTPATIENT)
Dept: GASTROENTEROLOGY | Facility: CLINIC | Age: 57
End: 2022-11-15

## 2022-11-15 VITALS
HEART RATE: 113 BPM | BODY MASS INDEX: 19.71 KG/M2 | DIASTOLIC BLOOD PRESSURE: 68 MMHG | OXYGEN SATURATION: 98 % | HEIGHT: 67 IN | WEIGHT: 125.6 LBS | SYSTOLIC BLOOD PRESSURE: 110 MMHG | TEMPERATURE: 97 F

## 2022-11-15 DIAGNOSIS — K70.31 ASCITES DUE TO ALCOHOLIC CIRRHOSIS (HCC): ICD-10-CM

## 2022-11-15 DIAGNOSIS — K74.60 DECOMPENSATED HEPATIC CIRRHOSIS (HCC): Primary | ICD-10-CM

## 2022-11-15 DIAGNOSIS — K72.90 DECOMPENSATED HEPATIC CIRRHOSIS (HCC): Primary | ICD-10-CM

## 2022-11-15 DIAGNOSIS — K70.31 ALCOHOLIC CIRRHOSIS OF LIVER WITH ASCITES (HCC): ICD-10-CM

## 2022-11-15 DIAGNOSIS — Z78.9 ALCOHOL USE: ICD-10-CM

## 2022-11-15 DIAGNOSIS — F31.81 BIPOLAR II DISORDER (HCC): ICD-10-CM

## 2022-11-15 RX ORDER — LACTULOSE 20 G/30ML
20 SOLUTION ORAL DAILY
Qty: 2700 ML | Refills: 0 | Status: SHIPPED | OUTPATIENT
Start: 2022-11-15 | End: 2023-02-13

## 2022-11-15 RX ORDER — SPIRONOLACTONE 100 MG/1
100 TABLET, FILM COATED ORAL DAILY
Qty: 90 TABLET | Refills: 0 | Status: SHIPPED | OUTPATIENT
Start: 2022-11-15 | End: 2023-02-13

## 2022-11-15 NOTE — PROGRESS NOTES
[facility] - Outpatient Consultation  Carmella Palacios 56 y o  male MRN: [id number]  Encounter: [id number]        ASSESSMENT AND PLAN     1  Decompensated hepatic cirrhosis ([facility] [address] )  2  Alcohol use  3  Ascites due to alcoholic cirrhosis ([facility] [address] )  4  Hepatic encephalopathy  5  Abnormal liver imaging  Patient diagnosed with decompensated liver cirrhosis in Oct 2022  Suspicion previously by PCP and was referred to GI but never saw until he was admitted at [address]  [facility] in Oct 2022  Etiology: Alcohol, hepatitis panel was checked and was negative  Denies IVDU at this time  Decompensated given evidence of ascites  Ascites: Has been receiving scheduled paracentesis, almost Qweekly  On ascites analysis, High SAAG low protein as of 10/17, negative for SBP at the time as well  Total protein was <2 0  Last paracentesis was 11/5 when he went to [facility] ER, with removal of nearly 900 cc fluid  He was recommended to take aldactone 100 mg daily which he says he has not been taking for the past 4-5 days  On exam today he does not have significant abdominal distension; counseled him on hazards of over use of paracentesis like dehydration, worsening confusion, infection  - will recommend re-initiating Aldactone 100 mg daily, sent new prescription as per his request, encouraged compliance  Varices: No prior EGD  He subjectively reports black stool 1 year ago, now he describes as nikolay colored  - obtain screening EGD  Hepatic encephalopathy: Was admitted at [address] Oct 2022, was found to have encephalopathy and was started on lactulose 20 daily  Not taking at present  Today on exam he is AAOx3, not encephalopathic, no significant tremors on outstretched hands     - will re-initiate lactulose 20 mg daily, discussed with him to titrate for 3-4 BMs/day  HCC screening: Last CT abdo w contrast Oct 2022 showing Cirrhosis with indeterminant left lobe lesion(s): 2 2 cm hypodensity and 2 5 cm hyperdensity in the medial segment left lobe  Will obtain MRI abdo w wo contrast  AFP was negative during hospitalization      - strongly advised complete alcohol cessation  - will follow up in clinic in 6 weeks    Patient's MELD Score is: 10 as of 11/5/22  Child Camara: B (questionable hx of enceph)  MELD Score 90-day mortality   =9 1 9%   10-19 6 0%   20-29 19 6%   30-39 52 6%   =40 71 3%     MELD Score Component Values:  Component Value Date   Creatinine: 0 78 mg/dL 11/5/2022   Dialysis at least twice   in the past week  Or CVVHD for =24 hours   in the past week: No    Bilirubin, total: 1 mg/dL 11/5/2022   INR: 1 34 11/5/2022   Sodium: 134 mmol/L 11/5/2022     6  Bipolar II disorder ([facility] [address] )  Uncontrolled at present  Does not see a psychiatrist  When offered he said he does not want to see one  He is competent at this time  7  Cdiff infection  As of 10/2022  Was treated with vancomycin  8  Screening colonoscopy  Ordered  None prior  Orders Placed This Encounter   Procedures   • MRI abdomen w wo contrast   • Colonoscopy   • EGD     HISTORY OF PRESENT ILLNESS     HPI   Patient is a 55 yo M with PMH including but not limited to decompensated alcohol cirrhosis, polysubstance abuse, alcohol abuse, COPD, bipolar disorder not on treatment, who presents today upon referral given decompensated alcoholic cirrhosis  Patient is not a reliable historian at this time, is not accompanied by any family member  Patient was admitted in end of Oct 2022 at [facility] with worsening abdominal distension, diarrhea and intermittent encephalopathy  At the time he was diagnosed with decompensated cirrhosis with ascites  Cdiff infection and hepatic encephalopathy  Previously as an outpatient he was referred to GI but not had a chance to see one and today is the first time he is seeing one as an outpatient  During hospital course he was treated with vancomycin, diuretics, paracentesis and lactulose   Upon discharge he was recommended to finish the course of antibiotics; was provided a standing order script for paracentesis, was prescribed medications like aldactone 100 and lactulose  He admits that about 4-5 days ago he stopped taking all the medications  Prior to admission at [facility] in Oct 2022, he also reports he had previously noticed black colored stools but never seeked medical care for it  Today he presents with multiple complaints  He reports occasional abdominal pain, distension which he says is not as bad as it was previously  He also inquires about the procedure to obtain new liver  Reports he continues to drink around 2 cans of beer everyday, provides inconsistent history about liquor use, reports previously drinking half pint of vodka daily  Active smoker  He says he lives with "an old woman" who takes care of him  REVIEW OF SYSTEMS     CONSTITUTIONAL: Denies any fever, chills, rigors, and weight loss  HEENT: No earache or tinnitus  Denies hearing loss or visual disturbances  CARDIOVASCULAR: No chest pain or palpitations  RESPIRATORY: Denies any cough, hemoptysis, shortness of breath or dyspnea on exertion  GASTROINTESTINAL: As noted in the History of Present Illness  GENITOURINARY: No problems with urination  Denies any hematuria or dysuria  NEUROLOGIC: No dizziness or vertigo, denies headaches  MUSCULOSKELETAL: Denies any muscle or joint pain  SKIN: Denies skin rashes or itching  ENDOCRINE: Denies excessive thirst  Denies intolerance to heat or cold  PSYCHOSOCIAL: Denies depression or anxiety  Denies any recent memory loss         Historical information     Past Medical History:   Diagnosis Date   • Bronchitis, asthmatic     last assessed: 3/21/2016   • Depression    • Pneumonia     last assessed: 12/27/2016   • Pneumonia of left lower lobe due to Haem[facility] influenzae [facility]     last assessed: 11/27/2015   • Sebaceous cyst     last assessed: 12/26/2013   • Sepsis with acute organ dysfunction ([facility] [address] ) 10/14/2022   • Substance abuse [facility])    • Suicidal ideation     last assessed: 12/27/2016     Past Surgical History:   Procedure Laterality Date   • IR PARACENTESIS  10/17/2022   • IR PARACENTESIS  10/19/2022   • IR PARACENTESIS  10/21/2022   • IR PARACENTESIS  10/27/2022   • IR PARACENTESIS  11/2/2022   • IR PARACENTESIS  11/5/2022     Social History   Social History     Substance and Sexual Activity   Alcohol Use Yes    Comment: 10 shots a day of vodka mixed with juice of some sort     Social History     Substance and Sexual Activity   Drug Use Not Currently   • Types: Prescription, Methamphetamines    Comment: opiates     Social History     Tobacco Use   Smoking Status Current Every Day Smoker   • Packs/day: 1 00   Smokeless Tobacco Never Used     Family History   Problem Relation Age of Onset   • Colon cancer Mother    • Diabetes Mother         mellitus       Allergies       Current Outpatient Medications:   •  lactulose 20 g/30 mL  •  spironolactone (ALDACTONE) 100 mg tablet    Allergies   Allergen Reactions   • Levofloxacin    • Quinolones            Objective assessment       Blood pressure 110/68, pulse (!) 113, temperature (!) 97 °F (36 1 °C), temperature source Tympanic, height 5' 7" (1 702 m), weight 57 kg (125 lb 9 6 oz), SpO2 98 %  Body mass index is 19 67 kg/m²  PHYSICAL EXAM:         General Appearance:   Alert, cooperative, no distress   HEENT:   Normocephalic     Neck:  symmetrical   Lungs:   Breathing unlabored, able to speak in full sentences    Heart[de-identified]   Regular rate    Abdomen:   Non tender, slightly distended   Genitalia:   Deferred    Rectal:   Deferred    Extremities:  No cyanosis, or edema    Pulses:  Strong   Skin:  No jaundice, rashes, or lesions    Lymph nodes:  No palpable cervical lymphadenopathy        Lab Results:      No visits with results within 1 Day(s) from this visit     Latest known visit with results is:   Admission on 11/05/2022, Discharged on 11/05/2022   Component Date Value   • WBC 11/05/2022 29 38 (A)   • RBC 11/05/2022 3 67 (A)   • Hemoglobin 11/05/2022 12 9    • Hematocrit 11/05/2022 38 2    • MCV 11/05/2022 103 (A)   • MCH 11/05/2022 35 1 (A)   • MCHC 11/05/2022 33 8    • RDW 11/05/2022 14 7    • MPV 11/05/2022 9 5    • Platelets 73/99/4635 461 (A)   • nRBC 11/05/2022 0    • Neutrophils Relative 11/05/2022 87 (A)   • Immat GRANS % 11/05/2022 1    • Lymphocytes Relative 11/05/2022 5 (A)   • Monocytes Relative 11/05/2022 4    • Eosinophils Relative 11/05/2022 2    • Basophils Relative 11/05/2022 1    • Neutrophils Absolute 11/05/2022 26 04 (A)   • Immature Grans Absolute 11/05/2022 0 22 (A)   • Lymphocytes Absolute 11/05/2022 1 31    • Monocytes Absolute 11/05/2022 1 11    • Eosinophils Absolute 11/05/2022 0 54    • Basophils Absolute 11/05/2022 0 16 (A)   • Protime 11/05/2022 17 4 (A)   • INR 11/05/2022 1 34 (A)   • PTT 11/05/2022 40 (A)   • Sodium 11/05/2022 134 (A)   • Potassium 11/05/2022 4 0    • Chloride 11/05/2022 102    • CO2 11/05/2022 28    • ANION GAP 11/05/2022 4    • BUN 11/05/2022 7    • Creatinine 11/05/2022 0 78    • Glucose 11/05/2022 136    • Calcium 11/05/2022 8 7    • Corrected Calcium 11/05/2022 10 0    • AST 11/05/2022     • ALT 11/05/2022 34    • Alkaline Phosphatase 11/05/2022 231 (A)   • Total Protein 11/05/2022 7 0    • Albumin 11/05/2022 2 4 (A)   • Total Bilirubin 11/05/2022 1 00    • eGFR 11/05/2022 101    • Lipase 11/05/2022 435 (A)   • Site 11/05/2022 paracentesis    • Color, Fluid 11/05/2022 Straw    • Clarity, Fluid 11/05/2022 Clear    • WBC, Fluid 11/05/2022 291    • Body Fluid Culture, Ster* 11/05/2022 No growth    • Gram Stain Result 11/05/2022 1+ Polys    • Gram Stain Result 11/05/2022 No bacteria seen    • Total Counted 11/05/2022 100    • Neutrophils % (Fluid) 11/05/2022 67    • Lymphs % (Fluid) 11/05/2022 20    • Mesothelial % (Fluid) 11/05/2022 1    • Histiocyte % (Fluid) 11/05/2022 10    • Monocytes % (Fluid) 11/05/2022 2          Radiology Results:      CT chest abdomen pelvis wo contrast    Result Date: 10/22/2022  Narrative: CT CHEST, ABDOMEN AND PELVIS WITHOUT IV CONTRAST INDICATION:  Leukocytosis  COMPARISON:  CT abdomen pelvis 10/14/2022, CT chest 2/13/2017 TECHNIQUE: CT examination of the chest, abdomen and pelvis was performed without intravenous contrast  Axial, sagittal, and coronal 2D reformatted images were created from the source data and submitted for interpretation  Radiation dose length product (DLP) for this visit:  668 77 mGy-cm  This examination, like all CT scans performed in the [facility], was performed utilizing techniques to minimize radiation dose exposure, including the use of iterative reconstruction and automated exposure control  Enteric contrast was not administered  FINDINGS: CHEST LUNGS:  Mild paraseptal emphysema  Mild dependent/compressive atelectasis right middle and lower lobes  Patchy subsegmental atelectasis left upper and lower lobes  Some nonspecific groundglass density noted in the anterior lingula, possibly focal atelectasis versus infectious or inflammatory  PLEURA:  Unremarkable  HEART/GREAT VESSELS: Heart is unremarkable for patient's age  Coronary artery calcification  No thoracic aortic aneurysm  MEDIASTINUM AND CANDIE:  Unremarkable  CHEST WALL AND LOWER NECK:  Unremarkable  ABDOMEN Evaluation of the solid organs is limited without IV contrast  LIVER/BILIARY TREE: Hepatomegaly  Cirrhotic liver morphology  2 2 cm hypodensity and 2 5 cm hyperdensity in the medial segment left lobe, cannot be further characterized on this study  The larger finding was indeterminate on previous CT study in retrospect  Possible small right lobe cyst  GALLBLADDER:  No calcified gallstones or wall thickening  Pericholecystic fluid likely part and parcel of ascites elsewhere  SPLEEN:  Unremarkable  PANCREAS:  Unremarkable  ADRENAL GLANDS:  Unremarkable  KIDNEYS/URETERS:  Unremarkable  No hydronephrosis   STOMACH AND BOWEL:  Limited evaluation of the bowel without oral or IV contrast   Fluid-filled, nondistended small bowel loops may be indicative of gastroenteritis  Colonic diverticulosis without evidence of diverticulitis  APPENDIX:  No findings to suggest appendicitis  ABDOMINOPELVIC CAVITY:  Small to moderate amount of abdominopelvic ascites  No pneumoperitoneum  Evaluation of lymphadenopathy is limited in the absence of intravenous contrast  No bulky adenopathy detected on this noncontrast study  VESSELS:  Unremarkable for patient's age  PELVIS REPRODUCTIVE ORGANS:  Unremarkable for patient's age  URINARY BLADDER:  Unremarkable  ABDOMINAL WALL/INGUINAL REGIONS:  Unremarkable  OSSEOUS STRUCTURES:  No acute fracture or destructive osseous lesion  Mild elevation right diaphragm  Impression: 1  Mild COPD  Mild right middle and lower lobe dependent/compressive atelectasis with mildly elevated right diaphragm  Small patchy groundglass density in the lingula, nonspecific although could represent focal atelectasis vs infectious or inflammatory etiology  Follow-up plain films may be helpful  2  Cirrhosis with indeterminant left lobe lesion(s)  Follow-up MRI abdomen with IV contrast ([facility]) recommended on a nonemergent basis  Small to moderate amount of abdominopelvic ascites  3  Diffusely fluid-filled small bowel without evidence for obstruction may be indicative of gastroenteritis  4  Colonic diverticulosis without evidence of focal diverticulitis  *Limited study without oral or IV contrast  The study was marked in Epic for follow-up  Workstation performed: [id number]     XR chest 1 view portable    Result Date: 11/5/2022  Narrative: CHEST INDICATION:   SOB  COMPARISON:  CXR 10/23/2022 and chest CT 10/29/2022  EXAM PERFORMED/VIEWS:  XR CHEST PORTABLE FINDINGS: Cardiomediastinal silhouette appears unremarkable  No acute disease    Persistent elevation of the right hemidiaphragm with minimal benign right base atelectasis/scar  No effusion or pneumothorax  Osseous structures appear within normal limits for patient age  Impression: No acute cardiopulmonary disease  Workstation performed: [id number]     XR chest pa & lateral    Result Date: 10/25/2022  Narrative: CHEST INDICATION:   Pneumonia  COMPARISON:  Chest x-ray 10/15/22 EXAM PERFORMED/VIEWS:  XR CHEST PA & LATERAL  The frontal view was performed utilizing dual energy radiographic technique  FINDINGS: Cardiomediastinal silhouette appears unremarkable  Stable moderate elevation of the right hemidiaphragm  Bibasilar linear atelectasis versus scarring, stable  No focal infiltrate  No pneumothorax or pleural effusion  Osseous structures appear within normal limits for patient age  Impression: Bibasilar linear atelectasis versus scarring, stable  No findings for focal infiltrate  Workstation performed: [id number]     CT facial bones w contrast    Result Date: 10/23/2022  Narrative: CT FACIAL SOFT TISSUES WITH INTRAVENOUS CONTRAST INDICATION:   Facial pain  COMPARISON: None  TECHNIQUE:  Axial images through the facial soft tissues were performed  Reformatted images were created in multiple planes  Radiation dose length product (DLP) for this visit:  403 07 mGy-cm   This examination, like all CT scans performed in the [facility], was performed utilizing techniques to minimize radiation dose exposure, including the use of iterative  reconstruction and automated exposure control  IV Contrast:  100 mL of iohexol (OMNIPAQUE) IMAGE QUALITY:  Diagnostic  FINDINGS: SOFT TISSUES: No soft tissue swelling, gas or fluid collection  DENTITION: Dental caries  No evidence of periapical abscess FACIAL BONES: Moderate degenerative change in the bilateral temporomandibular joints  ORBITS: Normal globes  Preseptal and retrobulbar soft tissues are unremarkable  SINUSES: The paranasal sinuses are clear       Impression: No evidence of periapical, subperiosteal or soft tissue abscess or cellulitis  Workstation performed: [id number]     IR INPATIENT Paracentesis    Result Date: 11/5/2022  Narrative: Paracentesis with ultrasound guidance 11/5/2022 History: Ascites with elevated white count Contrast: None Fluoroscopy time: None Number of images: 1 Radiation dose: Not applicable Conscious sedation time: Not applicable Technique: The patient was identified verbally, and by wrist band " Time out" was performed  Informed consent was obtained  Following obtaining informed consent, the overlying skin was prepped and draped in usual sterile fashion  Lidocaine was given as local anesthesia  A 5 Western Gaby Yueh centesis needle was placed using ultrasound guidance  900 cc of clear yellow colored fluid was removed  The patient tolerated procedure without apparent immediate complications or complaints  The patient left the IR department in unchanged condition  Dr Domingo Aburto performed the procedure Findings: Free fluid is seen in the abdomen  Impression: Impression: Successful diagnostic and therapeutic paracentesis Workstation performed: [id number]     IR AMB Paracentesis    Result Date: 11/3/2022  Narrative: INDICATION: Recurrent ascites PROCEDURE: 1  Ultrasound-guided paracentesis FINDINGS: 1   2600 mL clear yellow ascites removed  Impression: Paracentesis  _________________________________________________________________ COMPARISON: None PROCEDURE DETAILS: Operators: Dr Anabel Angelucci Anesthesia: Local Medications: 1% lidocaine COMMENTS: A preprocedure timeout was performed per [facility] protocol  The right abdomen was prepped and draped in the usual sterile fashion  5-Albanian Yueh catheter was advanced using ultrasound guidance into ascites  Following drainage, the skin was cleansed and sterile dressings were applied  Workstation performed: [id number]     IR INPATIENT Paracentesis    Result Date: 10/27/2022  Narrative: INDICATION: Recurrent ascites  PROCEDURE: 1  Ultrasound-guided paracentesis FINDINGS: 1   2160 mL clear yellow ascites removed  Impression: Paracentesis  _________________________________________________________________ COMPARISON: None PROCEDURE DETAILS: Operators: Dr Anabel Angelucci Anesthesia: Local Medications: 1% lidocaine COMMENTS: A preprocedure timeout was performed per [facility] protocol  The right abdomen was prepped and draped in the usual sterile fashion  5-Moldovan Yueh catheter was advanced using ultrasound guidance into ascites  Following drainage, the skin was cleansed and sterile dressings were applied  Workstation performed: [id number]     IR INPATIENT Paracentesis    Result Date: 10/23/2022  Narrative: INDICATION: Recurrent ascites PROCEDURE: 1  Ultrasound-guided paracentesis FINDINGS: 1   2400 mL clear yellow ascites removed  Impression: Paracentesis  _________________________________________________________________ COMPARISON: None PROCEDURE DETAILS: Operators: Dr Anabel Angelucci Anesthesia: Local Medications: 1% lidocaine COMMENTS: A preprocedure timeout was performed per [facility] protocol  The right abdomen was prepped and draped in the usual sterile fashion  5-Moldovan Yueh catheter was advanced using ultrasound guidance into ascites  Following drainage, the skin was cleansed and sterile dressings were applied  Workstation performed: [id number]     IR INPATIENT Paracentesis    Result Date: 10/20/2022  Narrative: INDICATION: Recurrent ascites PROCEDURE: 1  Ultrasound-guided paracentesis FINDINGS: 1   1950 mL clear yellow ascites removed  Impression: Paracentesis  _________________________________________________________________ COMPARISON: None PROCEDURE DETAILS: Operators: Dr Anabel Angelucci Anesthesia: Local Medications: 1% lidocaine COMMENTS: A preprocedure timeout was performed per [facility] protocol  The right abdomen was prepped and draped in the usual sterile fashion   5-Moldovan Yueh catheter was advanced using ultrasound guidance into ascites  Following drainage, the skin was cleansed and sterile dressings were applied  Workstation performed: [id number]     IR INPATIENT Paracentesis    Result Date: 10/18/2022  Narrative: INDICATION: Ascites, rule out SBP  PROCEDURE: 1  Ultrasound-guided paracentesis FINDINGS: 1   2500 mL clear yellow ascites removed  Impression: Paracentesis  _________________________________________________________________ COMPARISON: None PROCEDURE DETAILS: Operators: Dr Marciano Villanueva Anesthesia: Local Medications: 1% lidocaine COMMENTS: A preprocedure timeout was performed per [facility] protocol  The right abdomen was prepped and draped in the usual sterile fashion  5-Tajik Yueh catheter was advanced using ultrasound guidance into ascites  Following drainage, the skin was cleansed and sterile dressings were applied  Workstation performed: Lisa Sensor abdomen limited    Result Date: 10/19/2022  Narrative: ABDOMEN ULTRASOUND, LIMITED, FOUR QUADRANT SURVEY INDICATION:    Ascites, increased abdominal pain and distension  COMPARISON:  None  TECHNIQUE: Real-time ultrasound of the abdominal cavity was performed with a curvilinear transducer with standard grey scale imaging techniques  FINDINGS: Small to moderate amount of ascites in all 4 quadrants identified  Impression: Small to moderate ascites in all 4 quadrants  Workstation performed: [id number]     Echo complete w/ contrast if indicated    Result Date: 10/21/2022  Narrative: •  Left Ventricle: Left ventricular cavity size is normal  Wall thickness is normal  The left ventricular ejection fraction is 60-65%  Systolic function is normal  Although no diagnostic regional wall motion abnormality was identified, this possibility cannot be completely excluded on the basis of this study  Diastolic function is mildly abnormal, consistent with grade I (abnormal) relaxation  •  Aorta: The aortic root is mildly dilated 3 9 cm   The ascending aorta is top normal in size  •  Pericardium: Ascites is present           Yandel Gomez MD  [facility] Fellow

## 2022-11-15 NOTE — PATIENT INSTRUCTIONS
Obtain MRI, please schedule  Please stop alcohol use  Restart taking medications, aldactone 100 and lactulose  Patient will schedule MRI      Scheduled date of EGD/colonoscopy (as of today): 1/20/23  Physician performing EGD/colonoscopy: Dr Hernandez  Location of EGD/colonoscopy: [facility]  Desired bowel prep reviewed with patient: Golytely  Instructions reviewed with patient by:Ruth Ann  Clearances: N/A

## 2022-11-16 ENCOUNTER — HOSPITAL ENCOUNTER (OUTPATIENT)
Dept: INTERVENTIONAL RADIOLOGY/VASCULAR | Facility: HOSPITAL | Age: 57
Discharge: HOME | End: 2022-11-16
Attending: INTERNAL MEDICINE

## 2022-11-16 VITALS — HEART RATE: 103 BPM | DIASTOLIC BLOOD PRESSURE: 88 MMHG | SYSTOLIC BLOOD PRESSURE: 133 MMHG | OXYGEN SATURATION: 98 %

## 2022-11-16 DIAGNOSIS — K70.31 ALCOHOLIC CIRRHOSIS OF LIVER WITH ASCITES (HCC): ICD-10-CM

## 2022-11-18 ENCOUNTER — PATIENT OUTREACH (OUTPATIENT)
Dept: FAMILY MEDICINE CLINIC | Facility: CLINIC | Age: 57
End: 2022-11-18

## 2022-11-18 NOTE — PROGRESS NOTES
OP CM SW attempting 2nd outreach to assist with transportation needs  No answer, left message  OP CM SW will send unable to reach letter and outreach patient again in 2 weeks

## 2022-11-18 NOTE — LETTER
[address] [id number] [phone]    Re: Unable to reach letter   11/18/2022       Dear [facility] [address] Work  and wanted to be certain you had information to contact me should you desire assistance with or have questions about non-medical aspects of your care such as [but not limited to] medical insurance, housing, transportation, material needs, or emergency needs, as I have not been able to reach you  If I do not have an answer I will assist you in finding the appropriate agency or individual who can help  Please feel free to contact me at Dept: [phone]  Thank You      Sincerely,         Jeffrey Gonzalez

## 2022-12-01 ENCOUNTER — PATIENT OUTREACH (OUTPATIENT)
Dept: FAMILY MEDICINE CLINIC | Facility: CLINIC | Age: 57
End: 2022-12-01

## 2022-12-01 NOTE — PROGRESS NOTES
OP CHUY ELLIOTT attempting 3rd outreach, unable to reach letter sent 2 weeks ago  No answer, left message  OP CHUY ELLIOTT will close case but is available if patient calls back or for any other future needs

## 2022-12-07 ENCOUNTER — TELEPHONE (OUTPATIENT)
Dept: FAMILY MEDICINE CLINIC | Facility: CLINIC | Age: 57
End: 2022-12-07

## 2022-12-07 DIAGNOSIS — D72.829 LEUKOCYTOSIS, UNSPECIFIED TYPE: ICD-10-CM

## 2022-12-07 DIAGNOSIS — K70.31 ALCOHOLIC CIRRHOSIS OF LIVER WITH ASCITES (HCC): Primary | ICD-10-CM

## 2022-12-07 NOTE — TELEPHONE ENCOUNTER
I called and spoke with the patient on December 7, 2022  He continues to follow-up with GI and the hepatologist for his cirrhosis  He states that his ascites has improved significantly  He missed his last 2 appointments in our office due to scheduling issues  He is interested in getting some follow-up lab work in regards to the leukocytosis and to check his kidney function  I advised him that this is a good idea and he is due for lab work  I advised that he should schedule a follow-up appointment in 2 weeks and I did place the order for labs in the system for him

## 2022-12-14 ENCOUNTER — RA CDI HCC (OUTPATIENT)
Dept: OTHER | Facility: HOSPITAL | Age: 57
End: 2022-12-14

## 2022-12-14 NOTE — PROGRESS NOTES
[facility] [address]  coding opportunities       Chart reviewed, no opportunity found: CHART REVIEWED, NO OPPORTUNITY FOUND        Patients Insurance     Medicare Insurance: Medicare

## 2022-12-20 ENCOUNTER — OFFICE VISIT (OUTPATIENT)
Dept: FAMILY MEDICINE CLINIC | Facility: CLINIC | Age: 57
End: 2022-12-20

## 2022-12-20 ENCOUNTER — APPOINTMENT (OUTPATIENT)
Dept: LAB | Facility: CLINIC | Age: 57
End: 2022-12-20

## 2022-12-20 VITALS
BODY MASS INDEX: 19.43 KG/M2 | DIASTOLIC BLOOD PRESSURE: 74 MMHG | WEIGHT: 123.8 LBS | OXYGEN SATURATION: 99 % | SYSTOLIC BLOOD PRESSURE: 120 MMHG | HEIGHT: 67 IN | HEART RATE: 112 BPM | TEMPERATURE: 98.6 F | RESPIRATION RATE: 20 BRPM

## 2022-12-20 DIAGNOSIS — K76.9 LIVER LESION: ICD-10-CM

## 2022-12-20 DIAGNOSIS — D72.829 LEUKOCYTOSIS, UNSPECIFIED TYPE: ICD-10-CM

## 2022-12-20 DIAGNOSIS — F31.81 BIPOLAR II DISORDER (HCC): ICD-10-CM

## 2022-12-20 DIAGNOSIS — K70.31 ALCOHOLIC CIRRHOSIS OF LIVER WITH ASCITES (HCC): ICD-10-CM

## 2022-12-20 DIAGNOSIS — K70.31 ALCOHOLIC CIRRHOSIS OF LIVER WITH ASCITES (HCC): Primary | ICD-10-CM

## 2022-12-20 DIAGNOSIS — K21.9 GASTROESOPHAGEAL REFLUX DISEASE WITHOUT ESOPHAGITIS: ICD-10-CM

## 2022-12-20 PROBLEM — R19.5 DARK STOOLS: Status: RESOLVED | Noted: 2022-09-28 | Resolved: 2022-12-20

## 2022-12-20 LAB
ALBUMIN SERPL BCP-MCNC: 3.6 G/DL (ref 3.5–5)
ALP SERPL-CCNC: 87 U/L (ref 46–116)
ALT SERPL W P-5'-P-CCNC: 42 U/L (ref 12–78)
ANION GAP SERPL CALCULATED.3IONS-SCNC: 4 MMOL/L (ref 4–13)
AST SERPL W P-5'-P-CCNC: 30 U/L (ref 5–45)
BASOPHILS # BLD AUTO: 0.05 THOUSANDS/ÂΜL (ref 0–0.1)
BASOPHILS NFR BLD AUTO: 1 % (ref 0–1)
BILIRUB SERPL-MCNC: 0.5 MG/DL (ref 0.2–1)
BUN SERPL-MCNC: 15 MG/DL (ref 5–25)
CALCIUM SERPL-MCNC: 9.6 MG/DL (ref 8.3–10.1)
CHLORIDE SERPL-SCNC: 102 MMOL/L (ref 96–108)
CO2 SERPL-SCNC: 29 MMOL/L (ref 21–32)
CREAT SERPL-MCNC: 0.63 MG/DL (ref 0.6–1.3)
EOSINOPHIL # BLD AUTO: 0.09 THOUSAND/ÂΜL (ref 0–0.61)
EOSINOPHIL NFR BLD AUTO: 2 % (ref 0–6)
ERYTHROCYTE [DISTWIDTH] IN BLOOD BY AUTOMATED COUNT: 12.3 % (ref 11.6–15.1)
GFR SERPL CREATININE-BSD FRML MDRD: 110 ML/MIN/1.73SQ M
GLUCOSE P FAST SERPL-MCNC: 142 MG/DL (ref 65–99)
HCT VFR BLD AUTO: 38.9 % (ref 36.5–49.3)
HGB BLD-MCNC: 12.8 G/DL (ref 12–17)
IMM GRANULOCYTES # BLD AUTO: 0.01 THOUSAND/UL (ref 0–0.2)
IMM GRANULOCYTES NFR BLD AUTO: 0 % (ref 0–2)
LYMPHOCYTES # BLD AUTO: 1.21 THOUSANDS/ÂΜL (ref 0.6–4.47)
LYMPHOCYTES NFR BLD AUTO: 21 % (ref 14–44)
MCH RBC QN AUTO: 32.2 PG (ref 26.8–34.3)
MCHC RBC AUTO-ENTMCNC: 32.9 G/DL (ref 31.4–37.4)
MCV RBC AUTO: 98 FL (ref 82–98)
MONOCYTES # BLD AUTO: 0.79 THOUSAND/ÂΜL (ref 0.17–1.22)
MONOCYTES NFR BLD AUTO: 14 % (ref 4–12)
NEUTROPHILS # BLD AUTO: 3.62 THOUSANDS/ÂΜL (ref 1.85–7.62)
NEUTS SEG NFR BLD AUTO: 62 % (ref 43–75)
NRBC BLD AUTO-RTO: 0 /100 WBCS
PLATELET # BLD AUTO: 286 THOUSANDS/UL (ref 149–390)
PMV BLD AUTO: 11.5 FL (ref 8.9–12.7)
POTASSIUM SERPL-SCNC: 4.2 MMOL/L (ref 3.5–5.3)
PROT SERPL-MCNC: 7.4 G/DL (ref 6.4–8.4)
RBC # BLD AUTO: 3.98 MILLION/UL (ref 3.88–5.62)
SODIUM SERPL-SCNC: 135 MMOL/L (ref 135–147)
WBC # BLD AUTO: 5.77 THOUSAND/UL (ref 4.31–10.16)

## 2022-12-20 NOTE — PROGRESS NOTES
Assessment/Plan:  Problem List Items Addressed This Visit        Digestive    Alcoholic cirrhosis of liver with ascites ([facility] [address] ) - Primary     The patient is currently stable on his medication and has little ascites  He has not required a paracentesis in some time  He will follow-up with his hepatologist as scheduled and will continue with his current medications  GERD (gastroesophageal reflux disease)     The patient currently has no symptoms  We will continue to monitor closely  Other    Bipolar II disorder ([facility] )     The patient continues to refuse treatment for his bipolar 2 disorder  We will continue to monitor him closely  Liver lesion     The patient will follow up with his MRI as scheduled and will follow-up with diabetologist as scheduled  Return in about 3 months (around 3/20/2023) for Recheck  I spent 20 minutes during the visit reviewing the history from the patient, performing the examination, discussing the findings with the patient, providing counseling and education, and making a plan  I spent 10 minutes ordering referrals and testing and documenting  Subjective:   Chief Complaint   Patient presents with   • Follow-up     2 week follow-up        Patient ID: Dinora Alfred is a 56 y o  male presents today for routine checkup  Dinora Alfred is a 56 y o  male who presents today for follow-up of his cirrhosis and GERD  His lab work did show an overall improvement in his leukocytosis has resolved  He has been alcohol free since 11/10/2022  He is scheduled for a paracentesis tomorrow  He is staying away from this  He is not eating much- poor appetite- he is drinking Ensure regularly  He just had his BW done today- still pending  The patient denies any chest pain, shortness of breath, or palpitations  There is no edema  There are no headaches or visual changes  There is no lightheadedness, dizziness, or fainting spells    There is nausea, there is no vomiting  There is no abdominal pain  He is scheduled for an EGD and colononscopy  He is sporadically taking his thiamine and folic acid  There are no fevers or chills          The following portions of the patient's history were reviewed and updated as appropriate: allergies, current medications, past family history, past medical history, past social history, past surgical history and problem list   Patient Active Problem List   Diagnosis   • Bipolar II disorder ([facility] )   • Chronic depression   • Chronic sinusitis   • GERD (gastroesophageal reflux disease)   • Nicotine dependence   • Polysubstance dependence ([facility] )   • COPD (chronic obstructive pulmonary disease) ([facility] )   • Cervical radiculopathy due to degenerative joint disease of spine   • Degenerative disc disease, cervical   • Anterolisthesis   • Mass of left hand   • Cough   • Finger injury, right, initial encounter   • Sebaceous cyst   • Lipoma of left lower extremity   • Hepatomegaly   • Alcoholic cirrhosis of liver with ascites ([facility] [address] )   • Ascites   • Alcohol use   • Liver lesion     Past Medical History:   Diagnosis Date   • Bronchitis, asthmatic     last assessed: 3/21/2016   • Depression    • Pneumonia     last assessed: 12/27/2016   • Pneumonia of left lower lobe due to Haemophilus influenzae ([facility] )     last assessed: 11/27/2015   • Sebaceous cyst     last assessed: 12/26/2013   • Sepsis with acute organ dysfunction ([facility] ) 10/14/2022   • Substance abuse ([facility] )    • Suicidal ideation     last assessed: 12/27/2016     Past Surgical History:   Procedure Laterality Date   • IR PARACENTESIS  10/17/2022   • IR PARACENTESIS  10/19/2022   • IR PARACENTESIS  10/21/2022   • IR PARACENTESIS  10/27/2022   • IR PARACENTESIS  11/2/2022   • IR PARACENTESIS  11/5/2022   • IR PARACENTESIS  11/16/2022   • IR PARACENTESIS  12/21/2022     Allergies   Allergen Reactions   • Levofloxacin    • Quinolones      Family History   Problem Relation Age of Onset   • Colon cancer Mother    • Diabetes Mother         mellitus     Social History     Socioeconomic History   • Marital status: Single     Spouse name: Not on file   • Number of children: Not on file   • Years of education: Not on file   • Highest education level: Not on file   Occupational History   • Not on file   Tobacco Use   • Smoking status: Every Day     Packs/day: 1 00     Types: Cigarettes   • Smokeless tobacco: Never   Vaping Use   • Vaping Use: Never used   Substance and Sexual Activity   • Alcohol use: Not Currently     Comment: none since 11/10   • Drug use: Not Currently     Types: Prescription, Methamphetamines     Comment: opiates   • Sexual activity: Not on file   Other Topics Concern   • Not on file   Social History Narrative    Coffee consumption (3 cups/day)     Social Determinants of Health     Financial Resource Strain: Not on file   Food Insecurity: No Food Insecurity   • Worried About Running Out of Food in the Last Year: Never true   • Ran Out of Food in the Last Year: Never true   Transportation Needs: No Transportation Needs   • Lack of Transportation (Medical): No   • Lack of Transportation (Non-Medical): No   Physical Activity: Not on file   Stress: Not on file   Social Connections: Not on file   Intimate Partner Violence: Not At Risk   • Fear of Current or Ex-Partner: No   • Emotionally Abused: No   • Physically Abused: No   • Sexually Abused: No   Housing Stability: Low Risk    • Unable to Pay for Housing in the Last Year: No   • Number of Places Lived in the Last Year: 1   • Unstable Housing in the Last Year: No     Current Outpatient Medications on File Prior to Visit   Medication Sig Dispense Refill   • lactulose 20 g/30 mL Take 30 mL (20 g total) by mouth daily 2700 mL 0   • spironolactone (ALDACTONE) 100 mg tablet Take 1 tablet (100 mg total) by mouth daily 90 tablet 0     No current facility-administered medications on file prior to visit  Review of Systems   Constitutional: Negative      HENT: Negative  Eyes: Negative  Respiratory: Negative  Cardiovascular: Negative  Gastrointestinal: Negative  Endocrine: Negative  Genitourinary: Negative  Musculoskeletal: Negative  Skin: Negative  Allergic/Immunologic: Negative  Neurological: Negative  Hematological: Negative  Psychiatric/Behavioral: Negative  Objective:  Vitals:    12/20/22 1228   BP: 120/74   BP Location: Left arm   Patient Position: Sitting   Cuff Size: Standard   Pulse: (!) 112   Resp: 20   Temp: 98 6 °F (37 °C)   TempSrc: Tympanic   SpO2: 99%   Weight: 56 2 kg (123 lb 12 8 oz)   Height: 5' 7" (1 702 m)     Body mass index is 19 39 kg/m²  Physical Exam  Vitals and nursing note reviewed  Constitutional:       General: He is not in acute distress  Appearance: He is well-developed  He is not diaphoretic  Eyes:      Pupils: Pupils are equal, round, and reactive to light  Neck:      Thyroid: No thyromegaly  Vascular: No JVD  Trachea: No tracheal deviation  Cardiovascular:      Rate and Rhythm: Normal rate and regular rhythm  Heart sounds: Normal heart sounds  No murmur heard  No friction rub  No gallop  Pulmonary:      Effort: Pulmonary effort is normal  No respiratory distress  Breath sounds: Normal breath sounds  No stridor  No wheezing or rales  Chest:      Chest wall: No tenderness  Abdominal:      General: Bowel sounds are normal  There is no distension  Palpations: Abdomen is soft  There is no mass  Tenderness: There is no abdominal tenderness  There is no guarding or rebound  Musculoskeletal:         General: Normal range of motion  Cervical back: Normal range of motion and neck supple  Lymphadenopathy:      Cervical: No cervical adenopathy  Skin:     General: Skin is warm and dry  Coloration: Skin is not pale  Findings: No erythema or rash  Neurological:      Mental Status: He is alert and oriented to person, place, and time  Cranial Nerves: No cranial nerve deficit  Motor: No abnormal muscle tone  Coordination: Coordination normal       Deep Tendon Reflexes: Reflexes are normal and symmetric   Reflexes normal

## 2022-12-21 ENCOUNTER — HOSPITAL ENCOUNTER (OUTPATIENT)
Dept: INTERVENTIONAL RADIOLOGY/VASCULAR | Facility: HOSPITAL | Age: 57
Discharge: HOME | End: 2022-12-21
Attending: INTERNAL MEDICINE

## 2022-12-21 DIAGNOSIS — K70.31 ALCOHOLIC CIRRHOSIS OF LIVER WITH ASCITES (HCC): ICD-10-CM

## 2022-12-21 NOTE — SEDATION DOCUMENTATION
Arrived to IR suite for paracentesis evaluation  U/S performed by Dr Carolyn Figueredo and no para indicated at this time  Ambulated home

## 2022-12-26 NOTE — ASSESSMENT & PLAN NOTE
The patient continues to refuse treatment for his bipolar 2 disorder  We will continue to monitor him closely

## 2022-12-26 NOTE — ASSESSMENT & PLAN NOTE
The patient is currently stable on his medication and has little ascites  He has not required a paracentesis in some time  He will follow-up with his hepatologist as scheduled and will continue with his current medications

## 2022-12-26 NOTE — ASSESSMENT & PLAN NOTE
The patient will follow up with his MRI as scheduled and will follow-up with diabetologist as scheduled

## 2023-01-04 ENCOUNTER — HOSPITAL ENCOUNTER (OUTPATIENT)
Dept: INTERVENTIONAL RADIOLOGY/VASCULAR | Facility: HOSPITAL | Age: 58
Discharge: HOME | End: 2023-01-04
Attending: INTERNAL MEDICINE

## 2023-01-04 DIAGNOSIS — K70.31 ALCOHOLIC CIRRHOSIS OF LIVER WITH ASCITES (HCC): ICD-10-CM

## 2023-01-04 NOTE — SEDATION DOCUMENTATION
Arrived to IR suite for paracentesis evaluation  U/S performed by Dr Tanvir Whelan and no paracentesis indicated at this time

## 2023-01-07 ENCOUNTER — HOSPITAL ENCOUNTER (OUTPATIENT)
Dept: MRI IMAGING | Facility: HOSPITAL | Age: 58
Discharge: HOME | End: 2023-01-07

## 2023-01-07 DIAGNOSIS — K70.31 ALCOHOLIC CIRRHOSIS OF LIVER WITH ASCITES (HCC): ICD-10-CM

## 2023-01-07 RX ADMIN — GADOBUTROL 6 ML: 604.72 INJECTION INTRAVENOUS at 10:50

## 2023-01-12 ENCOUNTER — TELEPHONE (OUTPATIENT)
Dept: GASTROENTEROLOGY | Facility: CLINIC | Age: 58
End: 2023-01-12

## 2023-01-12 DIAGNOSIS — Z78.9 ALCOHOL USE: ICD-10-CM

## 2023-01-12 RX ORDER — LACTULOSE 20 G/30ML
20 SOLUTION ORAL DAILY
Qty: 2700 ML | Refills: 0 | Status: SHIPPED | OUTPATIENT
Start: 2023-01-12 | End: 2023-04-12

## 2023-01-12 NOTE — TELEPHONE ENCOUNTER
Patients GI provider:  Dr Millie John    Number to return call: [phone]    Reason for call: Almon Aschoff calling from Radiology w/ significant findings from pt's MRI of abdomen       Scheduled procedure/appointment date if applicable: Procedure: 4/32/9808

## 2023-01-13 DIAGNOSIS — K76.9 LIVER LESION: Primary | ICD-10-CM

## 2023-01-19 ENCOUNTER — TELEPHONE (OUTPATIENT)
Dept: GASTROENTEROLOGY | Facility: CLINIC | Age: 58
End: 2023-01-19

## 2023-01-19 DIAGNOSIS — Z12.11 SCREENING FOR COLON CANCER: Primary | ICD-10-CM

## 2023-01-19 NOTE — TELEPHONE ENCOUNTER
Patients GI provider:  Dr Destinee Jacob    Number to return call: [phone]    Reason for call: Pt calling stating he has not yet received information regarding his bowel prep for procedure  Pt would like prep sent over to [facility]  According to pt's last office visit, he has Golytely       Scheduled procedure/appointment date if applicable: Procedure: 9/26/8941

## 2023-01-20 ENCOUNTER — HOSPITAL ENCOUNTER (OUTPATIENT)
Dept: GASTROENTEROLOGY | Facility: HOSPITAL | Age: 58
Setting detail: OUTPATIENT SURGERY
End: 2023-01-20

## 2023-01-20 ENCOUNTER — ANESTHESIA (OUTPATIENT)
Dept: GASTROENTEROLOGY | Facility: HOSPITAL | Age: 58
End: 2023-01-20

## 2023-01-20 ENCOUNTER — ANESTHESIA EVENT (OUTPATIENT)
Dept: GASTROENTEROLOGY | Facility: HOSPITAL | Age: 58
End: 2023-01-20

## 2023-01-20 VITALS
OXYGEN SATURATION: 94 % | BODY MASS INDEX: 18.83 KG/M2 | SYSTOLIC BLOOD PRESSURE: 104 MMHG | HEIGHT: 67 IN | HEART RATE: 87 BPM | TEMPERATURE: 97.6 F | WEIGHT: 120 LBS | DIASTOLIC BLOOD PRESSURE: 66 MMHG | RESPIRATION RATE: 20 BRPM

## 2023-01-20 DIAGNOSIS — K74.60 DECOMPENSATED HEPATIC CIRRHOSIS (HCC): ICD-10-CM

## 2023-01-20 DIAGNOSIS — K72.90 DECOMPENSATED HEPATIC CIRRHOSIS (HCC): ICD-10-CM

## 2023-01-20 DIAGNOSIS — K70.31 ASCITES DUE TO ALCOHOLIC CIRRHOSIS (HCC): ICD-10-CM

## 2023-01-20 RX ORDER — LIDOCAINE HYDROCHLORIDE 10 MG/ML
INJECTION, SOLUTION EPIDURAL; INFILTRATION; INTRACAUDAL; PERINEURAL AS NEEDED
Status: DISCONTINUED | OUTPATIENT
Start: 2023-01-20 | End: 2023-01-20

## 2023-01-20 RX ORDER — PROPOFOL 10 MG/ML
INJECTION, EMULSION INTRAVENOUS AS NEEDED
Status: DISCONTINUED | OUTPATIENT
Start: 2023-01-20 | End: 2023-01-20

## 2023-01-20 RX ORDER — SODIUM CHLORIDE 9 MG/ML
INJECTION, SOLUTION INTRAVENOUS CONTINUOUS PRN
Status: DISCONTINUED | OUTPATIENT
Start: 2023-01-20 | End: 2023-01-20

## 2023-01-20 RX ADMIN — SODIUM CHLORIDE: 0.9 INJECTION, SOLUTION INTRAVENOUS at 09:15

## 2023-01-20 RX ADMIN — LIDOCAINE HYDROCHLORIDE 60 MG: 10 INJECTION, SOLUTION EPIDURAL; INFILTRATION; INTRACAUDAL; PERINEURAL at 09:08

## 2023-01-20 RX ADMIN — SODIUM CHLORIDE: 0.9 INJECTION, SOLUTION INTRAVENOUS at 09:12

## 2023-01-20 RX ADMIN — PROPOFOL 120 MG: 10 INJECTION, EMULSION INTRAVENOUS at 09:09

## 2023-01-20 NOTE — ANESTHESIA POSTPROCEDURE EVALUATION
Post-Op Assessment Note    CV Status:  Stable    Pain management: adequate     Mental Status:  Alert and awake   Hydration Status:  Euvolemic   PONV Controlled:  Controlled   Airway Patency:  Patent      Post Op Vitals Reviewed: Yes            No notable events documented      BP   103/76   Temp      Pulse 75   Resp   14   SpO2   97% RA

## 2023-01-20 NOTE — ANESTHESIA PREPROCEDURE EVALUATION
Procedure:  COLONOSCOPY  EGD    Relevant Problems   GI/HEPATIC   (+) Alcoholic cirrhosis of liver with ascites (HCC)   (+) GERD (gastroesophageal reflux disease)   (+) Hepatomegaly      MUSCULOSKELETAL   (+) Degenerative disc disease, cervical      NEURO/PSYCH   (+) Chronic depression      PULMONARY   (+) COPD (chronic obstructive pulmonary disease) (HCC)      Other   (+) Alcohol use   (+) Bipolar II disorder (HCC)   (+) Nicotine dependence   (+) Polysubstance dependence (HCC)        Physical Exam    Airway    Mallampati score: II  TM Distance: >3 FB       Dental   No notable dental hx     Cardiovascular      Pulmonary      Other Findings        Anesthesia Plan  ASA Score- 3     Anesthesia Type- IV sedation with anesthesia with ASA Monitors  Additional Monitors:   Airway Plan:     Comment: Pt admitted to using Meth 14 hrs ago  Says he has no effects today from it  Expressed importance of being NPO and not having used anything since then to be able to safely have sedation for his procedures today  He attests that he is NPO and has had nothing in over 14 hrs          Plan Factors-    Chart reviewed  Patient is a current smoker  Patient smoked on day of surgery  Induction- intravenous  Postoperative Plan-     Informed Consent- Anesthetic plan and risks discussed with patient  I personally reviewed this patient with the CRNA  Discussed and agreed on the Anesthesia Plan with the CRNA  Clarisse Menon

## 2023-01-20 NOTE — H&P
History and Physical - [facility]  Osmany Pollen 56 y o  male MRN: [id number]                  HPI: Osmany Pollen is a 56y o  year old male who presents for variceal and CRC screening  Unfortunately, he did not complete the prep and report ongoing solid stool  REVIEW OF SYSTEMS: Per the HPI, and otherwise unremarkable      Historical Information   Past Medical History:   Diagnosis Date   • Bronchitis, asthmatic     last assessed: 3/21/2016   • Depression    • Pneumonia     last assessed: 12/27/2016   • Pneumonia of left lower lobe due to Haemophilus influenzae ([facility] [address] )     last assessed: 11/27/2015   • Sebaceous cyst     last assessed: 12/26/2013   • Sepsis with acute organ dysfunction ([facility] [address] ) 10/14/2022   • Substance abuse ([facility] )    • Suicidal ideation     last assessed: 12/27/2016     Past Surgical History:   Procedure Laterality Date   • IR PARACENTESIS  10/17/2022   • IR PARACENTESIS  10/19/2022   • IR PARACENTESIS  10/21/2022   • IR PARACENTESIS  10/27/2022   • IR PARACENTESIS  11/2/2022   • IR PARACENTESIS  11/5/2022   • IR PARACENTESIS  11/16/2022   • IR PARACENTESIS  12/21/2022   • IR PARACENTESIS  1/4/2023     Social History   Social History     Substance and Sexual Activity   Alcohol Use Not Currently    Comment: none since 11/10     Social History     Substance and Sexual Activity   Drug Use Not Currently   • Types: Prescription, Methamphetamines    Comment: opiates     Social History     Tobacco Use   Smoking Status Every Day   • Packs/day: 1 00   • Types: Cigarettes   Smokeless Tobacco Never     Family History   Problem Relation Age of Onset   • Colon cancer Mother    • Diabetes Mother         mellitus       Meds/Allergies       Current Outpatient Medications:   •  lactulose 20 g/30 mL  •  polyethylene glycol (GOLYTELY) 4000 mL solution  •  spironolactone (ALDACTONE) 100 mg tablet    Allergies   Allergen Reactions   • Levofloxacin    • Quinolones        Objective     /65 Pulse 82   Temp 97 6 °F (36 4 °C) (Temporal)   Resp 20   Ht 5' 7" (1 702 m)   Wt 54 4 kg (120 lb)   SpO2 100%   BMI 18 79 kg/m²       PHYSICAL EXAM    Gen: NAD  Head: NCAT  CV: RRR  CHEST: Clear  ABD: soft, NT/ND  EXT: no edema      ASSESSMENT/PLAN:  This is a 56y o  year old male here for EGD/colonoscopy, however we will proceed only with his endoscopy due to failure to prep

## 2023-01-25 DIAGNOSIS — A04.8 H. PYLORI INFECTION: Primary | ICD-10-CM

## 2023-01-25 RX ORDER — CLARITHROMYCIN 500 MG/1
500 TABLET, COATED ORAL EVERY 12 HOURS SCHEDULED
Qty: 28 TABLET | Refills: 0 | Status: SHIPPED | OUTPATIENT
Start: 2023-01-25 | End: 2023-02-08

## 2023-01-25 RX ORDER — PANTOPRAZOLE SODIUM 40 MG/1
40 TABLET, DELAYED RELEASE ORAL EVERY 12 HOURS
Qty: 28 TABLET | Refills: 0 | Status: SHIPPED | OUTPATIENT
Start: 2023-01-25 | End: 2023-04-19

## 2023-01-25 RX ORDER — AMOXICILLIN 500 MG/1
1000 CAPSULE ORAL EVERY 12 HOURS SCHEDULED
Qty: 56 CAPSULE | Refills: 0 | Status: SHIPPED | OUTPATIENT
Start: 2023-01-25 | End: 2023-02-08

## 2023-01-26 ENCOUNTER — TELEPHONE (OUTPATIENT)
Dept: GASTROENTEROLOGY | Facility: CLINIC | Age: 58
End: 2023-01-26

## 2023-01-26 ENCOUNTER — TELEPHONE (OUTPATIENT)
Dept: OTHER | Facility: OTHER | Age: 58
End: 2023-01-26

## 2023-01-26 NOTE — TELEPHONE ENCOUNTER
Patients GI provider:  Dr Gonzalez Barnes    Number to return call: [phone]    Reason for call: Pt calling asking if he can be scheduled for colonoscopy for 2/01/2023 as he states his colonoscopy was unable to be done previously 1/20/2023  Limited to no availabioltiy w/ Dr Gonzalez Barnes in [place] as pt states he wanted to be scheduled before 2/15/2023  Please reach out to pt regarding this matter  There is no indication in notes when colonoscopy should be scheduled again      Scheduled procedure/appointment date if applicable: N/A

## 2023-01-26 NOTE — TELEPHONE ENCOUNTER
Patient called in and had some questions in regards to his most recent prescriptions  He wanted to know why he was put on both the clarithromycin and the amoxicillin  He is a little concerned about how that is going to affect his stomach  Please call patient back to discuss his concerns  2020 09:35

## 2023-01-26 NOTE — TELEPHONE ENCOUNTER
Reviewed 1/20/23 EGD biopsy results with patient  Dr Zo Pisano -Appended note  No significant abnormalities  H pylori treatment not required

## 2023-01-31 ENCOUNTER — PREP FOR PROCEDURE (OUTPATIENT)
Dept: GASTROENTEROLOGY | Facility: CLINIC | Age: 58
End: 2023-01-31

## 2023-01-31 DIAGNOSIS — K70.31 ALCOHOLIC CIRRHOSIS OF LIVER WITH ASCITES (HCC): Primary | ICD-10-CM

## 2023-01-31 NOTE — TELEPHONE ENCOUNTER
Scheduled date of colonoscopy (as of today):02/24/23  Physician performing colonoscopy: David  Location of colonoscopy:[place] [facility]  Bowel prep reviewed with patient: to review with pt  Instructions reviewed with patient by: to review with pt  Clearances: none

## 2023-02-01 ENCOUNTER — HOSPITAL ENCOUNTER (OUTPATIENT)
Dept: INTERVENTIONAL RADIOLOGY/VASCULAR | Facility: HOSPITAL | Age: 58
Discharge: HOME | End: 2023-02-01
Attending: INTERNAL MEDICINE | Admitting: RADIOLOGY

## 2023-02-01 DIAGNOSIS — K70.31 ALCOHOLIC CIRRHOSIS OF LIVER WITH ASCITES (HCC): ICD-10-CM

## 2023-02-01 NOTE — SEDATION DOCUMENTATION
Abdomen scanned with US  No paracentesis needed at this time per Dr Nori Macario  AVS printed per patient request   Discharged ambulatory

## 2023-02-24 ENCOUNTER — HOSPITAL ENCOUNTER (OUTPATIENT)
Dept: GASTROENTEROLOGY | Facility: HOSPITAL | Age: 58
Setting detail: OUTPATIENT SURGERY
Discharge: HOME | End: 2023-02-24
Attending: INTERNAL MEDICINE

## 2023-02-24 DIAGNOSIS — K70.31 ALCOHOLIC CIRRHOSIS OF LIVER WITH ASCITES (HCC): ICD-10-CM

## 2023-03-16 NOTE — PROGRESS NOTES
"SUBJECTIVE:   Sal is a 64 year old who presents for Preventive Visit.  Patient has been advised of split billing requirements and indicates understanding: Yes  Are you in the first 12 months of your Medicare coverage?  No    Healthy Habits:     In general, how would you rate your overall health?  Good    Frequency of exercise:  None    Do you usually eat at least 4 servings of fruit and vegetables a day, include whole grains    & fiber and avoid regularly eating high fat or \"junk\" foods?  Yes    Taking medications regularly:  Yes    Medication side effects:  Muscle aches and Other    Ability to successfully perform activities of daily living:  Bathing requires assistance    Home Safety:  Lack of grab bars in the bathroom    Hearing Impairment:  Feel that people are mumbling or not speaking clearly    In the past 6 months, have you been bothered by leaking of urine?  No    In general, how would you rate your overall mental or emotional health?  Good      PHQ-2 Total Score: 0    Additional concerns today:  No      Have you ever done Advance Care Planning? (For example, a Health Directive, POLST, or a discussion with a medical provider or your loved ones about your wishes): Yes, patient states has an Advance Care Planning document and will bring a copy to the clinic.    Fall risk     click delete button to remove this line now  Cognitive Screening   1) Repeat 3 items (Leader, Season, Table)    2) Clock draw: NORMAL  3) 3 item recall: Recalls 1 object   Results: NORMAL clock, 1-2 items recalled: COGNITIVE IMPAIRMENT LESS LIKELY    Mini-CogTM Copyright LILI Garcia. Licensed by the author for use in [facility]; reprinted with permission (sarah@.[facility]). All rights reserved.      Do you have sleep apnea, excessive snoring or daytime drowsiness?: no    Reviewed and updated as needed this visit by clinical staff   Tobacco  Allergies  Meds              Reviewed and updated as needed this visit by Provider          " Outpatient Care Management Note:    New direct PCP referral received from Dr Elliott Salazar  Patient was in ER on 11/5 for a paracentesis  Chart reviewed  Patient needs transportation services  Cm will place a referral to social work for outreach    CM removed self from the care team         Social History     Tobacco Use     Smoking status: Never     Smokeless tobacco: Never   Substance Use Topics     Alcohol use: Yes     Comment: Alcoholic Drinks/day: occasional         Alcohol Use 3/16/2023   Prescreen: >3 drinks/day or >7 drinks/week? No         Current providers sharing in care for this patient include:   Patient Care Team:  Darrius Lagunas MD as PCP - General (Internal Medicine)  Darrius Lagunas MD as Assigned PCP    The following health maintenance items are reviewed in Epic and correct as of today:  Health Maintenance   Topic Date Due     ANNUAL REVIEW OF HM ORDERS  Never done     ADVANCE CARE PLANNING  Never done     EYE EXAM  Never done     HIV SCREENING  Never done     MEDICARE ANNUAL WELLNESS VISIT  Never done     HEPATITIS C SCREENING  Never done     ZOSTER IMMUNIZATION (1 of 2) Never done     Pneumococcal Vaccine: Pediatrics (0 to 5 Years) and At-Risk Patients (6 to 64 Years) (2 - PCV) 05/16/2013     DTAP/TDAP/TD IMMUNIZATION (2 - Td or Tdap) 09/14/2020     A1C  11/12/2022     BMP  05/12/2023     LIPID  05/12/2023     MICROALBUMIN  05/12/2023     DIABETIC FOOT EXAM  05/12/2023     PHQ-2 (once per calendar year)  Completed     INFLUENZA VACCINE  Completed     COVID-19 Vaccine  Completed     IPV IMMUNIZATION  Aged Out     MENINGITIS IMMUNIZATION  Aged Out     COLORECTAL CANCER SCREENING  Discontinued     Lab work is in process        Review of Systems   Constitutional: Negative for fever.   HENT: Positive for congestion and hearing loss. Negative for ear pain and sore throat.    Eyes: Negative for pain and visual disturbance.   Respiratory: Negative for cough and shortness of breath.    Cardiovascular: Negative for chest pain and peripheral edema.   Gastrointestinal: Negative for abdominal pain, constipation, diarrhea, heartburn, hematochezia and nausea.   Genitourinary: Negative for dysuria, frequency, genital sores, hematuria, impotence and urgency.   Musculoskeletal: Positive for  "arthralgias and myalgias. Negative for joint swelling.   Skin: Negative for rash.   Neurological: Negative for dizziness, weakness, headaches and paresthesias.   Psychiatric/Behavioral: Negative for mood changes. The patient is nervous/anxious.      Constitutional, HEENT, cardiovascular, pulmonary, gi and gu systems are negative, except as otherwise noted.    OBJECTIVE:   /78   Pulse 74   Temp 98  F (36.7  C)   Resp 16   Ht 1.803 m (5' 11\")   Wt 117.5 kg (259 lb)   SpO2 98%   BMI 36.12 kg/m   Estimated body mass index is 36.12 kg/m  as calculated from the following:    Height as of this encounter: 1.803 m (5' 11\").    Weight as of this encounter: 117.5 kg (259 lb).  Physical Exam  GENERAL: healthy, alert and no distress  NECK: no adenopathy, no asymmetry, masses, or scars and thyroid normal to palpation  RESP: lungs clear to auscultation - no rales, rhonchi or wheezes  CV: regular rate and rhythm, normal S1 S2, no S3 or S4, no murmur, click or rub, no peripheral edema and peripheral pulses strong  ABDOMEN: soft, nontender, no hepatosplenomegaly, no masses and bowel sounds normal  MS: no gross musculoskeletal defects noted, no edema    Diagnostic Test Results:  Labs reviewed in Epic    ASSESSMENT / PLAN:     1. Encounter for annual wellness exam in Medicare patient  Chronic medical issues reviewed    2. Type 2 diabetes mellitus with other specified complication, with long-term current use of insulin (H)  He is due for A1c today and I anticipate it will not be at goal.  Historically, his diabetes has not been very well controlled.  This is likely due to a bit of apathy and disinterest on the part of the patient.  \"Frankly, I did not expect to live this long and so that is probably why I do not take my diabetes very seriously.\"    We talked about referral to the [facility] pharmacist if his A1c is above goal.  He needs new testing supplies and this was provided for him.    There is a discrepancy on his Lantus " dosing; his records indicate that he should be using 65 units but he tells us he is only using 52.    He is overdue on a diabetic eye exam and so referral was placed for him today.    - Adult Eye  Referral; Future  - HEMOGLOBIN A1C; Future  - blood glucose monitoring (NO BRAND SPECIFIED) meter device kit; Use to test blood sugar 4 times daily or as directed. Preferred blood glucose meter OR supplies to accompany: Blood Glucose Monitor Brands: per insurance.  Dispense: 1 kit; Refill: 0  - blood glucose (NO BRAND SPECIFIED) test strip; Use to test blood sugar 4 times daily or as directed. To accompany: Blood Glucose Monitor Brands: per insurance.  Dispense: 100 strip; Refill: 6  - thin (NO BRAND SPECIFIED) lancets; Use to test blood sugar 4 times daily or as directed. To accompany: Blood Glucose Monitor Brands: per insurance.  Dispense: 200 each; Refill: 6  - alcohol swab prep pads; Use to swab area of injection/melecio as directed.  Dispense: 100 each; Refill: 3  - Comprehensive metabolic panel; Future    3. Screening for HIV (human immunodeficiency virus)  - HIV Antigen Antibody Combo; Future    4. Need for hepatitis C screening test  - Hepatitis C Screen Reflex to HCV RNA Quant and Genotype; Future    5. Laceration of right upper extremity, initial encounter  - TDAP VACCINE (Adacel, Boostrix)    6. Need for pneumococcal vaccine  - Pneumococcal 20 Valent Conjugate (Prevnar 20)    7. Screening for lipoid disorders  - Lipid Profile (Chol, Trig, HDL, LDL calc); Future    8. Chemotherapy-induced peripheral neuropathy (H)  Foot exam done today and within normal limits    9. Rectal cancer (H)  Follows with [place] [facility]    10. Other pulmonary embolism without acute cor pulmonale, unspecified chronicity (H)  On warfarin.  He has INRs checked at [place] [facility]'s office once monthly    Patient Instructions   Tetanus shot and pneumonia shot today.    We will check variety of labs.  Results come through  "MyChart.    If your diabetes is not well controlled, I would have you see the pharmacist for discussion your diabetes medications.    Follow-up in 3 months for repeat diabetic check with PCP        Patient has been advised of split billing requirements and indicates understanding: Yes      COUNSELING:  Reviewed preventive health counseling, as reflected in patient instructions       Regular exercise       Healthy diet/nutrition       Vision screening       Hearing screening      BMI:   Estimated body mass index is 36.12 kg/m  as calculated from the following:    Height as of this encounter: 1.803 m (5' 11\").    Weight as of this encounter: 117.5 kg (259 lb).   Weight management plan: Discussed healthy diet and exercise guidelines      He reports that he has never smoked. He has never used smokeless tobacco.      Appropriate preventive services were discussed with this patient, including applicable screening as appropriate for cardiovascular disease, diabetes, osteopenia/osteoporosis, and glaucoma.  As appropriate for age/gender, discussed screening for colorectal cancer, prostate cancer, breast cancer, and cervical cancer. Checklist reviewing preventive services available has been given to the patient.    Reviewed patients plan of care and provided an AVS. The Basic Care Plan (routine screening as documented in Health Maintenance) for Hero meets the Care Plan requirement. This Care Plan has been established and reviewed with the Patient.      Santana Blackwood, [facility]    Identified Health Risks:  "

## 2023-04-19 ENCOUNTER — LAB (OUTPATIENT)
Dept: LAB | Facility: CLINIC | Age: 58
End: 2023-04-19

## 2023-04-19 DIAGNOSIS — K21.9 GASTROESOPHAGEAL REFLUX DISEASE WITHOUT ESOPHAGITIS: ICD-10-CM

## 2023-04-19 DIAGNOSIS — K76.9 LIVER LESION: ICD-10-CM

## 2023-04-19 DIAGNOSIS — K70.31 ALCOHOLIC CIRRHOSIS OF LIVER WITH ASCITES (HCC): ICD-10-CM

## 2023-04-19 LAB
ALBUMIN SERPL BCP-MCNC: 3.9 G/DL (ref 3.5–5)
ALP SERPL-CCNC: 73 U/L (ref 46–116)
ALT SERPL W P-5'-P-CCNC: 31 U/L (ref 12–78)
ANION GAP SERPL CALCULATED.3IONS-SCNC: 2 MMOL/L (ref 4–13)
AST SERPL W P-5'-P-CCNC: 24 U/L (ref 5–45)
BASOPHILS # BLD AUTO: 0.03 THOUSANDS/ΜL (ref 0–0.1)
BASOPHILS NFR BLD AUTO: 0 % (ref 0–1)
BILIRUB SERPL-MCNC: 0.76 MG/DL (ref 0.2–1)
BUN SERPL-MCNC: 10 MG/DL (ref 5–25)
CALCIUM SERPL-MCNC: 9.5 MG/DL (ref 8.3–10.1)
CHLORIDE SERPL-SCNC: 104 MMOL/L (ref 96–108)
CO2 SERPL-SCNC: 29 MMOL/L (ref 21–32)
CREAT SERPL-MCNC: 0.76 MG/DL (ref 0.6–1.3)
EOSINOPHIL # BLD AUTO: 0.1 THOUSAND/ΜL (ref 0–0.61)
EOSINOPHIL NFR BLD AUTO: 1 % (ref 0–6)
ERYTHROCYTE [DISTWIDTH] IN BLOOD BY AUTOMATED COUNT: 11.9 % (ref 11.6–15.1)
GFR SERPL CREATININE-BSD FRML MDRD: 102 ML/MIN/1.73SQ M
GGT SERPL-CCNC: 57 U/L (ref 5–85)
GLUCOSE SERPL-MCNC: 82 MG/DL (ref 65–140)
HCT VFR BLD AUTO: 43.1 % (ref 36.5–49.3)
HGB BLD-MCNC: 14.7 G/DL (ref 12–17)
IMM GRANULOCYTES # BLD AUTO: 0.02 THOUSAND/UL (ref 0–0.2)
IMM GRANULOCYTES NFR BLD AUTO: 0 % (ref 0–2)
LYMPHOCYTES # BLD AUTO: 1.6 THOUSANDS/ΜL (ref 0.6–4.47)
LYMPHOCYTES NFR BLD AUTO: 21 % (ref 14–44)
MCH RBC QN AUTO: 31.7 PG (ref 26.8–34.3)
MCHC RBC AUTO-ENTMCNC: 34.1 G/DL (ref 31.4–37.4)
MCV RBC AUTO: 93 FL (ref 82–98)
MONOCYTES # BLD AUTO: 0.86 THOUSAND/ΜL (ref 0.17–1.22)
MONOCYTES NFR BLD AUTO: 11 % (ref 4–12)
NEUTROPHILS # BLD AUTO: 5.2 THOUSANDS/ΜL (ref 1.85–7.62)
NEUTS SEG NFR BLD AUTO: 67 % (ref 43–75)
NRBC BLD AUTO-RTO: 0 /100 WBCS
PLATELET # BLD AUTO: 331 THOUSANDS/UL (ref 149–390)
PMV BLD AUTO: 9.7 FL (ref 8.9–12.7)
POTASSIUM SERPL-SCNC: 4.4 MMOL/L (ref 3.5–5.3)
PROT SERPL-MCNC: 7.6 G/DL (ref 6.4–8.4)
RBC # BLD AUTO: 4.64 MILLION/UL (ref 3.88–5.62)
SODIUM SERPL-SCNC: 135 MMOL/L (ref 135–147)
WBC # BLD AUTO: 7.81 THOUSAND/UL (ref 4.31–10.16)

## 2023-04-21 NOTE — RESULT ENCOUNTER NOTE
Spoke with the patient and relayed his lab results  Instructed him to follow-up with GI as previously discussed with Dr Kati Paul  Patient understands and has no questions or concerns to be addressed at this time

## 2023-05-01 ENCOUNTER — OFFICE VISIT (OUTPATIENT)
Dept: FAMILY MEDICINE CLINIC | Facility: CLINIC | Age: 58
End: 2023-05-01

## 2023-05-01 VITALS
HEIGHT: 67 IN | RESPIRATION RATE: 16 BRPM | TEMPERATURE: 97.5 F | HEART RATE: 88 BPM | BODY MASS INDEX: 21.94 KG/M2 | SYSTOLIC BLOOD PRESSURE: 120 MMHG | DIASTOLIC BLOOD PRESSURE: 70 MMHG | WEIGHT: 139.8 LBS | OXYGEN SATURATION: 99 %

## 2023-05-01 DIAGNOSIS — F17.210 CONTINUOUS DEPENDENCE ON CIGARETTE SMOKING: ICD-10-CM

## 2023-05-01 DIAGNOSIS — H26.9 CATARACT OF LEFT EYE, UNSPECIFIED CATARACT TYPE: ICD-10-CM

## 2023-05-01 DIAGNOSIS — Z01.818 PREOPERATIVE CLEARANCE: Primary | ICD-10-CM

## 2023-05-01 DIAGNOSIS — J43.9 PULMONARY EMPHYSEMA, UNSPECIFIED EMPHYSEMA TYPE (HCC): ICD-10-CM

## 2023-05-01 DIAGNOSIS — K70.30 ALCOHOLIC CIRRHOSIS, UNSPECIFIED WHETHER ASCITES PRESENT (HCC): ICD-10-CM

## 2023-05-01 NOTE — PROGRESS NOTES
NAME: Dakota Beltran is a 56 y o  male  : 1965    MRN: [id number]  DATE: May 1, 2023  TIME: 11:44 AM    Assessment and Plan   Diagnoses and all orders for this visit:    Preoperative clearance  Comments:  Patient is medically cleared for cataract surgery  Cataract of left eye, unspecified cataract type    Alcoholic cirrhosis, unspecified whether ascites present [facility])  Comments:  Patient states last alcohol consumption was approximately 7 months ago  Continuous dependence on cigarette smoking  Comments:  Smoking cessation discussed and encouraged    Pulmonary emphysema, unspecified emphysema type ([facility] [address] )      Tobacco Cessation Counseling: Tobacco cessation counseling and education was provided  The patient is sincerely urged to quit consumption of tobacco  He is not ready to quit tobacco  The numerous health risks of tobacco consumption were discussed  If he decides to quit, there are a number of helpful adjunctive aids, and he can see me to discuss nicotine replacement therapy, chantix, or bupropion anytime in the future  Echocardiogram from 2022 reviewed  Left ventricle size normal with EF of 60 to 65%  Normal systolic function  Rate 1 diastolic dysfunction  Aortic root mildly dilated at 3 9 cm  EKG 2022 reviewed and showing sinus tachycardia otherwise normal EKG    Preop clearance completed and faxed to Ophthalmology  Patient needs to schedule his Medicare wellness visit  Patient will call with any questions or concerns  30 minutes spent with patient reviewing history, performing exam, reviewing previous studies, counseling on smoking and documentation  Chief Complaint     Chief Complaint   Patient presents with    Pre-op Exam     Cataract left ey 5/10/23         History of Present Illness       HPI  51-year-old male here today for preop clearance for left cataract surgery scheduled for May 10, 2023    Patient with history of alcoholic cirrhosis, mild COPD/emphysema, continuous tobacco abuse  Patient has no new complaints today  States last consumption of alcohol was approximately 7 months ago  Smoking cessation discussed and encouraged  Review of Systems   Review of Systems   Constitutional: Negative  HENT: Negative  Respiratory: Negative  Cardiovascular: Negative  Gastrointestinal: Negative  Genitourinary: Negative  Neurological: Negative  Psychiatric/Behavioral: Negative  All other systems reviewed and are negative          Current Medications       Current Outpatient Medications:     lactulose 20 g/30 mL, Take 30 mL (20 g total) by mouth daily, Disp: 2700 mL, Rfl: 0    spironolactone (ALDACTONE) 100 mg tablet, Take 1 tablet (100 mg total) by mouth daily, Disp: 90 tablet, Rfl: 0    Current Allergies     Allergies as of 05/01/2023 - Reviewed 05/01/2023   Allergen Reaction Noted    Levofloxacin  11/27/2015    Quinolones  09/24/2016            The following portions of the patient's history were reviewed and updated as appropriate: allergies, current medications, past family history, past medical history, past social history, past surgical history and problem list      Past Medical History:   Diagnosis Date    Bronchitis, asthmatic     last assessed: 3/21/2016    Depression     Pneumonia     last assessed: 12/27/2016    Pneumonia of left lower lobe due to Haemophilus influenzae ([facility] [address] )     last assessed: 11/27/2015    Sebaceous cyst     last assessed: 12/26/2013    Sepsis with acute organ dysfunction ([facility] [address] ) 10/14/2022    Substance abuse ([facility] )     Suicidal ideation     last assessed: 12/27/2016       Past Surgical History:   Procedure Laterality Date    IR PARACENTESIS  10/17/2022    IR PARACENTESIS  10/19/2022    IR PARACENTESIS  10/21/2022    IR PARACENTESIS  10/27/2022    IR PARACENTESIS  11/2/2022    IR PARACENTESIS  11/5/2022    IR PARACENTESIS  11/16/2022    IR PARACENTESIS  12/21/2022    IR PARACENTESIS "1/4/2023    IR PARACENTESIS  2/1/2023       Family History   Problem Relation Age of Onset    Colon cancer Mother     Diabetes Mother         mellitus         Medications have been verified  Objective   /70 (BP Location: Left arm, Patient Position: Sitting, Cuff Size: Large)   Pulse 88   Temp 97 5 °F (36 4 °C) (Tympanic)   Resp 16   Ht 5' 7\" (1 702 m)   Wt 63 4 kg (139 lb 12 8 oz)   SpO2 99%   BMI 21 90 kg/m²        Physical Exam     Physical Exam  Vitals and nursing note reviewed  Constitutional:       General: He is not in acute distress  Appearance: He is not ill-appearing, toxic-appearing or diaphoretic  HENT:      Head: Normocephalic and atraumatic  Right Ear: Tympanic membrane normal       Left Ear: Tympanic membrane normal       Nose: Nose normal       Mouth/Throat:      Mouth: Mucous membranes are moist       Pharynx: Oropharynx is clear  No oropharyngeal exudate  Eyes:      General: No scleral icterus  Conjunctiva/sclera: Conjunctivae normal       Pupils: Pupils are equal, round, and reactive to light  Neck:      Trachea: No tracheal deviation  Cardiovascular:      Rate and Rhythm: Normal rate and regular rhythm  Heart sounds: Normal heart sounds  No murmur heard  Pulmonary:      Effort: Pulmonary effort is normal  No respiratory distress  Breath sounds: Normal breath sounds  Abdominal:      General: Bowel sounds are normal       Palpations: Abdomen is soft  Tenderness: There is no abdominal tenderness  Musculoskeletal:         General: No tenderness  Cervical back: Normal range of motion and neck supple  Right lower leg: No edema  Left lower leg: No edema  Lymphadenopathy:      Cervical: No cervical adenopathy  Skin:     General: Skin is warm and dry  Capillary Refill: Capillary refill takes less than 2 seconds  Coloration: Skin is not jaundiced  Findings: No rash     Neurological:      General: No focal " deficit present  Mental Status: He is alert and oriented to person, place, and time  Motor: No weakness or abnormal muscle tone        Coordination: Coordination normal    Psychiatric:         Mood and Affect: Mood normal

## 2023-05-03 ENCOUNTER — TELEPHONE (OUTPATIENT)
Dept: FAMILY MEDICINE CLINIC | Facility: CLINIC | Age: 58
End: 2023-05-03

## 2023-05-03 NOTE — TELEPHONE ENCOUNTER
[facility] [place] was calling  They wanted to know if you were able to fill out the pre op clearance form for the patient  I dont see it in the chart   Do you have it still? thanks

## 2023-05-03 NOTE — TELEPHONE ENCOUNTER
Looks like you were working with [facility] that day  Do you know where the medical clearance form is? It is not in patients chart and they never received it

## 2023-06-15 ENCOUNTER — TELEPHONE (OUTPATIENT)
Dept: GASTROENTEROLOGY | Facility: CLINIC | Age: 58
End: 2023-06-15

## 2023-06-15 DIAGNOSIS — K70.31 ALCOHOLIC CIRRHOSIS OF LIVER WITH ASCITES (HCC): Primary | ICD-10-CM

## 2023-06-15 RX ORDER — SODIUM CHLORIDE 9 MG/ML
20 INJECTION, SOLUTION INTRAVENOUS ONCE
OUTPATIENT
Start: 2023-06-15

## 2023-06-15 NOTE — TELEPHONE ENCOUNTER
----- Message from Starleen Angelucci, MD sent at 6/15/2023 10:28 AM EDT -----  Regarding: RE: Albumin Orders  Dr Sheldon Brice or myself never actually saw this patient in the office  I believe we scoped him  None-the-less, I placed the albumin orders  We should make sure he has a follow-up appointent in our office  With the fellows on a Tuesday afternoon is fine  ----- Message -----  From: Gerardo Filed  Sent: 6/15/2023   9:50 AM EDT  To: Dago Rice PA-C; #  Subject: FW: Albumin Orders                                 ----- Message -----  From: Emre Anderson  Sent: 6/15/2023   9:27 AM EDT  To: Gerardo Field  Subject: FW: Albumin Orders                               Dustin Iniguez - I was not sure what to do with this - I sent it back to [facility] to forward to ordering doctors Sheldon Brice or Ezequiel Hameed  Do you have any other suggestions?  ----- Message -----  From: Cydney Castro DO  Sent: 6/15/2023   8:32 AM EDT  To: #  Subject: FW: Albumin Orders                               Sees hepatology and is overdue for follow-up with them  Can albumin protocol be added to previously ordered paracentesis?  ----- Message -----  From: Jaz White  Sent: 6/14/2023  11:24 AM EDT  To: Cydney Castro DO; [facility]  Subject: Albumin Orders                                   Good morning,    Johnny called to schedule a paracentesis, his order states he needs albumin  Can you please place the orders for his albumin       Thank you,  [facility]

## 2023-06-30 ENCOUNTER — HOSPITAL ENCOUNTER (OUTPATIENT)
Dept: INFUSION CENTER | Facility: HOSPITAL | Age: 58
Discharge: HOME | End: 2023-06-30
Attending: INTERNAL MEDICINE

## 2023-06-30 ENCOUNTER — HOSPITAL ENCOUNTER (OUTPATIENT)
Dept: INTERVENTIONAL RADIOLOGY/VASCULAR | Facility: HOSPITAL | Age: 58
Discharge: HOME | End: 2023-06-30
Admitting: RADIOLOGY
Payer: MEDICARE

## 2023-06-30 VITALS
OXYGEN SATURATION: 99 % | HEART RATE: 87 BPM | SYSTOLIC BLOOD PRESSURE: 128 MMHG | RESPIRATION RATE: 18 BRPM | DIASTOLIC BLOOD PRESSURE: 86 MMHG

## 2023-06-30 DIAGNOSIS — K70.31 ALCOHOLIC CIRRHOSIS OF LIVER WITH ASCITES (HCC): ICD-10-CM

## 2023-06-30 PROCEDURE — 49083 ABD PARACENTESIS W/IMAGING: CPT

## 2023-07-01 ENCOUNTER — HOSPITAL ENCOUNTER (OUTPATIENT)
Dept: MRI IMAGING | Facility: HOSPITAL | Age: 58
Discharge: HOME | End: 2023-07-01
Payer: MEDICARE

## 2023-07-01 DIAGNOSIS — K76.9 LIVER LESION: ICD-10-CM

## 2023-07-01 PROCEDURE — A9585 GADOBUTROL INJECTION: HCPCS | Performed by: INTERNAL MEDICINE

## 2023-07-01 PROCEDURE — G1004 CDSM NDSC: HCPCS

## 2023-07-01 PROCEDURE — 74183 MRI ABD W/O CNTR FLWD CNTR: CPT

## 2023-07-01 RX ADMIN — GADOBUTROL 6 ML: 604.72 INJECTION INTRAVENOUS at 13:16

## 2023-09-28 ENCOUNTER — LAB (OUTPATIENT)
Dept: LAB | Facility: HOSPITAL | Age: 58
End: 2023-09-28
Payer: MEDICARE

## 2023-09-28 ENCOUNTER — OFFICE VISIT (OUTPATIENT)
Dept: GASTROENTEROLOGY | Facility: CLINIC | Age: 58
End: 2023-09-28
Payer: MEDICARE

## 2023-09-28 VITALS
DIASTOLIC BLOOD PRESSURE: 64 MMHG | BODY MASS INDEX: 22.13 KG/M2 | TEMPERATURE: 98.7 F | OXYGEN SATURATION: 97 % | HEART RATE: 75 BPM | SYSTOLIC BLOOD PRESSURE: 107 MMHG | WEIGHT: 141 LBS | HEIGHT: 67 IN

## 2023-09-28 DIAGNOSIS — K70.31 ALCOHOLIC CIRRHOSIS OF LIVER WITH ASCITES (HCC): Primary | ICD-10-CM

## 2023-09-28 DIAGNOSIS — K76.9 LIVER LESION: ICD-10-CM

## 2023-09-28 DIAGNOSIS — K70.31 ALCOHOLIC CIRRHOSIS OF LIVER WITH ASCITES (HCC): ICD-10-CM

## 2023-09-28 LAB
AFP-TM SERPL-MCNC: 2.16 NG/ML (ref 0–9)
BASOPHILS # BLD AUTO: 0.02 THOUSANDS/ÂΜL (ref 0–0.1)
BASOPHILS NFR BLD AUTO: 0 % (ref 0–1)
EOSINOPHIL # BLD AUTO: 0.08 THOUSAND/ÂΜL (ref 0–0.61)
EOSINOPHIL NFR BLD AUTO: 1 % (ref 0–6)
ERYTHROCYTE [DISTWIDTH] IN BLOOD BY AUTOMATED COUNT: 12.3 % (ref 11.6–15.1)
HCT VFR BLD AUTO: 39.1 % (ref 36.5–49.3)
HGB BLD-MCNC: 12.7 G/DL (ref 12–17)
IMM GRANULOCYTES # BLD AUTO: 0.01 THOUSAND/UL (ref 0–0.2)
IMM GRANULOCYTES NFR BLD AUTO: 0 % (ref 0–2)
INR PPP: 1.04 (ref 0.84–1.19)
LYMPHOCYTES # BLD AUTO: 1.53 THOUSANDS/ÂΜL (ref 0.6–4.47)
LYMPHOCYTES NFR BLD AUTO: 22 % (ref 14–44)
MCH RBC QN AUTO: 31.8 PG (ref 26.8–34.3)
MCHC RBC AUTO-ENTMCNC: 32.5 G/DL (ref 31.4–37.4)
MCV RBC AUTO: 98 FL (ref 82–98)
MONOCYTES # BLD AUTO: 0.74 THOUSAND/ÂΜL (ref 0.17–1.22)
MONOCYTES NFR BLD AUTO: 11 % (ref 4–12)
NEUTROPHILS # BLD AUTO: 4.54 THOUSANDS/ÂΜL (ref 1.85–7.62)
NEUTS SEG NFR BLD AUTO: 66 % (ref 43–75)
NRBC BLD AUTO-RTO: 0 /100 WBCS
PLATELET # BLD AUTO: 271 THOUSANDS/UL (ref 149–390)
PMV BLD AUTO: 9.7 FL (ref 8.9–12.7)
PROTHROMBIN TIME: 13.8 SECONDS (ref 11.6–14.5)
RBC # BLD AUTO: 4 MILLION/UL (ref 3.88–5.62)
WBC # BLD AUTO: 6.92 THOUSAND/UL (ref 4.31–10.16)

## 2023-09-28 PROCEDURE — 82105 ALPHA-FETOPROTEIN SERUM: CPT

## 2023-09-28 PROCEDURE — 80053 COMPREHEN METABOLIC PANEL: CPT

## 2023-09-28 PROCEDURE — 85025 COMPLETE CBC W/AUTO DIFF WBC: CPT

## 2023-09-28 PROCEDURE — 36415 COLL VENOUS BLD VENIPUNCTURE: CPT

## 2023-09-28 PROCEDURE — 99214 OFFICE O/P EST MOD 30 MIN: CPT | Performed by: INTERNAL MEDICINE

## 2023-09-28 PROCEDURE — 85610 PROTHROMBIN TIME: CPT

## 2023-09-28 NOTE — PROGRESS NOTES
[facility] - Outpatient Follow-up Note  Anant Smith 66 y.o. male MRN: [id number]  Encounter: [id number]          ASSESSMENT AND PLAN:      There are no diagnoses linked to this encounter.  ______________________________________________________________________    SUBJECTIVE:  ***      REVIEW OF SYSTEMS IS OTHERWISE NEGATIVE. Historical Information   Past Medical History:   Diagnosis Date   • Bronchitis, asthmatic     last assessed: 3/21/2016   • Depression    • Pneumonia     last assessed: 12/27/2016   • Pneumonia of left lower lobe due to Haemophilus influenzae ([address])     last assessed: 11/27/2015   • Sebaceous cyst     last assessed: 12/26/2013   • Sepsis with acute organ dysfunction ([address]) 10/14/2022   • Substance abuse ([address])    • Suicidal ideation     last assessed: 12/27/2016     Past Surgical History:   Procedure Laterality Date   • IR PARACENTESIS  10/17/2022   • IR PARACENTESIS  10/19/2022   • IR PARACENTESIS  10/21/2022   • IR PARACENTESIS  10/27/2022   • IR PARACENTESIS  11/2/2022   • IR PARACENTESIS  11/5/2022   • IR PARACENTESIS  11/16/2022   • IR PARACENTESIS  12/21/2022   • IR PARACENTESIS  1/4/2023   • IR PARACENTESIS  2/1/2023   • IR PARACENTESIS  6/30/2023     Social History   Social History     Substance and Sexual Activity   Alcohol Use Not Currently    Comment: none since 11/10     Social History     Substance and Sexual Activity   Drug Use Not Currently   • Types: Prescription, Methamphetamines    Comment: opiates     Social History     Tobacco Use   Smoking Status Every Day   • Packs/day: 1.00   • Types: Cigarettes   Smokeless Tobacco Never     Family History   Problem Relation Age of Onset   • Colon cancer Mother    • Diabetes Mother         mellitus       Meds/Allergies     No current outpatient medications on file.     Allergies   Allergen Reactions   • Levofloxacin    • Quinolones            Objective     Blood pressure 107/64, pulse 75, temperature 98.7 °F (37.1 °C), temperature source Oral, height 5' 7" (1.702 m), weight 64 kg (141 lb), SpO2 97 %. Body mass index is 22.08 kg/m². PHYSICAL EXAM:      General Appearance:   Alert, cooperative, no distress   HEENT:   Normocephalic, atraumatic, anicteric. Neck:  Supple, symmetrical, trachea midline   Lungs:   Clear to auscultation bilaterally; no rales, rhonchi or wheezing; respirations unlabored    Heart[de-identified]   Regular rate and rhythm; no murmur, rub, or gallop. Abdomen:   Soft, non-tender, non-distended; normal bowel sounds; no masses, no organomegaly    Genitalia:   Deferred    Rectal:   Deferred    Extremities:  No cyanosis, clubbing or edema    Pulses:  2+ and symmetric    Skin:  No jaundice, rashes, or lesions    Lymph nodes:  No palpable cervical lymphadenopathy        Lab Results:   No visits with results within 1 Day(s) from this visit.    Latest known visit with results is:   Lab on 04/19/2023   Component Date Value   • WBC 04/19/2023 7.81    • RBC 04/19/2023 4.64    • Hemoglobin 04/19/2023 14.7    • Hematocrit 04/19/2023 43.1    • MCV 04/19/2023 93    • MCH 04/19/2023 31.7    • MCHC 04/19/2023 34.1    • RDW 04/19/2023 11.9    • MPV 04/19/2023 9.7    • Platelets 59/54/3290 331    • nRBC 04/19/2023 0    • Neutrophils Relative 04/19/2023 67    • Immat GRANS % 04/19/2023 0    • Lymphocytes Relative 04/19/2023 21    • Monocytes Relative 04/19/2023 11    • Eosinophils Relative 04/19/2023 1    • Basophils Relative 04/19/2023 0    • Neutrophils Absolute 04/19/2023 5.20    • Immature Grans Absolute 04/19/2023 0.02    • Lymphocytes Absolute 04/19/2023 1.60    • Monocytes Absolute 04/19/2023 0.86    • Eosinophils Absolute 04/19/2023 0.10    • Basophils Absolute 04/19/2023 0.03    • Sodium 04/19/2023 135    • Potassium 04/19/2023 4.4    • Chloride 04/19/2023 104    • CO2 04/19/2023 29    • ANION GAP 04/19/2023 2 (L)    • BUN 04/19/2023 10    • Creatinine 04/19/2023 0.76    • Glucose 04/19/2023 82    • Calcium 04/19/2023 9.5    • AST 04/19/2023 24    • ALT 04/19/2023 31    • Alkaline Phosphatase 04/19/2023 73    • Total Protein 04/19/2023 7.6    • Albumin 04/19/2023 3.9    • Total Bilirubin 04/19/2023 0.76    • eGFR 04/19/2023 102    • GGT 04/19/2023 57          Radiology Results:   No results found.

## 2023-09-28 NOTE — PATIENT INSTRUCTIONS
As discussed today:    Laboratory Testing (Listed below):  Please have blood work drawn as soon as you can, and every 3 months. Radiology/Diagnostic Testing:  Please schedule a multiphase MRI in January. Avoid alcohol and tobacco products. Also avoid raw seafood/oysters and avoid swimming/wading in unchlorinated thurston, particularly from the [facility], due to increased risk of infection from a bacteria called Vibrio Vulnificus. Avoid medications called NSAID's (Motrin/Ibuprofen, Naproxen/Naprosyn/Aleve) if possible, due to increase risk of kidney dysfunction, and if you use medications containing acetaminophen (Tylenol, percocet, Endocet, and many cough/cold medications), the dose should not exceed 2 grams/day. Seek immediate medical attention if you develop fevers over 101 F, have evidence of GI bleeding (black or bloody stools, bloody or coffee ground emesis) or confusion. Call our office if you develop worsening symptoms related to lower extremity edema, ascites, jaundice or excessive bruising/bleeding.

## 2023-09-28 NOTE — PROGRESS NOTES
[facility] - Outpatient Follow-up Note  Tapan Cannon 66 y.o. male MRN: [id number]  Encounter: [id number]          ASSESSMENT AND PLAN:      Summary:    Mr. Carlyle Bruno is a 66y.o. year old male with cirrhosis secondary to Chronic alcohol abuse which was complicated in the past with ascites and hepatic encephalopathy. Problem List and Plan:    1. Cirrhosis: Appears to be recompensated after he quit drinking alcohol. No recent MELD score available. a. No clinical indication for liver transplant evaluation at this time. b. MELD labs to be drawn now and every 3 months.  c. Reiterated the importance of remaining sober. 2. Volume: Ascites/Edema currently well controlled off diuretics since quitting alcohol. We will continue to monitor clinically for redevelopment of ascites and edema. 3. Hepatic Encephalopathy: No recurrence since stopping drinking and stopping lactulose. At this point in time we will continue to monitor clinically for recurrence and if so restart lactulose and add rifaximin. 4. Esophageal Varices: Last EGD done in January 2023 showing mild PHG but no varices. Repeat EGD due in early 2025. .  5. Hepatoma Screening: Last MRI done in July 2023 showing no clear evidence of liver mass that was noted on the previous MRI. Will plan for repeat repeat MRI in January 2024. 6. Colon Cancer Screening: Mr. Carlyle Bruno has not yet had a screening colonoscopy. He was scheduled to have a colonoscopy along with his EGD in January however he did not prep. We have not been able to schedule a follow-up as of yet. I will have scheduling team call him to have this scheduled. 7. Nutritional status: Mild sarcopenia suggested on his physical exam.  Encouraged high protein snacking, low salt diet. If weight loss evident, will refer to nutritional counseling. Health Maintenance:  1. Mr. Carlyle Bruno was counseled to avoid alcohol and tobacco products.   2. Mr. Carlyle Bruno was also advised to avoid raw seafood/oysters and from swimming in unchlorinated thurston, particularly from the [facility], due to increased risk of infection from Vibrio Vulnificus. 3. Mr. Berny Chatterjee was also instructed to seek immediate medical attention should he develop fevers over 80 F, evidence of GI bleeding (black or bloody stools, bloody or coffee ground emesis) or confusion. 4. Mr. Berny Chatterjee was advised to avoid NSAIDs, if possible, due to increase risk of renal dysfunction, and advised that if he should use acetaminophen, dose should not exceed 2 grams/day. 5. Mr. Berny Chatterjee was recommend to remain up to date with adult vaccination, including influenza, pneumovax and meningococcus. Inactivated HSV and zoster vaccine is safe and encouraged by PCP. 6. Mr. Berny Chatterjee was instructed to call either our office or that of his referring doctor should he develop worsening symptoms related to lower extremity edema, ascites, jaundice or excessive bruising/bleeding. FOLLOW-UP:  Return in about 6 months (around 3/28/2024). VISIT DIAGNOSES AND ORDERS:      1. Alcoholic cirrhosis of liver with ascites ([address])    2. Liver lesion      Orders Placed This Encounter   Procedures   • MRI abdomen w wo contrast   • CBC and differential   • Protime-INR   • Comprehensive metabolic panel   • AFP tumor marker     ______________________________________________________________________    SUBJECTIVE:         Mr. Donaldo Gonzalez is a 66 y.o. male with medical history as outlined below including alcohol-related cirrhosis complicated with a history of ascites, encephalopathy, who returns for follow-up after last being seen in the office in November 2022 and an endoscopy unit in January 2023. When he was last seen in the office, he still had some ascites and edema and was advised to continue his Aldactone. He had not had recent overt encephalopathy but was recommended to continue his lactulose.   An MRI at that time had shown a possible liver mass in hepatic segment 4B and repeat MRI was requested. He was also strongly advised to stop drinking. He did not follow-up for quite some time, and did not actually get his MRI done until July. He did have follow-up blood work done in April. Fortunately, paracentesis was in early November 2022. He states he stopped drinking several months ago. He also stopped taking lactulose and spironolactone as he states as he was not confused, and he had no buildup of abdominal ascites. Overall, he states he has felt fairly well. Mr. Omer Bianchi denies recent or history of yellow eyes/skin, dark urine, GI bleeding, abdominal distention with fluid, lower extremity swelling, easy bruising, excessive bleeding, pruritus or confusion. The only complaint he actually had was some slight tenderness that he feels on palpation in the right upper quadrant underneath his rib. He states this for started when he was laughing hysterically and he "pulled a rib ". He denies  nausea, vomiting, heartburn, reflux, difficulty swallowing, early satiety, bloating, diarrhea, constipation or straining with passing stools. REVIEW OF SYSTEMS     Review of Systems   All other systems reviewed and are negative.       Historical Information   Patient Active Problem List   Diagnosis   • Bipolar II disorder ([address])   • Chronic depression   • Chronic sinusitis   • GERD (gastroesophageal reflux disease)   • Nicotine dependence   • Polysubstance dependence ([address])   • COPD (chronic obstructive pulmonary disease) (HCC)   • Cervical radiculopathy due to degenerative joint disease of spine   • Degenerative disc disease, cervical   • Anterolisthesis   • Mass of left hand   • Cough   • Finger injury, right, initial encounter   • Sebaceous cyst   • Lipoma of left lower extremity   • Change in stool   • Hepatomegaly   • Alcoholic cirrhosis of liver with ascites ([address])   • Ascites   • Alcohol use   • Liver lesion     Social History     Substance and Sexual Activity   Alcohol Use Not Currently    Comment: none since 11/10     Social History     Substance and Sexual Activity   Drug Use Not Currently   • Types: Prescription, Methamphetamines    Comment: opiates     Social History     Tobacco Use   Smoking Status Every Day   • Packs/day: 1.00   • Types: Cigarettes   Smokeless Tobacco Never       Meds/Allergies     No current outpatient medications on file. Allergies   Allergen Reactions   • Levofloxacin    • Quinolones            Objective     Blood pressure 107/64, pulse 75, temperature 98.7 °F (37.1 °C), temperature source Oral, height 5' 7" (1.702 m), weight 64 kg (141 lb), SpO2 97 %. Body mass index is 22.08 kg/m². PHYSICAL EXAM:      Physical Exam  Vitals reviewed. Constitutional:       General: He is not in acute distress. Appearance: Normal appearance. He is not ill-appearing. HENT:      Head: Normocephalic and atraumatic. Nose: Nose normal.      Mouth/Throat:      Pharynx: Oropharynx is clear. No posterior oropharyngeal erythema. Eyes:      General: No scleral icterus. Extraocular Movements: Extraocular movements intact. Cardiovascular:      Rate and Rhythm: Normal rate and regular rhythm. Heart sounds: No murmur heard. Pulmonary:      Effort: Pulmonary effort is normal. No respiratory distress. Breath sounds: Normal breath sounds. No rales. Abdominal:      General: There is no distension. Palpations: Abdomen is soft. There is no shifting dullness, fluid wave, hepatomegaly or splenomegaly. Tenderness: There is no abdominal tenderness. There is no guarding. Musculoskeletal:         General: No swelling. Normal range of motion. Cervical back: Normal range of motion and neck supple. Skin:     General: Skin is warm. Coloration: Skin is not jaundiced. Comments: Clubbed fingernails and a few scattered spider telangiectasias   Neurological:      General: No focal deficit present.       Mental Status: He is oriented to person, place, and time. Psychiatric:         Mood and Affect: Mood normal.         Lab Results:   Lab Results   Component Value Date    K 4.4 04/19/2023    CO2 29 04/19/2023     04/19/2023    BUN 10 04/19/2023    CREATININE 0.76 04/19/2023     Lab Results   Component Value Date    WBC 7.81 04/19/2023    HGB 14.7 04/19/2023    HCT 43.1 04/19/2023    MCV 93 04/19/2023     04/19/2023     Lab Results   Component Value Date    TP 7.6 04/19/2023    AST 24 04/19/2023    ALT 31 04/19/2023    BILIDIR 0.25 (H) 10/28/2022    INR 1.34 (H) 11/05/2022      Lab Results   Component Value Date    IRON 96 10/15/2022    FERRITIN 423 (H) 10/15/2022     Lab Results   Component Value Date    HDL 85 02/03/2022    TRIG 63 02/03/2022     MELD 3.0: 13 at 11/5/2022 10:48 AM  MELD-Na: 9 at 11/5/2022 10:48 AM  Calculated from:  Serum Creatinine: 0.78 mg/dL (Using min of 1 mg/dL) at 11/5/2022 10:48 AM  Serum Sodium: 134 mmol/L at 11/5/2022 10:48 AM  Total Bilirubin: 1.00 mg/dL at 11/5/2022 10:48 AM  Serum Albumin: 2.4 g/dL at 11/5/2022 10:48 AM  INR(ratio): 1.34 at 11/5/2022 10:48 AM  Age at listing (hypothetical): 57 years  Sex: Male at 11/5/2022 10:48 AM        Radiology Results:   No results found.       Vale Ochoa MD

## 2023-09-29 LAB
ALBUMIN SERPL BCP-MCNC: 4.1 G/DL (ref 3.5–5)
ALP SERPL-CCNC: 57 U/L (ref 34–104)
ALT SERPL W P-5'-P-CCNC: 23 U/L (ref 7–52)
ANION GAP SERPL CALCULATED.3IONS-SCNC: 9 MMOL/L
AST SERPL W P-5'-P-CCNC: 19 U/L (ref 13–39)
BILIRUB SERPL-MCNC: 0.65 MG/DL (ref 0.2–1)
BUN SERPL-MCNC: 8 MG/DL (ref 5–25)
CALCIUM SERPL-MCNC: 9.5 MG/DL (ref 8.4–10.2)
CHLORIDE SERPL-SCNC: 105 MMOL/L (ref 96–108)
CO2 SERPL-SCNC: 28 MMOL/L (ref 21–32)
CREAT SERPL-MCNC: 0.7 MG/DL (ref 0.6–1.3)
GFR SERPL CREATININE-BSD FRML MDRD: 105 ML/MIN/1.73SQ M
GLUCOSE P FAST SERPL-MCNC: 90 MG/DL (ref 65–99)
POTASSIUM SERPL-SCNC: 4.5 MMOL/L (ref 3.5–5.3)
PROT SERPL-MCNC: 6.8 G/DL (ref 6.4–8.4)
SODIUM SERPL-SCNC: 142 MMOL/L (ref 135–147)

## 2023-09-29 NOTE — RESULT ENCOUNTER NOTE
Dr. Lethia Collet, DO,  Mr. Omer Bianchi results were normal and he has been notified via [address] [place] [address].

## 2023-10-05 ENCOUNTER — TELEPHONE (OUTPATIENT)
Dept: GASTROENTEROLOGY | Facility: CLINIC | Age: 58
End: 2023-10-05

## 2023-10-05 ENCOUNTER — PREP FOR PROCEDURE (OUTPATIENT)
Dept: GASTROENTEROLOGY | Facility: CLINIC | Age: 58
End: 2023-10-05

## 2023-10-05 DIAGNOSIS — Z87.19 HISTORY OF CIRRHOSIS: Primary | ICD-10-CM

## 2023-10-05 NOTE — TELEPHONE ENCOUNTER
Scheduled date of colonoscopy (as of today): 11/28/23  Physician performing colonoscopy:   Location of colonoscopy: [facility]  Bowel prep reviewed with patient: Miralax/Dulcolax  Instructions reviewed with patient by: Ruth Ann/washington  Clearances:N/A

## 2023-11-16 ENCOUNTER — TELEPHONE (OUTPATIENT)
Dept: GASTROENTEROLOGY | Facility: CLINIC | Age: 58
End: 2023-11-16

## 2023-11-16 NOTE — TELEPHONE ENCOUNTER
Patient confirmed procedure on 11/28/23 with  via phone.       Scheduled date of colonoscopy (as of today): 11/28/23  Physician performing colonoscopy:   Location of colonoscopy:[facility]   Bowel prep reviewed with patient:Miralax/Dulcolax  Instructions reviewed with patient by:Ruth Ann/washington  Clearances: N/A

## 2023-11-28 ENCOUNTER — ANESTHESIA (OUTPATIENT)
Dept: GASTROENTEROLOGY | Facility: HOSPITAL | Age: 58
End: 2023-11-28

## 2023-11-28 ENCOUNTER — HOSPITAL ENCOUNTER (OUTPATIENT)
Dept: CT IMAGING | Facility: HOSPITAL | Age: 58
Discharge: HOME | End: 2023-11-28
Payer: MEDICARE

## 2023-11-28 ENCOUNTER — HOSPITAL ENCOUNTER (OUTPATIENT)
Dept: GASTROENTEROLOGY | Facility: HOSPITAL | Age: 58
Setting detail: OUTPATIENT SURGERY
Discharge: HOME | End: 2023-11-28
Attending: INTERNAL MEDICINE | Admitting: INTERNAL MEDICINE
Payer: MEDICARE

## 2023-11-28 ENCOUNTER — ANESTHESIA EVENT (OUTPATIENT)
Dept: GASTROENTEROLOGY | Facility: HOSPITAL | Age: 58
End: 2023-11-28

## 2023-11-28 VITALS
SYSTOLIC BLOOD PRESSURE: 112 MMHG | TEMPERATURE: 97.9 F | OXYGEN SATURATION: 100 % | RESPIRATION RATE: 20 BRPM | DIASTOLIC BLOOD PRESSURE: 71 MMHG | HEART RATE: 73 BPM

## 2023-11-28 DIAGNOSIS — Z12.11 COLON CANCER SCREENING: ICD-10-CM

## 2023-11-28 DIAGNOSIS — Z87.19 HISTORY OF CIRRHOSIS: ICD-10-CM

## 2023-11-28 DIAGNOSIS — R19.5 CHANGE IN STOOL: ICD-10-CM

## 2023-11-28 DIAGNOSIS — R19.5 CHANGE IN STOOL: Primary | ICD-10-CM

## 2023-11-28 PROCEDURE — G0121 COLON CA SCRN NOT HI RSK IND: HCPCS | Performed by: INTERNAL MEDICINE

## 2023-11-28 RX ORDER — SODIUM CHLORIDE 9 MG/ML
INJECTION, SOLUTION INTRAVENOUS CONTINUOUS PRN
Status: DISCONTINUED | OUTPATIENT
Start: 2023-11-28 | End: 2023-11-28

## 2023-11-28 RX ORDER — PROPOFOL 10 MG/ML
INJECTION, EMULSION INTRAVENOUS AS NEEDED
Status: DISCONTINUED | OUTPATIENT
Start: 2023-11-28 | End: 2023-11-28

## 2023-11-28 RX ADMIN — PROPOFOL 30 MG: 10 INJECTION, EMULSION INTRAVENOUS at 15:41

## 2023-11-28 RX ADMIN — PROPOFOL 100 MG: 10 INJECTION, EMULSION INTRAVENOUS at 15:34

## 2023-11-28 RX ADMIN — PROPOFOL 30 MG: 10 INJECTION, EMULSION INTRAVENOUS at 15:37

## 2023-11-28 RX ADMIN — SODIUM CHLORIDE: 0.9 INJECTION, SOLUTION INTRAVENOUS at 15:29

## 2023-11-28 RX ADMIN — SODIUM CHLORIDE: 0.9 INJECTION, SOLUTION INTRAVENOUS at 14:54

## 2023-11-28 RX ADMIN — PROPOFOL 40 MG: 10 INJECTION, EMULSION INTRAVENOUS at 15:43

## 2023-11-28 NOTE — ANESTHESIA POSTPROCEDURE EVALUATION
Post-Op Assessment Note    CV Status:  Stable  Pain Score: 0    Pain management: adequate       Mental Status:  Sleepy and arousable   Hydration Status:  Euvolemic   PONV Controlled:  None   Airway Patency:  Patent     Post Op Vitals Reviewed: Yes    No anethesia notable event occurred.     Staff: CRNA               /72 (11/28/23 1547)    Temp      Pulse 76 (11/28/23 1547)   Resp 16 (11/28/23 1547)    SpO2 92 % (11/28/23 1547)

## 2023-11-28 NOTE — H&P
History and Physical - [facility]  Glendy Orta 66 y.o. male MRN: [id number]                  HPI: Glendy Orta is a 66y.o. year old male who presents for index screening colonoscopy. REVIEW OF SYSTEMS: Per the HPI, and otherwise unremarkable. Historical Information   Past Medical History:   Diagnosis Date    Bronchitis, asthmatic     last assessed: 3/21/2016    Depression     Pneumonia     last assessed: 12/27/2016    Pneumonia of left lower lobe due to Haemophilus influenzae ([address])     last assessed: 11/27/2015    Sebaceous cyst     last assessed: 12/26/2013    Sepsis with acute organ dysfunction ([address]) 10/14/2022    Substance abuse ([address])     Suicidal ideation     last assessed: 12/27/2016     Past Surgical History:   Procedure Laterality Date    IR PARACENTESIS  10/17/2022    IR PARACENTESIS  10/19/2022    IR PARACENTESIS  10/21/2022    IR PARACENTESIS  10/27/2022    IR PARACENTESIS  11/2/2022    IR PARACENTESIS  11/5/2022    IR PARACENTESIS  11/16/2022    IR PARACENTESIS  12/21/2022    IR PARACENTESIS  1/4/2023    IR PARACENTESIS  2/1/2023    IR PARACENTESIS  6/30/2023     Social History   Social History     Substance and Sexual Activity   Alcohol Use Not Currently    Comment: none since 11/10     Social History     Substance and Sexual Activity   Drug Use Not Currently    Types: Prescription, Methamphetamines    Comment: opiates     Social History     Tobacco Use   Smoking Status Every Day    Packs/day: 1.00    Types: Cigarettes   Smokeless Tobacco Never     Family History   Problem Relation Age of Onset    Colon cancer Mother     Diabetes Mother         mellitus       Meds/Allergies     No current outpatient medications on file.     Allergies   Allergen Reactions    Levofloxacin     Quinolones        Objective     /87   Pulse 99   Temp 97.9 °F (36.6 °C) (Oral)   Resp 18   SpO2 100%       PHYSICAL EXAM    Gen: NAD  Head: NCAT  CV: RRR  CHEST: Clear  ABD: soft, NT/ND  EXT: no edema      ASSESSMENT/PLAN:  This is a 66y.o. year old male here for colonoscopy, and he is stable and optimized for his procedure.

## 2023-11-28 NOTE — ANESTHESIA PREPROCEDURE EVALUATION
Procedure:  COLONOSCOPY    Relevant Problems   GI/HEPATIC   (+) Alcoholic cirrhosis of liver with ascites (HCC)   (+) GERD (gastroesophageal reflux disease)   (+) Hepatomegaly   (+) Liver lesion      MUSCULOSKELETAL   (+) Degenerative disc disease, cervical      NEURO/PSYCH   (+) Chronic depression      PULMONARY   (+) COPD (chronic obstructive pulmonary disease) (HCC)        Physical Exam    Airway    Mallampati score: II  TM Distance: >3 FB       Dental   No notable dental hx     Cardiovascular      Pulmonary      Other Findings        Anesthesia Plan  ASA Score- 3     Anesthesia Type- IV sedation with anesthesia with ASA Monitors. Additional Monitors:     Airway Plan:            Plan Factors-    Chart reviewed. Patient is a current smoker. Patient smoked on day of surgery. Induction- intravenous. Postoperative Plan-     Informed Consent- Anesthetic plan and risks discussed with patient. I personally reviewed this patient with the CRNA. Discussed and agreed on the Anesthesia Plan with the CRNA. Babatunde Christianson

## 2023-11-30 DIAGNOSIS — K70.31 ALCOHOLIC CIRRHOSIS OF LIVER WITH ASCITES (HCC): Primary | ICD-10-CM

## 2024-02-21 PROBLEM — R05.9 COUGH: Status: RESOLVED | Noted: 2020-01-31 | Resolved: 2024-02-21

## 2024-03-14 ENCOUNTER — OFFICE VISIT (OUTPATIENT)
Dept: GASTROENTEROLOGY | Facility: CLINIC | Age: 59
End: 2024-03-14
Payer: MEDICARE

## 2024-03-14 VITALS
OXYGEN SATURATION: 99 % | BODY MASS INDEX: 20.72 KG/M2 | WEIGHT: 132 LBS | HEIGHT: 67 IN | HEART RATE: 110 BPM | SYSTOLIC BLOOD PRESSURE: 136 MMHG | DIASTOLIC BLOOD PRESSURE: 90 MMHG | TEMPERATURE: 98 F

## 2024-03-14 DIAGNOSIS — K70.31 ALCOHOLIC CIRRHOSIS OF LIVER WITH ASCITES (HCC): Primary | ICD-10-CM

## 2024-03-14 DIAGNOSIS — K76.9 LIVER LESION: ICD-10-CM

## 2024-03-14 PROCEDURE — 99214 OFFICE O/P EST MOD 30 MIN: CPT | Performed by: INTERNAL MEDICINE

## 2024-03-14 NOTE — PATIENT INSTRUCTIONS
As discussed today:    Medications:  No new prescriptions needed.  Laboratory Testing (Listed below):  Please have blood work drawn now and every 3 months.  Radiology/Diagnostic Testing:  Please schedule a multiphase MRI at your earliest convenience.  Avoid alcohol and tobacco products.  Also avoid raw seafood/oysters and avoid swimming/wading in unchlorinated thur[place], particularly from the [facility], due to increased risk of infection from a bacteria called Vibrio Vulnificus.  Avoid medications called NSAID's (Motrin/Ibuprofen, Naproxen/Naprosyn/Aleve) if possible, due to increase risk of kidney dysfunction, and if you use medications containing acetaminophen (Tylenol, percocet, Endocet, and many cough/cold medications), the dose should not exceed 2 grams/day.  Seek immediate medical attention if you develop fevers over 101 F, have evidence of GI bleeding (black or bloody stools, bloody or coffee ground emesis) or confusion.  Call our office if you develop worsening symptoms related to lower extremity edema, ascites, jaundice or excessive bruising/bleeding.

## 2024-03-14 NOTE — PROGRESS NOTES
[facility] - Outpatient Follow-up Note  Johnny Santana 58 y.o. male MRN: [id number]  Encounter: [id number]          ASSESSMENT AND PLAN:      Summary:    Mr. Santana is a 58 y.o. year old male with cirrhosis secondary to Chronic alcohol abuse which was complicated in the past with ascites and hepatic encephalopathy.    Problem List and Plan:    Cirrhosis: Appears to be recompensated after he quit drinking alcohol.  Most recent MELD score from September was 6.  No clinical indication for liver transplant evaluation at this time.  MELD labs to be drawn now and every 3 months.  Reiterated the importance of remaining sober.  Volume: Ascites/Edema currently well controlled off diuretics since quitting alcohol.  We will continue to monitor clinically for redevelopment of ascites and edema.  Hepatic Encephalopathy: No recurrence since stopping drinking and stopping lactulose.  At this point in time we will continue to monitor clinically for recurrence and if so restart lactulose and add rifaximin.  Esophageal Varices: Last EGD done in January 2023 showing mild PHG but no varices.  Repeat EGD due in early 2025.  Hepatoma Screening: Last MRI done in July 2023 showing no clear evidence of liver mass that was noted on the previous MRI.  MRI was requested for January however it was not yet done.  I will make sure he has it scheduled today.  Colon Cancer Screening: Attempts at colonoscopy and CT colonoscopy have been unsuccessful.  We can reattempt colonoscopy along with his next EGD.  Nutritional status: Mild sarcopenia suggested on his physical exam.  Encouraged high protein snacking, low salt diet.  If weight loss evident, will refer to nutritional counseling.     Health Maintenance:  Mr. Santana was counseled to avoid alcohol and tobacco products.  Mr. Santana was also advised to avoid raw seafood/oysters and from swimming in unchlorinated [place]ston, particularly from the [place], due to  increased risk of infection from Vibrio Vulnificus.  Mr. Santana was also instructed to seek immediate medical attention should he develop fevers over 101 F, evidence of GI bleeding (black or bloody stools, bloody or coffee ground emesis) or confusion.  Mr. Santana was advised to avoid NSAIDs, if possible, due to increase risk of renal dysfunction, and advised that if he should use acetaminophen, dose should not exceed 2 grams/day.  Mr. Santana was recommend to remain up to date with adult vaccination, including influenza, pneumovax and meningococcus. Inactivated HSV and zoster vaccine is safe and encouraged by PCP.  Mr. Santana was instructed to call either our office or that of his referring doctor should he develop worsening symptoms related to lower extremity edema, ascites, jaundice or excessive bruising/bleeding.        FOLLOW-UP:  Return in about 6 months (around 9/14/2024).     VISIT DIAGNOSES AND ORDERS:      1. Alcoholic cirrhosis of liver with ascites (HCC)    2. Liver lesion        Orders Placed This Encounter   Procedures    CBC    Comprehensive metabolic panel    Protime-INR    AFP tumor marker     ______________________________________________________________________    SUBJECTIVE:       Interval update 3/14/2024:  Since his last office visit, I saw him in late November for colonoscopy and unfortunately was not able to complete the study due to severe angulation and looping.  I sent him for CT colonography but unfortunately he was unable to complete that study.  Evaluation of those incomplete images reveals larger than typical volume of retained untagged fluid. Within those limitations, no large colonic polyp or mass is detected. No acute lower radiation dose noncontrast CT abnormalities identified in the imaging field.     Since that time, it does not appear that that has been admitted to the hospital or had any major complications.  He has not had blood work done since September.  His last abdominal  "imaging was in July 2023.    He continues to do well.  Mr. Santana denies recent or history of yellow eyes/skin, dark urine, GI bleeding, abdominal distention with fluid, lower extremity swelling, easy bruising, excessive bleeding, pruritus or confusion.      History:    HPI from office visit 9/28/2023:  Mr. Johnny Santana is a 58 y.o. male with medical history as outlined below including alcohol-related cirrhosis complicated with a history of ascites, encephalopathy, who returns for follow-up after last being seen in the office in November 2022 and an endoscopy unit in January 2023.    When he was last seen in the office, he still had some ascites and edema and was advised to continue his Aldactone.  He had not had recent overt encephalopathy but was recommended to continue his lactulose.  An MRI at that time had shown a possible liver mass in hepatic segment 4B and repeat MRI was requested.  He was also strongly advised to stop drinking.    He did not follow-up for quite some time, and did not actually get his MRI done until July.  He did have follow-up blood work done in April.  Fortunately, paracentesis was in early November 2022.    He states he stopped drinking several months ago.  He also stopped taking lactulose and spironolactone as he states as he was not confused, and he had no buildup of abdominal ascites.    Overall, he states he has felt fairly well.  Mr. Santana denies recent or history of yellow eyes/skin, dark urine, GI bleeding, abdominal distention with fluid, lower extremity swelling, easy bruising, excessive bleeding, pruritus or confusion.  The only complaint he actually had was some slight tenderness that he feels on palpation in the right upper quadrant underneath his rib.  He states this for started when he was laughing hysterically and he \"pulled a rib \".  He denies  nausea, vomiting, heartburn, reflux, difficulty swallowing, early satiety, bloating, diarrhea, constipation or straining with " "passing stools.           REVIEW OF SYSTEMS     Review of Systems   All other systems reviewed and are negative.      Historical Information   Patient Active Problem List   Diagnosis    Bipolar II disorder (HCC)    Chronic depression    Chronic sinusitis    GERD (gastroesophageal reflux disease)    Nicotine dependence    Polysubstance dependence (HCC)    COPD (chronic obstructive pulmonary disease) (HCC)    Cervical radiculopathy due to degenerative joint disease of spine    Degenerative disc disease, cervical    Anterolisthesis    Mass of left hand    Finger injury, right, initial encounter    Sebaceous cyst    Lipoma of left lower extremity    Change in stool    Hepatomegaly    Alcoholic cirrhosis of liver with ascites (HCC)    Ascites    Alcohol use    Liver lesion     Social History     Substance and Sexual Activity   Alcohol Use Not Currently    Comment: none since 11/10     Social History     Substance and Sexual Activity   Drug Use Not Currently    Types: Prescription, Methamphetamines    Comment: opiates     Social History     Tobacco Use   Smoking Status Every Day    Current packs/day: 1.00    Types: Cigarettes   Smokeless Tobacco Never       Meds/Allergies     No current outpatient medications on file.    Allergies   Allergen Reactions    Levofloxacin     Quinolones            Objective     Blood pressure 136/90, pulse (!) 110, temperature 98 °F (36.7 °C), temperature source Oral, height 5' 7\" (1.702 m), weight 59.9 kg (132 lb), SpO2 99%. Body mass index is 20.67 kg/m².      PHYSICAL EXAM:      Physical Exam  Vitals reviewed.   Constitutional:       General: He is not in acute distress.     Appearance: Normal appearance. He is not ill-appearing.   HENT:      Head: Normocephalic and atraumatic.      Nose: Nose normal.      Mouth/Throat:      Pharynx: Oropharynx is clear. No posterior oropharyngeal erythema.   Eyes:      General: No scleral icterus.     Extraocular Movements: Extraocular movements intact. "   Cardiovascular:      Rate and Rhythm: Normal rate and regular rhythm.      Heart sounds: No murmur heard.  Pulmonary:      Effort: Pulmonary effort is normal. No respiratory distress.      Breath sounds: Normal breath sounds. No rales.   Abdominal:      General: There is no distension.      Palpations: Abdomen is soft. There is no shifting dullness, fluid wave, hepatomegaly or splenomegaly.      Tenderness: There is no abdominal tenderness. There is no guarding.   Musculoskeletal:         General: No swelling. Normal range of motion.      Cervical back: Normal range of motion and neck supple.   Skin:     General: Skin is warm.      Coloration: Skin is not jaundiced.      Comments: Clubbed fingernails and a few scattered spider telangiectasias   Neurological:      General: No focal deficit present.      Mental Status: He is oriented to person, place, and time.   Psychiatric:         Mood and Affect: Mood normal.         Lab Results:   Lab Results   Component Value Date    K 4.5 09/28/2023    CO2 28 09/28/2023     09/28/2023    BUN 8 09/28/2023    CREATININE 0.70 09/28/2023     Lab Results   Component Value Date    WBC 6.92 09/28/2023    HGB 12.7 09/28/2023    HCT 39.1 09/28/2023    MCV 98 09/28/2023     09/28/2023     Lab Results   Component Value Date    TP 6.8 09/28/2023    AST 19 09/28/2023    ALT 23 09/28/2023    BILIDIR 0.25 (H) 10/28/2022    INR 1.04 09/28/2023      Lab Results   Component Value Date    IRON 96 10/15/2022    FERRITIN 423 (H) 10/15/2022     Lab Results   Component Value Date    HDL 85 02/03/2022    TRIG 63 02/03/2022     MELD 3.0: 6 at 9/28/2023  1:33 PM  MELD-Na: 6 at 9/28/2023  1:33 PM  Calculated from:  Serum Creatinine: 0.70 mg/dL (Using min of 1 mg/dL) at 9/28/2023  1:33 PM  Serum Sodium: 142 mmol/L (Using max of 137 mmol/L) at 9/28/2023  1:33 PM  Total Bilirubin: 0.65 mg/dL (Using min of 1 mg/dL) at 9/28/2023  1:33 PM  Serum Albumin: 4.1 g/dL (Using max of 3.5 g/dL) at  9/28/2023  1:33 PM  INR(ratio): 1.04 at 9/28/2023  1:33 PM  Age at listing (hypothetical): 58 years  Sex: Male at 9/28/2023  1:33 PM        Radiology Results:   No results found.      Markie Hernandez MD

## 2024-09-23 NOTE — PROGRESS NOTES
Progress Note - Roney Tompkins 56 y o  male MRN: [id number]    Unit/Bed#: -01 Encounter: [id number]    Assessment and Plan:    61-year-old male with tobacco and alcohol abuse and COPD admitted with SIRS, possible cirrhosis, alcoholic hepatitis, new ascites with concern for SBP  1) New onset ascites and generalized abdominal pain in setting of SIRS: Patient met SIRS criteria on admission, concern for SBP given new ascites  No other definitive source of infection  UA, blood cultures, chest x-ray negative     -plan for paracentesis and fluid studies today  -continue empiric treatment with Zosyn  -recommend 2 g sodium diet    2) Possible cirrhosis with alcoholic hepatitis: MELD 13 and DF 13  Previous lab work-up negative for viral, autoimmune, metabolic etiologies  CT from 10/14 shows slightly lobulated contour of the liver suggestive of cirrhosis, hepatomegaly, new ascites  No evidence HCC or PVT  Platelets normal which makes cirrhosis less likely  Bilirubin stable 2 40  Creatinine 0 61      -discussed need for complete alcohol cessation  -no indication for steroids based on low DF and concern for infection  -recommend EGD as outpatient for varices screening  -patient should follow-up in outpatient GI office for continued monitoring    3) Alcohol abuse:    -patient understands he must stop drinking  -continue folic acid and thiamine    Subjective:     Patient seen and examined at bedside  His abdomen is markedly distended and he complains of abdominal pain  He is asking for pain medication  No nausea or vomiting  He tolerated small amount of breakfast   He has had small bowel movements since admission  No signs of overt GI bleeding  Objective:     Vitals: Blood pressure 135/91, pulse 104, temperature 98 °F (36 7 °C), temperature source Oral, resp  rate 18, height 5' 7" (1 702 m), weight 66 7 kg (147 lb 1 6 oz), SpO2 99 %  ,Body mass index is 23 04 kg/m²        Intake/Output Summary (Last 24 hours) at 10/17/2022 0946  Last data filed at 10/16/2022 2000  Gross per 24 hour   Intake 720 ml   Output --   Net 720 ml       Physical Exam:     General Appearance: Alert and oriented x 3, appears stated age and cooperative  Lungs: Clear to auscultation bilaterally  Heart: Regular rate and rhythm  Abdomen:  Marked distention  Tense ascites  Positive bowel sounds  Diffuse tenderness to palpation  Neuro: No asterixis  Extremities: No cyanosis, edema    Invasive Devices  Report    Peripheral Intravenous Line  Duration           Peripheral IV 10/16/22 Left;Proximal;Ventral (anterior) Forearm <1 day                Lab Results:  Results from last 7 days   Lab Units 10/17/22  0632 10/16/22  0552   WBC Thousand/uL 30 58* 24 84*   HEMOGLOBIN g/dL 11 8* 11 1*   HEMATOCRIT % 34 1* 32 6*   PLATELETS Thousands/uL 370 338   NEUTROS PCT %  --  86*   LYMPHS PCT %  --  5*   MONOS PCT %  --  5   EOS PCT %  --  2     Results from last 7 days   Lab Units 10/17/22  0632   POTASSIUM mmol/L 4 0   CHLORIDE mmol/L 99   CO2 mmol/L 28   BUN mg/dL 6   CREATININE mg/dL 0 61   CALCIUM mg/dL 8 3   ALK PHOS U/L 317*   ALT U/L 56   AST U/L 96*     Results from last 7 days   Lab Units 10/15/22  0434   INR  1 14     Results from last 7 days   Lab Units 10/14/22  1350   LIPASE u/L 308       Imaging Studies: I have personally reviewed pertinent imaging studies  XR chest portable    Result Date: 10/16/2022  Impression: Bibasilar subsegmental atelectasis with nothing to indicate pneumonia  Workstation performed: [id number]     CT abdomen pelvis with contrast    Result Date: 10/14/2022  Impression: 1  Slightly microlobulated contour of the liver suggestive of cirrhosis  Hepatomegaly  2  New moderate abdominal/pelvic ascites   Workstation performed: [id number] 23-Sep-2024 17:16

## 2024-10-08 ENCOUNTER — TELEPHONE (OUTPATIENT)
Dept: GASTROENTEROLOGY | Facility: CLINIC | Age: 59
End: 2024-10-08

## 2024-10-08 ENCOUNTER — OFFICE VISIT (OUTPATIENT)
Dept: GASTROENTEROLOGY | Facility: CLINIC | Age: 59
End: 2024-10-08
Payer: MEDICARE

## 2024-10-08 VITALS
SYSTOLIC BLOOD PRESSURE: 150 MMHG | HEIGHT: 67 IN | OXYGEN SATURATION: 95 % | DIASTOLIC BLOOD PRESSURE: 90 MMHG | TEMPERATURE: 97.5 F | BODY MASS INDEX: 23.23 KG/M2 | WEIGHT: 148 LBS | HEART RATE: 98 BPM

## 2024-10-08 DIAGNOSIS — K74.60 DECOMPENSATED HEPATIC CIRRHOSIS (HCC): ICD-10-CM

## 2024-10-08 DIAGNOSIS — K72.90 DECOMPENSATED HEPATIC CIRRHOSIS (HCC): ICD-10-CM

## 2024-10-08 DIAGNOSIS — K76.9 LIVER LESION: Primary | ICD-10-CM

## 2024-10-08 DIAGNOSIS — K70.31 ALCOHOLIC CIRRHOSIS OF LIVER WITH ASCITES (HCC): ICD-10-CM

## 2024-10-08 PROCEDURE — 99214 OFFICE O/P EST MOD 30 MIN: CPT | Performed by: INTERNAL MEDICINE

## 2024-10-08 NOTE — PROGRESS NOTES
[facility] - Outpatient Follow-up Note  Johnny Santana 59 y.o. male MRN: [id number]  Encounter: [id number]          ASSESSMENT AND PLAN:      Summary:    Mr. Santana is a 59 y.o. year old male with cirrhosis secondary to Chronic alcohol abuse which was complicated in the past with ascites and hepatic encephalopathy.    Problem List and Plan:    Cirrhosis: Appears to be recompensated after he quit drinking alcohol.  No labs for over a year.  No clinical indication for liver transplant evaluation at this time.  Urged him to get labs done as soon as possible and at least every 6 months.  Reiterated the importance of remaining sober.  Volume: Ascites/Edema currently well controlled off diuretics since quitting alcohol.  We will continue to monitor clinically for redevelopment of ascites and edema.  Hepatic Encephalopathy: No recurrence since stopping drinking and stopping lactulose.  At this point in time we will continue to monitor clinically for recurrence and if so restart lactulose and add rifaximin.  Esophageal Varices: Last EGD done in January 2023 showing mild PHG but no varices.  Repeat EGD due in early 2025.  Order placed.  Hepatoma Screening: Last MRI done in July 2023 showing no clear evidence of liver mass that was noted on the previous MRI.  MRI was requested multiple times since that time however he has failed to schedule it.  I urged him again to schedule it as soon as possible.  Colon Cancer Screening: Attempts at colonoscopy and CT colonoscopy have been unsuccessful.  If he is willing, we can attempt with his next EGD.  Nutritional status: Mild sarcopenia suggested on his physical exam.  Encouraged high protein snacking, low salt diet.  If weight loss evident, will refer to nutritional counseling.     Health Maintenance:  Mr. Santana was counseled to avoid alcohol and tobacco products.  Mr. Santana was also advised to avoid raw seafood/oysters and from swimming in  unchlorinated thurston, particularly from the [facility], due to increased risk of infection from Vibrio Vulnificus.  Mr. Santana was also instructed to seek immediate medical attention should he develop fevers over 101 F, evidence of GI bleeding (black or bloody stools, bloody or coffee ground emesis) or confusion.  Mr. Santana was advised to avoid NSAIDs, if possible, due to increase risk of renal dysfunction, and advised that if he should use acetaminophen, dose should not exceed 2 grams/day.  Mr. Santana was recommend to remain up to date with adult vaccination, including influenza, pneumovax and meningococcus. Inactivated HSV and zoster vaccine is safe and encouraged by PCP.  Mr. Santana was instructed to call either our office or that of his referring doctor should he develop worsening symptoms related to lower extremity edema, ascites, jaundice or excessive bruising/bleeding.        FOLLOW-UP:  Return in about 6 months (around 4/8/2025).     VISIT DIAGNOSES AND ORDERS:      1. Liver lesion    2. Alcoholic cirrhosis of liver with ascites (HCC)    3. Decompensated hepatic cirrhosis (HCC)        Orders Placed This Encounter   Procedures    MRI abdomen w wo contrast    CBC    Comprehensive metabolic panel    Protime-INR    AFP tumor marker    EGD     ______________________________________________________________________    SUBJECTIVE:       Interval update 10/8/2024:  Patient returns for follow-up after last being seen in March.  Unfortunately he has had no blood work or imaging done for over a year.  He states he is feels relatively well but has been lost [MI] and riding his television series.    Denies recurrence of volume overload.  He denies evidence of GI bleeding.  He denies confusion, jaundice, pruritus, easy bruising or excessive bleeding.  He denies abdominal pain, nausea, vomiting, heartburn, reflux, diarrhea or constipation.    He denies relapse to drinking alcohol.      History:    Interval update  3/14/2024:  Since his last office visit, I saw him in late November for colonoscopy and unfortunately was not able to complete the study due to severe angulation and looping.  I sent him for CT colonography but unfortunately he was unable to complete that study.  Evaluation of those incomplete images reveals larger than typical volume of retained untagged fluid. Within those limitations, no large colonic polyp or mass is detected. No acute lower radiation dose noncontrast CT abnormalities identified in the imaging field.     Since that time, it does not appear that that has been admitted to the hospital or had any major complications.  He has not had blood work done since September.  His last abdominal imaging was in July 2023.    He continues to do well.  Mr. Santana denies recent or history of yellow eyes/skin, dark urine, GI bleeding, abdominal distention with fluid, lower extremity swelling, easy bruising, excessive bleeding, pruritus or confusion.    HPI from office visit 9/28/2023:  Mr. Johnny Santana is a 59 y.o. male with medical history as outlined below including alcohol-related cirrhosis complicated with a history of ascites, encephalopathy, who returns for follow-up after last being seen in the office in November 2022 and an endoscopy unit in January 2023.    When he was last seen in the office, he still had some ascites and edema and was advised to continue his Aldactone.  He had not had recent overt encephalopathy but was recommended to continue his lactulose.  An MRI at that time had shown a possible liver mass in hepatic segment 4B and repeat MRI was requested.  He was also strongly advised to stop drinking.    He did not follow-up for quite some time, and did not actually get his MRI done until July.  He did have follow-up blood work done in April.  Fortunately, paracentesis was in early November 2022.    He states he stopped drinking several months ago.  He also stopped taking lactulose and  "spironolactone as he states as he was not confused, and he had no buildup of abdominal ascites.    Overall, he states he has felt fairly well.  Mr. Santana denies recent or history of yellow eyes/skin, dark urine, GI bleeding, abdominal distention with fluid, lower extremity swelling, easy bruising, excessive bleeding, pruritus or confusion.  The only complaint he actually had was some slight tenderness that he feels on palpation in the right upper quadrant underneath his rib.  He states this for started when he was laughing hysterically and he \"pulled a rib \".  He denies  nausea, vomiting, heartburn, reflux, difficulty swallowing, early satiety, bloating, diarrhea, constipation or straining with passing stools.           REVIEW OF SYSTEMS     Review of Systems   All other systems reviewed and are negative.      Historical Information   Patient Active Problem List   Diagnosis    Bipolar II disorder (HCC)    Chronic depression    Chronic sinusitis    GERD (gastroesophageal reflux disease)    Nicotine dependence    Polysubstance dependence (HCC)    COPD (chronic obstructive pulmonary disease) (HCC)    Cervical radiculopathy due to degenerative joint disease of spine    Degenerative disc disease, cervical    Anterolisthesis    Mass of left hand    Finger injury, right, initial encounter    Sebaceous cyst    Lipoma of left lower extremity    Change in stool    Hepatomegaly    Alcoholic cirrhosis of liver with ascites (HCC)    Ascites    Alcohol use    Liver lesion     Social History     Substance and Sexual Activity   Alcohol Use Not Currently    Comment: none since 11/10     Social History     Substance and Sexual Activity   Drug Use Not Currently    Types: Prescription, Methamphetamines    Comment: opiates     Social History     Tobacco Use   Smoking Status Every Day    Current packs/day: 1.00    Types: Cigarettes   Smokeless Tobacco Never       Meds/Allergies     No current outpatient medications on " "file.    Allergies   Allergen Reactions    Levofloxacin     Quinolones            Objective     Blood pressure 150/90, pulse 98, temperature 97.5 °F (36.4 °C), height 5' 7\" (1.702 m), weight 67.1 kg (148 lb), SpO2 95%. Body mass index is 23.18 kg/m².      PHYSICAL EXAM:      Physical Exam  Vitals reviewed.   Constitutional:       General: He is not in acute distress.     Appearance: Normal appearance. He is not ill-appearing.   HENT:      Head: Normocephalic and atraumatic.      Nose: Nose normal.      Mouth/Throat:      Pharynx: Oropharynx is clear. No posterior oropharyngeal erythema.   Eyes:      General: No scleral icterus.     Extraocular Movements: Extraocular movements intact.   Cardiovascular:      Rate and Rhythm: Normal rate and regular rhythm.      Heart sounds: No murmur heard.  Pulmonary:      Effort: Pulmonary effort is normal. No respiratory distress.      Breath sounds: Normal breath sounds. No rales.   Abdominal:      General: There is no distension.      Palpations: Abdomen is soft. There is no shifting dullness, fluid wave, hepatomegaly or splenomegaly.      Tenderness: There is no abdominal tenderness. There is no guarding.   Musculoskeletal:         General: No swelling. Normal range of motion.      Cervical back: Normal range of motion and neck supple.   Skin:     General: Skin is warm.      Coloration: Skin is not jaundiced.      Comments: Clubbed fingernails and a few scattered spider telangiectasias   Neurological:      General: No focal deficit present.      Mental Status: He is oriented to person, place, and time.   Psychiatric:         Mood and Affect: Mood normal.         Lab Results:   Lab Results   Component Value Date    K 4.5 09/28/2023    CO2 28 09/28/2023     09/28/2023    BUN 8 09/28/2023    CREATININE 0.70 09/28/2023     Lab Results   Component Value Date    WBC 6.92 09/28/2023    HGB 12.7 09/28/2023    HCT 39.1 09/28/2023    MCV 98 09/28/2023     09/28/2023     Lab " Results   Component Value Date    TP 6.8 09/28/2023    AST 19 09/28/2023    ALT 23 09/28/2023    BILIDIR 0.25 (H) 10/28/2022    INR 1.04 09/28/2023      Lab Results   Component Value Date    IRON 96 10/15/2022    FERRITIN 423 (H) 10/15/2022     Lab Results   Component Value Date    HDL 85 02/03/2022    TRIG 63 02/03/2022     Computed MELD 3.0 unavailable. One or more values for this score either were not found within the given timeframe or did not fit some other criterion.  Computed MELD-Na unavailable. One or more values for this score either were not found within the given timeframe or did not fit some other criterion.        Radiology Results:   No results found.      Markie Hernandez MD

## 2024-10-08 NOTE — PATIENT INSTRUCTIONS
As discussed today:    Laboratory Testing (Listed below):  Please have blood work drawn at your earliest convenience and every 6 months.  Radiology/Diagnostic Testing:  Please schedule a multiphase MRI as soon as you can to follow-up on the liver lesion.  Please have an upper endoscopy scheduled in early 2025.  Avoid alcohol and tobacco products.  Also avoid raw seafood/oysters and avoid swimming/wading in unchlorinated thur[place], particularly from the [facility], due to increased risk of infection from a bacteria called Vibrio Vulnificus.  Avoid medications called NSAID's (Motrin/Ibuprofen, Naproxen/Naprosyn/Aleve) if possible, due to increase risk of kidney dysfunction, and if you use medications containing acetaminophen (Tylenol, percocet, Endocet, and many cough/cold medications), the dose should not exceed 2 grams/day.  Seek immediate medical attention if you develop fevers over 101 F, have evidence of GI bleeding (black or bloody stools, bloody or coffee ground emesis) or confusion.  Call our office if you develop worsening symptoms related to lower extremity edema, ascites, jaundice or excessive bruising/bleeding.

## 2024-10-08 NOTE — TELEPHONE ENCOUNTER
Scheduled date of EGD(as of today):01/14/2025  Physician performing EGD:Dr Markie Hernandez  Location of EGD:[place]  Instructions reviewed with patient by: kumar  Clearances: none

## 2025-01-14 ENCOUNTER — ANESTHESIA EVENT (OUTPATIENT)
Dept: GASTROENTEROLOGY | Facility: HOSPITAL | Age: 60
End: 2025-01-14
Payer: MEDICARE

## 2025-01-14 ENCOUNTER — ANESTHESIA (OUTPATIENT)
Dept: GASTROENTEROLOGY | Facility: HOSPITAL | Age: 60
End: 2025-01-14
Payer: MEDICARE

## 2025-01-14 ENCOUNTER — HOSPITAL ENCOUNTER (OUTPATIENT)
Dept: GASTROENTEROLOGY | Facility: HOSPITAL | Age: 60
Setting detail: OUTPATIENT SURGERY
Discharge: HOME | End: 2025-01-14
Attending: INTERNAL MEDICINE
Payer: MEDICARE

## 2025-01-14 VITALS
OXYGEN SATURATION: 97 % | BODY MASS INDEX: 23.23 KG/M2 | SYSTOLIC BLOOD PRESSURE: 139 MMHG | WEIGHT: 148 LBS | TEMPERATURE: 97.5 F | HEIGHT: 67 IN | HEART RATE: 63 BPM | DIASTOLIC BLOOD PRESSURE: 83 MMHG | RESPIRATION RATE: 15 BRPM

## 2025-01-14 DIAGNOSIS — K74.60 DECOMPENSATED HEPATIC CIRRHOSIS (HCC): ICD-10-CM

## 2025-01-14 DIAGNOSIS — K72.90 DECOMPENSATED HEPATIC CIRRHOSIS (HCC): ICD-10-CM

## 2025-01-14 PROCEDURE — 43235 EGD DIAGNOSTIC BRUSH WASH: CPT | Performed by: INTERNAL MEDICINE

## 2025-01-14 RX ORDER — SODIUM CHLORIDE 9 MG/ML
INJECTION, SOLUTION INTRAVENOUS CONTINUOUS PRN
Status: DISCONTINUED | OUTPATIENT
Start: 2025-01-14 | End: 2025-01-14

## 2025-01-14 RX ORDER — PROPOFOL 10 MG/ML
INJECTION, EMULSION INTRAVENOUS AS NEEDED
Status: DISCONTINUED | OUTPATIENT
Start: 2025-01-14 | End: 2025-01-14

## 2025-01-14 RX ORDER — LIDOCAINE HYDROCHLORIDE 20 MG/ML
INJECTION, SOLUTION EPIDURAL; INFILTRATION; INTRACAUDAL; PERINEURAL AS NEEDED
Status: DISCONTINUED | OUTPATIENT
Start: 2025-01-14 | End: 2025-01-14

## 2025-01-14 RX ADMIN — SODIUM CHLORIDE: 0.9 INJECTION, SOLUTION INTRAVENOUS at 09:43

## 2025-01-14 RX ADMIN — PROPOFOL 80 MG: 10 INJECTION, EMULSION INTRAVENOUS at 09:47

## 2025-01-14 RX ADMIN — LIDOCAINE HYDROCHLORIDE 100 MG: 20 INJECTION, SOLUTION EPIDURAL; INFILTRATION; INTRACAUDAL; PERINEURAL at 09:47

## 2025-01-14 NOTE — ANESTHESIA PREPROCEDURE EVALUATION
Procedure:  EGD    Relevant Problems   GI/HEPATIC   (+) Alcoholic cirrhosis of liver with ascites (HCC)   (+) GERD (gastroesophageal reflux disease)   (+) Hepatomegaly   (+) Liver lesion      MUSCULOSKELETAL   (+) Degenerative disc disease, cervical      NEURO/PSYCH   (+) Chronic depression      PULMONARY   (+) COPD (chronic obstructive pulmonary disease) (HCC)      Behavioral Health   (+) Bipolar II disorder (HCC)      Rheumatology   (+) Cervical radiculopathy due to degenerative joint disease of spine      Other   (+) Polysubstance dependence (HCC)        Physical Exam    Airway    Mallampati score: II  TM Distance: <3 FB  Neck ROM: full     Dental   Comment: None loose, No notable dental hx     Cardiovascular      Pulmonary      Other Findings        ECHO (2022)    Left Ventricle: Left ventricular cavity size is normal. Wall thickness is normal. The left ventricular ejection fraction is 60-65%. Systolic function is normal. Although no diagnostic regional wall motion abnormality was identified, this possibility cannot be completely excluded on the basis of this study. Diastolic function is mildly abnormal, consistent with grade I (abnormal) relaxation.    Aorta: The aortic root is mildly dilated 3.9 cm. The ascending aorta is top normal in size.    Pericardium: Ascites is present.      Anesthesia Plan  ASA Score- 3     Anesthesia Type- IV sedation with anesthesia with ASA Monitors.         Additional Monitors:     Airway Plan:     Comment: NPO after MN    Patient educated on the possibility for awareness under sedation and of the possibility of airway intervention in the event of an airway or procedural emergency    Available labwork, imaging, and diagnostic studies relevant to the procedure reviewed including prior anesthetic records. Patient is acceptable for the intended procedure      .       Plan Factors-Exercise tolerance (METS): >4 METS.    Chart reviewed.    Patient summary reviewed.    Patient is a  current smoker.  Patient instructed to abstain from smoking on day of procedure. Patient smoked on day of surgery.            Induction- intravenous.    Postoperative Plan-     Perioperative Resuscitation Plan - Level 1 - Full Code.       Informed Consent- Anesthetic plan and risks discussed with patient.  I personally reviewed this patient with the CRNA. Discussed and agreed on the Anesthesia Plan with the CRNA..      NPO Status:  No vitals data found for the desired time range.

## 2025-01-14 NOTE — ANESTHESIA POSTPROCEDURE EVALUATION
Post-Op Assessment Note    CV Status:  Stable  Pain Score: 0    Pain management: adequate       Mental Status:  Alert and awake   Hydration Status:  Euvolemic   PONV Controlled:  Controlled   Airway Patency:  Patent     Post Op Vitals Reviewed: Yes    No anethesia notable event occurred.    Staff: CRNA           Last Filed PACU Vitals:  Vitals Value Taken Time   Temp     Pulse 63    /71    Resp 14    SpO2 99

## 2025-01-14 NOTE — H&P
"History and Physical -  [facility]  Johnny Santana 59 y.o. male MRN: [id number]                  HPI: Johnny Santana is a 59 y.o. year old male who presents for variceal screening.      REVIEW OF SYSTEMS: Per the HPI, and otherwise unremarkable.    Historical Information   Past Medical History:   Diagnosis Date    Bronchitis, asthmatic     last assessed: 3/21/2016    Depression     Pneumonia     last assessed: 12/27/2016    Pneumonia of left lower lobe due to Haemophilus influenzae (HCC)     last assessed: 11/27/2015    Sebaceous cyst     last assessed: 12/26/2013    Sepsis with acute organ dysfunction (HCC) 10/14/2022    Substance abuse (HCC)     Suicidal ideation     last assessed: 12/27/2016     Past Surgical History:   Procedure Laterality Date    IR PARACENTESIS  10/17/2022    IR PARACENTESIS  10/19/2022    IR PARACENTESIS  10/21/2022    IR PARACENTESIS  10/27/2022    IR PARACENTESIS  11/2/2022    IR PARACENTESIS  11/5/2022    IR PARACENTESIS  11/16/2022    IR PARACENTESIS  12/21/2022    IR PARACENTESIS  1/4/2023    IR PARACENTESIS  2/1/2023    IR PARACENTESIS  6/30/2023     Social History   Social History     Substance and Sexual Activity   Alcohol Use Not Currently    Comment: none since 11/10     Social History     Substance and Sexual Activity   Drug Use Not Currently    Types: Prescription, Methamphetamines    Comment: opiates     Social History     Tobacco Use   Smoking Status Every Day    Current packs/day: 1.00    Types: Cigarettes   Smokeless Tobacco Never     Family History   Problem Relation Age of Onset    Colon cancer Mother     Diabetes Mother         mellitus       Meds/Allergies     No current outpatient medications on file.    Allergies   Allergen Reactions    Levofloxacin     Quinolones        Objective     /79   Pulse 75   Temp 97.5 °F (36.4 °C) (Temporal)   Resp 16   Ht 5' 7\" (1.702 m)   Wt 67.1 kg (148 lb)   SpO2 98%   BMI 23.18 kg/m²       PHYSICAL " EXAM    Gen: NAD  Head: NCAT  CV: RRR  CHEST: Clear  ABD: soft, NT/ND  EXT: no edema      ASSESSMENT/PLAN:  This is a 59 y.o. year old male here for EGD, and he is stable and optimized for his procedure.

## 2025-05-06 ENCOUNTER — OFFICE VISIT (OUTPATIENT)
Dept: GASTROENTEROLOGY | Facility: CLINIC | Age: 60
End: 2025-05-06
Payer: MEDICARE

## 2025-05-06 VITALS
DIASTOLIC BLOOD PRESSURE: 68 MMHG | SYSTOLIC BLOOD PRESSURE: 142 MMHG | WEIGHT: 139.4 LBS | HEIGHT: 67 IN | TEMPERATURE: 97.9 F | BODY MASS INDEX: 21.88 KG/M2 | HEART RATE: 87 BPM | OXYGEN SATURATION: 98 %

## 2025-05-06 DIAGNOSIS — I85.10 SECONDARY ESOPHAGEAL VARICES WITHOUT BLEEDING (HCC): ICD-10-CM

## 2025-05-06 DIAGNOSIS — K70.30 ALCOHOLIC CIRRHOSIS OF LIVER WITHOUT ASCITES (HCC): Primary | ICD-10-CM

## 2025-05-06 PROCEDURE — 99214 OFFICE O/P EST MOD 30 MIN: CPT | Performed by: INTERNAL MEDICINE

## 2025-05-06 NOTE — PROGRESS NOTES
Name: Johnny Santana      : 1965      MRN: [id number]  Encounter Provider: Markie Hernandez MD  Encounter Date: 2025   Encounter department: [facility] [place]  :  Assessment & Plan  Alcoholic cirrhosis of liver without ascites (HCC)    Mr. Santana is a 59 y.o. year old male with cirrhosis secondary to Chronic alcohol abuse which was complicated in the past with ascites and hepatic encephalopathy.     Problem List and Plan:     Cirrhosis: Appears to be recompensated after he quit drinking alcohol.  No labs since 2023.  No clinical indication for liver transplant evaluation at this time.  Urged him to get labs done as soon as possible and at least every 6 months.  Reiterated the importance of remaining sober.  Volume: Ascites/Edema currently well controlled off diuretics since quitting alcohol.  We will continue to monitor clinically for redevelopment of ascites and edema.  Hepatic Encephalopathy: No recurrence since stopping drinking and stopping lactulose.  At this point in time we will continue to monitor clinically for recurrence and if so restart lactulose and add rifaximin.  Esophageal Varices: Last EGD done in 2025 with finding of only 1 small distal esophageal varix.  In absence of ongoing alcohol use, plan for repeat EGD in .  Hepatoma Screening: Last MRI done in 2023 showing no clear evidence of liver mass that was noted on the previous MRI.  MRI was requested multiple times since that time however he has failed to schedule it.  I urged him again to schedule it as soon as possible.  Colon Cancer Screening: Attempts at colonoscopy and CT colonoscopy have been unsuccessful.  If he is willing, we can attempt with his next EGD.  Nutritional status: Mild sarcopenia suggested on his physical exam.  Encouraged high protein snacking, low salt diet.  If weight loss evident, will refer to nutritional counseling.      Health Maintenance:    Max was counseled to avoid alcohol and tobacco products.  Mr. Santana was also advised to avoid raw seafood/oysters and from swimming in unchlorinated thurston, particularly from the [place], due to increased risk of infection from Vibrio Vulnificus.  Mr. Santana was also instructed to seek immediate medical attention should he develop fevers over 101 F, evidence of GI bleeding (black or bloody stools, bloody or coffee ground emesis) or confusion.  Mr. Santana was advised to avoid NSAIDs, if possible, due to increase risk of renal dysfunction, and advised that if he should use acetaminophen, dose should not exceed 2 grams/day.  Mr. Santana was recommend to remain up to date with adult vaccination, including influenza, pneumovax and meningococcus. Inactivated HSV and zoster vaccine is safe and encouraged by PCP.  Mr. Santana was instructed to call either our office or that of his referring doctor should he develop worsening symptoms related to lower extremity edema, ascites, jaundice or excessive bruising/bleeding.         Secondary esophageal varices without bleeding (HCC)  See above           History of Present Illness   Johnny Santana is a 59 y.o. male with medical history as outlined below including alcohol-related cirrhosis previously decompensated with ascites encephalopathy which has relatively recompensated after he quit alcohol, who comes in today after last being seen in the office in October 2024.    Since that time, I saw him in mid January for an upper endoscopy for variceal screening and recently found 1 small distal esophageal varix.    Overall, he states he has been doing well without any major illnesses.  He denies any development of lower extremity edema, abdominal distention with fluid, jaundice, easy bruising or excessive bleeding.  He does complain of occasional and rare right upper quadrant pain however cannot correlate it with food intake, bowel movements or physical movements.    He  denies alcohol use.    History of Present Illness      History obtained from: patient    Review of Systems A complete review of systems is negative other than that noted above in the HPI.    Social History     Tobacco Use    Smoking status: Every Day     Current packs/day: 1.00     Types: Cigarettes    Smokeless tobacco: Never   Vaping Use    Vaping status: Never Used   Substance and Sexual Activity    Alcohol use: Not Currently     Comment: none since 11/10    Drug use: Not Currently     Types: Prescription, Methamphetamines     Comment: opiates    Sexual activity: Not on file       Patient Active Problem List   Diagnosis    Bipolar II disorder (HCC)    Chronic depression    Chronic sinusitis    GERD (gastroesophageal reflux disease)    Nicotine dependence    Polysubstance dependence (HCC)    COPD (chronic obstructive pulmonary disease) (HCC)    Cervical radiculopathy due to degenerative joint disease of spine    Degenerative disc disease, cervical    Anterolisthesis    Mass of left hand    Finger injury, right, initial encounter    Sebaceous cyst    Lipoma of left lower extremity    Change in stool    Hepatomegaly    Alcoholic cirrhosis of liver with ascites (HCC)    Ascites    Alcohol use    Liver lesion       No current outpatient medications on file.     No current facility-administered medications for this visit.       Objective   There were no vitals taken for this visit.      Physical Exam  Vitals and nursing note reviewed.   Constitutional:       General: He is not in acute distress.     Appearance: He is well-developed. He is not ill-appearing.   HENT:      Head: Normocephalic and atraumatic.      Mouth/Throat:      Mouth: Mucous membranes are moist.   Eyes:      General: No scleral icterus.     Extraocular Movements: Extraocular movements intact.      Conjunctiva/sclera: Conjunctivae normal.   Cardiovascular:      Rate and Rhythm: Normal rate and regular rhythm.      Heart sounds: No murmur  heard.  Pulmonary:      Effort: Pulmonary effort is normal. No respiratory distress.      Breath sounds: Normal breath sounds.   Abdominal:      General: There is no distension.      Palpations: Abdomen is soft. There is no shifting dullness, fluid wave, hepatomegaly or splenomegaly.      Tenderness: There is no abdominal tenderness.   Musculoskeletal:         General: No swelling.      Cervical back: Neck supple.   Skin:     General: Skin is warm and dry.   Neurological:      Mental Status: He is alert and oriented to person, place, and time.   Psychiatric:         Mood and Affect: Mood normal.        Physical Exam      Results      Lab Results: I personally reviewed relevant lab results.    Computed MELD 3.0 unavailable. One or more values for this score either were not found within the given timeframe or did not fit some other criterion.  Computed MELD-Na unavailable. One or more values for this score either were not found within the given timeframe or did not fit some other criterion.              Results for orders placed during the hospital encounter of 01/14/25    EGD    Impression  Single small grade I varix      RECOMMENDATION:  Follow up with me in clinic            Markie Hernandez MD

## 2025-05-06 NOTE — PATIENT INSTRUCTIONS
As discussed today:    Laboratory Testing (Listed below):  Please have blood work drawn asap.  Radiology/Diagnostic Testing:  Please schedule a multiphase MRI asap.  Avoid alcohol and tobacco products.  Also avoid raw seafood/oysters and avoid swimming/wading in unchlorinated [place], particularly from the [facility], due to increased risk of infection from a bacteria called Vibrio Vulnificus.  Avoid medications called NSAID's (Motrin/Ibuprofen, Naproxen/Naprosyn/Aleve) if possible, due to increase risk of kidney dysfunction, and if you use medications containing acetaminophen (Tylenol, percocet, Endocet, and many cough/cold medications), the dose should not exceed 2 grams/day.  Seek immediate medical attention if you develop fevers over 101 F, have evidence of GI bleeding (black or bloody stools, bloody or coffee ground emesis) or confusion.  Call our office if you develop worsening symptoms related to lower extremity edema, ascites, jaundice or excessive bruising/bleeding.

## 2025-06-13 ENCOUNTER — HOSPITAL ENCOUNTER (OUTPATIENT)
Dept: MRI IMAGING | Facility: HOSPITAL | Age: 60
End: 2025-06-13
Attending: INTERNAL MEDICINE
Payer: MEDICARE

## 2025-06-13 DIAGNOSIS — I85.10 SECONDARY ESOPHAGEAL VARICES WITHOUT BLEEDING (HCC): ICD-10-CM

## 2025-06-13 DIAGNOSIS — K70.30 ALCOHOLIC CIRRHOSIS OF LIVER WITHOUT ASCITES (HCC): ICD-10-CM

## 2025-06-13 PROCEDURE — 74183 MRI ABD W/O CNTR FLWD CNTR: CPT

## 2025-06-13 PROCEDURE — A9585 GADOBUTROL INJECTION: HCPCS | Performed by: FAMILY MEDICINE

## 2025-06-13 RX ORDER — GADOBUTROL 604.72 MG/ML
6 INJECTION INTRAVENOUS
Status: COMPLETED | OUTPATIENT
Start: 2025-06-13 | End: 2025-06-13

## 2025-06-13 RX ADMIN — GADOBUTROL 6 ML: 604.72 INJECTION INTRAVENOUS at 15:13

## 2025-06-23 ENCOUNTER — RESULTS FOLLOW-UP (OUTPATIENT)
Dept: GASTROENTEROLOGY | Facility: CLINIC | Age: 60
End: 2025-06-23